# Patient Record
Sex: MALE | Race: WHITE | Employment: OTHER | URBAN - METROPOLITAN AREA
[De-identification: names, ages, dates, MRNs, and addresses within clinical notes are randomized per-mention and may not be internally consistent; named-entity substitution may affect disease eponyms.]

---

## 2017-05-17 PROCEDURE — 82570 ASSAY OF URINE CREATININE: CPT | Performed by: INTERNAL MEDICINE

## 2017-05-17 PROCEDURE — 82043 UR ALBUMIN QUANTITATIVE: CPT | Performed by: INTERNAL MEDICINE

## 2017-05-22 PROBLEM — N40.0 BENIGN NON-NODULAR PROSTATIC HYPERPLASIA WITHOUT LOWER URINARY TRACT SYMPTOMS: Status: ACTIVE | Noted: 2017-05-22

## 2017-08-24 ENCOUNTER — LAB ENCOUNTER (OUTPATIENT)
Dept: LAB | Age: 77
End: 2017-08-24
Attending: PODIATRIST
Payer: MEDICARE

## 2017-08-24 DIAGNOSIS — M10.9 GOUT: Primary | ICD-10-CM

## 2017-08-24 LAB — URIC ACID: 6.5 MG/DL (ref 2.4–8.7)

## 2017-08-24 PROCEDURE — 84550 ASSAY OF BLOOD/URIC ACID: CPT

## 2017-09-21 ENCOUNTER — OFFICE VISIT (OUTPATIENT)
Dept: OTOLARYNGOLOGY | Facility: CLINIC | Age: 77
End: 2017-09-21

## 2017-09-21 VITALS
SYSTOLIC BLOOD PRESSURE: 140 MMHG | WEIGHT: 250 LBS | TEMPERATURE: 97 F | HEIGHT: 70 IN | BODY MASS INDEX: 35.79 KG/M2 | DIASTOLIC BLOOD PRESSURE: 80 MMHG

## 2017-09-21 DIAGNOSIS — H61.23 BILATERAL IMPACTED CERUMEN: Primary | ICD-10-CM

## 2017-09-21 PROCEDURE — 69210 REMOVE IMPACTED EAR WAX UNI: CPT | Performed by: OTOLARYNGOLOGY

## 2017-09-21 PROCEDURE — 99212 OFFICE O/P EST SF 10 MIN: CPT | Performed by: OTOLARYNGOLOGY

## 2017-09-21 PROCEDURE — 99203 OFFICE O/P NEW LOW 30 MIN: CPT | Performed by: OTOLARYNGOLOGY

## 2017-09-21 RX ORDER — INDOMETHACIN 50 MG/1
CAPSULE ORAL
Refills: 1 | COMMUNITY
Start: 2017-08-24 | End: 2017-11-13 | Stop reason: ALTCHOICE

## 2017-09-22 NOTE — PROGRESS NOTES
Katherine Hammond. is a 68year old male. Patient presents with:  Ear Wax: both ears    HPI:   He is trying to get his hearing checked and needs wax removed to be able to do it.      Current Outpatient Prescriptions:  indomethacin 50 MG Oral Cap TAKE ONE CA osteoarthritis knees   • SLEEP APNEA     [facility] fax [phone]      Social History:  Smoking status: Former Smoker                                                              Packs/day: 1.00      Years: 40.00        Quit date: 6/1/2009  Smokeless tob suction. Tympanic membranes were noted to be normal. Patient tolerated the procedure well. All questions were answered. ASSESSMENT AND PLAN:   1. Bilateral impacted cerumen  Cerumen cleared bilaterally.  RTC prn  - REMOVAL IMPACTED CERUMEN REQUIRING INSTR

## 2017-09-22 NOTE — PATIENT INSTRUCTIONS
Earwax Removal    The ear canal makes earwax from the canal’s lining. The ears make wax to lubricate and protect the ear canal. The ear canal is the tube that connects the middle ear to the outside of the ear.  The wax protects the ear from bacteria, infe · Don’t use cotton swabs in your ears. Cotton swabs may push wax deeper into the ear canal or damage the eardrum.  Use cotton gauze or a wet washcloth  to gently remove wax on the outside of the ear and around the opening to the ear canal.  · Don't use any © 9531-4951 [address], [address] [place]. All rights reserved. This information is not intended as a substitute for professional medical care. Always follow your healthcare professional's instructions.

## 2017-11-13 PROBLEM — M1A.00X0 IDIOPATHIC CHRONIC GOUT WITHOUT TOPHUS, UNSPECIFIED SITE: Status: ACTIVE | Noted: 2017-11-13

## 2018-03-18 ENCOUNTER — DIAGNOSTIC TRANS (OUTPATIENT)
Dept: OTHER | Age: 78
End: 2018-03-18

## 2018-03-18 ENCOUNTER — HOSPITAL (OUTPATIENT)
Dept: OTHER | Age: 78
End: 2018-03-18
Attending: HOSPITALIST

## 2018-03-18 LAB
ANALYZER ANC (IANC): ABNORMAL
ANION GAP SERPL CALC-SCNC: 13 MMOL/L (ref 10–20)
BASOPHILS # BLD: 0 THOUSAND/MCL (ref 0–0.3)
BASOPHILS NFR BLD: 0 %
BNP SERPL-MCNC: 42 PG/ML
BUN SERPL-MCNC: 23 MG/DL (ref 6–20)
BUN/CREAT SERPL: 23 (ref 7–25)
CALCIUM SERPL-MCNC: 8.8 MG/DL (ref 8.4–10.2)
CHLORIDE: 105 MMOL/L (ref 98–107)
CO2 SERPL-SCNC: 26 MMOL/L (ref 21–32)
CREAT SERPL-MCNC: 1 MG/DL (ref 0.67–1.17)
DIFFERENTIAL METHOD BLD: ABNORMAL
EOSINOPHIL # BLD: 0.3 THOUSAND/MCL (ref 0.1–0.5)
EOSINOPHIL NFR BLD: 3 %
ERYTHROCYTE [DISTWIDTH] IN BLOOD: 12.8 % (ref 11–15)
GLUCOSE SERPL-MCNC: 174 MG/DL (ref 65–99)
HEMATOCRIT: 38.8 % (ref 39–51)
HGB BLD-MCNC: 13.6 GM/DL (ref 13–17)
LYMPHOCYTES # BLD: 2.4 THOUSAND/MCL (ref 1–4)
LYMPHOCYTES NFR BLD: 25 %
MAGNESIUM SERPL-MCNC: 1.9 MG/DL (ref 1.7–2.4)
MCH RBC QN AUTO: 32.7 PG (ref 26–34)
MCHC RBC AUTO-ENTMCNC: 35.1 GM/DL (ref 32–36.5)
MCV RBC AUTO: 93.3 FL (ref 78–100)
MONOCYTES # BLD: 0.6 THOUSAND/MCL (ref 0.3–0.9)
MONOCYTES NFR BLD: 7 %
NEUTROPHILS # BLD: 6.2 THOUSAND/MCL (ref 1.8–7.7)
NEUTROPHILS NFR BLD: 65 %
NEUTS SEG NFR BLD: ABNORMAL %
PERCENT NRBC: ABNORMAL
PLATELET # BLD: 240 THOUSAND/MCL (ref 140–450)
POTASSIUM SERPL-SCNC: 4.3 MMOL/L (ref 3.4–5.1)
PROCALCITONIN SERPL IA-MCNC: <0.05 NG/ML
RBC # BLD: 4.16 MILLION/MCL (ref 4.5–5.9)
SODIUM SERPL-SCNC: 140 MMOL/L (ref 135–145)
TROPONIN I SERPL HS-MCNC: <0.02 NG/ML
TROPONIN I SERPL HS-MCNC: <0.02 NG/ML
WBC # BLD: 9.5 THOUSAND/MCL (ref 4.2–11)

## 2018-03-19 ENCOUNTER — CHARTING TRANS (OUTPATIENT)
Dept: OTHER | Age: 78
End: 2018-03-19

## 2018-03-19 PROBLEM — J43.2 CENTRILOBULAR EMPHYSEMA (HCC): Status: ACTIVE | Noted: 2018-03-19

## 2018-03-19 PROBLEM — I70.0 THORACIC AORTIC ATHEROSCLEROSIS (HCC): Status: ACTIVE | Noted: 2018-03-19

## 2018-03-19 PROBLEM — I70.0 THORACIC AORTIC ATHEROSCLEROSIS: Status: ACTIVE | Noted: 2018-03-19

## 2018-03-19 PROBLEM — J84.9 INTERSTITIAL LUNG DISEASE (HCC): Status: ACTIVE | Noted: 2018-03-19

## 2018-03-19 LAB
CHOLEST SERPL-MCNC: 140 MG/DL
CHOLEST/HDLC SERPL: 3.6 {RATIO}
HDLC SERPL-MCNC: 39 MG/DL
LDLC SERPL CALC-MCNC: 91 MG/DL
NONHDLC SERPL-MCNC: 101 MG/DL
TRIGLYCERIDE (TRIGP): 52 MG/DL
TROPONIN I SERPL HS-MCNC: <0.02 NG/ML
TROPONIN I SERPL HS-MCNC: <0.02 NG/ML

## 2018-03-20 PROBLEM — R94.30 CARDIAC LV EJECTION FRACTION >40%: Status: ACTIVE | Noted: 2018-03-20

## 2018-03-20 PROBLEM — IMO0002 CARDIAC LV EJECTION FRACTION >40%: Status: ACTIVE | Noted: 2018-03-20

## 2018-03-20 PROBLEM — I25.10 CORONARY ARTERY DISEASE INVOLVING NATIVE CORONARY ARTERY OF NATIVE HEART WITHOUT ANGINA PECTORIS: Status: ACTIVE | Noted: 2018-03-20

## 2018-03-20 PROBLEM — Z95.5 PRESENCE OF DRUG COATED STENT IN LAD CORONARY ARTERY: Status: ACTIVE | Noted: 2018-03-20

## 2018-03-20 LAB
BUN SERPL-MCNC: 23 MG/DL (ref 6–20)
BUN/CREAT SERPL: 28 (ref 7–25)
CREAT SERPL-MCNC: 0.83 MG/DL (ref 0.67–1.17)

## 2018-03-28 PROBLEM — E78.2 MIXED HYPERLIPIDEMIA: Status: ACTIVE | Noted: 2018-03-28

## 2018-03-28 PROBLEM — Z72.0 TOBACCO ABUSE: Status: RESOLVED | Noted: 2018-03-28 | Resolved: 2018-03-28

## 2018-03-28 PROBLEM — Z72.0 TOBACCO ABUSE: Status: ACTIVE | Noted: 2018-03-28

## 2018-04-12 ENCOUNTER — HOSPITAL (OUTPATIENT)
Dept: OTHER | Age: 78
End: 2018-04-12
Attending: INTERNAL MEDICINE

## 2018-05-01 ENCOUNTER — HOSPITAL (OUTPATIENT)
Dept: OTHER | Age: 78
End: 2018-05-01
Attending: INTERNAL MEDICINE

## 2018-05-15 PROBLEM — E78.2 MIXED HYPERLIPIDEMIA: Status: RESOLVED | Noted: 2018-03-28 | Resolved: 2018-05-15

## 2018-05-15 PROBLEM — M47.812 FACET ARTHROPATHY, CERVICAL: Status: ACTIVE | Noted: 2018-05-15

## 2018-06-01 ENCOUNTER — HOSPITAL (OUTPATIENT)
Dept: OTHER | Age: 78
End: 2018-06-01
Attending: INTERNAL MEDICINE

## 2018-06-11 PROBLEM — M47.812 SPONDYLOSIS OF CERVICAL REGION WITHOUT MYELOPATHY OR RADICULOPATHY: Status: ACTIVE | Noted: 2018-06-11

## 2018-07-01 ENCOUNTER — HOSPITAL (OUTPATIENT)
Dept: OTHER | Age: 78
End: 2018-07-01
Attending: INTERNAL MEDICINE

## 2018-08-01 ENCOUNTER — HOSPITAL (OUTPATIENT)
Dept: OTHER | Age: 78
End: 2018-08-01
Attending: INTERNAL MEDICINE

## 2019-04-08 PROCEDURE — 81003 URINALYSIS AUTO W/O SCOPE: CPT | Performed by: INTERNAL MEDICINE

## 2019-07-09 ENCOUNTER — OFFICE VISIT (OUTPATIENT)
Dept: OTOLARYNGOLOGY | Facility: CLINIC | Age: 79
End: 2019-07-09
Payer: MEDICARE

## 2019-07-09 VITALS
WEIGHT: 230 LBS | DIASTOLIC BLOOD PRESSURE: 66 MMHG | HEIGHT: 70 IN | BODY MASS INDEX: 32.93 KG/M2 | SYSTOLIC BLOOD PRESSURE: 132 MMHG | TEMPERATURE: 98 F

## 2019-07-09 DIAGNOSIS — H61.23 BILATERAL IMPACTED CERUMEN: Primary | ICD-10-CM

## 2019-07-09 PROCEDURE — 69210 REMOVE IMPACTED EAR WAX UNI: CPT | Performed by: OTOLARYNGOLOGY

## 2019-07-10 NOTE — PROGRESS NOTES
Dario Bai is a 78year old male. Patient presents with:  Cerumen Impaction: Bilateral ear cleaning     HPI:   Is ears feel as though they are blocked with wax once again.     Current Outpatient Medications:  Metoprolol Succinate  MG Oral Tabl Rfl:    AMLODIPINE BESYLATE 10 MG Oral Tab TAKE 1 TABLET DAILY Disp: 90 tablet Rfl: 2      Past Medical History:   Diagnosis Date   • CAD involving native coronary artery of native heart without angina pectoris 3/20/2018   • Cardiac LV ejection fraction 5 Turbinates - Normal   Neurological Normal Memory - Normal. Cranial nerves - Cranial nerves II through XII grossly intact.    Neck Exam     Psychiatric     Lymph Detail     Eyes     Ears Normal Inspection - Right: Normal, Left: Normal. Canal - Left: Normal.

## 2019-11-15 ENCOUNTER — OFFICE VISIT (OUTPATIENT)
Dept: OTOLARYNGOLOGY | Facility: CLINIC | Age: 79
End: 2019-11-15
Payer: MEDICARE

## 2019-11-15 VITALS
WEIGHT: 230 LBS | SYSTOLIC BLOOD PRESSURE: 120 MMHG | TEMPERATURE: 97 F | BODY MASS INDEX: 32.93 KG/M2 | HEIGHT: 70 IN | DIASTOLIC BLOOD PRESSURE: 75 MMHG

## 2019-11-15 DIAGNOSIS — H61.23 BILATERAL IMPACTED CERUMEN: Primary | ICD-10-CM

## 2019-11-15 PROCEDURE — 69210 REMOVE IMPACTED EAR WAX UNI: CPT | Performed by: OTOLARYNGOLOGY

## 2019-11-15 NOTE — PROGRESS NOTES
Milan Noe. is a 78year old male.  Patient presents with:  Ear Problem: Bilateral ear wax removal     HPI:   Ears are blocked with wax again  Current Outpatient Medications   Medication Sig Dispense Refill   • PANTOPRAZOLE SODIUM 40 MG Oral Tab EC T Medical History:   Diagnosis Date   • CAD involving native coronary artery of native heart without angina pectoris 3/20/2018   • Cardiac LV ejection fraction 50-55% per 2018 angio  3/20/2018   • Centrilobular emphysema ([address] 3/19/2018   • Diabetes mellitus Normal Inspection - Right: Normal, Left: Normal. Canal - Left: Normal. TM - Right: Normal, Left: Normal.   Cerumen impaction bilaterally     Procedure:  After informed consent was obtained, the patients ears were examined under the operating microscope.  Ce

## 2020-05-25 PROBLEM — R94.30 CARDIAC LV EJECTION FRACTION >40%: Status: RESOLVED | Noted: 2018-03-20 | Resolved: 2020-05-25

## 2020-05-25 PROBLEM — I25.10 CORONARY ARTERY DISEASE INVOLVING NATIVE CORONARY ARTERY OF NATIVE HEART WITHOUT ANGINA PECTORIS: Status: RESOLVED | Noted: 2018-03-20 | Resolved: 2020-05-25

## 2020-05-25 PROBLEM — M47.812 FACET ARTHRITIS OF CERVICAL REGION: Status: ACTIVE | Noted: 2017-11-13

## 2020-05-25 PROBLEM — IMO0002 CARDIAC LV EJECTION FRACTION >40%: Status: RESOLVED | Noted: 2018-03-20 | Resolved: 2020-05-25

## 2020-07-21 PROBLEM — Z95.5 HISTORY OF CORONARY ARTERY STENT PLACEMENT: Status: ACTIVE | Noted: 2018-03-20

## 2020-07-21 PROBLEM — I25.10 CAD IN NATIVE ARTERY: Status: ACTIVE | Noted: 2018-03-20

## 2020-08-11 ENCOUNTER — OFFICE VISIT (OUTPATIENT)
Dept: OTOLARYNGOLOGY | Facility: CLINIC | Age: 80
End: 2020-08-11
Payer: MEDICARE

## 2020-08-11 VITALS
BODY MASS INDEX: 33 KG/M2 | WEIGHT: 230 LBS | HEART RATE: 70 BPM | DIASTOLIC BLOOD PRESSURE: 61 MMHG | SYSTOLIC BLOOD PRESSURE: 113 MMHG

## 2020-08-11 DIAGNOSIS — H61.23 BILATERAL IMPACTED CERUMEN: Primary | ICD-10-CM

## 2020-08-11 PROCEDURE — 69210 REMOVE IMPACTED EAR WAX UNI: CPT | Performed by: OTOLARYNGOLOGY

## 2020-08-11 NOTE — PROGRESS NOTES
Martina Nesbitt is a [de-identified]year old male. Patient presents with:  Ear Wax    HPI:   Ears are feeling like they are blocked with wax. He got heairing aids within the last six months.    Current Outpatient Medications   Medication Sig Dispense Refill   • Pant OSTEOARTHRITIS     osteoarthritis knees   • Presence of drug coated stent in prox & Mid LAD coronary artery 03/19/2018 3/20/2018    2.75 x 15 mm Xcience drug-eluting stent into the mid LAD 3.5 x 12 mm Xcience drug-eluting stent into the proximal LAD   • SL patient indicates understanding of these issues and agrees to the plan. Nehemias Abreu MD  8/11/2020  10:07 AM

## 2020-12-11 PROBLEM — R73.03 BORDERLINE DIABETES MELLITUS: Status: ACTIVE | Noted: 2020-12-11

## 2020-12-11 PROBLEM — E78.6 ELEVATED RATIO OF CHOLESTEROL TO HIGH DENSITY LIPOPROTEIN (HDL): Status: ACTIVE | Noted: 2020-12-11

## 2020-12-11 PROBLEM — K44.9 HIATAL HERNIA: Status: ACTIVE | Noted: 2020-12-11

## 2020-12-11 PROBLEM — I10 HYPERTENSION: Status: ACTIVE | Noted: 2020-12-11

## 2020-12-25 ENCOUNTER — APPOINTMENT (OUTPATIENT)
Dept: CT IMAGING | Age: 80
End: 2020-12-25
Attending: EMERGENCY MEDICINE

## 2020-12-25 ENCOUNTER — HOSPITAL ENCOUNTER (EMERGENCY)
Age: 80
Discharge: HOME | End: 2020-12-25
Attending: EMERGENCY MEDICINE

## 2020-12-25 ENCOUNTER — APPOINTMENT (OUTPATIENT)
Dept: GENERAL RADIOLOGY | Age: 80
End: 2020-12-25
Attending: EMERGENCY MEDICINE

## 2020-12-25 VITALS
HEIGHT: 70 IN | SYSTOLIC BLOOD PRESSURE: 130 MMHG | WEIGHT: 230.82 LBS | OXYGEN SATURATION: 93 % | HEART RATE: 71 BPM | DIASTOLIC BLOOD PRESSURE: 68 MMHG | RESPIRATION RATE: 20 BRPM | BODY MASS INDEX: 33.05 KG/M2 | TEMPERATURE: 97.9 F

## 2020-12-25 DIAGNOSIS — U07.1 COVID-19 VIRUS INFECTION: Primary | ICD-10-CM

## 2020-12-25 LAB
ALBUMIN SERPL-MCNC: 3.2 G/DL (ref 3.6–5.1)
ALBUMIN/GLOB SERPL: 0.7 {RATIO} (ref 1–2.4)
ALP SERPL-CCNC: 99 UNITS/L (ref 45–117)
ALT SERPL-CCNC: 15 UNITS/L
ANION GAP SERPL CALC-SCNC: 8 MMOL/L (ref 10–20)
AST SERPL-CCNC: 23 UNITS/L
BASOPHILS # BLD: 0 K/MCL (ref 0–0.3)
BASOPHILS NFR BLD: 0 %
BILIRUB SERPL-MCNC: 0.4 MG/DL (ref 0.2–1)
BUN SERPL-MCNC: 18 MG/DL (ref 6–20)
BUN/CREAT SERPL: 20 (ref 7–25)
CALCIUM SERPL-MCNC: 8.3 MG/DL (ref 8.4–10.2)
CHLORIDE SERPL-SCNC: 109 MMOL/L (ref 98–107)
CO2 SERPL-SCNC: 26 MMOL/L (ref 21–32)
CREAT SERPL-MCNC: 0.9 MG/DL (ref 0.67–1.17)
D DIMER PPP FEU-MCNC: 1.05 MG/L (FEU)
DEPRECATED RDW RBC: 42.8 FL (ref 39–50)
EOSINOPHIL # BLD: 0.1 K/MCL (ref 0–0.5)
EOSINOPHIL NFR BLD: 2 %
ERYTHROCYTE [DISTWIDTH] IN BLOOD: 12.4 % (ref 11–15)
FASTING DURATION TIME PATIENT: ABNORMAL H
GFR SERPLBLD BASED ON 1.73 SQ M-ARVRAT: 80 ML/MIN/1.73M2
GLOBULIN SER-MCNC: 4.6 G/DL (ref 2–4)
GLUCOSE SERPL-MCNC: 114 MG/DL (ref 65–99)
HCT VFR BLD CALC: 37 % (ref 39–51)
HGB BLD-MCNC: 12.4 G/DL (ref 13–17)
IMM GRANULOCYTES # BLD AUTO: 0.1 K/MCL (ref 0–0.2)
IMM GRANULOCYTES # BLD: 1 %
LYMPHOCYTES # BLD: 1.2 K/MCL (ref 1–4)
LYMPHOCYTES NFR BLD: 19 %
MCH RBC QN AUTO: 31.5 PG (ref 26–34)
MCHC RBC AUTO-ENTMCNC: 33.5 G/DL (ref 32–36.5)
MCV RBC AUTO: 93.9 FL (ref 78–100)
MONOCYTES # BLD: 0.9 K/MCL (ref 0.3–0.9)
MONOCYTES NFR BLD: 13 %
NEUTROPHILS # BLD: 4.4 K/MCL (ref 1.8–7.7)
NEUTROPHILS NFR BLD: 65 %
NRBC BLD MANUAL-RTO: 0 /100 WBC
PLATELET # BLD AUTO: 187 K/MCL (ref 140–450)
POTASSIUM SERPL-SCNC: 3.8 MMOL/L (ref 3.4–5.1)
PROT SERPL-MCNC: 7.8 G/DL (ref 6.4–8.2)
RAINBOW EXTRA TUBES HOLD SPECIMEN: NORMAL
RBC # BLD: 3.94 MIL/MCL (ref 4.5–5.9)
SODIUM SERPL-SCNC: 139 MMOL/L (ref 135–145)
TROPONIN I SERPL HS-MCNC: <0.02 NG/ML
WBC # BLD: 6.7 K/MCL (ref 4.2–11)

## 2020-12-25 PROCEDURE — 93005 ELECTROCARDIOGRAM TRACING: CPT | Performed by: EMERGENCY MEDICINE

## 2020-12-25 PROCEDURE — 36415 COLL VENOUS BLD VENIPUNCTURE: CPT

## 2020-12-25 PROCEDURE — 85379 FIBRIN DEGRADATION QUANT: CPT | Performed by: EMERGENCY MEDICINE

## 2020-12-25 PROCEDURE — 71275 CT ANGIOGRAPHY CHEST: CPT

## 2020-12-25 PROCEDURE — 84484 ASSAY OF TROPONIN QUANT: CPT | Performed by: EMERGENCY MEDICINE

## 2020-12-25 PROCEDURE — 71045 X-RAY EXAM CHEST 1 VIEW: CPT

## 2020-12-25 PROCEDURE — 80053 COMPREHEN METABOLIC PANEL: CPT | Performed by: EMERGENCY MEDICINE

## 2020-12-25 PROCEDURE — 85025 COMPLETE CBC W/AUTO DIFF WBC: CPT | Performed by: EMERGENCY MEDICINE

## 2020-12-25 PROCEDURE — 99285 EMERGENCY DEPT VISIT HI MDM: CPT

## 2020-12-25 PROCEDURE — 10002805 HB CONTRAST AGENT: Performed by: EMERGENCY MEDICINE

## 2020-12-25 RX ADMIN — IOHEXOL 100 ML: 350 INJECTION, SOLUTION INTRAVENOUS at 14:30

## 2020-12-25 SDOH — HEALTH STABILITY: MENTAL HEALTH: HOW OFTEN DO YOU HAVE A DRINK CONTAINING ALCOHOL?: NEVER

## 2020-12-25 ASSESSMENT — PAIN SCALES - GENERAL: PAINLEVEL_OUTOF10: 0

## 2020-12-27 LAB
ATRIAL RATE (BPM): 65
P AXIS (DEGREES): 46
PR-INTERVAL (MSEC): 142
QRS-INTERVAL (MSEC): 98
QT-INTERVAL (MSEC): 418
QTC: 435
R AXIS (DEGREES): 18
REPORT TEXT: NORMAL
T AXIS (DEGREES): 54
VENTRICULAR RATE EKG/MIN (BPM): 65

## 2020-12-28 ENCOUNTER — TELEPHONE (OUTPATIENT)
Dept: SCHEDULING | Age: 80
End: 2020-12-28

## 2020-12-30 ENCOUNTER — TELEPHONE (OUTPATIENT)
Dept: SCHEDULING | Age: 80
End: 2020-12-30

## 2021-01-25 ENCOUNTER — OFFICE VISIT (OUTPATIENT)
Dept: OTOLARYNGOLOGY | Facility: CLINIC | Age: 81
End: 2021-01-25
Payer: MEDICARE

## 2021-01-25 VITALS
TEMPERATURE: 98 F | HEIGHT: 70 IN | DIASTOLIC BLOOD PRESSURE: 70 MMHG | BODY MASS INDEX: 33.55 KG/M2 | SYSTOLIC BLOOD PRESSURE: 122 MMHG | WEIGHT: 234.38 LBS

## 2021-01-25 DIAGNOSIS — H61.23 BILATERAL IMPACTED CERUMEN: Primary | ICD-10-CM

## 2021-01-25 PROCEDURE — 69210 REMOVE IMPACTED EAR WAX UNI: CPT | Performed by: OTOLARYNGOLOGY

## 2021-01-25 NOTE — PROGRESS NOTES
Xenia Fuentes. is a [de-identified]year old male.  Patient presents with:  Ear Wax: both ears    HPI:   Is ears are blocked with wax and his hearing aids are not working as well  Current Outpatient Medications   Medication Sig Dispense Refill   • Metoprolol [facility] • OSTEOARTHRITIS     osteoarthritis knees   • Presence of drug coated stent in prox & Mid LAD coronary artery 03/19/2018 3/20/2018    2.75 x 15 mm Xcience drug-eluting stent into the mid LAD 3.5 x 12 mm Xcience drug-eluting stent into the proximal LAD ears.  Return as needed      The patient indicates understanding of these issues and agrees to the plan. Nehemias Ramos MD  1/25/2021  3:59 PM

## 2021-04-14 ENCOUNTER — IMMUNIZATION (OUTPATIENT)
Dept: LAB | Age: 81
End: 2021-04-14

## 2021-04-14 DIAGNOSIS — Z23 NEED FOR VACCINATION: Primary | ICD-10-CM

## 2021-04-14 PROCEDURE — 91301 COVID-19 MODERNA VACCINE: CPT

## 2021-04-14 PROCEDURE — 0011A COVID-19 MODERNA VACCINE: CPT

## 2021-05-18 ENCOUNTER — IMMUNIZATION (OUTPATIENT)
Dept: LAB | Age: 81
End: 2021-05-18
Attending: HOSPITALIST

## 2021-05-18 DIAGNOSIS — Z23 NEED FOR VACCINATION: Primary | ICD-10-CM

## 2021-05-18 PROCEDURE — 0012A COVID-19 MODERNA VACCINE: CPT

## 2021-05-18 PROCEDURE — 91301 COVID-19 MODERNA VACCINE: CPT

## 2021-07-07 PROBLEM — Z95.5 HISTORY OF CORONARY ARTERY STENT PLACEMENT: Status: RESOLVED | Noted: 2018-03-20 | Resolved: 2021-07-07

## 2021-07-07 PROBLEM — I10 HYPERTENSION: Status: RESOLVED | Noted: 2020-12-11 | Resolved: 2021-07-07

## 2021-07-07 PROBLEM — K44.9 HIATAL HERNIA: Status: RESOLVED | Noted: 2020-12-11 | Resolved: 2021-07-07

## 2021-07-07 PROBLEM — R73.03 BORDERLINE DIABETES MELLITUS: Status: RESOLVED | Noted: 2020-12-11 | Resolved: 2021-07-07

## 2021-07-27 ENCOUNTER — OFFICE VISIT (OUTPATIENT)
Dept: OTOLARYNGOLOGY | Facility: CLINIC | Age: 81
End: 2021-07-27
Payer: MEDICARE

## 2021-07-27 VITALS — DIASTOLIC BLOOD PRESSURE: 67 MMHG | TEMPERATURE: 97 F | SYSTOLIC BLOOD PRESSURE: 131 MMHG

## 2021-07-27 DIAGNOSIS — H61.23 BILATERAL IMPACTED CERUMEN: Primary | ICD-10-CM

## 2021-07-27 PROCEDURE — 69210 REMOVE IMPACTED EAR WAX UNI: CPT | Performed by: OTOLARYNGOLOGY

## 2021-07-27 NOTE — PROGRESS NOTES
Sophia Lopez. is a 80year old male.  Patient presents with:  Cerumen Impaction: patient is here for ear cleaning    HPI:   Is ears are blocked with wax and he cannot hear very well  Current Outpatient Medications   Medication Sig Dispense Refill   • o drug-eluting stent into the proximal LAD   • SLEEP APNEA     [facility] fax [phone]      Social History:  Social History    Tobacco Use      Smoking status: Former Smoker        Packs/day: 1.00        Years: 40.00        Pack years: 36        Quit date these issues and agrees to the plan. Nehemias Abreu MD  7/27/2021  9:08 AM

## 2021-12-10 PROBLEM — M46.92 UNSPECIFIED INFLAMMATORY SPONDYLOPATHY, CERVICAL REGION: Status: ACTIVE | Noted: 2021-12-10

## 2021-12-10 PROBLEM — M46.92 UNSPECIFIED INFLAMMATORY SPONDYLOPATHY, CERVICAL REGION (HCC): Status: ACTIVE | Noted: 2021-12-10

## 2022-01-28 ENCOUNTER — OFFICE VISIT (OUTPATIENT)
Dept: OTOLARYNGOLOGY | Facility: CLINIC | Age: 82
End: 2022-01-28
Payer: MEDICARE

## 2022-01-28 VITALS — WEIGHT: 225 LBS | BODY MASS INDEX: 32.21 KG/M2 | HEIGHT: 70 IN

## 2022-01-28 DIAGNOSIS — H61.23 BILATERAL IMPACTED CERUMEN: Primary | ICD-10-CM

## 2022-01-28 PROCEDURE — 99212 OFFICE O/P EST SF 10 MIN: CPT | Performed by: OTOLARYNGOLOGY

## 2022-01-28 NOTE — PROGRESS NOTES
Anshul Hough. is a 80year old male. Patient presents with:  Cerumen Impaction: Patient Presents with: Bilateral ear wax Impaction       HISTORY OF PRESENT ILLNESS    He presents with decreased hearing from his ears.   History of seeing SVT in the pas anesthesia/anoscopy. Incision and drainage of perirectal abscess. Placement of seton . Excision of anal tags. Destruction of internal hemorrhoids. Surgery done at [facility] on 3/12/10.    • OTHER SURGICAL HISTORY      left knee arthroscopy   • PATIENT DOCUMENTED Head/Face Normal Facial features - Normal. Eyebrows - Normal. Skull - Normal.             Ears Normal Inspection - Right: Normal, Left: Normal. Canal - Right: Normal, Left: Normal. TM - Right: Normal, Left: Normal.   Skin Normal Inspection - Normal. (two) times daily before meals. , Disp: 1 kit, Rfl: 0  •  aspirin 81 MG Oral Chew Tab, Chew 81 mg by mouth daily. , Disp: , Rfl:   •  Omega-3 Fatty Acids (FISH OIL) 1000 MG Oral Cap, ONE DAILY, Disp: , Rfl:   •  Multiple Vitamin (MULTI VITAMIN MENS OR), ONE

## 2022-02-15 PROBLEM — Z95.5 HISTORY OF CORONARY ARTERY STENT PLACEMENT: Status: RESOLVED | Noted: 2018-03-20 | Resolved: 2022-02-15

## 2022-05-24 ENCOUNTER — OFFICE VISIT (OUTPATIENT)
Dept: OTOLARYNGOLOGY | Facility: CLINIC | Age: 82
End: 2022-05-24
Payer: MEDICARE

## 2022-05-24 VITALS — TEMPERATURE: 97 F | BODY MASS INDEX: 32.93 KG/M2 | HEIGHT: 70 IN | WEIGHT: 230 LBS

## 2022-05-24 DIAGNOSIS — H61.23 BILATERAL IMPACTED CERUMEN: Primary | ICD-10-CM

## 2022-05-24 PROCEDURE — 69210 REMOVE IMPACTED EAR WAX UNI: CPT | Performed by: OTOLARYNGOLOGY

## 2022-10-11 ENCOUNTER — OFFICE VISIT (OUTPATIENT)
Dept: OTOLARYNGOLOGY | Facility: CLINIC | Age: 82
End: 2022-10-11
Payer: MEDICARE

## 2022-10-11 DIAGNOSIS — H61.23 BILATERAL IMPACTED CERUMEN: Primary | ICD-10-CM

## 2022-10-11 PROCEDURE — 69210 REMOVE IMPACTED EAR WAX UNI: CPT | Performed by: OTOLARYNGOLOGY

## 2023-02-14 ENCOUNTER — OFFICE VISIT (OUTPATIENT)
Dept: OTOLARYNGOLOGY | Facility: CLINIC | Age: 83
End: 2023-02-14

## 2023-02-14 DIAGNOSIS — H61.23 BILATERAL IMPACTED CERUMEN: Primary | ICD-10-CM

## 2023-02-14 PROCEDURE — 69210 REMOVE IMPACTED EAR WAX UNI: CPT | Performed by: OTOLARYNGOLOGY

## 2023-02-14 RX ORDER — NIFEDIPINE 30 MG/1
30 TABLET, FILM COATED, EXTENDED RELEASE ORAL DAILY
COMMUNITY
Start: 2023-02-01

## 2023-02-14 RX ORDER — OMEPRAZOLE 20 MG/1
20 CAPSULE, DELAYED RELEASE ORAL DAILY
COMMUNITY
Start: 2023-01-27

## 2023-02-14 RX ORDER — LOSARTAN POTASSIUM AND HYDROCHLOROTHIAZIDE 12.5; 1 MG/1; MG/1
1 TABLET ORAL DAILY
COMMUNITY
Start: 2023-01-27

## 2023-05-30 ENCOUNTER — OFFICE VISIT (OUTPATIENT)
Dept: OTOLARYNGOLOGY | Facility: CLINIC | Age: 83
End: 2023-05-30

## 2023-05-30 VITALS — BODY MASS INDEX: 33 KG/M2 | WEIGHT: 230 LBS | TEMPERATURE: 98 F

## 2023-05-30 DIAGNOSIS — H61.23 BILATERAL IMPACTED CERUMEN: Primary | ICD-10-CM

## 2023-05-30 PROCEDURE — 69210 REMOVE IMPACTED EAR WAX UNI: CPT | Performed by: OTOLARYNGOLOGY

## 2023-05-30 RX ORDER — METHYLPREDNISOLONE 4 MG/1
1 TABLET ORAL AS DIRECTED
COMMUNITY
Start: 2023-04-26

## 2023-05-30 RX ORDER — BENZONATATE 200 MG/1
200 CAPSULE ORAL 3 TIMES DAILY PRN
COMMUNITY
Start: 2023-04-26

## 2023-06-30 NOTE — LETTER
[facility]     PICC INSERTION CONSENT     I agree to have a Peripherally Inserted Central Catheter (PICC) placed in my arm.   1. The PICC insertion procedure, care, maintenance, risks, benefits, and complications have been explained to me by my physician, ________________________, and I understand them.   2. I understand that this may not be the only way I can receive my medication. I understand that my physician has determined that the PICC would be the safest and most effective means of administering my medication at this time. If there are other options of giving medication into my veins those options have been explained to me by my physician and I have chosen this one.   3. I realize a nurse who has been specially trained and certified by the hospital and ’s representative to insert a PICC will perform this procedure. My catheter will be inserted by _____________________________.   4. I have been informed by my doctor of the nature and purpose of this procedure and the risks involved and the possibility of complications. I realize that this is an invasive procedure and has certain risks such as air embolism (air entering the catheter or my vein), arterial puncture (a tearing of one of my arteries), infection, irregular heartbeat and venous thrombosis (a blood clot in a vein) nerve injury and fracture of the catheter with or without migration.   5. In order to numb the area where the line will be placed, a small amount of anesthetic medication will be injected as ordered by my physician.   6. I understand that while the catheter will be placed in my upper arm the end of the catheter will come to rest in an area near my heart.     7. The person performing this procedure has discussed the potential benefits, risks, and side effects of the PICC; the likelihood of achieving goals; and potential problems that might occur during recuperation. They also discussed reasonable alternatives to  the PICC, including risks, benefits, and side effects related to the alternatives and risks related to not receiving this procedure.    8.  I have expressed any questions about this procedure to my physician or the PICC Proceduralist and he/she has answered them.  I certify that I have read and understand this consent to the insertion of a PICC.      _________________________________________________________   Date     Time     Patient/Guardian Signature       ____________________________________   Printed name of Patient/Guardian          ________________________________________________________________    Date        Time                   Witnessing RN Signature      Patient Name: Raul GALVNA Abdiaziz Hadadd     : 1940                 Printed: 2024     Medical Record #: [id number]   [Negative] : Genitourinary

## 2023-10-24 ENCOUNTER — OFFICE VISIT (OUTPATIENT)
Dept: OTOLARYNGOLOGY | Facility: CLINIC | Age: 83
End: 2023-10-24

## 2023-10-24 DIAGNOSIS — H61.23 BILATERAL IMPACTED CERUMEN: Primary | ICD-10-CM

## 2023-10-24 PROCEDURE — 69210 REMOVE IMPACTED EAR WAX UNI: CPT | Performed by: OTOLARYNGOLOGY

## 2023-10-24 RX ORDER — SILDENAFIL 50 MG/1
50 TABLET, FILM COATED ORAL
COMMUNITY
Start: 2023-09-05

## 2023-10-24 NOTE — PROGRESS NOTES
Alison Hernandez is a 80year old male.   Patient presents with:  Ear Wax: Patient here for routine ear cleaning        HISTORY OF PRESENT ILLNESS    Patient presents for cerumen removal. No other complaints or concerns at this time    Social History    Socioeconomic History      Marital status:     Tobacco Use      Smoking status: Former        Packs/day: 1.00        Years: 40.00        Additional pack years: 0.00        Total pack years: 40.00        Types: Cigarettes        Quit date: 2009        Years since quittin.4      Smokeless tobacco: Never      Tobacco comments: has been a 3 ppd    Vaping Use      Vaping Use: Never used    Substance and Sexual Activity      Alcohol use: Yes        Comment: occasional intake      Drug use: No      Family History   Problem Relation Age of Onset    Cancer Mother         lung    Obesity Son     Obesity Sister     Lipids Sister     Obesity Son        Past Medical History:   Diagnosis Date    Abdominal aortic aneurysm (AAA) 3.0 cm to 5.0 cm in diameter in female [facility])     CAD involving native coronary artery of native heart without angina pectoris 3/20/2018    Cardiac LV ejection fraction 50-55% per 2018 angio  3/20/2018    Centrilobular emphysema ([address] 3/19/2018    Coronary atherosclerosis     COVID 2020    Diabetes mellitus ([address]     Gastric ulcer 2008    Healed     GOUT     High blood pressure     High cholesterol     Hypercholesterolemia     HYPERLIPIDEMIA     HYPERTENSION     Hypertension     OBESITY     OSTEOARTHRITIS     osteoarthritis knees    Prediabetes     Presence of drug coated stent in prox & Mid LAD coronary artery 2018 3/20/2018    2.75 x 15 mm Xcience drug-eluting stent into the mid LAD 3.5 x 12 mm Xcience drug-eluting stent into the proximal LAD    Pulmonary emphysema ([address]     SLEEP APNEA     [facility] fax [phone]    Sleep apnea        Past Surgical History:   Procedure Laterality Date    CATH BARE METAL STENT (BMS) COLONOSCOPY  2009= Declines repeat    1285, complicated by anal fissure    COLONOSCOPY      COLONOSCOPY  03/2022    one small TA, int hem, can consider d/cing surveillance    COLONOSCOPY N/A 2/28/2022    Procedure: COLONOSCOPY,  with polypectomy;  Surgeon: Abelardo Lyn MD;  Location: [facility]  Dr Melina Bird 3/12/10 cdh     Rectal exam under anesthesia/anoscopy. Incision and drainage of perirectal abscess. Placement of seton . Excision of anal tags. Destruction of internal hemorrhoids. Surgery done at [facility] on 3/12/10. OTHER SURGICAL HISTORY      left knee arthroscopy    PATIENT DOCUMENTED NOT TO HAVE EXPERIENCED ANY OF THE FOLLOWING EVENTS N/A 2/18/2015    Procedure: ESOPHAGOGASTRODUODENOSCOPY, POSSIBLE BIOPSY, POSSIBLE POLYPECTOMY 41607;  Surgeon: Smooth Wray MD;  Location: [address] [place] [address] IV ANTIBIOTIC SURGICAL SITE INFECTION PROPHYLAXIS. N/A 2/18/2015    Procedure: ESOPHAGOGASTRODUODENOSCOPY, POSSIBLE BIOPSY, POSSIBLE POLYPECTOMY 90147;  Surgeon: Smooth Wray MD;  Location: [place] [address] GI ENDOSCOPY PERFORMED  2008, 2/27/2009    UPPER GI ENDOSCOPY,BIOPSY  2/18/15=Gastritis. H pylori negative, normal SB Bx    UPPER GI ENDOSCOPY,BIOPSY N/A 2/18/2015    Procedure: ESOPHAGOGASTRODUODENOSCOPY, POSSIBLE BIOPSY, POSSIBLE POLYPECTOMY 54150;  Surgeon: Smooth Wray MD;  Location: [address] [place] [address]    UPPER GI ENDOSCOPY,EXAM         REVIEW OF SYSTEMS    System Neg/Pos Details   Constitutional Negative Fatigue, fever and weight loss. ENMT Negative Drooling. Eyes Negative Blurred vision and vision changes. Respiratory Negative Dyspnea and wheezing. Cardio Negative Chest pain, irregular heartbeat/palpitations and syncope. GI Negative Abdominal pain and diarrhea. Endocrine Negative Cold intolerance and heat intolerance. Neuro Negative Tremors.    Psych Negative Anxiety and depression. Integumentary Negative Frequent skin infections, pigment change and rash. Hema/Lymph Negative Easy bleeding and easy bruising. PHYSICAL EXAM    There were no vitals taken for this visit. Constitutional Normal Overall appearance - Normal.        Neck Exam Normal Inspection - Normal. Palpation - Normal. Parotid gland - Normal. Thyroid gland - Normal.             Head/Face Normal Facial features - Normal. Eyebrows - Normal. Skull - Normal.             Ears Normal Inspection - Right: Normal, Left: Normal. Canal - Right: Normal, Left: Normal. TM - Right: Normal, Left: Normal.   Skin Normal Inspection - Normal.                              Canals:  Right: Canal reveals cerumen impaction,   Left: Canal reveals cerumen impaction,     Tympanic Membranes:  Right: Normal tympanic membrane. Left: Normal tympanic membrane. TM Visualized Method:   Right TM examined via otomicroscopy. Left TM examined via otomicroscopy. PROCEDURE:    Removal of cerumen impaction   The cerumen impaction was completely removed using microscopy. Removal was completed by using acurette and/or suction. Comments: Return to clinic as needed. Avoid q-tips, water precautions and use over the counter wax remedies as needed. Current Outpatient Medications:     Sildenafil Citrate 50 MG Oral Tab, Take 1 tablet (50 mg total) by mouth daily as needed. , Disp: , Rfl:     benzonatate 200 MG Oral Cap, Take 1 capsule (200 mg total) by mouth 3 (three) times daily as needed. , Disp: , Rfl:     methylPREDNISolone 4 MG Oral Tab, Take 1 tablet (4 mg total) by mouth As Directed., Disp: , Rfl:     Losartan Potassium-HCTZ 100-12.5 MG Oral Tab, Take 1 tablet by mouth daily. , Disp: , Rfl:     NIFEdipine ER 30 MG Oral Tablet 24 Hr, Take 1 tablet (30 mg total) by mouth daily. , Disp: , Rfl:     omeprazole 20 MG Oral Capsule Delayed Release, Take 1 capsule (20 mg total) by mouth daily. , Disp: , Rfl:     losartan 100 MG Oral Tab, Take 1 tablet (100 mg total) by mouth daily. , Disp: 30 tablet, Rfl: 1    losartan 100 MG Oral Tab, Take 1 tablet (100 mg total) by mouth daily. , Disp: 90 tablet, Rfl: 3    METOPROLOL SUCCINATE 100 MG Oral Tablet 24 Hr, TAKE 1 TABLET BY MOUTH EVERY DAY, Disp: 90 tablet, Rfl: 1    amLODIPine 5 MG Oral Tab, Take 1 tablet (5 mg total) by mouth daily. , Disp: 90 tablet, Rfl: 3    atorvastatin 20 MG Oral Tab, Take 1 tablet (20 mg total) by mouth daily. , Disp: 90 tablet, Rfl: 3    allopurinol 100 MG Oral Tab, Take 1 tablet (100 mg total) by mouth daily. , Disp: , Rfl:     Glucose Blood (CONTOUR NEXT TEST) In Vitro Strip, TEST TWICE DAILY BEFORE MEALS AS DIRECTED, Disp: 200 strip, Rfl: 3    Microlet Lancets Does not apply Misc, USE AS DIRECTED TWICE DAILY, Disp: 200 each, Rfl: 3    Blood Glucose Monitoring Suppl ([employer] Player CONTOUR NEXT MONITOR) w/Device Does not apply Kit, 1 Units by Does not apply route 2 (two) times daily before meals. , Disp: 1 kit, Rfl: 0    aspirin 81 MG Oral Chew Tab, Chew 1 tablet (81 mg total) by mouth daily. , Disp: , Rfl:     Omega-3 Fatty Acids (FISH OIL) 1000 MG Oral Cap, ONE DAILY, Disp: , Rfl:     Multiple Vitamin (MULTI VITAMIN MENS OR), ONE DAILY, Disp: , Rfl:     cholecalciferol (VITAMIN D3) 5000 units Oral Cap, Take 1 capsule (5,000 Units total) by mouth daily. , Disp: 30 capsule, Rfl: 11  ASSESSMENT AND PLAN    1. Bilateral impacted cerumen    - REMOVAL IMPACTED CERUMEN REQUIRING INSTRUMENTATION, UNILATERAL      All cerumen was removed using microscopy. I have asked the patient to return to see me as needed for repeat cerumen removal in the future. Lynnette Ross.  Darlene Halsted, MD    10/24/2023    8:50 AM

## 2024-01-27 ENCOUNTER — APPOINTMENT (OUTPATIENT)
Dept: CT IMAGING | Facility: HOSPITAL | Age: 84
End: 2024-01-27
Attending: EMERGENCY MEDICINE
Payer: MEDICARE

## 2024-01-27 ENCOUNTER — APPOINTMENT (OUTPATIENT)
Dept: GENERAL RADIOLOGY | Facility: HOSPITAL | Age: 84
End: 2024-01-27
Attending: EMERGENCY MEDICINE
Payer: MEDICARE

## 2024-01-27 ENCOUNTER — HOSPITAL ENCOUNTER (INPATIENT)
Facility: HOSPITAL | Age: 84
LOS: 2 days | Discharge: HOME HEALTH | End: 2024-01-30
Attending: EMERGENCY MEDICINE | Admitting: INTERNAL MEDICINE
Payer: MEDICARE

## 2024-01-27 ENCOUNTER — HOSPITAL ENCOUNTER (INPATIENT)
Facility: HOSPITAL | Age: 84
LOS: 2 days | Discharge: HOME | End: 2024-01-30
Attending: EMERGENCY MEDICINE | Admitting: INTERNAL MEDICINE
Payer: MEDICARE

## 2024-01-27 DIAGNOSIS — K61.0 PERIANAL ABSCESS: ICD-10-CM

## 2024-01-27 DIAGNOSIS — K52.9 PROCTOCOLITIS: ICD-10-CM

## 2024-01-27 DIAGNOSIS — N17.9 ACUTE KIDNEY INJURY (HCC): Primary | ICD-10-CM

## 2024-01-27 LAB
ALBUMIN SERPL-MCNC: 4.6 G/DL (ref 3.2–4.8)
ALBUMIN/GLOB SERPL: 1.2 {RATIO} (ref 1–2)
ALP LIVER SERPL-CCNC: 90 U/L
ALT SERPL-CCNC: 17 U/L
ANION GAP SERPL CALC-SCNC: 11 MMOL/L (ref 0–18)
AST SERPL-CCNC: 38 U/L (ref ?–34)
BILIRUB SERPL-MCNC: 0.6 MG/DL (ref 0.2–1.1)
BILIRUB UR QL: NEGATIVE
BUN BLD-MCNC: 49 MG/DL (ref 9–23)
BUN/CREAT SERPL: 25 (ref 10–20)
CALCIUM BLD-MCNC: 8.5 MG/DL (ref 8.7–10.4)
CHLORIDE SERPL-SCNC: 107 MMOL/L (ref 98–112)
CO2 SERPL-SCNC: 18 MMOL/L (ref 21–32)
COLOR UR: YELLOW
CREAT BLD-MCNC: 1.96 MG/DL
EGFRCR SERPLBLD CKD-EPI 2021: 33 ML/MIN/1.73M2 (ref 60–?)
FLUAV + FLUBV RNA SPEC NAA+PROBE: NEGATIVE
FLUAV + FLUBV RNA SPEC NAA+PROBE: NEGATIVE
GLOBULIN PLAS-MCNC: 4 G/DL (ref 2.8–4.4)
GLUCOSE BLD-MCNC: 137 MG/DL (ref 70–99)
GLUCOSE BLDC GLUCOMTR-MCNC: 160 MG/DL (ref 70–99)
GLUCOSE UR-MCNC: NORMAL MG/DL
HGB UR QL STRIP.AUTO: NEGATIVE
HYALINE CASTS #/AREA URNS AUTO: PRESENT /LPF
KETONES UR-MCNC: NEGATIVE MG/DL
LACTATE SERPL-SCNC: 1.7 MMOL/L (ref 0.5–2)
LEUKOCYTE ESTERASE UR QL STRIP.AUTO: NEGATIVE
NITRITE UR QL STRIP.AUTO: NEGATIVE
OSMOLALITY SERPL CALC.SUM OF ELEC: 297 MOSM/KG (ref 275–295)
PH UR: 5.5 [PH] (ref 5–8)
POTASSIUM SERPL-SCNC: 3.8 MMOL/L (ref 3.5–5.1)
PROT SERPL-MCNC: 8.6 G/DL (ref 5.7–8.2)
PROT UR-MCNC: 50 MG/DL
RSV RNA SPEC NAA+PROBE: NEGATIVE
SARS-COV-2 RNA RESP QL NAA+PROBE: NOT DETECTED
SODIUM SERPL-SCNC: 136 MMOL/L (ref 136–145)
SP GR UR STRIP: 1.02 (ref 1–1.03)
UROBILINOGEN UR STRIP-ACNC: NORMAL

## 2024-01-27 PROCEDURE — 85060 BLOOD SMEAR INTERPRETATION: CPT | Performed by: EMERGENCY MEDICINE

## 2024-01-27 PROCEDURE — 93010 ELECTROCARDIOGRAM REPORT: CPT

## 2024-01-27 PROCEDURE — 83605 ASSAY OF LACTIC ACID: CPT | Performed by: EMERGENCY MEDICINE

## 2024-01-27 PROCEDURE — 93005 ELECTROCARDIOGRAM TRACING: CPT

## 2024-01-27 PROCEDURE — 99285 EMERGENCY DEPT VISIT HI MDM: CPT

## 2024-01-27 PROCEDURE — 74177 CT ABD & PELVIS W/CONTRAST: CPT | Performed by: EMERGENCY MEDICINE

## 2024-01-27 PROCEDURE — 71045 X-RAY EXAM CHEST 1 VIEW: CPT | Performed by: EMERGENCY MEDICINE

## 2024-01-27 PROCEDURE — 36415 COLL VENOUS BLD VENIPUNCTURE: CPT

## 2024-01-27 PROCEDURE — 0241U SARS-COV-2/FLU A AND B/RSV BY PCR (GENEXPERT): CPT | Performed by: EMERGENCY MEDICINE

## 2024-01-27 PROCEDURE — 80053 COMPREHEN METABOLIC PANEL: CPT | Performed by: EMERGENCY MEDICINE

## 2024-01-27 PROCEDURE — 82962 GLUCOSE BLOOD TEST: CPT

## 2024-01-27 PROCEDURE — 87040 BLOOD CULTURE FOR BACTERIA: CPT | Performed by: EMERGENCY MEDICINE

## 2024-01-27 PROCEDURE — 96361 HYDRATE IV INFUSION ADD-ON: CPT

## 2024-01-27 PROCEDURE — 85025 COMPLETE CBC W/AUTO DIFF WBC: CPT | Performed by: EMERGENCY MEDICINE

## 2024-01-27 PROCEDURE — 81001 URINALYSIS AUTO W/SCOPE: CPT | Performed by: EMERGENCY MEDICINE

## 2024-01-28 ENCOUNTER — APPOINTMENT (OUTPATIENT)
Dept: ULTRASOUND IMAGING | Facility: HOSPITAL | Age: 84
End: 2024-01-28
Attending: HOSPITALIST
Payer: MEDICARE

## 2024-01-28 PROBLEM — N17.9 ACUTE KIDNEY INJURY: Status: ACTIVE | Noted: 2024-01-28

## 2024-01-28 PROBLEM — N17.9 ACUTE KIDNEY INJURY (HCC): Status: ACTIVE | Noted: 2024-01-28

## 2024-01-28 PROBLEM — K61.0 PERIANAL ABSCESS: Status: ACTIVE | Noted: 2024-01-28

## 2024-01-28 PROBLEM — K52.9 PROCTOCOLITIS: Status: ACTIVE | Noted: 2024-01-28

## 2024-01-28 LAB
ANION GAP SERPL CALC-SCNC: 11 MMOL/L (ref 0–18)
ATRIAL RATE: 89 BPM
BUN BLD-MCNC: 36 MG/DL (ref 9–23)
BUN/CREAT SERPL: 27.1 (ref 10–20)
CALCIUM BLD-MCNC: 7.9 MG/DL (ref 8.7–10.4)
CHLORIDE SERPL-SCNC: 115 MMOL/L (ref 98–112)
CO2 SERPL-SCNC: 15 MMOL/L (ref 21–32)
CREAT BLD-MCNC: 1.33 MG/DL
EGFRCR SERPLBLD CKD-EPI 2021: 53 ML/MIN/1.73M2 (ref 60–?)
GLUCOSE BLD-MCNC: 139 MG/DL (ref 70–99)
GLUCOSE BLDC GLUCOMTR-MCNC: 135 MG/DL (ref 70–99)
OSMOLALITY SERPL CALC.SUM OF ELEC: 303 MOSM/KG (ref 275–295)
P AXIS: 40 DEGREES
P-R INTERVAL: 174 MS
POTASSIUM SERPL-SCNC: 3.4 MMOL/L (ref 3.5–5.1)
Q-T INTERVAL: 364 MS
QRS DURATION: 110 MS
QTC CALCULATION (BEZET): 442 MS
R AXIS: 22 DEGREES
SODIUM SERPL-SCNC: 141 MMOL/L (ref 136–145)
T AXIS: 65 DEGREES
VENTRICULAR RATE: 89 BPM

## 2024-01-28 PROCEDURE — 96365 THER/PROPH/DIAG IV INF INIT: CPT

## 2024-01-28 PROCEDURE — 96375 TX/PRO/DX INJ NEW DRUG ADDON: CPT

## 2024-01-28 PROCEDURE — 94799 UNLISTED PULMONARY SVC/PX: CPT

## 2024-01-28 PROCEDURE — 82962 GLUCOSE BLOOD TEST: CPT

## 2024-01-28 PROCEDURE — 80048 BASIC METABOLIC PNL TOTAL CA: CPT | Performed by: HOSPITALIST

## 2024-01-28 PROCEDURE — 76770 US EXAM ABDO BACK WALL COMP: CPT | Performed by: HOSPITALIST

## 2024-01-28 PROCEDURE — 94660 CPAP INITIATION&MGMT: CPT

## 2024-01-28 RX ORDER — ACETAMINOPHEN 500 MG
500 TABLET ORAL EVERY 4 HOURS PRN
Status: DISCONTINUED | OUTPATIENT
Start: 2024-01-28 | End: 2024-01-30

## 2024-01-28 RX ORDER — SODIUM CHLORIDE, SODIUM LACTATE, POTASSIUM CHLORIDE, CALCIUM CHLORIDE 600; 310; 30; 20 MG/100ML; MG/100ML; MG/100ML; MG/100ML
INJECTION, SOLUTION INTRAVENOUS CONTINUOUS
Status: DISCONTINUED | OUTPATIENT
Start: 2024-01-28 | End: 2024-01-28

## 2024-01-28 RX ORDER — POLYETHYLENE GLYCOL 3350 17 G/17G
17 POWDER, FOR SOLUTION ORAL DAILY PRN
Status: DISCONTINUED | OUTPATIENT
Start: 2024-01-28 | End: 2024-01-29

## 2024-01-28 RX ORDER — ONDANSETRON 2 MG/ML
4 INJECTION INTRAMUSCULAR; INTRAVENOUS EVERY 6 HOURS PRN
Status: DISCONTINUED | OUTPATIENT
Start: 2024-01-28 | End: 2024-01-30

## 2024-01-28 RX ORDER — SODIUM CHLORIDE 9 MG/ML
125 INJECTION, SOLUTION INTRAVENOUS CONTINUOUS
Status: DISCONTINUED | OUTPATIENT
Start: 2024-01-28 | End: 2024-01-28

## 2024-01-28 RX ORDER — MELATONIN
3 NIGHTLY PRN
Status: DISCONTINUED | OUTPATIENT
Start: 2024-01-28 | End: 2024-01-30

## 2024-01-28 RX ORDER — POTASSIUM CHLORIDE 14.9 MG/ML
20 INJECTION INTRAVENOUS ONCE
Status: COMPLETED | OUTPATIENT
Start: 2024-01-28 | End: 2024-01-28

## 2024-01-28 RX ORDER — ATORVASTATIN CALCIUM 20 MG/1
20 TABLET, FILM COATED ORAL DAILY
Status: DISCONTINUED | OUTPATIENT
Start: 2024-01-28 | End: 2024-01-30

## 2024-01-28 RX ORDER — BISACODYL 10 MG
10 SUPPOSITORY, RECTAL RECTAL
Status: DISCONTINUED | OUTPATIENT
Start: 2024-01-28 | End: 2024-01-29

## 2024-01-28 RX ORDER — HEPARIN SODIUM 5000 [USP'U]/ML
5000 INJECTION, SOLUTION INTRAVENOUS; SUBCUTANEOUS EVERY 8 HOURS SCHEDULED
Status: DISCONTINUED | OUTPATIENT
Start: 2024-01-28 | End: 2024-01-30

## 2024-01-28 RX ORDER — ALLOPURINOL 100 MG/1
100 TABLET ORAL DAILY
Status: DISCONTINUED | OUTPATIENT
Start: 2024-01-28 | End: 2024-01-30

## 2024-01-28 RX ORDER — METOCLOPRAMIDE HYDROCHLORIDE 5 MG/ML
5 INJECTION INTRAMUSCULAR; INTRAVENOUS EVERY 8 HOURS PRN
Status: DISCONTINUED | OUTPATIENT
Start: 2024-01-28 | End: 2024-01-30

## 2024-01-28 RX ORDER — METRONIDAZOLE 500 MG/100ML
500 INJECTION, SOLUTION INTRAVENOUS ONCE
Status: COMPLETED | OUTPATIENT
Start: 2024-01-28 | End: 2024-01-28

## 2024-01-28 RX ORDER — METRONIDAZOLE 500 MG/100ML
500 INJECTION, SOLUTION INTRAVENOUS EVERY 8 HOURS
Qty: 2100 ML | Refills: 0 | Status: DISCONTINUED | OUTPATIENT
Start: 2024-01-28 | End: 2024-01-30

## 2024-01-28 RX ORDER — SODIUM CHLORIDE 9 MG/ML
INJECTION, SOLUTION INTRAVENOUS CONTINUOUS
Status: DISCONTINUED | OUTPATIENT
Start: 2024-01-28 | End: 2024-01-29

## 2024-01-28 RX ORDER — SENNOSIDES 8.6 MG
17.2 TABLET ORAL NIGHTLY PRN
Status: DISCONTINUED | OUTPATIENT
Start: 2024-01-28 | End: 2024-01-30

## 2024-01-28 RX ORDER — ENOXAPARIN SODIUM 100 MG/ML
30 INJECTION SUBCUTANEOUS DAILY
Status: DISCONTINUED | OUTPATIENT
Start: 2024-01-28 | End: 2024-01-28

## 2024-01-28 RX ORDER — METOPROLOL SUCCINATE 100 MG/1
100 TABLET, EXTENDED RELEASE ORAL DAILY
Status: DISCONTINUED | OUTPATIENT
Start: 2024-01-28 | End: 2024-01-29

## 2024-01-28 NOTE — PROGRESS NOTES
[facility] Note:  Renal Dose Adjustment for enoxaparin (LOVENOX)    Raul Freed Jr. has been prescribed enoxaparin 40 mg subcutaneously every 24 hours.    Estimated Creatinine Clearance: 27.6 mL/min (A) (based on SCr of 1.96 mg/dL (H)).    Calculated CrCl 20 to 30 mL/min so the dose of Enoxaparin (LOVENOX) has been changed to enoxaparin 30 mg every 24 hours per P&T approved protocol.  Pharmacy will continue to follow, and make additional adjustments if needed.      Thank you,  Dino Moncada, PharmD  1/28/2024 2:04 AM

## 2024-01-28 NOTE — PROGRESS NOTES
[facility] Pharmacy Note: Antimicrobial Weight Based Dose Adjustment for: ceftriaxone (ROCEPHIN)    Raul Freed Jr. is a 83 year old patient who has been prescribed ceftriaxone (ROCEPHIN) 1gm ivpb x 1 dose.    Estimated Creatinine Clearance: 27.6 mL/min (A) (based on SCr of 1.96 mg/dL (H)).  Wt Readings from Last 6 Encounters:   01/27/24 104.3 kg (230 lb)   05/30/23 104.3 kg (230 lb)   05/24/22 104.3 kg (230 lb)   02/28/22 103.4 kg (228 lb)   02/15/22 106.6 kg (235 lb)   01/28/22 102.1 kg (225 lb)     Body mass index is 34.97 kg/m².    Based on this criteria and renal function, dose will be adjusted to ceftriaxone (ROCEPHIN) 2gm ivpb x 1 dose.    Thank you,    Dino Moncada, PharmD  1/28/2024  12:26 AM

## 2024-01-28 NOTE — PLAN OF CARE
Patient is alert and oriented with some confusion at times requiring redirection.  Denies pain. On room air, CPAP when sleeping. Per pt's family he had not urinated in a couple days. Bladder scan showed 418 ml. Patient able to void freely during shift. Ambulating with standby assist. High fall risk s/p fall at home 1/25. IV antibiotics and fluid continued.  US kidney/bladder showing no hydronephrosis. Blood cultures pending. No bowel movement. Tolerating low fiber soft diet. No plans for surgical intervention. Family updated on POC at bedside. Safety precautions in place.     Problem: Patient Centered Care  Goal: Patient preferences are identified and integrated in the patient's plan of care  Description: Interventions:  - What would you like us to know as we care for you?   - Provide timely, complete, and accurate information to patient/family  - Incorporate patient and family knowledge, values, beliefs, and cultural backgrounds into the planning and delivery of care  - Encourage patient/family to participate in care and decision-making at the level they choose  - Honor patient and family perspectives and choices  1/28/2024 1600 by Imelda Montano RN  Outcome: Progressing  1/28/2024 1559 by Imelda Montano RN  Outcome: Progressing     Problem: Patient/Family Goals  Goal: Patient/Family Long Term Goal  Description: Patient's Long Term Goal:     Interventions:  -   - See additional Care Plan goals for specific interventions  1/28/2024 1600 by Imelda Montano RN  Outcome: Progressing  1/28/2024 1559 by Imelda Montano RN  Outcome: Progressing  Goal: Patient/Family Short Term Goal  Description: Patient's Short Term Goal:     Interventions:   -   - See additional Care Plan goals for specific interventions  1/28/2024 1600 by Imelda Montano RN  Outcome: Progressing  1/28/2024 1559 by Imelda Montano RN  Outcome: Progressing     Problem: CARDIOVASCULAR - ADULT  Goal: Maintains optimal cardiac output and hemodynamic  stability  Description: INTERVENTIONS:  - Monitor vital signs, rhythm, and trends  - Monitor for bleeding, hypotension and signs of decreased cardiac output  - Evaluate effectiveness of vasoactive medications to optimize hemodynamic stability  - Monitor arterial and/or venous puncture sites for bleeding and/or hematoma  - Assess quality of pulses, skin color and temperature  - Assess for signs of decreased coronary artery perfusion - ex. Angina  - Evaluate fluid balance, assess for edema, trend weights  1/28/2024 1600 by Imelda Montano RN  Outcome: Progressing  1/28/2024 1559 by Imelda Montano RN  Outcome: Progressing  Goal: Absence of cardiac arrhythmias or at baseline  Description: INTERVENTIONS:  - Continuous cardiac monitoring, monitor vital signs, obtain 12 lead EKG if indicated  - Evaluate effectiveness of antiarrhythmic and heart rate control medications as ordered  - Initiate emergency measures for life threatening arrhythmias  - Monitor electrolytes and administer replacement therapy as ordered  1/28/2024 1600 by Imelda Montano RN  Outcome: Progressing  1/28/2024 1559 by Imelda Montano RN  Outcome: Progressing     Problem: SKIN/TISSUE INTEGRITY - ADULT  Goal: Skin integrity remains intact  Description: INTERVENTIONS  - Assess and document risk factors for pressure ulcer development  - Assess and document skin integrity  - Monitor for areas of redness and/or skin breakdown  - Initiate interventions, skin care algorithm/standards of care as needed  1/28/2024 1600 by Imelda Montano RN  Outcome: Progressing  1/28/2024 1559 by Imelda Montano RN  Outcome: Progressing  Goal: Incision(s), wounds(s) or drain site(s) healing without S/S of infection  Description: INTERVENTIONS:  - Assess and document risk factors for pressure ulcer development  - Assess and document skin integrity  - Assess and document dressing/incision, wound bed, drain sites and surrounding tissue  - Implement wound care per orders  - Initiate  isolation precautions as appropriate  - Initiate Pressure Ulcer prevention bundle as indicated  1/28/2024 1600 by Imelda Montano, RN  Outcome: Progressing  1/28/2024 1559 by Imelda Montano RN  Outcome: Progressing  Goal: Oral mucous membranes remain intact  Description: INTERVENTIONS  - Assess oral mucosa and hygiene practices  - Implement preventative oral hygiene regimen  - Implement oral medicated treatments as ordered  1/28/2024 1600 by Imelda Montano, RN  Outcome: Progressing  1/28/2024 1559 by Imelda Montano, RN  Outcome: Progressing     Problem: SAFETY ADULT - FALL  Goal: Free from fall injury  Description: INTERVENTIONS:  - Assess pt frequently for physical needs  - Identify cognitive and physical deficits and behaviors that affect risk of falls.  - [place] fall precautions as indicated by assessment.  - Educate pt/family on patient safety including physical limitations  - Instruct pt to call for assistance with activity based on assessment  - Modify environment to reduce risk of injury  - Provide assistive devices as appropriate  - Consider OT/PT consult to assist with strengthening/mobility  - Encourage toileting schedule  Outcome: Progressing

## 2024-01-28 NOTE — CONSULTS
[facility]    Report of Consultation    Raul Freed Jr. Patient Status:  Inpatient    1940 MRN [id number]   Location [facility] 4W/SW/SE Attending Grecia Mc MD   Hosp Day # 0 PCP Rao Beyer MD     Date of Admission:  2024  Date of Consult:  2024  Reason for Consultation:   Colitis    History of Present Illness:   Patient is a 83 year old male who was admitted to the hospital for Acute kidney injury (HCC):    He is a poor historian, history obtained from patient and chart.  He reports having chronic diarrhea  His current symptoms started about 2 days ago, with progressive weakness.  He is said to have had a lot of diarrhea by family members, although he denies this as being different from his baseline.  He also denies N/V, loss of appetite, abdominal pain/distention, or blood in stool.  He has not noted urinary symptoms or F/C/S    He has a PMH significant for CAD, DM2, HTN.         Past Medical History  Past Medical History:   Diagnosis Date    Abdominal aortic aneurysm (AAA) 3.0 cm to 5.0 cm in diameter in female (HCC)     CAD involving native coronary artery of native heart without angina pectoris 3/20/2018    Cardiac LV ejection fraction 50-55% per 2018 angio  3/20/2018    Centrilobular emphysema (HCC) 3/19/2018    Coronary atherosclerosis     COVID 2020    Diabetes mellitus (HCC)     Gastric ulcer 2008    Healed     GOUT     High blood pressure     High cholesterol     Hypercholesterolemia     HYPERLIPIDEMIA     HYPERTENSION     Hypertension     OBESITY     OSTEOARTHRITIS     osteoarthritis knees    Prediabetes     Presence of drug coated stent in prox & Mid LAD coronary artery 2018 3/20/2018    2.75 x 15 mm Xcience drug-eluting stent into the mid LAD 3.5 x 12 mm Xcience drug-eluting stent into the proximal LAD    Pulmonary emphysema (HCC)     SLEEP APNEA     [facility] fax [phone]    Sleep apnea        Past Surgical History  Past  Surgical History:   Procedure Laterality Date    CATH BARE METAL STENT (BMS)      COLONOSCOPY  2009= Declines repeat    2009, complicated by anal fissure    COLONOSCOPY      COLONOSCOPY  03/2022    one small TA, int hem, can consider d/cing surveillance    COLONOSCOPY N/A 2/28/2022    Procedure: COLONOSCOPY,  with polypectomy;  Surgeon: Anthony Patel MD;  Location: [facility]    HEMORRHOIDECTOMY  Dr Kamara 3/12/10 [facility]     Rectal exam under anesthesia/anoscopy. Incision and drainage of perirectal abscess. Placement of seton . Excision of anal tags. Destruction of internal hemorrhoids.  Surgery done at [facility] on 3/12/10.    OTHER SURGICAL HISTORY      left knee arthroscopy    PATIENT DOCUMENTED NOT TO HAVE EXPERIENCED ANY OF THE FOLLOWING EVENTS N/A 2/18/2015    Procedure: ESOPHAGOGASTRODUODENOSCOPY, POSSIBLE BIOPSY, POSSIBLE POLYPECTOMY 89911;  Surgeon: Musa Cook MD;  Location: [facility]    PATIENT WITHOUGH PREOPERATIVE ORDER FOR IV ANTIBIOTIC SURGICAL SITE INFECTION PROPHYLAXIS. N/A 2/18/2015    Procedure: ESOPHAGOGASTRODUODENOSCOPY, POSSIBLE BIOPSY, POSSIBLE POLYPECTOMY 26845;  Surgeon: Musa Cook MD;  Location: [facility]    UPPER GI ENDOSCOPY PERFORMED  2008, 2/27/2009    UPPER GI ENDOSCOPY,BIOPSY  2/18/15=Gastritis.  H pylori negative, normal SB Bx    UPPER GI ENDOSCOPY,BIOPSY N/A 2/18/2015    Procedure: ESOPHAGOGASTRODUODENOSCOPY, POSSIBLE BIOPSY, POSSIBLE POLYPECTOMY 67046;  Surgeon: Musa Cook MD;  Location: [facility]    UPPER GI ENDOSCOPY,EXAM         Family History  Family History   Problem Relation Age of Onset    Cancer Mother         lung    Obesity Son     Obesity Sister     Lipids Sister     Obesity Son        Social History  Social History     Socioeconomic History    Marital status:    Tobacco Use    Smoking status: Former     Packs/day: 1.00     Years: 40.00     Additional pack years: 0.00     Total pack years:  40.00     Types: Cigarettes     Quit date: 2009     Years since quittin.6    Smokeless tobacco: Never    Tobacco comments:     has been a 3 ppd   Vaping Use    Vaping Use: Never used   Substance and Sexual Activity    Alcohol use: Yes     Comment: occasional intake    Drug use: No     Social Determinants of Health     Food Insecurity: No Food Insecurity (2024)    Food Insecurity     Food Insecurity: Never true   Transportation Needs: No Transportation Needs (2024)    Transportation Needs     Lack of Transportation: No   Housing Stability: Low Risk  (2024)    Housing Stability     Housing Instability: No           Current Medications:  Current Facility-Administered Medications   Medication Dose Route Frequency    allopurinol (Zyloprim) tab 100 mg  100 mg Oral Daily    atorvastatin (Lipitor) tab 20 mg  20 mg Oral Daily    [Held by provider] metoprolol succinate ER (Toprol XL) 24 hr tab 100 mg  100 mg Oral Daily    acetaminophen (Tylenol Extra Strength) tab 500 mg  500 mg Oral Q4H PRN    melatonin tab 3 mg  3 mg Oral Nightly PRN    polyethylene glycol (PEG 3350) (Miralax) 17 g oral packet 17 g  17 g Oral Daily PRN    sennosides (Senokot) tab 17.2 mg  17.2 mg Oral Nightly PRN    bisacodyl (Dulcolax) 10 MG rectal suppository 10 mg  10 mg Rectal Daily PRN    ondansetron (Zofran) 4 MG/2ML injection 4 mg  4 mg Intravenous Q6H PRN    metoclopramide (Reglan) 5 mg/mL injection 5 mg  5 mg Intravenous Q8H PRN    cefTRIAXone (Rocephin) 2 g in D5W 100 mL IVPB-ADD  2 g Intravenous Q24H    metRONIDAZOLE in sodium chloride 0.79% (Flagyl) 5 mg/mL IVPB premix 500 mg  500 mg Intravenous Q8H    sodium chloride 0.9% infusion   Intravenous Continuous    heparin (Porcine) 5000 UNIT/ML injection 5,000 Units  5,000 Units Subcutaneous Q8H MATT     Medications Prior to Admission   Medication Sig    omeprazole 20 MG Oral Capsule Delayed Release Take 1 capsule (20 mg total) by mouth daily.    losartan 100 MG Oral Tab Take 1  tablet (100 mg total) by mouth daily.    losartan 100 MG Oral Tab Take 1 tablet (100 mg total) by mouth daily.    Omega-3 Fatty Acids (FISH OIL) 1000 MG Oral Cap ONE DAILY    Multiple Vitamin (MULTI VITAMIN MENS OR) ONE DAILY    Sildenafil Citrate 50 MG Oral Tab Take 1 tablet (50 mg total) by mouth daily as needed.    benzonatate 200 MG Oral Cap Take 1 capsule (200 mg total) by mouth 3 (three) times daily as needed.    methylPREDNISolone 4 MG Oral Tab Take 1 tablet (4 mg total) by mouth As Directed.    Losartan Potassium-HCTZ 100-12.5 MG Oral Tab Take 1 tablet by mouth daily.    NIFEdipine ER 30 MG Oral Tablet 24 Hr Take 1 tablet (30 mg total) by mouth daily.    METOPROLOL SUCCINATE 100 MG Oral Tablet 24 Hr TAKE 1 TABLET BY MOUTH EVERY DAY    amLODIPine 5 MG Oral Tab Take 1 tablet (5 mg total) by mouth daily.    atorvastatin 20 MG Oral Tab Take 1 tablet (20 mg total) by mouth daily.    allopurinol 100 MG Oral Tab Take 1 tablet (100 mg total) by mouth daily.    Glucose Blood (CONTOUR NEXT TEST) In Vitro Strip TEST TWICE DAILY BEFORE MEALS AS DIRECTED    Microlet Lancets Does not apply Misc USE AS DIRECTED TWICE DAILY    cholecalciferol (VITAMIN D3) 5000 units Oral Cap Take 1 capsule (5,000 Units total) by mouth daily.    Blood Glucose Monitoring Suppl ([employer] Records CONTOUR NEXT MONITOR) w/Device Does not apply Kit 1 Units by Does not apply route 2 (two) times daily before meals.    aspirin 81 MG Oral Chew Tab Chew 1 tablet (81 mg total) by mouth daily.       Allergies  No Known Allergies    Review of Systems:   Pertinent items are noted in HPI.    Physical Exam:      /83 (BP Location: Left arm)   Pulse 86   Temp (!) 101.1 °F (38.4 °C) (Rectal)   Resp 18   Ht 5' 8\" (1.727 m)   Wt 230 lb (104.3 kg)   SpO2 96%   BMI 34.97 kg/m²    Body mass index is 34.97 kg/m².    I/O last 3 completed shifts:  In: 1000 [I.V.:1000]  Out: -     CONSTITUTIONAL:  awake, alert, cooperative, no apparent distress, and appears stated  age  EYES:  Lids and lashes normal, sclera clear, conjunctiva normal  ENT:  Normocephalic, without obvious abnormality, atraumatic  NECK:  Supple, symmetrical, trachea midline, no adenopathy, thyroid symmetric, not enlarged and no tenderness  HEMATOLOGIC/LYMPHATICS:  No cervical lymphadenopathy, no supraclavicular lymphadenopathy, no inguinal lymphadenopathy  LUNGS:  No increased work of breathing, good air exchange, clear to auscultation bilaterally, no crackles or wheezing  CARDIOVASCULAR:  Normal apical impulse, regular rate and rhythm, and no murmur noted, no pedal edema  ABDOMEN:  Obese, normal bowel sounds, soft, non-distended, non-tender, no masses palpated, no hepatosplenomegaly        MUSCULOSKELETAL: Full range of motion noted.  Motor strength is 5 out of 5 all extremities bilaterally.   SKIN:  no rashes and no jaundice  PSYCHIATRIC:       Orientation:  normal     Appearance:  normal     Behavior:  normal     Attitude toward examiner:  normal     Affect: normal     Judgment:  Normal         Results:     Laboratory Data:  Lab Results   Component Value Date    WBC 13.0 (H) 01/27/2024    HGB 12.3 (L) 01/27/2024    HCT 37.4 (L) 01/27/2024    .0 01/27/2024    CREATSERUM 1.96 (H) 01/27/2024    BUN 49 (H) 01/27/2024     01/27/2024    K 3.8 01/27/2024     01/27/2024    CO2 18.0 (L) 01/27/2024     (H) 01/27/2024    CA 8.5 (L) 01/27/2024    ALB 4.6 01/27/2024    ALKPHO 90 01/27/2024    TP 8.6 (H) 01/27/2024    AST 38 (H) 01/27/2024    ALT 17 01/27/2024    TSH 3.044 05/18/2015    PSA 1.02 11/25/2015    CRP 3.1 11/24/2009         Imaging:  CT ABDOMEN PELVIS IV CONTRAST, NO ORAL (ER)    Result Date: 1/28/2024  CONCLUSION:  1. Imaging findings suggesting severe proctocolitis with complex-appearing left lateral fluid and gas collection concerning for associated abscess formation.  2. Fluid-filled loops of small and large bowel, which could reflect additional underlying enterocolitis or  malabsorption in the appropriate clinical setting.  3. Reactive ileus or partial large bowel obstruction.  4. Infrarenal abdominal aortic aneurysm measuring up to 5.0 cm maximally.  5. Mild prostatomegaly.  6. Large gas and debris-filled duodenal diverticulum.  7. Suspected mild pulmonary interstitial fibrosis.  8. Possible hepatic steatosis.   9. Lesser incidental findings as above.    A preliminary report was issued by the "[employer] Radiology teleradiology service. There are no major discrepancies.   elm-remote.   Dictated by (CST): Mike Baum MD on 1/28/2024 at 9:15 AM     Finalized by (CST): Mike Baum MD on 1/28/2024 at 9:31 AM          XR CHEST AP PORTABLE  (CPT=71045)    Result Date: 1/28/2024  CONCLUSION:  1. A minimal nodular opacity is seen slightly inferior to the left anterior 4th rib, and may be summation artifact, but is indeterminate. Follow-up chest CT imaging should be considered.  2. Otherwise negative for radiographically evident acute intrathoracic process.    A preliminary report was issued by the "[employer] Radiology teleradiology service. There are no major discrepancies.  elm-remote.   Dictated by (CST): Mike Baum MD on 1/28/2024 at 6:08 AM     Finalized by (CST): Mike Baum MD on 1/28/2024 at 6:10 AM              Impression:       Proctocolitis    Perianal abscess    Fever    Leukocytosis  He appears to have a small intramural abscess in the left posterior rectal wall, about the level of the inferior border of the prostate.  This is likely to respond to IV antibiotics and perhaps rupture spontaneously into the rectal lumen.  Defer drainage procedure at this time, if needed this would best be done via transanal route (in OR or via endoscopy) and not by IR as that could potentially create a supralevator fistula      Diarrhea  Patient reports chronic history of diarrhea and no change  C.diff and GI stool panel pending      PAULA on CKD  Cr 1.9 on admit, prior labs from 2023 with Cr  1.0      OK to allow diet for now      Thank you for allowing me to participate in the care of your patient.    Musa Shelby MD    1/28/2024

## 2024-01-28 NOTE — H&P
[facility]       CC:   Chief Complaint   Patient presents with    Fatigue    Urinary Symptoms        PCP: Rao Beyer MD    Date of Admission: 1/27/2024  8:55 PM    ASSESSMENT / PLAN:     Mr. Freed is an 84 yo M with PMH of CAD, DM2, HTN, HL who presented with fatigue, diarrhea, and inability to urinate.     Proctocolitis with perianal abscess  Fever  - CT A/P with proctocolitis with perianal abscess  - Bcx pending, LA normal  - started on zosyn, continue  - surgery consulted, no surgical indication at this time    Diarrhea  - check C.diff and GI stool panel    PAULA on CKD  - Cr 1.9 on admit, prior labs from 2023 with Cr 1.0  - continue IVF  - no obstruction on CT A/P or renal US  - bladder scan PRN    CAD/HL  - statin, hold ASA for now    DM2  - home regimen: not on home meds  - accuchecks QID, hypoglycemic protocol, SSI    HTN  - BP stable, low in ER  - hold home metoprolol    ACP  - CODE- FULL- \"try once\"  - POA- spouse  - lives at home with spouse, grandson is also staying with them but has had a TBI    FN:  - IVF: NS  - Diet: low fiber    DVT Prophy: SCD, HSQ  Lines: PIV    Dispo: pending clinical course    Outpatient records or previous hospital records reviewed.     Further recommendations pending patient's clinical course.  [facility] hospitalist to continue to follow patient while in house    Patient and/or patient's family given opportunity to ask questions and note understanding and agreeing with therapeutic plan as outlined    Grecia Mc MD  [facility]  Answering Service number: [phone]    HPI     History of Present Illness:    Mr. Freed is an 84 yo M with PMH of CAD, DM2, HTN, HL who presented with fatigue, diarrhea, and inability to urinate. Patient is not the best historian, does not have his hearing aids in per family. States symptoms started a few days ago, family noticed he has been weaker. Denied fevers at home. Denied abdominal pain. Has been having a lot of diarrhea.  Has had issues with urinating, no burning with urination. He was taken to immediate care and discharged. Became weaker so family called 911    In the ED, febrile to 101, BP stable, LA normal. Labs with WBC 13, Cr 1.96. CT A/P with proctocolitis with perianal abscess. He was started on zosyn. Surgery consulted.       PMH  Past Medical History:   Diagnosis Date    Abdominal aortic aneurysm (AAA) 3.0 cm to 5.0 cm in diameter in female (HCC)     CAD involving native coronary artery of native heart without angina pectoris 3/20/2018    Cardiac LV ejection fraction 50-55% per 2018 angio  3/20/2018    Centrilobular emphysema (HCC) 3/19/2018    Coronary atherosclerosis     COVID 12/2020    Diabetes mellitus (HCC)     Gastric ulcer 2008    Healed 2009    GOUT     High blood pressure     High cholesterol     Hypercholesterolemia     HYPERLIPIDEMIA     HYPERTENSION     Hypertension     OBESITY     OSTEOARTHRITIS     osteoarthritis knees    Prediabetes     Presence of drug coated stent in prox & Mid LAD coronary artery 03/19/2018 3/20/2018    2.75 x 15 mm Xcience drug-eluting stent into the mid LAD 3.5 x 12 mm Xcience drug-eluting stent into the proximal LAD    Pulmonary emphysema (HCC)     SLEEP APNEA     [facility] fax [phone]    Sleep apnea         PSH  Past Surgical History:   Procedure Laterality Date    CATH BARE METAL STENT (BMS)      COLONOSCOPY  2009= Declines repeat    2009, complicated by anal fissure    COLONOSCOPY      COLONOSCOPY  03/2022    one small TA, int hem, can consider d/cing surveillance    COLONOSCOPY N/A 2/28/2022    Procedure: COLONOSCOPY,  with polypectomy;  Surgeon: Anthony Patel MD;  Location: [facility]    HEMORRHOIDECTOMY  Dr Kamara 3/12/10 [facility]     Rectal exam under anesthesia/anoscopy. Incision and drainage of perirectal abscess. Placement of seton . Excision of anal tags. Destruction of internal hemorrhoids.  Surgery done at [facility] on 3/12/10.    OTHER SURGICAL HISTORY       left knee arthroscopy    PATIENT DOCUMENTED NOT TO HAVE EXPERIENCED ANY OF THE FOLLOWING EVENTS N/A 2015    Procedure: ESOPHAGOGASTRODUODENOSCOPY, POSSIBLE BIOPSY, POSSIBLE POLYPECTOMY 36583;  Surgeon: Musa Cook MD;  Location: [facility]    PATIENT WITHOUGH PREOPERATIVE ORDER FOR IV ANTIBIOTIC SURGICAL SITE INFECTION PROPHYLAXIS. N/A 2015    Procedure: ESOPHAGOGASTRODUODENOSCOPY, POSSIBLE BIOPSY, POSSIBLE POLYPECTOMY 36245;  Surgeon: Musa Cook MD;  Location: [facility]    UPPER GI ENDOSCOPY PERFORMED  , 2009    UPPER GI ENDOSCOPY,BIOPSY  2/18/15=Gastritis.  H pylori negative, normal SB Bx    UPPER GI ENDOSCOPY,BIOPSY N/A 2015    Procedure: ESOPHAGOGASTRODUODENOSCOPY, POSSIBLE BIOPSY, POSSIBLE POLYPECTOMY 79708;  Surgeon: Musa Cook MD;  Location: [facility]    UPPER GI ENDOSCOPY,EXAM          ALL:  No Known Allergies     Home Medications:  Outpatient Medications Marked as Taking for the 24 encounter (Hospital Encounter)   Medication Sig Dispense Refill    omeprazole 20 MG Oral Capsule Delayed Release Take 1 capsule (20 mg total) by mouth daily.      losartan 100 MG Oral Tab Take 1 tablet (100 mg total) by mouth daily. 30 tablet 1    losartan 100 MG Oral Tab Take 1 tablet (100 mg total) by mouth daily. 90 tablet 3    Omega-3 Fatty Acids (FISH OIL) 1000 MG Oral Cap ONE DAILY      Multiple Vitamin (MULTI VITAMIN MENS OR) ONE DAILY           Soc Hx  Social History     Tobacco Use    Smoking status: Former     Packs/day: 1.00     Years: 40.00     Additional pack years: 0.00     Total pack years: 40.00     Types: Cigarettes     Quit date: 2009     Years since quittin.6    Smokeless tobacco: Never    Tobacco comments:     has been a 3 ppd   Substance Use Topics    Alcohol use: Yes     Comment: occasional intake        Fam Hx  Family History   Problem Relation Age of Onset    Cancer Mother         lung    Obesity Son     Obesity  Sister     Lipids Sister     Obesity Son        Review of Systems  Comprehensive ROS reviewed and negative except for what's stated above.     OBJECTIVE:  /63 (BP Location: Left arm)   Pulse 86   Temp (!) 101.1 °F (38.4 °C) (Rectal)   Resp 18   Ht 5' 8\" (1.727 m)   Wt 230 lb (104.3 kg)   SpO2 98%   BMI 34.97 kg/m²     GEN: elderly male in NAD  HEENT: EOMI, hard of hearing  Pulm: CTAB, no crackles or wheezes  CV: RRR, no murmurs  ABD: Soft, non-tender, non-distended, +BS  SKIN: warm, dry  EXT: no edema    Diagnostic Data:    CBC/Chem    Recent Labs   Lab 01/27/24 2105   RBC 3.87   HGB 12.3*   HCT 37.4*   MCV 96.6   MCH 31.8   MCHC 32.9   RDW 13.1   NEPRELIM 8.68*   WBC 13.0*   .0         Recent Labs   Lab 01/27/24 2106   *   BUN 49*   CREATSERUM 1.96*   EGFRCR 33*   CA 8.5*      K 3.8      CO2 18.0*     Lab Results   Component Value Date    WBC 13.0 01/27/2024    HGB 12.3 01/27/2024    HCT 37.4 01/27/2024    .0 01/27/2024    CREATSERUM 1.96 01/27/2024    BUN 49 01/27/2024     01/27/2024    K 3.8 01/27/2024     01/27/2024    CO2 18.0 01/27/2024     01/27/2024    CA 8.5 01/27/2024    ALB 4.6 01/27/2024    ALKPHO 90 01/27/2024    BILT 0.6 01/27/2024    TP 8.6 01/27/2024    AST 38 01/27/2024    ALT 17 01/27/2024       No results for input(s): \"TROP\" in the last 168 hours.    Additional Diagnostics:     Radiology: XR CHEST AP PORTABLE  (CPT=71045)    Result Date: 1/28/2024  CONCLUSION:  1. A minimal nodular opacity is seen slightly inferior to the left anterior 4th rib, and may be summation artifact, but is indeterminate. Follow-up chest CT imaging should be considered.  2. Otherwise negative for radiographically evident acute intrathoracic process.    A preliminary report was issued by the [facility] teleradiology service. There are no major discrepancies.  elm-remote.   Dictated by (CST): Mike Baum MD on 1/28/2024 at 6:08 AM     Finalized by  (CST): Mike Baum MD on 1/28/2024 at 6:10 AM

## 2024-01-28 NOTE — ED PROVIDER NOTES
Patient Seen in: [facility] Emergency Department      History     Chief Complaint   Patient presents with    Fatigue    Urinary Symptoms     Stated Complaint: Weakness    Subjective:   HPI    83-year-old male with history of hypertension, diabetes, hyperlipidemia, abdominal aortic aneurysm, coronary artery disease post angioplasty and stent placement, interstitial lung disease, and sleep apnea presents with complaints of nausea, vomiting, and diarrhea.  The patient reports 3-4 episodes of nonbloody diarrhea daily over the past 4 days.  He reports nausea and vomiting beginning yesterday and continuing today.  He reports intermittent vomiting.  He states he has been able to keep down some fluids.  He also reports cough productive of clear sputum.  He denies dyspnea.  No known fevers although the patient has a 101.1 temperature in the ED.  The patient was seen in immediate care yesterday and had laboratory studies done.  He was diagnosed with acute kidney injury and dehydration.  He was discharged with Florastor and Zofran.  He went to the emergency department today because of worsening symptoms.    Objective:   Past Medical History:   Diagnosis Date    Abdominal aortic aneurysm (AAA) 3.0 cm to 5.0 cm in diameter in female (HCC)     CAD involving native coronary artery of native heart without angina pectoris 3/20/2018    Cardiac LV ejection fraction 50-55% per 2018 angio  3/20/2018    Centrilobular emphysema (HCC) 3/19/2018    Coronary atherosclerosis     COVID 12/2020    Diabetes mellitus (HCC)     Gastric ulcer 2008    Healed 2009    GOUT     High blood pressure     High cholesterol     Hypercholesterolemia     HYPERLIPIDEMIA     HYPERTENSION     Hypertension     OBESITY     OSTEOARTHRITIS     osteoarthritis knees    Prediabetes     Presence of drug coated stent in prox & Mid LAD coronary artery 03/19/2018 3/20/2018    2.75 x 15 mm Xcience drug-eluting stent into the mid LAD 3.5 x 12 mm Xcience drug-eluting  stent into the proximal LAD    Pulmonary emphysema (HCC)     SLEEP APNEA     [facility] fax [phone]    Sleep apnea               Past Surgical History:   Procedure Laterality Date    CATH BARE METAL STENT (BMS)      COLONOSCOPY  2009= Declines repeat    2009, complicated by anal fissure    COLONOSCOPY      COLONOSCOPY  03/2022    one small TA, int hem, can consider d/cing surveillance    COLONOSCOPY N/A 2/28/2022    Procedure: COLONOSCOPY,  with polypectomy;  Surgeon: Anthony Patel MD;  Location: [facility]    HEMORRHOIDECTOMY  Dr Kamara 3/12/10 [facility]     Rectal exam under anesthesia/anoscopy. Incision and drainage of perirectal abscess. Placement of seton . Excision of anal tags. Destruction of internal hemorrhoids.  Surgery done at [facility] on 3/12/10.    OTHER SURGICAL HISTORY      left knee arthroscopy    PATIENT DOCUMENTED NOT TO HAVE EXPERIENCED ANY OF THE FOLLOWING EVENTS N/A 2/18/2015    Procedure: ESOPHAGOGASTRODUODENOSCOPY, POSSIBLE BIOPSY, POSSIBLE POLYPECTOMY 03310;  Surgeon: Musa Cook MD;  Location: [facility]    PATIENT WITHOUGH PREOPERATIVE ORDER FOR IV ANTIBIOTIC SURGICAL SITE INFECTION PROPHYLAXIS. N/A 2/18/2015    Procedure: ESOPHAGOGASTRODUODENOSCOPY, POSSIBLE BIOPSY, POSSIBLE POLYPECTOMY 63987;  Surgeon: Musa Cook MD;  Location: [facility]    UPPER GI ENDOSCOPY PERFORMED  2008, 2/27/2009    UPPER GI ENDOSCOPY,BIOPSY  2/18/15=Gastritis.  H pylori negative, normal SB Bx    UPPER GI ENDOSCOPY,BIOPSY N/A 2/18/2015    Procedure: ESOPHAGOGASTRODUODENOSCOPY, POSSIBLE BIOPSY, POSSIBLE POLYPECTOMY 16356;  Surgeon: Musa Cook MD;  Location: [facility]    UPPER GI ENDOSCOPY,EXAM                  Social History     Socioeconomic History    Marital status:    Tobacco Use    Smoking status: Former     Packs/day: 1.00     Years: 40.00     Additional pack years: 0.00     Total pack years: 40.00     Types: Cigarettes     Quit  date: 2009     Years since quittin.6    Smokeless tobacco: Never    Tobacco comments:     has been a 3 ppd   Vaping Use    Vaping Use: Never used   Substance and Sexual Activity    Alcohol use: Yes     Comment: occasional intake    Drug use: No              Review of Systems    Positive for stated complaint: Weakness  Other systems are as noted in HPI.  Constitutional and vital signs reviewed.      All other systems reviewed and negative except as noted above.    Physical Exam     ED Triage Vitals [24 2100]   /35   Pulse 94   Resp 22   Temp 98.7 °F (37.1 °C)   Temp src Oral   SpO2 95 %   O2 Device None (Room air)       Current:/52   Pulse 89   Temp (!) 101.1 °F (38.4 °C) (Rectal)   Resp 23   Ht 172.7 cm (5' 8\")   Wt 104.3 kg   SpO2 96%   BMI 34.97 kg/m²         Physical Exam      General Appearance: alert, no distress  Eyes: pupils equal and round no pallor or injection  ENT, Mouth: mucous membranes moist  Respiratory: there are no retractions, lungs are clear to auscultation  Cardiovascular: regular rate and rhythm  Gastrointestinal:  abdomen is soft and non tender, no masses, bowel sounds normal  Neurological: Speech normal.  Moving extremities x 4.  Skin: warm and dry, no rashes.  Musculoskeletal: neck is supple non tender        Extremities are symmetrical, full range of motion  Psychiatric: patient is oriented X 3, there is no agitation      ED Course     Labs Reviewed   URINALYSIS WITH CULTURE REFLEX - Abnormal; Notable for the following components:       Result Value    Clarity Urine Turbid (*)     Protein Urine 50 (*)     Squamous Epi. Cells Few (*)     Hyaline Casts Present (*)     All other components within normal limits   COMP METABOLIC PANEL (14) - Abnormal; Notable for the following components:    Glucose 137 (*)     CO2 18.0 (*)     BUN 49 (*)     Creatinine 1.96 (*)     BUN/CREA Ratio 25.0 (*)     Calcium, Total 8.5 (*)     Calculated Osmolality 297 (*)     eGFR-Cr  33 (*)     AST 38 (*)     Total Protein 8.6 (*)     All other components within normal limits   POCT GLUCOSE - Abnormal; Notable for the following components:    POC Glucose  160 (*)     All other components within normal limits   CBC W/ DIFFERENTIAL - Abnormal; Notable for the following components:    WBC 13.0 (*)     HGB 12.3 (*)     HCT 37.4 (*)     RDW-SD 46.4 (*)     Neutrophil Absolute Prelim 8.68 (*)     All other components within normal limits   LACTIC ACID, PLASMA - Normal   SARS-COV-2/FLU A AND B/RSV BY PCR (GENEXPERT) - Normal    Narrative:     This test is intended for the qualitative detection and differentiation of SARS-CoV-2, influenza A, influenza B, and respiratory syncytial virus (RSV) viral RNA in nasopharyngeal or nares swabs from individuals suspected of respiratory viral infection consistent with COVID-19 by their healthcare provider. Signs and symptoms of respiratory viral infection due to SARS-CoV-2, influenza, and RSV can be similar.    Test performed using the Xpert Xpress SARS-CoV-2/FLU/RSV (real time RT-PCR)  assay on the [employer] instrument, [employer], [place] [address].   This test is being used under the Food and Drug Administration's Emergency Use Authorization.    The authorized Fact Sheet for Healthcare Providers for this assay is available upon request from the laboratory.   CBC WITH DIFFERENTIAL WITH PLATELET    Narrative:     The following orders were created for panel order CBC With Differential With Platelet.  Procedure                               Abnormality         Status                     ---------                               -----------         ------                     CBC W/ DIFFERENTIAL[[id number]]          Abnormal            Preliminary result           Please view results for these tests on the individual orders.   SCAN SLIDE   MD BLOOD SMEAR CONSULT   RAINBOW DRAW LAVENDER   RAINBOW DRAW LIGHT GREEN   RAINBOW DRAW BLUE   BLOOD CULTURE   BLOOD CULTURE   GI  STOOL PANEL BY PCR   C. DIFFICILE(TOXIGENIC)PCR             EKG    Rate, intervals and axes as noted on EKG Report.  Rate: 89  Rhythm: Sinus Rhythm  Axis: Normal  Reading: Normal EKG                   MDM      Lab and CT results noted.  Patient with acute kidney injury likely secondary to dehydration.  Also has proctocolitis and a small perianal abscess.  Will begin empiric antibiotics, IV fluids, and admit for further evaluation and treatment.  Communicated with Dr. Reynolds, hospitalist.  Also communicated with Dr. Shelby, general surgery.  Admission disposition: 1/28/2024 12:41 AM                                        Medical Decision Making      Disposition and Plan     Clinical Impression:  1. Acute kidney injury (HCC)    2. Proctocolitis    3. Perianal abscess         Disposition:  Admit  1/28/2024 12:41 am    Follow-up:  No follow-up provider specified.  We recommend that you schedule follow up care with a primary care provider within the next three months to obtain basic health screening including reassessment of your blood pressure.      Medications Prescribed:  Current Discharge Medication List                            Hospital Problems       Present on Admission  Date Reviewed: 10/24/2023            ICD-10-CM Noted POA    * (Principal) Acute kidney injury (HCC) N17.9 1/28/2024 Unknown

## 2024-01-28 NOTE — PROGRESS NOTES
[facility]acy Note: Antimicrobial Weight Based Dose Adjustment for: ceftriaxone (ROCEPHIN)    Raul Freed Jr. is a 83 year old patient who has been prescribed ceftriaxone (ROCEPHIN) 1gm every 24 hours.    Estimated Creatinine Clearance: 27.6 mL/min (A) (based on SCr of 1.96 mg/dL (H)).  Wt Readings from Last 6 Encounters:   01/27/24 104.3 kg (230 lb)   05/30/23 104.3 kg (230 lb)   05/24/22 104.3 kg (230 lb)   02/28/22 103.4 kg (228 lb)   02/15/22 106.6 kg (235 lb)   01/28/22 102.1 kg (225 lb)     Body mass index is 34.97 kg/m².    Based on this criteria and renal function, dose will be adjusted to ceftriaxone (ROCEPHIN) 2gm every 24 hours.    Thank you,    Dino Moncada, PharmD  1/28/2024  2:05 AM

## 2024-01-28 NOTE — ED INITIAL ASSESSMENT (HPI)
Pt arrived via EMS from home with weakness, unable to urinate, and diarrhea. Pt reports he feels very weak. Pt is alertx4.

## 2024-01-28 NOTE — PLAN OF CARE
Patient was admitted from the ER with weakness, urinary retention and diarrhea. He is on Tele with no calls. NPO. IVF & abx infusing.ScD's for DVT prophylaxis. Up with one and walker. Bed lowest position. Call light with in reach. All safety measures in place.  Problem: Patient Centered Care  Goal: Patient preferences are identified and integrated in the patient's plan of care  Description: Interventions:  - Provide timely, complete, and accurate information to patient/family  - Incorporate patient and family knowledge, values, beliefs, and cultural backgrounds into the planning and delivery of care  - Encourage patient/family to participate in care and decision-making at the level they choose  - Honor patient and family perspectives and choices  1/28/2024 0422 by Rocio Epperson RN  Outcome: Progressing  1/28/2024 0421 by Rocio Epperson RN  Outcome: Progressing     specific interventions  1/28/2024 0422 by Rocio Epperson RN  Outcome: Progressing  1/28/2024 0421 by Rocio Epperson RN  Outcome: Progressing     Problem: CARDIOVASCULAR - ADULT  Goal: Maintains optimal cardiac output and hemodynamic stability  Description: INTERVENTIONS:  - Monitor vital signs, rhythm, and trends  - Monitor for bleeding, hypotension and signs of decreased cardiac output  - Evaluate effectiveness of vasoactive medications to optimize hemodynamic stability  - Monitor arterial and/or venous puncture sites for bleeding and/or hematoma  - Assess quality of pulses, skin color and temperature  - Assess for signs of decreased coronary artery perfusion - ex. Angina  - Evaluate fluid balance, assess for edema, trend weights  Outcome: Progressing  Goal: Absence of cardiac arrhythmias or at baseline  Description: INTERVENTIONS:  - Continuous cardiac monitoring, monitor vital signs, obtain 12 lead EKG if indicated  - Evaluate effectiveness of antiarrhythmic and heart rate control medications as ordered  - Initiate emergency  measures for life threatening arrhythmias  - Monitor electrolytes and administer replacement therapy as ordered  Outcome: Progressing     - See additional Care Plan goals for   - Monitor for areas of redness and/or skin breakdown  - Initiate interventions, skin care algorithm/standards of care as needed  Outcome: Progressing  Goal: Incision(s), wounds(s) or drain site(s) healing without S/S of infection  Description: INTERVENTIONS:  - Assess and document risk factors for pressure ulcer development  - Assess and document skin integrity  - Assess and document dressing/incision, wound bed, drain sites and surrounding tissue  - Implement wound care per orders  - Initiate isolation precautions as appropriate  - Initiate Pressure Ulcer prevention bundle as indicated  Outcome: Progressing  Goal: Oral mucous membranes remain intact  Description: INTERVENTIONS  - Assess oral mucosa and hygiene practices  - Implement preventative oral hygiene regimen  - Implement oral medicated treatments as ordered  Outcome: Progressing     Problem: Patient Centered Care  Goal: Patient preferences are identified and integrated in the patient's plan of care  Description: Interventions:  - Provide timely, complete, and accurate information to patient/family  - Incorporate patient and family knowledge, values, beliefs, and cultural backgrounds into the planning and delivery of care  - Encourage patient/family to participate in care and decision-making at the level they choose  - Honor patient and family perspectives and choices  Outcome: Progressing     itional Care Plan goals for specific interventions  Outcome: Progressing

## 2024-01-28 NOTE — ED QUICK NOTES
Orders for admission, patient is aware of plan and ready to go upstairs. Any questions, please call ED RN Nataliia at extension [phone].     Patient Covid vaccination status: Fully vaccinated     COVID Test Ordered in ED: SARS-CoV-2/Flu A and B/RSV by PCR (GeneXpert)    COVID Suspicion at Admission: N/A    Running Infusions:    sodium chloride          Mental Status/LOC at time of transport: a/o x 3    Other pertinent information:   CIWA score: N/A   NIH score:  N/A

## 2024-01-29 ENCOUNTER — APPOINTMENT (OUTPATIENT)
Dept: PICC SERVICES | Facility: HOSPITAL | Age: 84
End: 2024-01-29
Attending: INTERNAL MEDICINE
Payer: MEDICARE

## 2024-01-29 LAB
ANION GAP SERPL CALC-SCNC: 9 MMOL/L (ref 0–18)
BASOPHILS # BLD AUTO: 0.01 X10(3) UL (ref 0–0.2)
BASOPHILS # BLD AUTO: 0.02 X10(3) UL (ref 0–0.2)
BASOPHILS NFR BLD AUTO: 0.1 %
BASOPHILS NFR BLD AUTO: 0.2 %
BUN BLD-MCNC: 23 MG/DL (ref 9–23)
BUN/CREAT SERPL: 23.2 (ref 10–20)
CALCIUM BLD-MCNC: 7.9 MG/DL (ref 8.7–10.4)
CHLORIDE SERPL-SCNC: 114 MMOL/L (ref 98–112)
CO2 SERPL-SCNC: 18 MMOL/L (ref 21–32)
CREAT BLD-MCNC: 0.99 MG/DL
DEPRECATED RDW RBC AUTO: 44.4 FL (ref 35.1–46.3)
DEPRECATED RDW RBC AUTO: 46.4 FL (ref 35.1–46.3)
EGFRCR SERPLBLD CKD-EPI 2021: 76 ML/MIN/1.73M2 (ref 60–?)
EOSINOPHIL # BLD AUTO: 0.01 X10(3) UL (ref 0–0.7)
EOSINOPHIL # BLD AUTO: 0.04 X10(3) UL (ref 0–0.7)
EOSINOPHIL NFR BLD AUTO: 0.1 %
EOSINOPHIL NFR BLD AUTO: 0.5 %
ERYTHROCYTE [DISTWIDTH] IN BLOOD BY AUTOMATED COUNT: 12.8 % (ref 11–15)
ERYTHROCYTE [DISTWIDTH] IN BLOOD BY AUTOMATED COUNT: 13.1 % (ref 11–15)
GLUCOSE BLD-MCNC: 111 MG/DL (ref 70–99)
HCT VFR BLD AUTO: 29.7 %
HCT VFR BLD AUTO: 37.4 %
HGB BLD-MCNC: 10.4 G/DL
HGB BLD-MCNC: 12.3 G/DL
IMM GRANULOCYTES # BLD AUTO: 0.05 X10(3) UL (ref 0–1)
IMM GRANULOCYTES # BLD AUTO: 0.09 X10(3) UL (ref 0–1)
IMM GRANULOCYTES NFR BLD: 0.6 %
IMM GRANULOCYTES NFR BLD: 0.7 %
LYMPHOCYTES # BLD AUTO: 0.99 X10(3) UL (ref 1–4)
LYMPHOCYTES # BLD AUTO: 1.15 X10(3) UL (ref 1–4)
LYMPHOCYTES NFR BLD AUTO: 13.2 %
LYMPHOCYTES NFR BLD AUTO: 7.6 %
MAGNESIUM SERPL-MCNC: 2.2 MG/DL (ref 1.6–2.6)
MCH RBC QN AUTO: 31.8 PG (ref 26–34)
MCH RBC QN AUTO: 32.8 PG (ref 26–34)
MCHC RBC AUTO-ENTMCNC: 32.9 G/DL (ref 31–37)
MCHC RBC AUTO-ENTMCNC: 35 G/DL (ref 31–37)
MCV RBC AUTO: 93.7 FL
MCV RBC AUTO: 96.6 FL
MONOCYTES # BLD AUTO: 2.56 X10(3) UL (ref 0.1–1)
MONOCYTES # BLD AUTO: 3.2 X10(3) UL (ref 0.1–1)
MONOCYTES NFR BLD AUTO: 24.7 %
MONOCYTES NFR BLD AUTO: 29.5 %
NEUTROPHILS # BLD AUTO: 4.86 X10 (3) UL (ref 1.5–7.7)
NEUTROPHILS # BLD AUTO: 4.86 X10(3) UL (ref 1.5–7.7)
NEUTROPHILS # BLD AUTO: 8.68 X10 (3) UL (ref 1.5–7.7)
NEUTROPHILS # BLD AUTO: 8.68 X10(3) UL (ref 1.5–7.7)
NEUTROPHILS NFR BLD AUTO: 56 %
NEUTROPHILS NFR BLD AUTO: 66.8 %
OSMOLALITY SERPL CALC.SUM OF ELEC: 296 MOSM/KG (ref 275–295)
PLATELET # BLD AUTO: 180 10(3)UL (ref 150–450)
PLATELET # BLD AUTO: 246 10(3)UL (ref 150–450)
POTASSIUM SERPL-SCNC: 3.4 MMOL/L (ref 3.5–5.1)
POTASSIUM SERPL-SCNC: 3.4 MMOL/L (ref 3.5–5.1)
POTASSIUM SERPL-SCNC: 4.1 MMOL/L (ref 3.5–5.1)
RBC # BLD AUTO: 3.17 X10(6)UL
RBC # BLD AUTO: 3.87 X10(6)UL
SODIUM SERPL-SCNC: 141 MMOL/L (ref 136–145)
WBC # BLD AUTO: 13 X10(3) UL (ref 4–11)
WBC # BLD AUTO: 8.7 X10(3) UL (ref 4–11)

## 2024-01-29 PROCEDURE — 97535 SELF CARE MNGMENT TRAINING: CPT

## 2024-01-29 PROCEDURE — 36569 INSJ PICC 5 YR+ W/O IMAGING: CPT

## 2024-01-29 PROCEDURE — 94799 UNLISTED PULMONARY SVC/PX: CPT

## 2024-01-29 PROCEDURE — B5181ZA FLUOROSCOPY OF SUPERIOR VENA CAVA USING LOW OSMOLAR CONTRAST, GUIDANCE: ICD-10-PCS | Performed by: STUDENT IN AN ORGANIZED HEALTH CARE EDUCATION/TRAINING PROGRAM

## 2024-01-29 PROCEDURE — 80048 BASIC METABOLIC PNL TOTAL CA: CPT | Performed by: INTERNAL MEDICINE

## 2024-01-29 PROCEDURE — 85025 COMPLETE CBC W/AUTO DIFF WBC: CPT | Performed by: INTERNAL MEDICINE

## 2024-01-29 PROCEDURE — 97161 PT EVAL LOW COMPLEX 20 MIN: CPT

## 2024-01-29 PROCEDURE — 97116 GAIT TRAINING THERAPY: CPT

## 2024-01-29 PROCEDURE — 85060 BLOOD SMEAR INTERPRETATION: CPT | Performed by: INTERNAL MEDICINE

## 2024-01-29 PROCEDURE — 02HV33Z INSERTION OF INFUSION DEVICE INTO SUPERIOR VENA CAVA, PERCUTANEOUS APPROACH: ICD-10-PCS | Performed by: STUDENT IN AN ORGANIZED HEALTH CARE EDUCATION/TRAINING PROGRAM

## 2024-01-29 PROCEDURE — 97530 THERAPEUTIC ACTIVITIES: CPT

## 2024-01-29 PROCEDURE — 97165 OT EVAL LOW COMPLEX 30 MIN: CPT

## 2024-01-29 PROCEDURE — 84132 ASSAY OF SERUM POTASSIUM: CPT | Performed by: HOSPITALIST

## 2024-01-29 PROCEDURE — 83735 ASSAY OF MAGNESIUM: CPT | Performed by: INTERNAL MEDICINE

## 2024-01-29 PROCEDURE — 84132 ASSAY OF SERUM POTASSIUM: CPT | Performed by: STUDENT IN AN ORGANIZED HEALTH CARE EDUCATION/TRAINING PROGRAM

## 2024-01-29 RX ORDER — CETIRIZINE HYDROCHLORIDE 10 MG/1
10 TABLET ORAL DAILY
Status: DISCONTINUED | OUTPATIENT
Start: 2024-01-29 | End: 2024-01-30

## 2024-01-29 RX ORDER — LIDOCAINE HYDROCHLORIDE 10 MG/ML
5 INJECTION, SOLUTION EPIDURAL; INFILTRATION; INTRACAUDAL; PERINEURAL
Status: COMPLETED | OUTPATIENT
Start: 2024-01-29 | End: 2024-01-29

## 2024-01-29 RX ORDER — FLUTICASONE PROPIONATE 50 MCG
1 SPRAY, SUSPENSION (ML) NASAL DAILY
Status: DISCONTINUED | OUTPATIENT
Start: 2024-01-29 | End: 2024-01-30

## 2024-01-29 RX ORDER — POTASSIUM CHLORIDE 20 MEQ/1
40 TABLET, EXTENDED RELEASE ORAL EVERY 4 HOURS
Status: COMPLETED | OUTPATIENT
Start: 2024-01-29 | End: 2024-01-29

## 2024-01-29 RX ORDER — METOPROLOL SUCCINATE 50 MG/1
50 TABLET, EXTENDED RELEASE ORAL
Status: DISCONTINUED | OUTPATIENT
Start: 2024-01-29 | End: 2024-01-30

## 2024-01-29 RX ORDER — ERTAPENEM 1 G/1
1 INJECTION, POWDER, LYOPHILIZED, FOR SOLUTION INTRAMUSCULAR; INTRAVENOUS DAILY
Qty: 21 EACH | Refills: 0 | Status: SHIPPED | OUTPATIENT
Start: 2024-01-29 | End: 2024-02-08

## 2024-01-29 NOTE — PLAN OF CARE
Problem: Patient Centered Care  Goal: Patient preferences are identified and integrated in the patient's plan of care  Description: Interventions:  - What would you like us to know as we care for you?   - Provide timely, complete, and accurate information to patient/family  - Incorporate patient and family knowledge, values, beliefs, and cultural backgrounds into the planning and delivery of care  - Encourage patient/family to participate in care and decision-making at the level they choose  - Honor patient and family perspectives and choices  Outcome: Progressing     Problem: Patient/Family Goals  Goal: Patient/Family Long Term Goal  Description: Patient's Long Term Goal:     Interventions:  - See additional Care Plan goals for specific interventions  Outcome: Progressing  Goal: Patient/Family Short Term Goal  Description: Patient's Short Term Goal:     Interventions:   - See additional Care Plan goals for specific interventions  Outcome: Progressing     Problem: CARDIOVASCULAR - ADULT  Goal: Maintains optimal cardiac output and hemodynamic stability  Description: INTERVENTIONS:  - Monitor vital signs, rhythm, and trends  - Monitor for bleeding, hypotension and signs of decreased cardiac output  - Evaluate effectiveness of vasoactive medications to optimize hemodynamic stability  - Monitor arterial and/or venous puncture sites for bleeding and/or hematoma  - Assess quality of pulses, skin color and temperature  - Assess for signs of decreased coronary artery perfusion - ex. Angina  - Evaluate fluid balance, assess for edema, trend weights  Outcome: Progressing  Goal: Absence of cardiac arrhythmias or at baseline  Description: INTERVENTIONS:  - Continuous cardiac monitoring, monitor vital signs, obtain 12 lead EKG if indicated  - Evaluate effectiveness of antiarrhythmic and heart rate control medications as ordered  - Initiate emergency measures for life threatening arrhythmias  - Monitor electrolytes and  administer replacement therapy as ordered  Outcome: Progressing     Problem: SKIN/TISSUE INTEGRITY - ADULT  Goal: Skin integrity remains intact  Description: INTERVENTIONS  - Assess and document risk factors for pressure ulcer development  - Assess and document skin integrity  - Monitor for areas of redness and/or skin breakdown  - Initiate interventions, skin care algorithm/standards of care as needed  Outcome: Progressing  Goal: Incision(s), wounds(s) or drain site(s) healing without S/S of infection  Description: INTERVENTIONS:  - Assess and document risk factors for pressure ulcer development  - Assess and document skin integrity  - Assess and document dressing/incision, wound bed, drain sites and surrounding tissue  - Implement wound care per orders  - Initiate isolation precautions as appropriate  - Initiate Pressure Ulcer prevention bundle as indicated  Outcome: Progressing  Goal: Oral mucous membranes remain intact  Description: INTERVENTIONS  - Assess oral mucosa and hygiene practices  - Implement preventative oral hygiene regimen  - Implement oral medicated treatments as ordered  Outcome: Progressing     Problem: SAFETY ADULT - FALL  Goal: Free from fall injury  Description: INTERVENTIONS:  - Assess pt frequently for physical needs  - Identify cognitive and physical deficits and behaviors that affect risk of falls.  - [place] fall precautions as indicated by assessment.  - Educate pt/family on patient safety including physical limitations  - Instruct pt to call for assistance with activity based on assessment  - Modify environment to reduce risk of injury  - Provide assistive devices as appropriate  - Consider OT/PT consult to assist with strengthening/mobility  - Encourage toileting schedule  Outcome: Progressing

## 2024-01-29 NOTE — HOME CARE LIAISON
[facility] Spoke w/ patient's spouse/Chel who is agreeable with [facility]. Contact information placed on AVS.

## 2024-01-29 NOTE — CM/SW NOTE
01/29/24 1200   CM/ Referral Data   Referral Source    Reason for Referral Discharge planning   Informant Patient;Spouse/Significant Other   Medical Hx   Does patient have an established PCP? Yes  (Dr Rao Beyer)   Patient Info   Patient's Current Mental Status at Time of Assessment Alert;Oriented   Patient's Home Environment House   Number of Levels in Home 1   Number of Stair in Home 0   Patient lives with Spouse/Significant other   Patient Status Prior to Admission   Independent with ADLs and Mobility No   Pt. requires assistance with Housework;Ambulating   Services in place prior to admission DME/Supplies at home   Type of DME/Supplies Standard Walker   Discharge Needs   Anticipated D/C needs Home health care       MDO received for discharge planning. PT recommending home with HH PT. CM met with patient and spouse at bedside, above information provided by patient/spouse.     Patient lives home with spouse in single level home. Usually \"limps on one leg due to knee pain\", no devices used. Patient has walker if needed. Patient able to walk around home at baseline and drives. Son lives close if needed.     PT recommendations discussed, patient agreeable to home health. Patient has no current services in the home, no history of LINDA.     HH referrals sent via [employer]. F2F/order entered and uploaded to referral.     Update 1400: CM met with patient and spouse at bedside, provided quality data list for home health. Choice is [facility]. Reserved in [employer] and notified anil Jack.     MDO received for outpatient antibiotics. Discussed with patient and spouse, they are requesting infusion center as they do not feel comfortable doing IV antibiotics at home. Patient stated his spouse can drive him to and from daily. Per Polly with infusion center, they are currently accepting new patients.    Final ID plan pending. Discussed with Dr Bradford, she will enter final script into chart. Likely plan  is daily IV Invanz.    Plan: Home with spouse and [facility]. Final antibiotic plan pending- MD to enter final script for infusion center    Ely Talbert, RN, BSN       normal...

## 2024-01-29 NOTE — PROGRESS NOTES
[facility] Hospitalist Progress Note     CC: Hospital Follow up    PCP: Rao Beyer MD       Assessment/Plan:     Principal Problem:    Acute kidney injury (HCC)  Active Problems:    Proctocolitis    Perianal abscess    Mr. Freed is an 82 yo M with PMH of CAD, DM2, HTN, HL who presented with fatigue, diarrhea, and inability to urinate.      Proctocolitis with perianal abscess  Fever  - CT A/P with proctocolitis with perianal abscess  - Bcx pending, LA normal  - started on zosyn, continue  - surgery consulted, no surgical indication at this time  - ID consulted, appreciate recs     Diarrhea  - check C.diff and GI stool panel  - per patient is a chronic problem x few years,      PAULA on CKD- resolved  - Cr 1.9 on admit, prior labs from 2023 with Cr 1.0  - stop IVF  - no obstruction on CT A/P or renal US  - bladder scan PRN     NAGMA  - suspect from chronic diarrhea/PAULA  - per outpatient labs is chronic    CAD/HL  - statin, hold ASA for now     DM2  - home regimen: not on home meds  - accuchecks QID, hypoglycemic protocol, SSI     HTN  - BP stable, low in ER  - hold home metoprolol     ACP  - CODE- FULL- \"try once\"  - POA- spouse  - lives at home with spouse, grandson is also staying with them but has had a TBI     FN:  - IVF: NS stopped  - Diet: low fiber     DVT Prophy: SCD, HSQ  Lines: PIV     Dispo: pending clinical course    Thank You,  Kerrie Guzman,     Hospitalist with [facility]     Subjective:     Patient states that he feels a lot better. No diarrhea since hospitalization    OBJECTIVE:    Blood pressure 115/54, pulse 83, temperature 98.5 °F (36.9 °C), temperature source Oral, resp. rate 20, height 5' 8\" (1.727 m), weight 230 lb (104.3 kg), SpO2 97%.    Temp:  [98.3 °F (36.8 °C)-98.5 °F (36.9 °C)] 98.5 °F (36.9 °C)  Pulse:  [76-93] 83  Resp:  [20-23] 20  BP: (115-141)/(54-79) 115/54  SpO2:  [92 %-98 %] 97 %      Intake/Output:    Intake/Output Summary (Last 24 hours) at  1/29/2024 1050  Last data filed at 1/28/2024 2030  Gross per 24 hour   Intake 850 ml   Output 845 ml   Net 5 ml       Last 3 Weights   01/28/24 0323 230 lb (104.3 kg)   01/27/24 2100 230 lb (104.3 kg)   05/30/23 0805 230 lb (104.3 kg)   05/24/22 0812 230 lb (104.3 kg)       Exam   Gen: No acute distres  Heent: NC AT, neck supple  Pulm: Lungs clear, normal respiratory effort  CV: Heart with regular rate and rhythm  Abd: Abdomen soft, nontender  MSK: no clubbing, no cyanosis  Skin: no rashes or lesions        Data Review:       Labs:     Recent Labs   Lab 01/27/24 2105 01/29/24  0603   RBC 3.87 3.17*   HGB 12.3* 10.4*   HCT 37.4* 29.7*   MCV 96.6 93.7   MCH 31.8 32.8   MCHC 32.9 35.0   RDW 13.1 12.8   NEPRELIM 8.68* 4.86   WBC 13.0* 8.7   .0 180.0         Recent Labs   Lab 01/27/24 2106 01/28/24  1526 01/29/24  0603   * 139* 111*   BUN 49* 36* 23   CREATSERUM 1.96* 1.33* 0.99   EGFRCR 33* 53* 76   CA 8.5* 7.9* 7.9*    141 141   K 3.8 3.4* 3.4*  3.4*    115* 114*   CO2 18.0* 15.0* 18.0*       Recent Labs   Lab 01/27/24 2106   ALT 17   AST 38*   ALB 4.6         Imaging:  CT ABDOMEN PELVIS IV CONTRAST, NO ORAL (ER)    Result Date: 1/28/2024  CONCLUSION:  1. Imaging findings suggesting severe proctocolitis with complex-appearing left lateral fluid and gas collection concerning for associated abscess formation.  2. Fluid-filled loops of small and large bowel, which could reflect additional underlying enterocolitis or malabsorption in the appropriate clinical setting.  3. Reactive ileus or partial large bowel obstruction.  4. Infrarenal abdominal aortic aneurysm measuring up to 5.0 cm maximally.  5. Mild prostatomegaly.  6. Large gas and debris-filled duodenal diverticulum.  7. Suspected mild pulmonary interstitial fibrosis.  8. Possible hepatic steatosis.   9. Lesser incidental findings as above.    A preliminary report was issued by the [facility] teleradiology service. There are no  major discrepancies.   elm-remote.   Dictated by (CST): Mike Baum MD on 1/28/2024 at 9:15 AM     Finalized by (CST): Mike Baum MD on 1/28/2024 at 9:31 AM          XR CHEST AP PORTABLE  (CPT=71045)    Result Date: 1/28/2024  CONCLUSION:  1. A minimal nodular opacity is seen slightly inferior to the left anterior 4th rib, and may be summation artifact, but is indeterminate. Follow-up chest CT imaging should be considered.  2. Otherwise negative for radiographically evident acute intrathoracic process.    A preliminary report was issued by the [facility] teleradiology service. There are no major discrepancies.  elm-remote.   Dictated by (CST): Mike Baum MD on 1/28/2024 at 6:08 AM     Finalized by (CST): Mike Baum MD on 1/28/2024 at 6:10 AM             Meds:      metoprolol succinate ER  50 mg Oral Daily Beta Blocker    potassium chloride  40 mEq Oral Q4H    allopurinol  100 mg Oral Daily    atorvastatin  20 mg Oral Daily    cefTRIAXone  2 g Intravenous Q24H    metRONIDAZOLE  500 mg Intravenous Q8H    heparin  5,000 Units Subcutaneous Q8H MATT       acetaminophen, melatonin, polyethylene glycol (PEG 3350), sennosides, bisacodyl, ondansetron, metoclopramide

## 2024-01-29 NOTE — PHYSICAL THERAPY NOTE
PHYSICAL THERAPY EVALUATION - INPATIENT     Room Number: 446/446-A  Evaluation Date: 1/29/2024  Type of Evaluation: Initial   Physician Order: PT Eval and Treat    Presenting Problem: N/V, diarrhea, urinary retention, PAULA, dehydration, perianal abscess  Co-Morbidities : AAA, CAD, coronary atherosclerosis, DM2, GOUT, HTN, HLD, OA  Reason for Therapy: Mobility Dysfunction and Discharge Planning    PHYSICAL THERAPY ASSESSMENT     Patient is a 83 year old male admitted 1/27/2024 for N/V, diarrhea, urinary retention, PAULA, dehydration, perianal abscess.  Prior to admission, patient was independent with no AD. During session, patient demonstrated CGA-SBA with a rolling walker during transfers, gait, stair negotiation, ambulating household distances, and ambulating community distances. Pt demonstrates ability to navigate home environment safely, was experiencing dyspnea on exertion requiring seated rest break after 60' prior to stair trial. Anticipate patient to return home with home-health PT to return [facility]. Patient is cleared from a physical therapy standpoint for discharge as they have adequately achieved therapy goals to return home safely, however patient would benefit from continued inpatient therapy services if patient remains hospitalized to further progress towards prior level of function.     Patient history and/or personal factors that may impact the plan of care include limited social support. Based on the physical therapy examination of the noted systems and mobility skills, the patient presentation is stable given the patient is functioning near baseline level.    The patient's Approx Degree of Impairment: 41.77% has been calculated based on documentation in the [facility] '6 clicks' Inpatient Basic Mobility Short Form.  Research supports that patients with this level of impairment may benefit from home with home-health PT.    Patient will benefit from continued IP PT services to address these deficits in  preparation for discharge.    DISCHARGE RECOMMENDATIONS  PT Discharge Recommendations: Home with home health PT    PLAN  PT Treatment Plan: Bed mobility;Endurance;Energy conservation;Patient education;Gait training;Transfer training;Stair training  Rehab Potential : Good  Frequency (Obs): 3-5x/week       PHYSICAL THERAPY MEDICAL/SOCIAL HISTORY       Problem List  Principal Problem:    Acute kidney injury (HCC)  Active Problems:    Proctocolitis    Perianal abscess      HOME SITUATION  Home Situation  Type of Home: House  Home Layout: One level  Stairs to Enter : 3  Railing: Yes  Lives With: Spouse  Drives: Yes  Patient Owned Equipment: Rolling walker;Cane  Patient Regularly Uses: Hearing aides;Glasses     Prior Level of Rodney: independent with no device, lives with spouse, 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 I've been  for 60 years!\"     PHYSICAL THERAPY EXAMINATION     OBJECTIVE  Precautions: Other (Comment) (contact+)  Fall Risk: Standard fall risk    WEIGHT BEARING RESTRICTION  Weight Bearing Restriction: None                PAIN ASSESSMENT  Ratin  Location: denies       COGNITION  Overall Cognitive Status:  WFL - within functional limits    RANGE OF MOTION AND STRENGTH ASSESSMENT  Upper extremity ROM and strength are within functional limits  BUE  Lower extremity ROM is within functional limits  BLE  Lower extremity strength is within functional limits  BLE    BALANCE  Static Sitting: Good  Dynamic Sitting: Fair +  Static Standing: Good  Dynamic Standing: Fair +      NEUROLOGICAL FINDINGS           Sensation: WFL bilat to light touch          ACTIVITY TOLERANCE  Pulse: 83 (pre, 83 post)  Heart Rate Source: Monitor     BP: 115/54 (pre, 143/60 post)  BP Location: Left arm  BP Method: Automatic  Patient Position: Sitting    O2 WALK  Oxygen Therapy  SPO2% on Room Air at Rest: 97  SPO2% Ambulation on Room Air: 96    AM-PAC '6-Clicks' INPATIENT SHORT FORM -  BASIC MOBILITY  How much difficulty does the patient currently have...  Patient Difficulty: Turning over in bed (including adjusting bedclothes, sheets and blankets)?: None   Patient Difficulty: Sitting down on and standing up from a chair with arms (e.g., wheelchair, bedside commode, etc.): A Little   Patient Difficulty: Moving from lying on back to sitting on the side of the bed?: A Little   How much help from another person does the patient currently need...   Help from Another: Moving to and from a bed to a chair (including a wheelchair)?: A Little   Help from Another: Need to walk in hospital room?: A Little   Help from Another: Climbing 3-5 steps with a railing?: A Little     AM-PAC Score:  Raw Score: 19   Approx Degree of Impairment: 41.77%   Standardized Score (AM-PAC Scale): 45.44   CMS Modifier (G-Code): CK    FUNCTIONAL ABILITY STATUS  Functional Mobility/Gait Assessment  Gait Assistance: Contact guard assist (progressed to SBA)  Distance (ft): 60+60  Assistive Device: Rolling walker;None (ambulated 5' to chair without RW, prior to that pt denied ambulation without RW)  Pattern: R Decreased stance time;Comment (dec meg, slightly kyphotic, moderate UE reliance on RW, no LOB, step through pattern with dec step length)  Stairs: Stairs  How Many Stairs: 4  Device: 2 Rails  Assist: Contact guard assist  Pattern: Ascend and Descend  Ascend and Descend : Reciprocal      SIT TO STAND: contact guard assist with rolling walker from chair, mat table, progressed to SBA  STAND TO SIT: standby assist     SESSION DETAIL  Patient received seated in bedside chair, agreeable to physical therapy evaluation. Pt experiencing fatigue during ambulation, required seated rest break after ambulating 60' with rolling walker prior to stairs. Pt preferring to use rolling walker during ambulation despite therapist encouragement to try ambulation without device. Vital signs monitored as noted above, no adverse symptoms and patient  stable during session. Education with patient provided on Physical therapy plan of care and physiological benefits of out of bed mobility. Next session anticipate to progress gait and ambulating community distances.      Exercise/Education Provided:  Bed mobility  Energy conservation  Functional activity tolerated  Gait training  Posture  Transfer training    Patient End of Session: Up in chair;Needs met;Call light within reach;RN aware of session/findings;All patient questions and concerns addressed    CURRENT GOALS    Goals to be met by: 24  Patient Goal Patient's self-stated goal is: to return home   Goal #1 Patient is able to demonstrate supine - sit EOB @ level: modified independent     Goal #1   Current Status    Goal #2 Patient is able to demonstrate transfers Sit to/from Stand at assistance level: modified independent with walker - rolling     Goal #2  Current Status    Goal #3 Patient is able to ambulate 200 feet with assist device: walker - rolling at assistance level: modified independent   Goal #3   Current Status    Goal #4 Patient to demonstrate independence with home activity/exercise instructions provided to patient in preparation for discharge.   Goal #4  Current Status    Goal #5    Goal #5  Current Status      Patient Evaluation Complexity Level:  History High - 3 or more personal factors and/or co-morbidities   Examination of body systems Moderate - addressing a total of 3 or more elements   Clinical Presentation Low - Stable   Clinical Decision Making Low Complexity       Gait Trainin minutes    BATOOL Cristina  [facility]  DPT Candidate     I provided clinical instruction and supervision for the duration of this session and agree with the above documentation.    Jaqui Olivo, PT, DPT  [facility]

## 2024-01-29 NOTE — OCCUPATIONAL THERAPY NOTE
OCCUPATIONAL THERAPY EVALUATION - INPATIENT     Room Number: 446/446-A  Evaluation Date: 1/29/2024  Type of Evaluation: Initial  Presenting Problem: N/V, diarrhea, urinary retention, PAULA, dehydration, perianal abscess    Physician Order: IP Consult to Occupational Therapy  Reason for Therapy: ADL/IADL Dysfunction and Discharge Planning    OCCUPATIONAL THERAPY ASSESSMENT   Patient is a 83 year old male admitted 1/27/2024 for general deconditioning due to dehydration, PAULA and N/V.  In this OT evaluation patient presents with the following impairments: impulsive behavior (premorbid to this admission), dec dynamic balance and self reported intermittent working memory deficits thought to be caused by lack of sleep and nutrition by spouse.  These deficits manifest functionally while performing household mobility.   The patient is below baseline and would benefit from skilled inpatient OT to address the above deficits, maximizing patient's ability to return to prior level of function.    The patient's Approx Degree of Impairment: 25.8% has been calculated based on documentation in the [facility] '6 clicks' Inpatient Daily Activity Short Form.  Research supports that patients with this level of impairment may benefit from home w/ family.    DISCHARGE RECOMMENDATIONS  OT Discharge Recommendations: Home  OT Device Recommendations: None    PLAN  Patient has been evaluated and presents with no skilled Occupational Therapy needs  at this time.  Patient will be discharged from Occupational Therapy services. Please re-order if a new functional limitation presents during this admission.    OCCUPATIONAL THERAPY MEDICAL/SOCIAL HISTORY   Problem List  Principal Problem:    Acute kidney injury (HCC)  Active Problems:    Proctocolitis    Perianal abscess    HOME SITUATION  Type of Home: House  Home Layout: One level  Lives With: Spouse  Toilet and Equipment: Comfort height toilet  Shower/Tub and Equipment: Walk-in shower; Tub-shower combo;  Shower chair  Drives: Yes  Patient Regularly Uses: Glasses; Hearing aides    Stairs in Home: 3  Use of Assistive Device(s): none    Prior Level of Andrew: independent    SUBJECTIVE  \"I want to get out of here soon, please\"    OCCUPATIONAL THERAPY EXAMINATION    OBJECTIVE  Precautions: -- (contact)  Fall Risk: Standard fall risk       PAIN ASSESSMENT  Ratin       COGNITION  Impulsive  Impaired short term memory    VISION  Wears glasses    Communication: able to communicate basic wants and needs clearly    Behavioral/Emotional/Social: cooperative     RANGE OF MOTION   Upper extremity ROM is within functional limits     STRENGTH ASSESSMENT  Upper extremity strength is within functional limits     COORDINATION  Gross Motor: WFL   Fine Motor: WFL     ACTIVITIES OF DAILY LIVING ASSESSMENT  AM-PAC ‘6-Clicks’ Inpatient Daily Activity Short Form  How much help from another person does the patient currently need…  -   Putting on and taking off regular lower body clothing?: A Little  -   Bathing (including washing, rinsing, drying)?: A Little  -   Toileting, which includes using toilet, bedpan or urinal? : None  -   Putting on and taking off regular upper body clothing?: None  -   Taking care of personal grooming such as brushing teeth?: None  -   Eating meals?: None    AM-PAC Score:  Score: 22  Approx Degree of Impairment: 25.8%  Standardized Score (AM-PAC Scale): 47.1  CMS Modifier (G-Code): CJ    FUNCTIONAL TRANSFER ASSESSMENT  Sit to Stand: Edge of Bed  Edge of Bed: Supervision     Skilled Therapy Provided: fall prevention, energy conservation     EDUCATION PROVIDED  Patient : Role of Occupational Therapy; Discharge Recommendations; Plan of Care; Functional Transfer Techniques; Fall Prevention  Patient's Response to Education: Demonstrates Disinterest  Family/Caregiver: Role of Occupational Therapy; Plan of Care; Discharge Recommendations; Adaptive Equipment Recommendations; Functional Transfer  Techniques  Family/Caregiver's Response to Education: Verbalized Understanding    Patient End of Session: Up in chair;Needs met;Call light within reach;RN aware of session/findings;Family present    Patient was able to achieve the following ...   Patient able to toilet transfer  safely and independently    Patient able to dress lower extremities  at previous functional level    Patient/Caregiver able to demonstrate safety with ADLS  safely and independently     Patient Evaluation Complexity Level:   Occupational Profile/Medical History LOW - Brief history including review of medical or therapy records    Specific performance deficits impacting engagement in ADL/IADL LOW  1 - 3 performance deficits    Client Assessment/Performance Deficits LOW - No comorbidities nor modifications of tasks    Clinical Decision Making LOW - Analysis of occupational profile, problem-focused assessments, limited treatment options    Overall Complexity LOW     OT Session Time: 23 minutes  Self-Care Home Management: 10 minutes  Therapeutic Activity: 13 minutes

## 2024-01-29 NOTE — CONSULTS
[facility] Infectious Disease  Report of Consultation    Raul Freed Jr. Patient Status:  Inpatient    1940 MRN [id number]   Location [facility] 4W/SW/SE Attending Kerrie Guzman, DO   Hosp Day # 1 PCP Rao Beyer MD     Date of Admission:  2024  Date of Consult:  2024    Reason for Consultation:  Colitis, perianal abscess    History of Present Illness:  Raul Freed Jr. is a a(n) 83 year old male being seen at your request regarding proctocolitis with a perianal abscess.  Roldan presented to the ED with a c/o multiple episodes of diarrhea over the last 4 days as well as lucina N/V.  He denied fevers but had a temp to 101.1F on admission.      CT done in the ED demonstrated severe proctocolitis with L lateral fluid collection and gas collection.  Also some fluid filled loops of small and large bowel.  Blood cultures are negative.  He has been started on IV ceftriaxone and metronidazole and we are asked to see and assist.    History:  Past Medical History:   Diagnosis Date    Abdominal aortic aneurysm (AAA) 3.0 cm to 5.0 cm in diameter in female (HCC)     CAD involving native coronary artery of native heart without angina pectoris 3/20/2018    Cardiac LV ejection fraction 50-55% per 2018 angio  3/20/2018    Centrilobular emphysema (HCC) 3/19/2018    Coronary atherosclerosis     COVID 2020    Diabetes mellitus (HCC)     Gastric ulcer 2008    Healed 2009    GOUT     High blood pressure     High cholesterol     Hypercholesterolemia     HYPERLIPIDEMIA     HYPERTENSION     Hypertension     OBESITY     OSTEOARTHRITIS     osteoarthritis knees    Prediabetes     Presence of drug coated stent in prox & Mid LAD coronary artery 2018 3/20/2018    2.75 x 15 mm Xcience drug-eluting stent into the mid LAD 3.5 x 12 mm Xcience drug-eluting stent into the proximal LAD    Pulmonary emphysema (HCC)     SLEEP APNEA     [facility] fax [phone]     Sleep apnea      Past Surgical History:   Procedure Laterality Date    CATH BARE METAL STENT (BMS)      COLONOSCOPY  2009= Declines repeat    2009, complicated by anal fissure    COLONOSCOPY      COLONOSCOPY  03/2022    one small TA, int hem, can consider d/cing surveillance    COLONOSCOPY N/A 2/28/2022    Procedure: COLONOSCOPY,  with polypectomy;  Surgeon: Anthony Patel MD;  Location: [facility]    HEMORRHOIDECTOMY  Dr Kamara 3/12/10 [facility]     Rectal exam under anesthesia/anoscopy. Incision and drainage of perirectal abscess. Placement of seton . Excision of anal tags. Destruction of internal hemorrhoids.  Surgery done at [facility] on 3/12/10.    OTHER SURGICAL HISTORY      left knee arthroscopy    PATIENT DOCUMENTED NOT TO HAVE EXPERIENCED ANY OF THE FOLLOWING EVENTS N/A 2/18/2015    Procedure: ESOPHAGOGASTRODUODENOSCOPY, POSSIBLE BIOPSY, POSSIBLE POLYPECTOMY 37317;  Surgeon: Musa Cook MD;  Location: [facility]    PATIENT WITHOUGH PREOPERATIVE ORDER FOR IV ANTIBIOTIC SURGICAL SITE INFECTION PROPHYLAXIS. N/A 2/18/2015    Procedure: ESOPHAGOGASTRODUODENOSCOPY, POSSIBLE BIOPSY, POSSIBLE POLYPECTOMY 84304;  Surgeon: Musa Cook MD;  Location: [facility]    UPPER GI ENDOSCOPY PERFORMED  2008, 2/27/2009    UPPER GI ENDOSCOPY,BIOPSY  2/18/15=Gastritis.  H pylori negative, normal SB Bx    UPPER GI ENDOSCOPY,BIOPSY N/A 2/18/2015    Procedure: ESOPHAGOGASTRODUODENOSCOPY, POSSIBLE BIOPSY, POSSIBLE POLYPECTOMY 22640;  Surgeon: Musa Cook MD;  Location: [facility]    UPPER GI ENDOSCOPY,EXAM       Family History   Problem Relation Age of Onset    Cancer Mother         lung    Obesity Son     Obesity Sister     Lipids Sister     Obesity Son       reports that he quit smoking about 14 years ago. He has a 40 pack-year smoking history. He has never used smokeless tobacco. He reports current alcohol use. He reports that he does not use  drugs.    Allergies:  No Known Allergies    Medications:    Current Facility-Administered Medications:     metoprolol succinate ER (Toprol XL) 24 hr tab 50 mg, 50 mg, Oral, Daily Beta Blocker    potassium chloride (K-Dur) tab 40 mEq, 40 mEq, Oral, Q4H    allopurinol (Zyloprim) tab 100 mg, 100 mg, Oral, Daily    atorvastatin (Lipitor) tab 20 mg, 20 mg, Oral, Daily    acetaminophen (Tylenol Extra Strength) tab 500 mg, 500 mg, Oral, Q4H PRN    melatonin tab 3 mg, 3 mg, Oral, Nightly PRN    sennosides (Senokot) tab 17.2 mg, 17.2 mg, Oral, Nightly PRN    ondansetron (Zofran) 4 MG/2ML injection 4 mg, 4 mg, Intravenous, Q6H PRN    metoclopramide (Reglan) 5 mg/mL injection 5 mg, 5 mg, Intravenous, Q8H PRN    cefTRIAXone (Rocephin) 2 g in D5W 100 mL IVPB-ADD, 2 g, Intravenous, Q24H    metRONIDAZOLE in sodium chloride 0.79% (Flagyl) 5 mg/mL IVPB premix 500 mg, 500 mg, Intravenous, Q8H    heparin (Porcine) 5000 UNIT/ML injection 5,000 Units, 5,000 Units, Subcutaneous, Q8H MATT    Review of Systems:    Constitutional:  Fevers on admission.   HEENT:  No visual changes, oral ulcers, sore throat, difficulty swallowing.   Respiratory: Negative for cough, sputum, hemoptysis, chest pain, wheezing, dyspnea on exertion, or stridor.   Cardiovascular: Negative for chest pain, palpitations, irregular heart beats.   Gastrointestinal:  N/V/D.   Genitourinary:  No dysuria, hematuria, urine urgency or frequency.   Integument/breast: Negative for rash, skin lesions, and pruritus.   Hematologic/lymphatic: Negative for easy bruising, bleeding, and lymphadenopathy.   Musculoskeletal: Negative for myalgias, arthralgias, muscle weakness.   Neurological: No focal neurologic deficits, seizures, tremors.   Psych:  No h/o anxiety, depression, other psych d/o.   Endocrine: No history of of diabetes, thyroid disorder.    Remainder of 12 point review of systems otherwise negative.    Vital signs in last 24 hours:  Patient Vitals for the past 24 hrs:   BP  Temp Temp src Pulse Resp SpO2   01/29/24 0919 115/54 -- -- 83 -- --   01/29/24 0345 -- -- -- 76 -- 97 %   01/29/24 0239 119/79 98.5 °F (36.9 °C) Oral 93 20 92 %   01/29/24 0103 -- -- -- 87 -- 98 %   01/28/24 2300 -- -- -- 85 -- 97 %   01/28/24 2030 141/68 98.3 °F (36.8 °C) Oral 90 23 97 %       Intake/Output:  No intake/output data recorded.    Physical Exam:   General: Awake, alert, non-tox and in NAD.   Head: Normocephalic, without obvious abnormality, atraumatic.   Eyes: Conjunctivae/corneas clear.  No scleral icterus.  No conjunctival     hemorrhage.   Nose: Nares normal.   Throat:  Oropharynx clear, MMs moist.   Neck: Trachea ML, no masses.   Lungs: CTA b/l no rhonchi, rales, wheezes.   Chest wall: No tenderness or deformity.   Heart: Regular rate and rhythm, normal S1S2, no murmurs.   Abdomen: Soft, NT/ND.  Bowel sounds present.  No organomegaly.   Extremity: No edema.   Skin: No rashes or lesions.   Neurological: No focal neurologic deficits.    Lab Data Review:  Lab Results   Component Value Date    WBC 8.7 01/29/2024    HGB 10.4 01/29/2024    HCT 29.7 01/29/2024    .0 01/29/2024    CREATSERUM 0.99 01/29/2024    BUN 23 01/29/2024     01/29/2024    K 3.4 01/29/2024    K 3.4 01/29/2024     01/29/2024    CO2 18.0 01/29/2024     01/29/2024    CA 7.9 01/29/2024    MG 2.2 01/29/2024      Cultures:   Blood cultures negative x 2    Radiology:  CONCLUSION:   1. Imaging findings suggesting severe proctocolitis with complex-appearing left lateral fluid and gas collection concerning for associated abscess formation.      2. Fluid-filled loops of small and large bowel, which could reflect additional underlying enterocolitis or malabsorption in the appropriate clinical setting.      3. Reactive ileus or partial large bowel obstruction.      4. Infrarenal abdominal aortic aneurysm measuring up to 5.0 cm maximally.      5. Mild prostatomegaly.      6. Large gas and debris-filled duodenal  diverticulum.      7. Suspected mild pulmonary interstitial fibrosis.      8. Possible hepatic steatosis.       9. Lesser incidental findings as above.       Assessment and Plan:    Sepsis in this gentleman presenting with fevers, leukocytosis, N/V/D  - CT with evidence of severe proctocolitis with complex-appearing L lateral fluid/gas  - Seen by Dr. Shelby - consistent with a small intramural abscess in the L posterior rectal wall  - IV antibiotics ongoing, no plans for drainage at this time    2.  Diarrhea due to the above  - Patient does have a h/o chronic loose stools so unclear if this is much worse than baseline  - Stool PCR and c.diff studies have been ordered for completeness    3.  Low-grade leukocytosis due to the above  - At 8K and resolved currently    4.  Disposition - inpatient.  Supportive care ongoing.  If no definitive procedure planned, best approach will be for continued IV Rx.  Suggest PICC and ~3 weeks of IV invanz with repeat CT anticipated at EOT, sooner if clinical worsening.  Trending temps and WBCs.  Orders placed for PICC.  Anticipating invanz 1gm IV daily through 2/19/24 at discharge.  Orders entered for social work to assist.  Will follow.    Nupur Bradford DO, [facility] Infectious Disease  [phone]    1/29/2024  12:14 PM

## 2024-01-29 NOTE — DISCHARGE INSTRUCTIONS
Sometimes managing your health at home requires assistance.  The [facility] team has recognized your preference to use [facility].  They can be reached by phone at [phone].  The fax number for your reference is [phone].  A representative from the home health agency will contact you or your family to schedule your first visit.        You have been scheduled to receive outpatient antibiotics at the infusion center located at the [facility].  The Cancer Center is located on the east side of the hospital next to the [facility].  Please park in the [place] parking lot, enter through the front door of the Cancer Center, and check in at the . *For weekend appointments, please enter through door 5A (just left of the Cancer Center main door, marked “WEEKEND INFUSION ENTRANCE”).      These infusions are by appointment only so it is very important that you are on time for each visit.  Immediately after discharge, you should make a follow-up appointment with your ordering provider (Dr. Bradford) prior to the completion of you antibiotics to ensure a safe and seamless transition.      If you are unable to make your appointment or have questions/concerns, please call the center directly at [phone].    [facility]  [address], [place] [address]  First appointment: Tuesday 1/31/2024 at 3pm  If you are unable to make your appointment or have questions/concerns, please call the center directly at [phone].

## 2024-01-30 VITALS
SYSTOLIC BLOOD PRESSURE: 143 MMHG | DIASTOLIC BLOOD PRESSURE: 75 MMHG | TEMPERATURE: 98 F | RESPIRATION RATE: 18 BRPM | HEIGHT: 68 IN | BODY MASS INDEX: 34.86 KG/M2 | HEART RATE: 71 BPM | WEIGHT: 230 LBS | OXYGEN SATURATION: 96 %

## 2024-01-30 LAB
ANION GAP SERPL CALC-SCNC: 6 MMOL/L (ref 0–18)
BASOPHILS # BLD AUTO: 0.01 X10(3) UL (ref 0–0.2)
BASOPHILS NFR BLD AUTO: 0.1 %
BUN BLD-MCNC: 15 MG/DL (ref 9–23)
BUN/CREAT SERPL: 18.3 (ref 10–20)
CALCIUM BLD-MCNC: 8.4 MG/DL (ref 8.7–10.4)
CHLORIDE SERPL-SCNC: 115 MMOL/L (ref 98–112)
CO2 SERPL-SCNC: 21 MMOL/L (ref 21–32)
CREAT BLD-MCNC: 0.82 MG/DL
DEPRECATED RDW RBC AUTO: 43.5 FL (ref 35.1–46.3)
EGFRCR SERPLBLD CKD-EPI 2021: 87 ML/MIN/1.73M2 (ref 60–?)
EOSINOPHIL # BLD AUTO: 0.16 X10(3) UL (ref 0–0.7)
EOSINOPHIL NFR BLD AUTO: 2.1 %
ERYTHROCYTE [DISTWIDTH] IN BLOOD BY AUTOMATED COUNT: 12.9 % (ref 11–15)
GLUCOSE BLD-MCNC: 101 MG/DL (ref 70–99)
HCT VFR BLD AUTO: 28.2 %
HGB BLD-MCNC: 9.9 G/DL
IMM GRANULOCYTES # BLD AUTO: 0.06 X10(3) UL (ref 0–1)
IMM GRANULOCYTES NFR BLD: 0.8 %
LYMPHOCYTES # BLD AUTO: 1.41 X10(3) UL (ref 1–4)
LYMPHOCYTES NFR BLD AUTO: 18.6 %
MCH RBC QN AUTO: 32.6 PG (ref 26–34)
MCHC RBC AUTO-ENTMCNC: 35.1 G/DL (ref 31–37)
MCV RBC AUTO: 92.8 FL
MONOCYTES # BLD AUTO: 1.8 X10(3) UL (ref 0.1–1)
MONOCYTES NFR BLD AUTO: 23.7 %
NEUTROPHILS # BLD AUTO: 4.16 X10 (3) UL (ref 1.5–7.7)
NEUTROPHILS # BLD AUTO: 4.16 X10(3) UL (ref 1.5–7.7)
NEUTROPHILS NFR BLD AUTO: 54.7 %
OSMOLALITY SERPL CALC.SUM OF ELEC: 295 MOSM/KG (ref 275–295)
PLATELET # BLD AUTO: 185 10(3)UL (ref 150–450)
POTASSIUM SERPL-SCNC: 4.1 MMOL/L (ref 3.5–5.1)
RBC # BLD AUTO: 3.04 X10(6)UL
SODIUM SERPL-SCNC: 142 MMOL/L (ref 136–145)
WBC # BLD AUTO: 7.6 X10(3) UL (ref 4–11)

## 2024-01-30 PROCEDURE — 94799 UNLISTED PULMONARY SVC/PX: CPT

## 2024-01-30 PROCEDURE — 80048 BASIC METABOLIC PNL TOTAL CA: CPT | Performed by: NURSE PRACTITIONER

## 2024-01-30 PROCEDURE — 85025 COMPLETE CBC W/AUTO DIFF WBC: CPT | Performed by: NURSE PRACTITIONER

## 2024-01-30 NOTE — PLAN OF CARE
Alert and oriented x4. RA. Remote tele in place. CPAP at night. R picc line in place. R arm precautions maintained. Tolerating low fiber soft. Continues IV rocephin and flagyl. Voiding freely. Ambulates independently. Call light within reach . Safety measures in place.     Problem: CARDIOVASCULAR - ADULT  Goal: Maintains optimal cardiac output and hemodynamic stability  Description: INTERVENTIONS:  - Monitor vital signs, rhythm, and trends  - Monitor for bleeding, hypotension and signs of decreased cardiac output  - Evaluate effectiveness of vasoactive medications to optimize hemodynamic stability  - Monitor arterial and/or venous puncture sites for bleeding and/or hematoma  - Assess quality of pulses, skin color and temperature  - Assess for signs of decreased coronary artery perfusion - ex. Angina  - Evaluate fluid balance, assess for edema, trend weights  Outcome: Progressing  Goal: Absence of cardiac arrhythmias or at baseline  Description: INTERVENTIONS:  - Continuous cardiac monitoring, monitor vital signs, obtain 12 lead EKG if indicated  - Evaluate effectiveness of antiarrhythmic and heart rate control medications as ordered  - Initiate emergency measures for life threatening arrhythmias  - Monitor electrolytes and administer replacement therapy as ordered  Outcome: Progressing     Problem: SKIN/TISSUE INTEGRITY - ADULT  Goal: Skin integrity remains intact  Description: INTERVENTIONS  - Assess and document risk factors for pressure ulcer development  - Assess and document skin integrity  - Monitor for areas of redness and/or skin breakdown  - Initiate interventions, skin care algorithm/standards of care as needed  Outcome: Progressing  Goal: Incision(s), wounds(s) or drain site(s) healing without S/S of infection  Description: INTERVENTIONS:  - Assess and document risk factors for pressure ulcer development  - Assess and document skin integrity  - Assess and document dressing/incision, wound bed, drain sites  and surrounding tissue  - Implement wound care per orders  - Initiate isolation precautions as appropriate  - Initiate Pressure Ulcer prevention bundle as indicated  Outcome: Progressing  Goal: Oral mucous membranes remain intact  Description: INTERVENTIONS  - Assess oral mucosa and hygiene practices  - Implement preventative oral hygiene regimen  - Implement oral medicated treatments as ordered  Outcome: Progressing     Problem: SAFETY ADULT - FALL  Goal: Free from fall injury  Description: INTERVENTIONS:  - Assess pt frequently for physical needs  - Identify cognitive and physical deficits and behaviors that affect risk of falls.  - [place] fall precautions as indicated by assessment.  - Educate pt/family on patient safety including physical limitations  - Instruct pt to call for assistance with activity based on assessment  - Modify environment to reduce risk of injury  - Provide assistive devices as appropriate  - Consider OT/PT consult to assist with strengthening/mobility  - Encourage toileting schedule  Outcome: Progressing

## 2024-01-30 NOTE — PLAN OF CARE
Pt a/o x 4, ambulating in room per self. Wife @ BS, discussed POC, discharge instructions and all follow up care. Pt and wife verbalize understanding that first appt for infusion center is 1/31 at 1500. All questions addressed and pt discharged home after 1st dose of Invanz, pt tolerated well.   Problem: Patient Centered Care  Goal: Patient preferences are identified and integrated in the patient's plan of care  Description: Interventions:  - What would you like us to know as we care for you?   - Provide timely, complete, and accurate information to patient/family  - Incorporate patient and family knowledge, values, beliefs, and cultural backgrounds into the planning and delivery of care  - Encourage patient/family to participate in care and decision-making at the level they choose  - Honor patient and family perspectives and choices  Outcome: Adequate for Discharge     Problem: Patient/Family Goals  Goal: Patient/Family Long Term Goal  Description: Patient's Long Term Goal:     Interventions:  -   - See additional Care Plan goals for specific interventions  Outcome: Adequate for Discharge  Goal: Patient/Family Short Term Goal  Description: Patient's Short Term Goal:     Interventions:   -   - See additional Care Plan goals for specific interventions  Outcome: Adequate for Discharge     Problem: CARDIOVASCULAR - ADULT  Goal: Maintains optimal cardiac output and hemodynamic stability  Description: INTERVENTIONS:  - Monitor vital signs, rhythm, and trends  - Monitor for bleeding, hypotension and signs of decreased cardiac output  - Evaluate effectiveness of vasoactive medications to optimize hemodynamic stability  - Monitor arterial and/or venous puncture sites for bleeding and/or hematoma  - Assess quality of pulses, skin color and temperature  - Assess for signs of decreased coronary artery perfusion - ex. Angina  - Evaluate fluid balance, assess for edema, trend weights  Outcome: Adequate for Discharge  Goal:  Absence of cardiac arrhythmias or at baseline  Description: INTERVENTIONS:  - Continuous cardiac monitoring, monitor vital signs, obtain 12 lead EKG if indicated  - Evaluate effectiveness of antiarrhythmic and heart rate control medications as ordered  - Initiate emergency measures for life threatening arrhythmias  - Monitor electrolytes and administer replacement therapy as ordered  Outcome: Adequate for Discharge     Problem: SKIN/TISSUE INTEGRITY - ADULT  Goal: Skin integrity remains intact  Description: INTERVENTIONS  - Assess and document risk factors for pressure ulcer development  - Assess and document skin integrity  - Monitor for areas of redness and/or skin breakdown  - Initiate interventions, skin care algorithm/standards of care as needed  Outcome: Adequate for Discharge  Goal: Incision(s), wounds(s) or drain site(s) healing without S/S of infection  Description: INTERVENTIONS:  - Assess and document risk factors for pressure ulcer development  - Assess and document skin integrity  - Assess and document dressing/incision, wound bed, drain sites and surrounding tissue  - Implement wound care per orders  - Initiate isolation precautions as appropriate  - Initiate Pressure Ulcer prevention bundle as indicated  Outcome: Adequate for Discharge  Goal: Oral mucous membranes remain intact  Description: INTERVENTIONS  - Assess oral mucosa and hygiene practices  - Implement preventative oral hygiene regimen  - Implement oral medicated treatments as ordered  Outcome: Adequate for Discharge     Problem: SAFETY ADULT - FALL  Goal: Free from fall injury  Description: INTERVENTIONS:  - Assess pt frequently for physical needs  - Identify cognitive and physical deficits and behaviors that affect risk of falls.  - [place] fall precautions as indicated by assessment.  - Educate pt/family on patient safety including physical limitations  - Instruct pt to call for assistance with activity based on assessment  - Modify  environment to reduce risk of injury  - Provide assistive devices as appropriate  - Consider OT/PT consult to assist with strengthening/mobility  - Encourage toileting schedule  Outcome: Adequate for Discharge

## 2024-01-30 NOTE — CM/SW NOTE
Final rx entered in [employer] for [facility] infusion center.   Will need to arrange appointment time for day after dc prior to dc.     Jennifer Chan RN, BSN  Nurse   [phone]

## 2024-01-30 NOTE — DISCHARGE SUMMARY
General Medicine Discharge Summary     Patient ID:  Raul Freed Jr.  83 year old  2/19/1940    Admit date: 1/27/2024    Discharge date and time: 01/30/24    Attending Physician: Kerrie Guzman DO     Consults: IP CONSULT TO GENERAL SURGERY  IP CONSULT TO RESPIRATORY CARE  IP CONSULT TO SOCIAL WORK  IP CONSULT TO INFECTIOUS DISEASE  IP CONSULT TO SOCIAL WORK  IP CONSULT TO SOCIAL WORK  IP CONSULT TO VASCULAR ACCESS TEAM    Primary Care Physician: Rao Beyer MD     Reason for admission: proctocolitis, perianal abscess    Risk of Readmission Lace+ Score: 67  59-90 High Risk  29-58 Medium Risk  0-28   Low Risk    Discharge Diagnoses: Perianal abscess [K61.0]  Proctocolitis [K52.9]  Acute kidney injury (HCC) [N17.9]  See Additional Discharge Diagnoses in Hospital Course    Discharged Condition: good    Follow-up with labs/images appointments:   F/u with PCP  F/u with ID    Exam  Gen: No acute distress  Pulm: Lungs clear, normal respiratory effort  CV: Heart with regular rate and rhythm  Abd: Abdomen soft,   EXT: no edema     HPI: Mr. Freed is an 84 yo M with PMH of CAD, DM2, HTN, HL who presented with fatigue, diarrhea, and inability to urinate. Patient is not the best historian, does not have his hearing aids in per family. States symptoms started a few days ago, family noticed he has been weaker. Denied fevers at home. Denied abdominal pain. Has been having a lot of diarrhea. Has had issues with urinating, no burning with urination. He was taken to immediate care and discharged. Became weaker so family called 911     In the ED, febrile to 101, BP stable, LA normal. Labs with WBC 13, Cr 1.96. CT A/P with proctocolitis with perianal abscess. He was started on zosyn. Surgery consulted.        Hospital Course:   Mr. Freed is an 84 yo M with PMH of CAD, DM2, HTN, HL who presented with fatigue, diarrhea, and inability to urinate. Found to have proctocolitis with perianal abscess. Seen by general surgery  and decision made to manage conservatively. Seen by ID and had PICC placed with plans for invanz x 3 weeks. He will f/u as outpatient for repeat scan./ Dc in stable condition     Proctocolitis with perianal abscess  Fever  - CT A/P with proctocolitis with perianal abscess  - Bcx pending, LA normal  - started on zosyn, continue  - surgery consulted, no surgical indication at this time  - ID consulted, appreciate recs     Diarrhea  - check C.diff and GI stool panel  - per patient is a chronic problem x few years,      PAULA on CKD- resolved  - Cr 1.9 on admit, prior labs from 2023 with Cr 1.0  - stop IVF  - no obstruction on CT A/P or renal US  - bladder scan PRN     NAGMA  - suspect from chronic diarrhea/PAULA  - per outpatient labs is chronic     CAD/HL  - statin, hold ASA for now     DM2  - home regimen: not on home meds  - accuchecks QID, hypoglycemic protocol, SSI     HTN  - BP stable, low in ER  - hold home metoprolol    Operative Procedures:      Imaging: CT ABDOMEN PELVIS IV CONTRAST, NO ORAL (ER)    Result Date: 1/28/2024  CONCLUSION:  1. Imaging findings suggesting severe proctocolitis with complex-appearing left lateral fluid and gas collection concerning for associated abscess formation.  2. Fluid-filled loops of small and large bowel, which could reflect additional underlying enterocolitis or malabsorption in the appropriate clinical setting.  3. Reactive ileus or partial large bowel obstruction.  4. Infrarenal abdominal aortic aneurysm measuring up to 5.0 cm maximally.  5. Mild prostatomegaly.  6. Large gas and debris-filled duodenal diverticulum.  7. Suspected mild pulmonary interstitial fibrosis.  8. Possible hepatic steatosis.   9. Lesser incidental findings as above.    A preliminary report was issued by the [facility] teleradiology service. There are no major discrepancies.   elm-remote.   Dictated by (CST): Mike Baum MD on 1/28/2024 at 9:15 AM     Finalized by (CST): Mike Baum MD on  1/28/2024 at 9:31 AM          XR CHEST AP PORTABLE  (CPT=71045)    Result Date: 1/28/2024  CONCLUSION:  1. A minimal nodular opacity is seen slightly inferior to the left anterior 4th rib, and may be summation artifact, but is indeterminate. Follow-up chest CT imaging should be considered.  2. Otherwise negative for radiographically evident acute intrathoracic process.    A preliminary report was issued by the [facility] teleradiology service. There are no major discrepancies.  elm-remote.   Dictated by (CST): Mike Baum MD on 1/28/2024 at 6:08 AM     Finalized by (CST): Mike Baum MD on 1/28/2024 at 6:10 AM             Disposition: home    Activity: activity as tolerated  Diet: regular diet  Wound Care: as directed  Code Status: Full Code  O2:     Home Medication Changes:     Med list     Medication List        START taking these medications      ertapenem 1 g Solr  Commonly known as: Invanz  Inject 1 g into the vein daily for 21 days. Check once weekly CBC, CMP, CRP.  PICC care q week.  Flushes per [facility] protocol.            CONTINUE taking these medications      allopurinol 100 MG Tabs  Commonly known as: Zyloprim     aspirin 81 MG Chew     atorvastatin 20 MG Tabs  Commonly known as: Lipitor  Take 1 tablet (20 mg total) by mouth daily.     Kyra Contour Next Monitor w/Device Kit  1 Units by Does not apply route 2 (two) times daily before meals.     benzonatate 200 MG Caps  Commonly known as: Tessalon     cholecalciferol 125 MCG (5000 UT) Caps  Commonly known as: Vitamin D3  Take 1 capsule (5,000 Units total) by mouth daily.     Contour Next Test Strp  Generic drug: Glucose Blood  TEST TWICE DAILY BEFORE MEALS AS DIRECTED     metoprolol succinate  MG Tb24  Commonly known as: Toprol XL  TAKE 1 TABLET BY MOUTH EVERY DAY     Microlet Lancets Misc  USE AS DIRECTED TWICE DAILY     MULTI VITAMIN MENS OR     omega-3 fatty acids 1000 MG Caps  Commonly known as: Fish Oil     omeprazole 20 MG  Cpdr  Commonly known as: PriLOSEC     Sildenafil Citrate 50 MG Tabs  Commonly known as: VIAGRA            STOP taking these medications      amLODIPine 5 MG Tabs  Commonly known as: Norvasc     losartan 100 MG Tabs  Commonly known as: Cozaar     Losartan Potassium-HCTZ 100-12.5 MG Tabs  Commonly known as: HYZAAR     methylPREDNISolone 4 MG Tabs  Commonly known as: Medrol     NIFEdipine ER 30 MG Tb24  Commonly known as: Adalat CC               Where to Get Your Medications        You can get these medications from any pharmacy    Bring a paper prescription for each of these medications  ertapenem 1 g Solr         FU   Follow-up Information       Tiffani Hensley APRN. Call in 1 week(s).    Specialty: Nurse Practitioner  Contact information:  [address]  [facility] [address]  [place] [address] [phone]               Rao Beyer MD. Schedule an appointment as soon as possible for a visit.    Specialty: Internal Medicine  Contact information:  [address]  [address]  [facility] [address] [phone]                             DC instructions:      Other Discharge Instructions:         Sometimes managing your health at home requires assistance.  The [facility] team has recognized your preference to use [facility].  They can be reached by phone at [phone].  The fax number for your reference is [phone].  A representative from the home health agency will contact you or your family to schedule your first visit.            I reconciled current and discharge medications on day of discharge, discussed changes with patient and noted changes above.       Total Time Coordinating Care: Greater than 30 minutes    Patient had opportunity to ask questions and state understand and agree with therapeutic plan as outlined    Thank You,    Kerrie Guzman DO   Hospitalist with Replaced by [facility] and Care

## 2024-01-30 NOTE — PROGRESS NOTES
[facility] Infectious Disease  Progress Note    Raul Freed Jr. Patient Status:  Inpatient    1940 MRN [id number]   Location [facility] 4W/SW/SE Attending Kerrie Guzman, DO   Hosp Day # 2 PCP Rao Beyer MD     Subjective:    Patient seen and examined resting in bed, tolerating the IV abx. States ~1 episode of diarrhea a day. No other complaints or concerns. He's hoping to go home today.    Review of Systems:  Review of systems reviewed and negative except as mentioned    Objective:  Blood pressure 142/46, pulse 75, temperature 97.5 °F (36.4 °C), temperature source Oral, resp. rate 18, height 5' 8\" (1.727 m), weight 230 lb (104.3 kg), SpO2 96%.        Physical Exam:  General: Awake, alert, non-tox, NAD.  HEENT:  Oropharynx clear, trachea ML.  Heart: RRR S1S2 no murmurs.  Lungs: Essentially CTA b/l, no rhonchi, rales, wheezes.  Abdomen: Soft, NT/ND.  BS present.    Extremity: No edema.  Neurological: No focal deficits.  Derm:  Warm, dry, free from rashes.  Right arm PICC    Lab Data Review:  Lab Results   Component Value Date    WBC 7.6 2024    HGB 9.9 2024    HCT 28.2 2024    .0 2024    CREATSERUM 0.82 2024    BUN 15 2024     2024    K 4.1 2024     2024    CO2 21.0 2024     2024    CA 8.4 2024        Cultures:  Hospital Encounter on 24   1. Blood Culture     Status: None (Preliminary result)    Collection Time: 24 10:04 PM    Specimen: Blood,peripheral   Result Value Ref Range    Blood Culture Result No Growth 2 Days N/A        Radiology:    CT ABDOMEN PELVIS IV CONTRAST, NO ORAL (ER)    Result Date: 2024  CONCLUSION:  1. Imaging findings suggesting severe proctocolitis with complex-appearing left lateral fluid and gas collection concerning for associated abscess formation.  2. Fluid-filled loops of small and large bowel, which  could reflect additional underlying enterocolitis or malabsorption in the appropriate clinical setting.  3. Reactive ileus or partial large bowel obstruction.  4. Infrarenal abdominal aortic aneurysm measuring up to 5.0 cm maximally.  5. Mild prostatomegaly.  6. Large gas and debris-filled duodenal diverticulum.  7. Suspected mild pulmonary interstitial fibrosis.  8. Possible hepatic steatosis.   9. Lesser incidental findings as above.    A preliminary report was issued by the [facility] teleradiology service. There are no major discrepancies.   elm-remote.   Dictated by (CST): Mike Baum MD on 1/28/2024 at 9:15 AM     Finalized by (CST): Mike Baum MD on 1/28/2024 at 9:31 AM          XR CHEST AP PORTABLE  (CPT=71045)    Result Date: 1/28/2024  CONCLUSION:  1. A minimal nodular opacity is seen slightly inferior to the left anterior 4th rib, and may be summation artifact, but is indeterminate. Follow-up chest CT imaging should be considered.  2. Otherwise negative for radiographically evident acute intrathoracic process.    A preliminary report was issued by the [facility] teleradiology service. There are no major discrepancies.  elm-remote.   Dictated by (CST): Mike Baum MD on 1/28/2024 at 6:08 AM     Finalized by (CST): Mike Baum MD on 1/28/2024 at 6:10 AM               Assessment and Plan:    Sepsis in this gentleman presenting with fevers, leukocytosis, N/V/D  - CT with evidence of severe proctocolitis with complex-appearing L lateral fluid/gas  - Seen by Dr. Shelby - consistent with a small intramural abscess in the L posterior rectal wall, no plans for drainage at this time  - Blood cultures NGTD  - IV ceftriaxone and flagyl ongoing      Diarrhea due to the above  - Patient does have a h/o chronic loose stools so unclear if this is much worse than baseline  - Stool PCR and c.diff ordered, not yet sent. RN to send if able     Low-grade leukocytosis due to the above  -  resoled      Recommendations  - Continue IV ceftriaxone and flagyl while inpatient with plans to switch to IV Invanz at discharge to complete ~3 weeks of IV Rx through 2/19/24 with plans for repeat CT prior to EOT  - Follow pending cultures   - PICC in place, weekly labs while on IV abx  - Follow up with ID within one week of discharge   - Stable to discharge from ID standpoint when opt IV abx are set up and when okay with others.        If you have any questions or concerns please call [facility] Infectious Disease at [phone].     BABITA Barone    1/30/2024  8:47 AM

## 2024-01-30 NOTE — CM/SW NOTE
01/30/24 1100   Discharge disposition   Expected discharge disposition Home-Health   Post Acute Care Provider [facility]   DME/Infusion Providers   ([facility])   Discharge transportation Private car     Pt discussed with Dr. Guzman and RN Corie. Pt is medically cleared for discharge.    Spoke with Barby in outpt scheduling.   Pt is on the schedule tomorrow 1/31 at 3pm for his first infusion.   AVS updated with directions and time.    [facility] liaison notified of dc today via secure chat.    [facility]  P:[phone]  F:[phone]    Jennifer Chan RN, BSN  Nurse   [phone]

## 2024-01-31 ENCOUNTER — NURSE ONLY (OUTPATIENT)
Dept: HEMATOLOGY/ONCOLOGY | Facility: HOSPITAL | Age: 84
End: 2024-01-31
Attending: INTERNAL MEDICINE
Payer: MEDICARE

## 2024-01-31 VITALS
TEMPERATURE: 98 F | RESPIRATION RATE: 18 BRPM | OXYGEN SATURATION: 94 % | DIASTOLIC BLOOD PRESSURE: 64 MMHG | HEART RATE: 75 BPM | SYSTOLIC BLOOD PRESSURE: 156 MMHG

## 2024-01-31 DIAGNOSIS — K61.0 PERIANAL ABSCESS: Primary | ICD-10-CM

## 2024-01-31 DIAGNOSIS — K52.9 PROCTOCOLITIS: ICD-10-CM

## 2024-01-31 PROCEDURE — 96365 THER/PROPH/DIAG IV INF INIT: CPT

## 2024-01-31 RX ORDER — 0.9 % SODIUM CHLORIDE 0.9 %
INTRAVENOUS SOLUTION INTRAVENOUS
Status: DISCONTINUED
Start: 2024-01-31 | End: 2024-01-31

## 2024-01-31 RX ORDER — ERTAPENEM 1 G/1
INJECTION, POWDER, LYOPHILIZED, FOR SOLUTION INTRAMUSCULAR; INTRAVENOUS
Status: DISCONTINUED
Start: 2024-01-31 | End: 2024-01-31

## 2024-01-31 NOTE — PROGRESS NOTES
Pt here for Invanz . Pt denies any issues or concerns.   Patient reports taking shower and getting PICC line wet during it, did not cover PICC line up during shower. Patient states he was informed not to shower while line is in place. Education provided to patient and reinforced not getting the line or dressing wet. Risks explained to patient. Voices understanding.      Ordering MD: Suzette  Order Exp: 2/19/2024     Pt tolerated infusion without difficulty or complaint. Reviewed next apt date/time: 2/1 at 1600  Copy of AVS given to patient.      Education Record  Learner:  Patient  Disease / Diagnosis: Perianal abscess, Proctocolitis.  Barriers / Limitations:  None  Method:  Reinforcement  General Topics:  Precautions, Plan of care reviewed, and Other:  PICC line care and maintenance.   Outcome:  Shows understanding

## 2024-02-01 ENCOUNTER — NURSE ONLY (OUTPATIENT)
Dept: HEMATOLOGY/ONCOLOGY | Facility: HOSPITAL | Age: 84
End: 2024-02-01
Attending: INTERNAL MEDICINE
Payer: MEDICARE

## 2024-02-01 VITALS
HEART RATE: 77 BPM | TEMPERATURE: 97 F | WEIGHT: 235.63 LBS | OXYGEN SATURATION: 97 % | SYSTOLIC BLOOD PRESSURE: 164 MMHG | DIASTOLIC BLOOD PRESSURE: 58 MMHG | BODY MASS INDEX: 36 KG/M2 | RESPIRATION RATE: 18 BRPM

## 2024-02-01 DIAGNOSIS — K61.0 PERIANAL ABSCESS: Primary | ICD-10-CM

## 2024-02-01 DIAGNOSIS — K52.9 PROCTOCOLITIS: ICD-10-CM

## 2024-02-01 PROCEDURE — 96365 THER/PROPH/DIAG IV INF INIT: CPT

## 2024-02-01 RX ORDER — 0.9 % SODIUM CHLORIDE 0.9 %
INTRAVENOUS SOLUTION INTRAVENOUS
Status: DISCONTINUED
Start: 2024-02-01 | End: 2024-02-01

## 2024-02-01 RX ORDER — ERTAPENEM 1 G/1
INJECTION, POWDER, LYOPHILIZED, FOR SOLUTION INTRAMUSCULAR; INTRAVENOUS
Status: DISCONTINUED
Start: 2024-02-01 | End: 2024-02-01

## 2024-02-01 NOTE — PROGRESS NOTES
Pt here for IV antibiotic r/t perianal abscess. Patient arrived ambulatory to unit. R upper arm single lumen PICC C/D/I, good blood return noted. IV Invanz infused. Line flushed and alcohol cap applied. Pt denies any issues or concerns.      Ordering MD: Suzette HOPPER 2/19     Pt tolerated infusion without difficulty or complaint. Reviewed next apt date/time: 2/2      Education Record  Learner:  Patient  Disease / Diagnosis: Perianal abscess  Barriers / Limitations:  None  Method:  Discussion  General Topics:  Medication, Side effects and symptom management, and Plan of care reviewed  Outcome:  Shows understanding

## 2024-02-02 ENCOUNTER — NURSE ONLY (OUTPATIENT)
Dept: HEMATOLOGY/ONCOLOGY | Facility: HOSPITAL | Age: 84
End: 2024-02-02
Attending: INTERNAL MEDICINE
Payer: MEDICARE

## 2024-02-02 VITALS
WEIGHT: 234.31 LBS | HEIGHT: 67.99 IN | HEART RATE: 82 BPM | OXYGEN SATURATION: 92 % | SYSTOLIC BLOOD PRESSURE: 159 MMHG | TEMPERATURE: 97 F | RESPIRATION RATE: 16 BRPM | DIASTOLIC BLOOD PRESSURE: 70 MMHG | BODY MASS INDEX: 35.51 KG/M2

## 2024-02-02 DIAGNOSIS — K61.0 PERIANAL ABSCESS: Primary | ICD-10-CM

## 2024-02-02 DIAGNOSIS — K52.9 PROCTOCOLITIS: ICD-10-CM

## 2024-02-02 PROCEDURE — 96365 THER/PROPH/DIAG IV INF INIT: CPT

## 2024-02-02 RX ORDER — 0.9 % SODIUM CHLORIDE 0.9 %
INTRAVENOUS SOLUTION INTRAVENOUS
Status: DISCONTINUED
Start: 2024-02-02 | End: 2024-02-02

## 2024-02-02 RX ORDER — ERTAPENEM 1 G/1
INJECTION, POWDER, LYOPHILIZED, FOR SOLUTION INTRAMUSCULAR; INTRAVENOUS
Status: DISCONTINUED
Start: 2024-02-02 | End: 2024-02-02

## 2024-02-02 NOTE — PROGRESS NOTES
Pt here for INVANZ . Pt denies any issues or concerns.      Ordering MD: Nupur Bradford  Order Exp: 2/19/2024     Pt tolerated infusion without difficulty or complaint. Reviewed next apt date/time: YES      Education Record  Learner:  Patient  Disease / Diagnosis: Perianal abcess  Barriers / Limitations:  None  Method:  Discussion  General Topics:  Plan of care reviewed  Outcome:  Shows understanding

## 2024-02-03 ENCOUNTER — NURSE ONLY (OUTPATIENT)
Dept: HEMATOLOGY/ONCOLOGY | Facility: HOSPITAL | Age: 84
End: 2024-02-03
Attending: INTERNAL MEDICINE
Payer: MEDICARE

## 2024-02-03 VITALS
TEMPERATURE: 98 F | RESPIRATION RATE: 16 BRPM | HEART RATE: 77 BPM | OXYGEN SATURATION: 96 % | DIASTOLIC BLOOD PRESSURE: 72 MMHG | SYSTOLIC BLOOD PRESSURE: 145 MMHG

## 2024-02-03 DIAGNOSIS — K61.0 PERIANAL ABSCESS: Primary | ICD-10-CM

## 2024-02-03 DIAGNOSIS — K52.9 PROCTOCOLITIS: ICD-10-CM

## 2024-02-03 PROCEDURE — 96365 THER/PROPH/DIAG IV INF INIT: CPT

## 2024-02-03 RX ORDER — ERTAPENEM 1 G/1
INJECTION, POWDER, LYOPHILIZED, FOR SOLUTION INTRAMUSCULAR; INTRAVENOUS
Status: DISCONTINUED
Start: 2024-02-03 | End: 2024-02-03

## 2024-02-03 RX ORDER — 0.9 % SODIUM CHLORIDE 0.9 %
INTRAVENOUS SOLUTION INTRAVENOUS
Status: DISCONTINUED
Start: 2024-02-03 | End: 2024-02-03

## 2024-02-03 NOTE — PROGRESS NOTES
Pt here for daily Invanz . Pt denies any issues or concerns.      Ordering MD: Suzette  Order Exp: 2/19     Pt tolerated infusion without difficulty or complaint. Reviewed next apt date/time: 2/4 @ 0800      Education Record  Learner:  Patient and Spouse  Disease / Diagnosis: Perianal abscess  Barriers / Limitations:  None  Method:  Reinforcement  General Topics:  Plan of care reviewed  Outcome:  Shows understanding

## 2024-02-04 ENCOUNTER — NURSE ONLY (OUTPATIENT)
Dept: HEMATOLOGY/ONCOLOGY | Facility: HOSPITAL | Age: 84
End: 2024-02-04
Attending: INTERNAL MEDICINE
Payer: MEDICARE

## 2024-02-04 VITALS
OXYGEN SATURATION: 96 % | SYSTOLIC BLOOD PRESSURE: 144 MMHG | HEART RATE: 79 BPM | DIASTOLIC BLOOD PRESSURE: 67 MMHG | TEMPERATURE: 98 F | RESPIRATION RATE: 18 BRPM

## 2024-02-04 DIAGNOSIS — K52.9 PROCTOCOLITIS: ICD-10-CM

## 2024-02-04 DIAGNOSIS — K61.0 PERIANAL ABSCESS: Primary | ICD-10-CM

## 2024-02-04 PROCEDURE — 96365 THER/PROPH/DIAG IV INF INIT: CPT

## 2024-02-04 RX ORDER — ERTAPENEM 1 G/1
INJECTION, POWDER, LYOPHILIZED, FOR SOLUTION INTRAMUSCULAR; INTRAVENOUS
Status: DISCONTINUED
Start: 2024-02-04 | End: 2024-02-04

## 2024-02-04 RX ORDER — 0.9 % SODIUM CHLORIDE 0.9 %
INTRAVENOUS SOLUTION INTRAVENOUS
Status: DISCONTINUED
Start: 2024-02-04 | End: 2024-02-04

## 2024-02-04 NOTE — PROGRESS NOTES
Pt here for daily Invanz infusion. Pt denies any issues or concerns.      Ordering MD: Suzette  Order Exp: 2/19/24     Pt tolerated infusion without difficulty or complaint. Reviewed next apt date/time: 2/5 @ 1600      Education Record  Learner:  Patient  Disease / Diagnosis: Perianal abscess  Barriers / Limitations:  None  Method:  Reinforcement  General Topics:  Medication and Plan of care reviewed  Outcome:  Shows understanding

## 2024-02-05 ENCOUNTER — NURSE ONLY (OUTPATIENT)
Dept: HEMATOLOGY/ONCOLOGY | Facility: HOSPITAL | Age: 84
End: 2024-02-05
Attending: INTERNAL MEDICINE
Payer: MEDICARE

## 2024-02-05 VITALS
DIASTOLIC BLOOD PRESSURE: 63 MMHG | TEMPERATURE: 98 F | SYSTOLIC BLOOD PRESSURE: 152 MMHG | OXYGEN SATURATION: 95 % | HEART RATE: 62 BPM | RESPIRATION RATE: 18 BRPM

## 2024-02-05 DIAGNOSIS — K52.9 PROCTOCOLITIS: ICD-10-CM

## 2024-02-05 DIAGNOSIS — K61.0 PERIANAL ABSCESS: Primary | ICD-10-CM

## 2024-02-05 LAB
ALBUMIN SERPL-MCNC: 4 G/DL (ref 3.2–4.8)
ALBUMIN/GLOB SERPL: 1.1 {RATIO} (ref 1–2)
ALP LIVER SERPL-CCNC: 96 U/L
ALT SERPL-CCNC: 48 U/L
ANION GAP SERPL CALC-SCNC: 7 MMOL/L (ref 0–18)
AST SERPL-CCNC: 35 U/L (ref ?–34)
BASOPHILS # BLD AUTO: 0.03 X10(3) UL (ref 0–0.2)
BASOPHILS NFR BLD AUTO: 0.3 %
BILIRUB SERPL-MCNC: 0.2 MG/DL (ref 0.2–1.1)
BUN BLD-MCNC: 17 MG/DL (ref 9–23)
BUN/CREAT SERPL: 15.9 (ref 10–20)
CALCIUM BLD-MCNC: 9.1 MG/DL (ref 8.7–10.4)
CHLORIDE SERPL-SCNC: 106 MMOL/L (ref 98–112)
CO2 SERPL-SCNC: 28 MMOL/L (ref 21–32)
CREAT BLD-MCNC: 1.07 MG/DL
CRP SERPL-MCNC: <0.4 MG/DL (ref ?–1)
DEPRECATED RDW RBC AUTO: 44.8 FL (ref 35.1–46.3)
EGFRCR SERPLBLD CKD-EPI 2021: 69 ML/MIN/1.73M2 (ref 60–?)
EOSINOPHIL # BLD AUTO: 0.21 X10(3) UL (ref 0–0.7)
EOSINOPHIL NFR BLD AUTO: 2.4 %
ERYTHROCYTE [DISTWIDTH] IN BLOOD BY AUTOMATED COUNT: 12.9 % (ref 11–15)
GLOBULIN PLAS-MCNC: 3.5 G/DL (ref 2.8–4.4)
GLUCOSE BLD-MCNC: 135 MG/DL (ref 70–99)
HCT VFR BLD AUTO: 31.5 %
HGB BLD-MCNC: 10.9 G/DL
IMM GRANULOCYTES # BLD AUTO: 0.33 X10(3) UL (ref 0–1)
IMM GRANULOCYTES NFR BLD: 3.7 %
LYMPHOCYTES # BLD AUTO: 2.46 X10(3) UL (ref 1–4)
LYMPHOCYTES NFR BLD AUTO: 27.8 %
MCH RBC QN AUTO: 33.3 PG (ref 26–34)
MCHC RBC AUTO-ENTMCNC: 34.6 G/DL (ref 31–37)
MCV RBC AUTO: 96.3 FL
MONOCYTES # BLD AUTO: 1.22 X10(3) UL (ref 0.1–1)
MONOCYTES NFR BLD AUTO: 13.8 %
NEUTROPHILS # BLD AUTO: 4.6 X10 (3) UL (ref 1.5–7.7)
NEUTROPHILS # BLD AUTO: 4.6 X10(3) UL (ref 1.5–7.7)
NEUTROPHILS NFR BLD AUTO: 52 %
OSMOLALITY SERPL CALC.SUM OF ELEC: 296 MOSM/KG (ref 275–295)
PLATELET # BLD AUTO: 282 10(3)UL (ref 150–450)
POTASSIUM SERPL-SCNC: 4.2 MMOL/L (ref 3.5–5.1)
PROT SERPL-MCNC: 7.5 G/DL (ref 5.7–8.2)
RBC # BLD AUTO: 3.27 X10(6)UL
SODIUM SERPL-SCNC: 141 MMOL/L (ref 136–145)
WBC # BLD AUTO: 8.9 X10(3) UL (ref 4–11)

## 2024-02-05 PROCEDURE — 96365 THER/PROPH/DIAG IV INF INIT: CPT

## 2024-02-05 PROCEDURE — 80053 COMPREHEN METABOLIC PANEL: CPT

## 2024-02-05 PROCEDURE — 85025 COMPLETE CBC W/AUTO DIFF WBC: CPT

## 2024-02-05 PROCEDURE — 86140 C-REACTIVE PROTEIN: CPT

## 2024-02-05 RX ORDER — ERTAPENEM 1 G/1
INJECTION, POWDER, LYOPHILIZED, FOR SOLUTION INTRAMUSCULAR; INTRAVENOUS
Status: DISCONTINUED
Start: 2024-02-05 | End: 2024-02-05

## 2024-02-05 RX ORDER — 0.9 % SODIUM CHLORIDE 0.9 %
INTRAVENOUS SOLUTION INTRAVENOUS
Status: DISCONTINUED
Start: 2024-02-05 | End: 2024-02-05

## 2024-02-05 NOTE — CDS QUERY
.CLINICAL DOCUMENTATION CLARIFICATION FORM  Dear Doctor: Megan.  Infectious disease notes: Sepsis in this gentleman presenting with fevers, leukocytosis, N/V/D. Based on your judgment and below clinical indicators, please clarify whether or not you agree with documented diagnosis of sepsis:    PLEASE (X) ATT THAT APPLY TO  ( x) Yes, I agree with Sepsis  ( ) No, sepsis was ruled out  ( ) other- please specify:____________________________________    Also, if agree with sepsis, please specify associated organ dysfunction  ,( x) PAULA   ( )  Other- please specify:________________________________      Documentation includes   83yr M with HTN, DMM presented to ER with fatigue, nausea, vomitting, diarrhea, and inability to urinate, and admitted for Proctocolitis and Perianal abscess  Clinical indicators:  in ED febrile to 101.1, fever Leukocytosis, PAULA, Proctocolitis with perianal abscess    Lab results 1/27- 1/30:  WBC  13 8.7 7.6,  lactic acide 1.7 ,creatinine Cr 1.96 1.33 0.99 0.82, GFR 33   Vitals:  Pulse 94, 90, 94 ,91 89, Respiration 25 22 20 17  - Blood culture negative    Infectious disease: Sepsis in this gentleman presenting with fevers, leukocytosis, N/V/D  - consistent with a small intramural abscess in the L posterior rectal wall, no plans for drainage at this time-     Treatment:  IV ceftriaxone and flagyy, PICC line ,IV Invanz at discharge to complete ~3 weeks of IV Rx through 2/19/24, repeat CT in 2-3weeks  If you have any questions, please contact Clinical :  SANDRA Gonzalez at [phone]     Thank You!    THIS FORM IS A PERMANENT PART OF THE MEDICAL RECORD

## 2024-02-06 ENCOUNTER — NURSE ONLY (OUTPATIENT)
Dept: HEMATOLOGY/ONCOLOGY | Facility: HOSPITAL | Age: 84
End: 2024-02-06
Attending: INTERNAL MEDICINE
Payer: MEDICARE

## 2024-02-06 VITALS
HEART RATE: 74 BPM | RESPIRATION RATE: 18 BRPM | DIASTOLIC BLOOD PRESSURE: 51 MMHG | SYSTOLIC BLOOD PRESSURE: 144 MMHG | TEMPERATURE: 97 F | OXYGEN SATURATION: 96 %

## 2024-02-06 DIAGNOSIS — K61.0 PERIANAL ABSCESS: Primary | ICD-10-CM

## 2024-02-06 DIAGNOSIS — K52.9 PROCTOCOLITIS: ICD-10-CM

## 2024-02-06 PROCEDURE — 96365 THER/PROPH/DIAG IV INF INIT: CPT

## 2024-02-06 RX ORDER — 0.9 % SODIUM CHLORIDE 0.9 %
INTRAVENOUS SOLUTION INTRAVENOUS
Status: DISCONTINUED
Start: 2024-02-06 | End: 2024-02-06

## 2024-02-06 RX ORDER — ERTAPENEM 1 G/1
INJECTION, POWDER, LYOPHILIZED, FOR SOLUTION INTRAMUSCULAR; INTRAVENOUS
Status: DISCONTINUED
Start: 2024-02-06 | End: 2024-02-06

## 2024-02-06 NOTE — PROGRESS NOTES
Pt here for daily abx. Pt denies any issues or concerns. Tolerated infusion without difficulty or complaint. Reviewed next appt date/time. Discharge home Ambulating independently     Ordering MD: Nupur Bradford MD F/U: Pt instructed to call  EOT: 2/19/24    Education Record  Learner:  Patient  Barriers / Limitations:  None  Method:  Discussion  General Topics:  Plan of care reviewed  Outcome:  Shows understanding

## 2024-02-07 ENCOUNTER — NURSE ONLY (OUTPATIENT)
Dept: HEMATOLOGY/ONCOLOGY | Facility: HOSPITAL | Age: 84
End: 2024-02-07
Attending: INTERNAL MEDICINE
Payer: MEDICARE

## 2024-02-07 VITALS
DIASTOLIC BLOOD PRESSURE: 54 MMHG | TEMPERATURE: 98 F | HEART RATE: 70 BPM | SYSTOLIC BLOOD PRESSURE: 153 MMHG | RESPIRATION RATE: 18 BRPM | OXYGEN SATURATION: 94 %

## 2024-02-07 DIAGNOSIS — K61.0 PERIANAL ABSCESS: Primary | ICD-10-CM

## 2024-02-07 DIAGNOSIS — K52.9 PROCTOCOLITIS: ICD-10-CM

## 2024-02-07 PROCEDURE — 96365 THER/PROPH/DIAG IV INF INIT: CPT

## 2024-02-07 RX ORDER — ERTAPENEM 1 G/1
INJECTION, POWDER, LYOPHILIZED, FOR SOLUTION INTRAMUSCULAR; INTRAVENOUS
Status: DISCONTINUED
Start: 2024-02-07 | End: 2024-02-07

## 2024-02-07 RX ORDER — 0.9 % SODIUM CHLORIDE 0.9 %
INTRAVENOUS SOLUTION INTRAVENOUS
Status: DISCONTINUED
Start: 2024-02-07 | End: 2024-02-07

## 2024-02-07 NOTE — PROGRESS NOTES
Pt here for daily Invanz. Pt denies any issues or concerns.      Ordering MD: Suzette  Order Exp: 2/19/24     Pt tolerated infusion without difficulty or complaint. Reviewed next apt date/time: 2/8 @ 1600    Education Record  Learner:  Patient  Disease / Diagnosis: perianal abscess   Barriers / Limitations:  None  Method:  Reinforcement  General Topics:  Medication and Plan of care reviewed  Outcome:  Shows understanding

## 2024-02-08 ENCOUNTER — APPOINTMENT (OUTPATIENT)
Dept: MRI IMAGING | Facility: HOSPITAL | Age: 84
End: 2024-02-08
Attending: EMERGENCY MEDICINE
Payer: MEDICARE

## 2024-02-08 ENCOUNTER — APPOINTMENT (OUTPATIENT)
Dept: GENERAL RADIOLOGY | Facility: HOSPITAL | Age: 84
End: 2024-02-08
Attending: EMERGENCY MEDICINE
Payer: MEDICARE

## 2024-02-08 ENCOUNTER — APPOINTMENT (OUTPATIENT)
Dept: CT IMAGING | Facility: HOSPITAL | Age: 84
End: 2024-02-08
Attending: EMERGENCY MEDICINE
Payer: MEDICARE

## 2024-02-08 ENCOUNTER — HOSPITAL ENCOUNTER (INPATIENT)
Facility: HOSPITAL | Age: 84
LOS: 8 days | Discharge: NURSING, REHAB OR LONG-TERM CARE | End: 2024-02-16
Attending: EMERGENCY MEDICINE | Admitting: STUDENT IN AN ORGANIZED HEALTH CARE EDUCATION/TRAINING PROGRAM
Payer: MEDICARE

## 2024-02-08 ENCOUNTER — APPOINTMENT (OUTPATIENT)
Dept: HEMATOLOGY/ONCOLOGY | Facility: HOSPITAL | Age: 84
End: 2024-02-08
Attending: INTERNAL MEDICINE
Payer: MEDICARE

## 2024-02-08 DIAGNOSIS — R53.1 WEAKNESS: ICD-10-CM

## 2024-02-08 DIAGNOSIS — R19.7 DIARRHEA, UNSPECIFIED TYPE: ICD-10-CM

## 2024-02-08 DIAGNOSIS — K61.2 ABSCESS OF ANAL AND RECTAL REGIONS: Primary | ICD-10-CM

## 2024-02-08 PROBLEM — D72.829 LEUKOCYTOSIS: Status: ACTIVE | Noted: 2024-02-08

## 2024-02-08 PROBLEM — D64.9 ANEMIA: Status: ACTIVE | Noted: 2024-02-08

## 2024-02-08 PROBLEM — R73.9 HYPERGLYCEMIA: Status: ACTIVE | Noted: 2024-02-08

## 2024-02-08 LAB
ALBUMIN SERPL-MCNC: 3.8 G/DL (ref 3.2–4.8)
ALBUMIN/GLOB SERPL: 1 {RATIO} (ref 1–2)
ALP LIVER SERPL-CCNC: 85 U/L
ALT SERPL-CCNC: 27 U/L
ANION GAP SERPL CALC-SCNC: 6 MMOL/L (ref 0–18)
AST SERPL-CCNC: 20 U/L (ref ?–34)
ATRIAL RATE: 78 BPM
BASOPHILS # BLD AUTO: 0.03 X10(3) UL (ref 0–0.2)
BASOPHILS NFR BLD AUTO: 0.2 %
BILIRUB SERPL-MCNC: 0.6 MG/DL (ref 0.2–1.1)
BILIRUB UR QL: NEGATIVE
BUN BLD-MCNC: 17 MG/DL (ref 9–23)
BUN/CREAT SERPL: 15.6 (ref 10–20)
C DIFF GDH + TOXINS A+B STL QL IA.RAPID: DETECTED
C DIFF TOX B STL QL: POSITIVE
CALCIUM BLD-MCNC: 8.9 MG/DL (ref 8.7–10.4)
CHLORIDE SERPL-SCNC: 105 MMOL/L (ref 98–112)
CLARITY UR: CLEAR
CO2 SERPL-SCNC: 27 MMOL/L (ref 21–32)
CREAT BLD-MCNC: 1.09 MG/DL
DEPRECATED RDW RBC AUTO: 45.7 FL (ref 35.1–46.3)
EGFRCR SERPLBLD CKD-EPI 2021: 67 ML/MIN/1.73M2 (ref 60–?)
EOSINOPHIL # BLD AUTO: 0.08 X10(3) UL (ref 0–0.7)
EOSINOPHIL NFR BLD AUTO: 0.4 %
ERYTHROCYTE [DISTWIDTH] IN BLOOD BY AUTOMATED COUNT: 13.3 % (ref 11–15)
EST. AVERAGE GLUCOSE BLD GHB EST-MCNC: 140 MG/DL (ref 68–126)
FLUAV + FLUBV RNA SPEC NAA+PROBE: NEGATIVE
FLUAV + FLUBV RNA SPEC NAA+PROBE: NEGATIVE
GLOBULIN PLAS-MCNC: 3.8 G/DL (ref 2.8–4.4)
GLUCOSE BLD-MCNC: 117 MG/DL (ref 70–99)
GLUCOSE BLDC GLUCOMTR-MCNC: 101 MG/DL (ref 70–99)
GLUCOSE BLDC GLUCOMTR-MCNC: 122 MG/DL (ref 70–99)
GLUCOSE UR-MCNC: NORMAL MG/DL
HBA1C MFR BLD: 6.5 % (ref ?–5.7)
HCT VFR BLD AUTO: 32.5 %
HGB BLD-MCNC: 11.2 G/DL
HGB UR QL STRIP.AUTO: NEGATIVE
IMM GRANULOCYTES # BLD AUTO: 0.16 X10(3) UL (ref 0–1)
IMM GRANULOCYTES NFR BLD: 0.8 %
KETONES UR-MCNC: NEGATIVE MG/DL
LACTATE SERPL-SCNC: 1.4 MMOL/L (ref 0.5–2)
LEUKOCYTE ESTERASE UR QL STRIP.AUTO: NEGATIVE
LYMPHOCYTES # BLD AUTO: 1.93 X10(3) UL (ref 1–4)
LYMPHOCYTES NFR BLD AUTO: 10 %
MCH RBC QN AUTO: 33.1 PG (ref 26–34)
MCHC RBC AUTO-ENTMCNC: 34.5 G/DL (ref 31–37)
MCV RBC AUTO: 96.2 FL
MONOCYTES # BLD AUTO: 3.36 X10(3) UL (ref 0.1–1)
MONOCYTES NFR BLD AUTO: 17.4 %
NEUTROPHILS # BLD AUTO: 13.7 X10 (3) UL (ref 1.5–7.7)
NEUTROPHILS # BLD AUTO: 13.7 X10(3) UL (ref 1.5–7.7)
NEUTROPHILS NFR BLD AUTO: 71.2 %
NITRITE UR QL STRIP.AUTO: NEGATIVE
OSMOLALITY SERPL CALC.SUM OF ELEC: 289 MOSM/KG (ref 275–295)
P AXIS: 49 DEGREES
P-R INTERVAL: 180 MS
PH UR: 6.5 [PH] (ref 5–8)
PLATELET # BLD AUTO: 258 10(3)UL (ref 150–450)
POTASSIUM SERPL-SCNC: 4.2 MMOL/L (ref 3.5–5.1)
PROT SERPL-MCNC: 7.6 G/DL (ref 5.7–8.2)
PROT UR-MCNC: 20 MG/DL
Q-T INTERVAL: 392 MS
QRS DURATION: 94 MS
QTC CALCULATION (BEZET): 446 MS
R AXIS: 22 DEGREES
RBC # BLD AUTO: 3.38 X10(6)UL
RSV RNA SPEC NAA+PROBE: NEGATIVE
SARS-COV-2 RNA RESP QL NAA+PROBE: NOT DETECTED
SODIUM SERPL-SCNC: 138 MMOL/L (ref 136–145)
SP GR UR STRIP: 1.02 (ref 1–1.03)
T AXIS: 43 DEGREES
TROPONIN I SERPL HS-MCNC: 6 NG/L
UROBILINOGEN UR STRIP-ACNC: NORMAL
VENTRICULAR RATE: 78 BPM
WBC # BLD AUTO: 19.3 X10(3) UL (ref 4–11)

## 2024-02-08 PROCEDURE — 93010 ELECTROCARDIOGRAM REPORT: CPT

## 2024-02-08 PROCEDURE — 0241U SARS-COV-2/FLU A AND B/RSV BY PCR (GENEXPERT): CPT | Performed by: EMERGENCY MEDICINE

## 2024-02-08 PROCEDURE — 85025 COMPLETE CBC W/AUTO DIFF WBC: CPT | Performed by: EMERGENCY MEDICINE

## 2024-02-08 PROCEDURE — 84484 ASSAY OF TROPONIN QUANT: CPT | Performed by: EMERGENCY MEDICINE

## 2024-02-08 PROCEDURE — 81001 URINALYSIS AUTO W/SCOPE: CPT | Performed by: EMERGENCY MEDICINE

## 2024-02-08 PROCEDURE — 80053 COMPREHEN METABOLIC PANEL: CPT | Performed by: EMERGENCY MEDICINE

## 2024-02-08 PROCEDURE — 74177 CT ABD & PELVIS W/CONTRAST: CPT | Performed by: EMERGENCY MEDICINE

## 2024-02-08 PROCEDURE — 94660 CPAP INITIATION&MGMT: CPT

## 2024-02-08 PROCEDURE — 36415 COLL VENOUS BLD VENIPUNCTURE: CPT

## 2024-02-08 PROCEDURE — 71045 X-RAY EXAM CHEST 1 VIEW: CPT | Performed by: EMERGENCY MEDICINE

## 2024-02-08 PROCEDURE — 70498 CT ANGIOGRAPHY NECK: CPT | Performed by: EMERGENCY MEDICINE

## 2024-02-08 PROCEDURE — 87040 BLOOD CULTURE FOR BACTERIA: CPT | Performed by: EMERGENCY MEDICINE

## 2024-02-08 PROCEDURE — 83605 ASSAY OF LACTIC ACID: CPT | Performed by: EMERGENCY MEDICINE

## 2024-02-08 PROCEDURE — 99285 EMERGENCY DEPT VISIT HI MDM: CPT

## 2024-02-08 PROCEDURE — 96374 THER/PROPH/DIAG INJ IV PUSH: CPT

## 2024-02-08 PROCEDURE — 82962 GLUCOSE BLOOD TEST: CPT

## 2024-02-08 PROCEDURE — 87493 C DIFF AMPLIFIED PROBE: CPT | Performed by: STUDENT IN AN ORGANIZED HEALTH CARE EDUCATION/TRAINING PROGRAM

## 2024-02-08 PROCEDURE — 70496 CT ANGIOGRAPHY HEAD: CPT | Performed by: EMERGENCY MEDICINE

## 2024-02-08 PROCEDURE — 70551 MRI BRAIN STEM W/O DYE: CPT | Performed by: EMERGENCY MEDICINE

## 2024-02-08 PROCEDURE — 96361 HYDRATE IV INFUSION ADD-ON: CPT

## 2024-02-08 PROCEDURE — 93005 ELECTROCARDIOGRAM TRACING: CPT

## 2024-02-08 PROCEDURE — 83036 HEMOGLOBIN GLYCOSYLATED A1C: CPT | Performed by: STUDENT IN AN ORGANIZED HEALTH CARE EDUCATION/TRAINING PROGRAM

## 2024-02-08 RX ORDER — NICOTINE POLACRILEX 4 MG
15 LOZENGE BUCCAL
Status: DISCONTINUED | OUTPATIENT
Start: 2024-02-08 | End: 2024-02-16

## 2024-02-08 RX ORDER — NICOTINE POLACRILEX 4 MG
30 LOZENGE BUCCAL
Status: DISCONTINUED | OUTPATIENT
Start: 2024-02-08 | End: 2024-02-16

## 2024-02-08 RX ORDER — ATORVASTATIN CALCIUM 20 MG/1
20 TABLET, FILM COATED ORAL DAILY
Status: DISCONTINUED | OUTPATIENT
Start: 2024-02-08 | End: 2024-02-16

## 2024-02-08 RX ORDER — VIT C/B6/B5/MAGNESIUM/HERB 173 50-5-6-5MG
500 CAPSULE ORAL DAILY
COMMUNITY
End: 2024-02-16

## 2024-02-08 RX ORDER — DEXTROSE MONOHYDRATE 25 G/50ML
50 INJECTION, SOLUTION INTRAVENOUS
Status: DISCONTINUED | OUTPATIENT
Start: 2024-02-08 | End: 2024-02-16

## 2024-02-08 RX ORDER — ENEMA 19; 7 G/133ML; G/133ML
1 ENEMA RECTAL ONCE AS NEEDED
Status: DISCONTINUED | OUTPATIENT
Start: 2024-02-08 | End: 2024-02-16

## 2024-02-08 RX ORDER — VANCOMYCIN HYDROCHLORIDE 50 MG/ML
125 KIT ORAL 4 TIMES DAILY
Status: DISCONTINUED | OUTPATIENT
Start: 2024-02-08 | End: 2024-02-16

## 2024-02-08 RX ORDER — BISACODYL 10 MG
10 SUPPOSITORY, RECTAL RECTAL
Status: DISCONTINUED | OUTPATIENT
Start: 2024-02-08 | End: 2024-02-16

## 2024-02-08 RX ORDER — THIAMINE HCL 100 MG
500 TABLET ORAL DAILY
COMMUNITY

## 2024-02-08 RX ORDER — METOPROLOL SUCCINATE 100 MG/1
100 TABLET, EXTENDED RELEASE ORAL DAILY
Status: DISCONTINUED | OUTPATIENT
Start: 2024-02-08 | End: 2024-02-16

## 2024-02-08 RX ORDER — ONDANSETRON 2 MG/ML
4 INJECTION INTRAMUSCULAR; INTRAVENOUS EVERY 6 HOURS PRN
Status: DISCONTINUED | OUTPATIENT
Start: 2024-02-08 | End: 2024-02-16

## 2024-02-08 RX ORDER — PANTOPRAZOLE SODIUM 20 MG/1
20 TABLET, DELAYED RELEASE ORAL
Status: DISCONTINUED | OUTPATIENT
Start: 2024-02-09 | End: 2024-02-16

## 2024-02-08 RX ORDER — SODIUM CHLORIDE 9 MG/ML
INJECTION, SOLUTION INTRAVENOUS CONTINUOUS
Status: DISCONTINUED | OUTPATIENT
Start: 2024-02-08 | End: 2024-02-12

## 2024-02-08 RX ORDER — METOCLOPRAMIDE HYDROCHLORIDE 5 MG/ML
10 INJECTION INTRAMUSCULAR; INTRAVENOUS EVERY 8 HOURS PRN
Status: DISCONTINUED | OUTPATIENT
Start: 2024-02-08 | End: 2024-02-13

## 2024-02-08 RX ORDER — ALLOPURINOL 100 MG/1
100 TABLET ORAL DAILY
Status: DISCONTINUED | OUTPATIENT
Start: 2024-02-08 | End: 2024-02-16

## 2024-02-08 RX ORDER — SENNOSIDES 8.6 MG
17.2 TABLET ORAL NIGHTLY PRN
Status: DISCONTINUED | OUTPATIENT
Start: 2024-02-08 | End: 2024-02-16

## 2024-02-08 RX ORDER — ACETAMINOPHEN 500 MG
500 TABLET ORAL EVERY 4 HOURS PRN
Status: DISCONTINUED | OUTPATIENT
Start: 2024-02-08 | End: 2024-02-16

## 2024-02-08 RX ORDER — POLYETHYLENE GLYCOL 3350 17 G/17G
17 POWDER, FOR SOLUTION ORAL DAILY PRN
Status: DISCONTINUED | OUTPATIENT
Start: 2024-02-08 | End: 2024-02-16

## 2024-02-08 NOTE — ED QUICK NOTES
Orders for admission, patient is aware of plan and ready to go upstairs. Any questions, please call ED BILL esparza at extension [phone].     Patient Covid vaccination status: Fully vaccinated     COVID Test Ordered in ED: SARS-CoV-2/Flu A and B/RSV by PCR (GeneXpert)    COVID Suspicion at Admission: N/A    Running Infusions:      Mental Status/LOC at time of transport: AAOx4 but forgetfull (confirmed by wife)    Other pertinent information: hard of hearing, does not have hearing aides in  CIWA score: N/A   NIH score:  N/A

## 2024-02-08 NOTE — HOME CARE LIAISON
This pt is current with [facility] for RN/PT/OT services. Pt will need a KENYETTA entered and Quality data delivered by BUBBA TAVERAS. Notified BUBBA Ramirez.

## 2024-02-08 NOTE — ED INITIAL ASSESSMENT (HPI)
Pt presents to ED via EMS for weakness and diarrhea starting last night. Pt is currently on day 8 of daily antibiotic infusions for a perianal abscess and proctocolitis. Pt denies abdominal pain or vomiting

## 2024-02-08 NOTE — H&P
[facility] Hospitalist H&P       CC:   Chief Complaint   Patient presents with    Fatigue        PCP: Rao Beyer MD    History of Present Illness: Patient is a 83 year old male with PMH sig for CAD, DM2, HTN, HL, and recent admission for proctocolitis with perianal abscess discharged on invanz presenting with diarrhea and worsening weakness. Upon presentation he was afebrile. Labwork showed a worsening leukocytosis of 19.3    CT A/P showed:  1. Severe proctocolitis with substantial interval worsening, with interval development of a large intramural rectal abscess.      2. Additional left posterolateral and posterior peripherally enhancing fluid collections are seen, suggesting potential communicating or independent abscesses.      3. A heavy stool burden is seen throughout the more proximal colon, and there may be some degree of partial large bowel obstruction.       Patient was started on merrem and admitted for further medical management.       PMH  Past Medical History:   Diagnosis Date    Abdominal aortic aneurysm (AAA) 3.0 cm to 5.0 cm in diameter in female (HCC)     CAD involving native coronary artery of native heart without angina pectoris 03/20/2018    Cardiac LV ejection fraction 50-55% per 2018 angio  03/20/2018    Centrilobular emphysema (HCC) 03/19/2018    Coronary atherosclerosis     COVID 12/2020    Diabetes mellitus (HCC)     Gastric ulcer 2008    Healed 2009    GOUT     High blood pressure     High cholesterol     Hypercholesterolemia     HYPERLIPIDEMIA     HYPERTENSION     Hypertension     OBESITY     OSTEOARTHRITIS     osteoarthritis knees    Prediabetes     Presence of drug coated stent in prox & Mid LAD coronary artery 03/19/2018 03/20/2018    2.75 x 15 mm Xcience drug-eluting stent into the mid LAD 3.5 x 12 mm Xcience drug-eluting stent into the proximal LAD    Pulmonary emphysema (HCC)     SLEEP APNEA     [facility] fax [phone]    Sleep apnea         PSH  Past Surgical  History:   Procedure Laterality Date    CATH BARE METAL STENT (BMS)      COLONOSCOPY  2009= Declines repeat    2009, complicated by anal fissure    COLONOSCOPY      COLONOSCOPY  03/2022    one small TA, int hem, can consider d/cing surveillance    COLONOSCOPY N/A 02/28/2022    Procedure: COLONOSCOPY,  with polypectomy;  Surgeon: Anthony Patel MD;  Location: [facility]    HEMORRHOIDECTOMY  Dr Kamara 3/12/10 [facility]     Rectal exam under anesthesia/anoscopy. Incision and drainage of perirectal abscess. Placement of seton . Excision of anal tags. Destruction of internal hemorrhoids.  Surgery done at [facility] on 3/12/10.    OTHER SURGICAL HISTORY      left knee arthroscopy    PATIENT DOCUMENTED NOT TO HAVE EXPERIENCED ANY OF THE FOLLOWING EVENTS N/A 02/18/2015    Procedure: ESOPHAGOGASTRODUODENOSCOPY, POSSIBLE BIOPSY, POSSIBLE POLYPECTOMY 97681;  Surgeon: Musa Cook MD;  Location: [facility]    PATIENT WITHOUGH PREOPERATIVE ORDER FOR IV ANTIBIOTIC SURGICAL SITE INFECTION PROPHYLAXIS. N/A 02/18/2015    Procedure: ESOPHAGOGASTRODUODENOSCOPY, POSSIBLE BIOPSY, POSSIBLE POLYPECTOMY 92166;  Surgeon: Musa Cook MD;  Location: [facility]    UPPER GI ENDOSCOPY PERFORMED  2008, 2/27/2009    UPPER GI ENDOSCOPY,BIOPSY  2/18/15=Gastritis.  H pylori negative, normal SB Bx    UPPER GI ENDOSCOPY,BIOPSY N/A 02/18/2015    Procedure: ESOPHAGOGASTRODUODENOSCOPY, POSSIBLE BIOPSY, POSSIBLE POLYPECTOMY 48132;  Surgeon: Musa Cook MD;  Location: [facility]    UPPER GI ENDOSCOPY,EXAM          ALL:  No Known Allergies     Home Medications:  No outpatient medications have been marked as taking for the 2/8/24 encounter (Hospital Encounter).         Soc Hx  Social History     Tobacco Use    Smoking status: Former     Packs/day: 1.00     Years: 40.00     Additional pack years: 0.00     Total pack years: 40.00     Types: Cigarettes     Quit date: 6/1/2009     Years since quitting:  14.6    Smokeless tobacco: Never    Tobacco comments:     has been a 3 ppd   Substance Use Topics    Alcohol use: Yes     Comment: occasional intake        Fam Hx  Family History   Problem Relation Age of Onset    Cancer Mother         lung    Obesity Son     Obesity Sister     Lipids Sister     Obesity Son        Review of Systems  Comprehensive ROS reviewed and negative except for what's stated above.     OBJECTIVE:  BP (!) 164/75   Pulse 79   Temp 98.5 °F (36.9 °C)   Resp 23   Ht 5' 10\" (1.778 m)   Wt 226 lb (102.5 kg)   SpO2 95%   BMI 32.43 kg/m²   General:  Alert, no distress   Head:  Normocephalic, without obvious abnormality               Neck: Supple   Lungs:   Clear to auscultation bilaterally. Normal effort       Heart:  Regular rate and rhythm, S1, S2 normal,    Abdomen:   Soft, non-tender. Bowel sounds normal.   Extremities: Extremities cherelle             Diagnostic Data:    CBC/Chem  Recent Labs   Lab 02/05/24  1550 02/08/24  0854   WBC 8.9 19.3*   HGB 10.9* 11.2*   MCV 96.3 96.2   .0 258.0       Recent Labs   Lab 02/05/24  1550 02/08/24  0854    138   K 4.2 4.2    105   CO2 28.0 27.0   BUN 17 17   CREATSERUM 1.07 1.09   * 117*   CA 9.1 8.9       Recent Labs   Lab 02/05/24  1550 02/08/24  0854   ALT 48 27   AST 35* 20   ALB 4.0 3.8       No results for input(s): \"TROP\" in the last 168 hours.    Radiology: CT ABDOMEN+PELVIS(CONTRAST ONLY)(CPT=74177)    Result Date: 2/8/2024  CONCLUSION:  1. Severe proctocolitis with substantial interval worsening, with interval development of a large intramural rectal abscess.  2. Additional left posterolateral and posterior peripherally enhancing fluid collections are seen, suggesting potential communicating or independent abscesses.  3. A heavy stool burden is seen throughout the more proximal colon, and there may be some degree of partial large bowel obstruction.   4. Infrarenal abdominal aortic aneurysm measuring up to 5.0 cm.  5. A  large complex debris-filled duodenal diverticulum is seen without CT evidence acute complication.  6. Prostatomegaly.  7. Pulmonary interstitial fibrosis is suggested.  8. Possible hepatic steatosis.  9. Lesser incidental findings as above.    elm-remote.   Dictated by (CST): Mike Baum MD on 2/08/2024 at 12:08 PM     Finalized by (CST): Mike Baum MD on 2/08/2024 at 12:20 PM          CTA BRAIN + CTA CAROTIDS (CPT=70496/78465)    Result Date: 2/8/2024  CONCLUSION:  1. No proximal intracranial flow limiting stenosis/large vessel occlusion.  No intracranial aneurysm identified.  2. No hemodynamically significant stenosis or dissection involving the cervical carotid or vertebral arteries.  Non flow-limiting moderate left and mild right carotid bifurcation atherosclerosis.  There is also mild to moderate right vertebral ostial stenosis related to atherosclerosis. 3. Noncontrast head CT demonstrates no acute intracranial abnormality. 4. Nonspecific white matter changes involving both cerebral hemispheres that most likely reflect sequelae of chronic microangiopathy. 5. Emphysema.  Noncalcified right upper lobe lung nodules, which measure up to 6 mm.  Nonemergent follow-up chest CT is suggested for complete evaluation (unless outside institution comparison imaging is available and demonstrates stability).  elm-remote  Dictated by (CST): Toño Chavarria MD on 2/08/2024 at 11:55 AM     Finalized by (CST): Toño Chavarria MD on 2/08/2024 at 12:03 PM          XR CHEST AP/PA (1 VIEW) (CPT=71045)    Result Date: 2/8/2024  CONCLUSION: No acute cardiopulmonary abnormality.  Interval placement of a right upper extremity PICC with tip in the cavoatrial junction.   Dictated by (CST): Sean Singer MD on 2/08/2024 at 10:41 AM     Finalized by (CST): Sean Singer MD on 2/08/2024 at 10:43 AM          MRI BRAIN WO ACUTE (3) SEQUENCE (CPT=70551)    Result Date: 2/8/2024  CONCLUSION: No acute intracranial process.  No evidence of  acute or subacute infarct. There are moderate microvascular white matter ischemic changes, likely related to long-standing hypertension and/or diabetes.   Dictated by (CST): Sean Singer MD on 2/08/2024 at 10:00 AM     Finalized by (CST): Sean Singer MD on 2/08/2024 at 10:01 AM             ASSESSMENT / PLAN:   Patient is a 83 year old male with PMH sig for CAD, DM2, HTN, HL, and recent admission for proctocolitis with perianal abscess discharged on invanz presenting with diarrhea and worsening weakness.    Severe proctocolitis  Large intramural rectal abscess  - recent admission for same dc on invanz  - CT reviewed, shows interval worsening  [facility]  - [facility] colleagues consulted, appreciate recommendations  - reached out to IR to see if anything can be drained    Overflow diarrhea  - CT reviewed, large stool burden  - stop all anti diarrheal medications  - needs as good bowel regimen  - currently NPO until seen by general surgery, then will start on scheduled medications    CKD  - chronic, stable    CAD  HLD  - statin, hold ASA for now\    DM2  - home regimen: not on home meds  - accuchecks QID, hypoglycemic protocol, SSI     HTN  - resume home metoprolol    FN:  - IVF: NS  - Diet: NPO    DVT Prophy: SCD  Lines: PIV    Dispo: pending clinical course    Outpatient records or previous hospital records reviewed.     Further recommendations pending patient's clinical course.  [facility] hospitalist to continue to follow patient while in house    Patient and/or patient's family given opportunity to ask questions and note understanding and agreeing with therapeutic plan as outlined    Thank You,  Kerrie Guzman DO    Hospitalist with [facility]  Answering Service number: [phone]

## 2024-02-08 NOTE — CONSULTS
[facility] Infectious Disease  Report of Consultation    Raul Freed Jr. Patient Status:  Emergency    1940 MRN [id number]   Location [facility] EMERGENCY DEPARTMENT Attending Trace Tyson MD   Hosp Day # 0 PCP Rao Beyer MD     Date of Admission:  2024  Date of Consult:  2024    Reason for Consultation:  Rectal intramural abscess, diarrhea    History of Present Illness:  Raul Freed Jr. is a a(n) 83 year old male being seen at your request regarding a rectal intramural abscess with diarrhea.  Patient is well known to myself from a h/o recent admission for the same.    Patient recently presented to the ED on 24 with a c/o HN/V/D.  He had a CT at that time demonstrate a L lateral rectal fluid collection and he had some fevers.  He was ultimately discharged on a course of IV invanz and had normal WBCs.  Plan was for a ~3 week course through 24.      Patient returned to the ED today with weakness, dizziness, and feeling off balanced.  No N/V.  Some liquid stools.    Repeat CT in the ED demonstrated    History:  Past Medical History:   Diagnosis Date    Abdominal aortic aneurysm (AAA) 3.0 cm to 5.0 cm in diameter in female (HCC)     CAD involving native coronary artery of native heart without angina pectoris 2018    Cardiac LV ejection fraction 50-55% per 2018 angio  2018    Centrilobular emphysema (HCC) 2018    Coronary atherosclerosis     COVID 2020    Diabetes mellitus (HCC)     Gastric ulcer 2008    Healed     GOUT     High blood pressure     High cholesterol     Hypercholesterolemia     HYPERLIPIDEMIA     HYPERTENSION     Hypertension     OBESITY     OSTEOARTHRITIS     osteoarthritis knees    Prediabetes     Presence of drug coated stent in prox & Mid LAD coronary artery 2018    2.75 x 15 mm [employer]ience drug-eluting stent into the mid LAD 3.5 x 12 mm [employer]ience drug-eluting stent into  the proximal LAD    Pulmonary emphysema (HCC)     SLEEP APNEA     [facility] fax [phone]    Sleep apnea      Past Surgical History:   Procedure Laterality Date    CATH BARE METAL STENT (BMS)      COLONOSCOPY  2009= Declines repeat    2009, complicated by anal fissure    COLONOSCOPY      COLONOSCOPY  03/2022    one small TA, int hem, can consider d/cing surveillance    COLONOSCOPY N/A 02/28/2022    Procedure: COLONOSCOPY,  with polypectomy;  Surgeon: Anthony Patel MD;  Location: [facility]    HEMORRHOIDECTOMY  Dr Kamara 3/12/10 [facility]     Rectal exam under anesthesia/anoscopy. Incision and drainage of perirectal abscess. Placement of seton . Excision of anal tags. Destruction of internal hemorrhoids.  Surgery done at [facility] on 3/12/10.    OTHER SURGICAL HISTORY      left knee arthroscopy    PATIENT DOCUMENTED NOT TO HAVE EXPERIENCED ANY OF THE FOLLOWING EVENTS N/A 02/18/2015    Procedure: ESOPHAGOGASTRODUODENOSCOPY, POSSIBLE BIOPSY, POSSIBLE POLYPECTOMY 46838;  Surgeon: Musa Cook MD;  Location: [facility]    PATIENT WITHOUGH PREOPERATIVE ORDER FOR IV ANTIBIOTIC SURGICAL SITE INFECTION PROPHYLAXIS. N/A 02/18/2015    Procedure: ESOPHAGOGASTRODUODENOSCOPY, POSSIBLE BIOPSY, POSSIBLE POLYPECTOMY 68488;  Surgeon: Musa Cook MD;  Location: [facility]    UPPER GI ENDOSCOPY PERFORMED  2008, 2/27/2009    UPPER GI ENDOSCOPY,BIOPSY  2/18/15=Gastritis.  H pylori negative, normal SB Bx    UPPER GI ENDOSCOPY,BIOPSY N/A 02/18/2015    Procedure: ESOPHAGOGASTRODUODENOSCOPY, POSSIBLE BIOPSY, POSSIBLE POLYPECTOMY 27956;  Surgeon: Musa Cook MD;  Location: [facility]    UPPER GI ENDOSCOPY,EXAM       Family History   Problem Relation Age of Onset    Cancer Mother         lung    Obesity Son     Obesity Sister     Lipids Sister     Obesity Son       reports that he quit smoking about 14 years ago. He has a 40 pack-year smoking history. He has never used smokeless  tobacco. He reports current alcohol use. He reports that he does not use drugs.    Allergies:  No Known Allergies    Medications:    Current Facility-Administered Medications:     meropenem (Merrem) 500 mg in sodium chloride 0.9% 100 mL IVPB-MBP, 500 mg, Intravenous, Once    Review of Systems:    Constitutional:  No fevers, chills, diaphoresis, weight changes.   HEENT:  No visual changes, oral ulcers, sore throat, difficulty swallowing.   Respiratory: Negative for cough, sputum, hemoptysis, chest pain, wheezing, dyspnea on exertion, or stridor.   Cardiovascular: Negative for chest pain, palpitations, irregular heart beats.   Gastrointestinal:  Diarrhea.   Genitourinary:  No dysuria, hematuria, urine urgency or frequency.   Integument/breast: Negative for rash, skin lesions, and pruritus.   Hematologic/lymphatic: Negative for easy bruising, bleeding, and lymphadenopathy.   Musculoskeletal: Negative for myalgias, arthralgias, muscle weakness.   Neurological: Weakness, feeling off balance.   Psych:  No h/o anxiety, depression, other psych d/o.   Endocrine: No history of of diabetes, thyroid disorder.    Remainder of 12 point review of systems otherwise negative.    Vital signs in last 24 hours:  Patient Vitals for the past 24 hrs:   BP Temp Pulse Resp SpO2 Height Weight   02/08/24 1315 (!) 164/75 -- 79 23 95 % -- --   02/08/24 1230 (!) 164/66 -- 69 17 95 % -- --   02/08/24 1100 124/54 -- 73 20 93 % -- --   02/08/24 1041 122/71 -- 80 16 95 % -- --   02/08/24 0848 152/71 -- -- -- -- -- --   02/08/24 0846 -- 98.5 °F (36.9 °C) 79 16 95 % 5' 10\" (1.778 m) 226 lb (102.5 kg)       Intake/Output:  No intake/output data recorded.    Physical Exam:   General: Awake, alert, non-tox and in NAD.   Head: Normocephalic, without obvious abnormality, atraumatic.   Eyes: Conjunctivae/corneas clear.  No scleral icterus.  No conjunctival     hemorrhage.   Nose: Nares normal.   Throat:  Oropharynx clear, MMs moist.   Neck: Trachea ML, no  masses.   Lungs: CTA b/l no rhonchi, rales, wheezes.   Chest wall: No tenderness or deformity.   Heart: Regular rate and rhythm, normal S1S2, no murmurs.   Abdomen: Soft, NT/ND.  Bowel sounds present.  No organomegaly.   Extremity: No edema.   Skin: No rashes or lesions.   Neurological: No focal neurologic deficits.    Lab Data Review:  Lab Results   Component Value Date    WBC 19.3 02/08/2024    HGB 11.2 02/08/2024    HCT 32.5 02/08/2024    .0 02/08/2024    CREATSERUM 1.09 02/08/2024    BUN 17 02/08/2024     02/08/2024    K 4.2 02/08/2024     02/08/2024    CO2 27.0 02/08/2024     02/08/2024    CA 8.9 02/08/2024    ALB 3.8 02/08/2024    ALKPHO 85 02/08/2024    BILT 0.6 02/08/2024    TP 7.6 02/08/2024    AST 20 02/08/2024    ALT 27 02/08/2024      Cultures:   Pending    Radiology:  CONCLUSION:   1. Severe proctocolitis with substantial interval worsening, with interval development of a large intramural rectal abscess.      2. Additional left posterolateral and posterior peripherally enhancing fluid collections are seen, suggesting potential communicating or independent abscesses.      3. A heavy stool burden is seen throughout the more proximal colon, and there may be some degree of partial large bowel obstruction.       4. Infrarenal abdominal aortic aneurysm measuring up to 5.0 cm.      5. A large complex debris-filled duodenal diverticulum is seen without CT evidence acute complication.      6. Prostatomegaly.      7. Pulmonary interstitial fibrosis is suggested.      8. Possible hepatic steatosis.      9. Lesser incidental findings as above.     Assessment and Plan:    Syndrome of weakness, diarrhea, and known proctocolitis with new leukocytosis and concern for interval worsening  - CT with possibly several communicating abscesses and initial intramural abscess worsening  - IV invanz had been ongoing - meropenem given in ED    2.  Recent admission in January 2024 for sepsis in this  gentleman presenting with fevers, leukocytosis, N/V/D  - CT at that time with evidence of severe proctocolitis with complex-appearing L lateral fluid/gas  - Seen by Dr. Shelby - consistent with a small intramural abscess in the L posterior rectal wall not amenable to drainage previously     3.  Diarrhea due to the above  - Patient does have a h/o chronic loose stools so unclear if this is much worse than baseline  - Stool studies normal last admission, will repeat c.diff    4.  Leukocytosis due to the above  - Potentially due to undrained focus of infection vs. C.diff vs. Other  - At 19K, will trend    5.  Weakness and some feeling off balance  - This could be because of evolving sepsis or could possibly be drug effect due to carbapenem class  - After meropenem dosing will give zosyn for now     4.  Disposition - inpatient.  Given interval worsening suggest re-eval from surgery and IR to determine if anything can be drained.  Will check stools for c.diff and treat if needed.  Will give IV zosyn while here in case some of his weakness and feeling off balance were due to carbapenems.  Trending temp sand WBCs.  New stop date for IV antibiotics to be determined.  D/w patient at length.  Will follow.    Nupur Bradford DO, [facility] Infectious Disease  [phone]    2/8/2024  1:47 PM

## 2024-02-08 NOTE — ED PROVIDER NOTES
Patient Seen in: [facility] Emergency Department      History     Chief Complaint   Patient presents with    Fatigue     Stated Complaint:     Subjective:   83 male with extensive history including diabetes, CAD, AAA, hyperlipidemia, obesity, and recent rectal abscess here with complaints of weakness and dizziness described as feeling off balance.  He is having a hard time walking.  Does not feel one-sided weakness or numbness.  No slurred speech or confusion.  No difficulty swallowing or visual changes.  No falls at home.  He does get ertapenem daily for 21 days.  He has approximately 10 more days left.  Otherwise denies new medications.  No vomiting or diarrhea.            Objective:   Past Medical History:   Diagnosis Date    Abdominal aortic aneurysm (AAA) 3.0 cm to 5.0 cm in diameter in female (HCC)     CAD involving native coronary artery of native heart without angina pectoris 03/20/2018    Cardiac LV ejection fraction 50-55% per 2018 angio  03/20/2018    Centrilobular emphysema (HCC) 03/19/2018    Coronary atherosclerosis     COVID 12/2020    Diabetes mellitus (HCC)     Gastric ulcer 2008    Healed 2009    GOUT     High blood pressure     High cholesterol     Hypercholesterolemia     HYPERLIPIDEMIA     HYPERTENSION     Hypertension     OBESITY     OSTEOARTHRITIS     osteoarthritis knees    Prediabetes     Presence of drug coated stent in prox & Mid LAD coronary artery 03/19/2018 03/20/2018    2.75 x 15 mm Xcience drug-eluting stent into the mid LAD 3.5 x 12 mm Xcience drug-eluting stent into the proximal LAD    Pulmonary emphysema (HCC)     SLEEP APNEA     [facility] fax [phone]    Sleep apnea               Past Surgical History:   Procedure Laterality Date    CATH BARE METAL STENT (BMS)      COLONOSCOPY  2009= Declines repeat    2009, complicated by anal fissure    COLONOSCOPY      COLONOSCOPY  03/2022    one small TA, int hem, can consider d/cing surveillance    COLONOSCOPY N/A 02/28/2022     Procedure: COLONOSCOPY,  with polypectomy;  Surgeon: Anthony Patel MD;  Location: [facility]    HEMORRHOIDECTOMY  Dr Kamara 3/12/10 [facility]     Rectal exam under anesthesia/anoscopy. Incision and drainage of perirectal abscess. Placement of seton . Excision of anal tags. Destruction of internal hemorrhoids.  Surgery done at [facility] on 3/12/10.    OTHER SURGICAL HISTORY      left knee arthroscopy    PATIENT DOCUMENTED NOT TO HAVE EXPERIENCED ANY OF THE FOLLOWING EVENTS N/A 2015    Procedure: ESOPHAGOGASTRODUODENOSCOPY, POSSIBLE BIOPSY, POSSIBLE POLYPECTOMY 18612;  Surgeon: Musa Cook MD;  Location: [facility]    PATIENT WITHOUGH PREOPERATIVE ORDER FOR IV ANTIBIOTIC SURGICAL SITE INFECTION PROPHYLAXIS. N/A 2015    Procedure: ESOPHAGOGASTRODUODENOSCOPY, POSSIBLE BIOPSY, POSSIBLE POLYPECTOMY 72871;  Surgeon: Musa Cook MD;  Location: [facility]    UPPER GI ENDOSCOPY PERFORMED  , 2009    UPPER GI ENDOSCOPY,BIOPSY  2/18/15=Gastritis.  H pylori negative, normal SB Bx    UPPER GI ENDOSCOPY,BIOPSY N/A 2015    Procedure: ESOPHAGOGASTRODUODENOSCOPY, POSSIBLE BIOPSY, POSSIBLE POLYPECTOMY 75978;  Surgeon: Musa Cook MD;  Location: [facility]    UPPER GI ENDOSCOPY,EXAM                  Social History     Socioeconomic History    Marital status:    Tobacco Use    Smoking status: Former     Packs/day: 1.00     Years: 40.00     Additional pack years: 0.00     Total pack years: 40.00     Types: Cigarettes     Quit date: 2009     Years since quittin.6    Smokeless tobacco: Never    Tobacco comments:     has been a 3 ppd   Vaping Use    Vaping Use: Never used   Substance and Sexual Activity    Alcohol use: Yes     Comment: occasional intake    Drug use: No     Social Determinants of Health     Food Insecurity: No Food Insecurity (2024)    Food Insecurity     Food Insecurity: Never true   Transportation Needs: No  Transportation Needs (1/28/2024)    Transportation Needs     Lack of Transportation: No   Housing Stability: Low Risk  (1/28/2024)    Housing Stability     Housing Instability: No              Review of Systems    Positive for stated complaint:   Other systems are as noted in HPI.  Constitutional and vital signs reviewed.      All other systems reviewed and negative except as noted above.    Physical Exam     ED Triage Vitals   BP 02/08/24 0848 152/71   Pulse 02/08/24 0846 79   Resp 02/08/24 0846 16   Temp 02/08/24 0846 98.5 °F (36.9 °C)   Temp src --    SpO2 02/08/24 0846 95 %   O2 Device 02/08/24 0846 None (Room air)       Current:BP (!) 164/66   Pulse 69   Temp 98.5 °F (36.9 °C)   Resp 17   Ht 177.8 cm (5' 10\")   Wt 102.5 kg   SpO2 95%   BMI 32.43 kg/m²         Physical Exam  HENT:      Head: Normocephalic and atraumatic.      Right Ear: External ear normal.      Nose: Nose normal.      Mouth/Throat:      Mouth: Mucous membranes are moist.   Eyes:      Extraocular Movements: Extraocular movements intact.      Pupils: Pupils are equal, round, and reactive to light.   Cardiovascular:      Rate and Rhythm: Normal rate.      Pulses: Normal pulses.   Pulmonary:      Effort: Pulmonary effort is normal.      Breath sounds: Normal breath sounds.   Abdominal:      Palpations: Abdomen is soft.      Tenderness: There is no abdominal tenderness.   Musculoskeletal:         General: Normal range of motion.      Cervical back: Normal range of motion.   Skin:     General: Skin is warm.      Capillary Refill: Capillary refill takes less than 2 seconds.   Neurological:      General: No focal deficit present.      Mental Status: He is alert.      Sensory: No sensory deficit.      Motor: No weakness.      Comments: No facial asymmetry.  Finger-to-nose normal               ED Course     Labs Reviewed   COMP METABOLIC PANEL (14) - Abnormal; Notable for the following components:       Result Value    Glucose 117 (*)     All  other components within normal limits   URINALYSIS, ROUTINE - Abnormal; Notable for the following components:    Protein Urine 20 (*)     All other components within normal limits   POCT GLUCOSE - Abnormal; Notable for the following components:    POC Glucose  122 (*)     All other components within normal limits   CBC W/ DIFFERENTIAL - Abnormal; Notable for the following components:    WBC 19.3 (*)     RBC 3.38 (*)     HGB 11.2 (*)     HCT 32.5 (*)     Neutrophil Absolute Prelim 13.70 (*)     Neutrophil Absolute 13.70 (*)     Monocyte Absolute 3.36 (*)     All other components within normal limits   TROPONIN I HIGH SENSITIVITY - Normal   SARS-COV-2/FLU A AND B/RSV BY PCR (GENEXPERT) - Normal    Narrative:     This test is intended for the qualitative detection and differentiation of SARS-CoV-2, influenza A, influenza B, and respiratory syncytial virus (RSV) viral RNA in nasopharyngeal or nares swabs from individuals suspected of respiratory viral infection consistent with COVID-19 by their healthcare provider. Signs and symptoms of respiratory viral infection due to SARS-CoV-2, influenza, and RSV can be similar.    Test performed using the Xpert Xpress SARS-CoV-2/FLU/RSV (real time RT-PCR)  assay on the GeneXpert instrument, [employer], [place] [address].   This test is being used under the Food and Drug Administration's Emergency Use Authorization.    The authorized Fact Sheet for Healthcare Providers for this assay is available upon request from the laboratory.   CBC WITH DIFFERENTIAL WITH PLATELET    Narrative:     The following orders were created for panel order CBC With Differential With Platelet.  Procedure                               Abnormality         Status                     ---------                               -----------         ------                     CBC W/ DIFFERENTIAL[[id number]]          Abnormal            Final result                 Please view results for these tests on the individual  orders.   SCAN SLIDE   LACTIC ACID, PLASMA   RAINBOW DRAW BLUE   RAINBOW DRAW GOLD   BLOOD CULTURE   BLOOD CULTURE     EKG    Rate, intervals and axes as noted on EKG Report.  Rate: 78  Rhythm: Sinus Rhythm  Reading: no st elevation normal axis. Normal EKG read by me              ED Course as of 02/08/24 1326  ------------------------------------------------------------  Time: 02/08 0954  Comment: Labs independently interpreted by me.  Patient has a new leukocytosis.  Hemoglobin is 11.  Troponin normal, CMP normal, awaiting a urine specimen, chest x-ray.  ------------------------------------------------------------  Time: 02/08 1023  Comment: Patient now having large amounts of diarrhea in the bed.  We were unable to get a sample to test for C. difficile.  Will start some IV fluids.  Certainly this is in the differential and could explain his leukocytosis as well.  ------------------------------------------------------------  Time: 02/08 1024  Comment: MRI independently interpreted by me as no acute stroke process.  ------------------------------------------------------------  Time: 02/08 1029  Comment: Stress test from a few years ago check.  Patient had good ejection fraction.  No recent echo in the system.  ------------------------------------------------------------  Time: 02/08 1238  Comment: Discussed with hospitalist for admission.  CTs independently interpreted by me.  He has increased intramural rectal abscess and proctocolitis.  He has no LVO on his CTA brain, chest x-ray clear.  Fluids being given.  Will add blood cultures now although is only SIRS criteria is leukocytosis.  Patient to be admitted with infectious disease consult.  General surgery notified.  ------------------------------------------------------------  Time: 02/08 1326  Comment: Dr Hopper now here and melvi MUELLER      CT ABDOMEN+PELVIS(CONTRAST ONLY)(CPT=74177)    Result Date: 2/8/2024  CONCLUSION:  1. Severe  proctocolitis with substantial interval worsening, with interval development of a large intramural rectal abscess.  2. Additional left posterolateral and posterior peripherally enhancing fluid collections are seen, suggesting potential communicating or independent abscesses.  3. A heavy stool burden is seen throughout the more proximal colon, and there may be some degree of partial large bowel obstruction.   4. Infrarenal abdominal aortic aneurysm measuring up to 5.0 cm.  5. A large complex debris-filled duodenal diverticulum is seen without CT evidence acute complication.  6. Prostatomegaly.  7. Pulmonary interstitial fibrosis is suggested.  8. Possible hepatic steatosis.  9. Lesser incidental findings as above.    elm-remote.   Dictated by (CST): Mike Baum MD on 2/08/2024 at 12:08 PM     Finalized by (CST): Mike Baum MD on 2/08/2024 at 12:20 PM          CTA BRAIN + CTA CAROTIDS (CPT=70496/50818)    Result Date: 2/8/2024  CONCLUSION:  1. No proximal intracranial flow limiting stenosis/large vessel occlusion.  No intracranial aneurysm identified.  2. No hemodynamically significant stenosis or dissection involving the cervical carotid or vertebral arteries.  Non flow-limiting moderate left and mild right carotid bifurcation atherosclerosis.  There is also mild to moderate right vertebral ostial stenosis related to atherosclerosis. 3. Noncontrast head CT demonstrates no acute intracranial abnormality. 4. Nonspecific white matter changes involving both cerebral hemispheres that most likely reflect sequelae of chronic microangiopathy. 5. Emphysema.  Noncalcified right upper lobe lung nodules, which measure up to 6 mm.  Nonemergent follow-up chest CT is suggested for complete evaluation (unless outside institution comparison imaging is available and demonstrates stability).  elm-remote  Dictated by (CST): Toño Chavarria MD on 2/08/2024 at 11:55 AM     Finalized by (CST): Toño Chavarria MD on 2/08/2024 at  12:03 PM          XR CHEST AP/PA (1 VIEW) (CPT=71045)    Result Date: 2/8/2024  CONCLUSION: No acute cardiopulmonary abnormality.  Interval placement of a right upper extremity PICC with tip in the cavoatrial junction.   Dictated by (CST): Sean Singer MD on 2/08/2024 at 10:41 AM     Finalized by (CST): Sean Singer MD on 2/08/2024 at 10:43 AM          MRI BRAIN WO ACUTE (3) SEQUENCE (CPT=70551)    Result Date: 2/8/2024  CONCLUSION: No acute intracranial process.  No evidence of acute or subacute infarct. There are moderate microvascular white matter ischemic changes, likely related to long-standing hypertension and/or diabetes.   Dictated by (CST): Sean Sinegr MD on 2/08/2024 at 10:00 AM     Finalized by (CST): Sean Singer MD on 2/08/2024 at 10:01 AM           Admission disposition: 2/8/2024 12:40 PM                                        Medical Decision Making  Patient on IV antibiotics for rectal abscess here with ataxia and feeling off balance since last night.  It has been over 12 hours and he would not be a thrombolytic candidate.  Will discuss with neurology and get imaging along with lab work.  Differential could include stroke, worsening metabolic condition, worsening abscess or infectious process or C. difficile.    Amount and/or Complexity of Data Reviewed  External Data Reviewed: notes.     Details: Infectious disease office note from yesterday:Patient presents today for hospital f/u for rectal abscess. He was recently admitted to [facility] with c/o multiple episodes of diarrhea and some n/v as well as temp of 101.1F on admission. CT done in the ED showing severe proctocolitis with L lateral fluid collection and gas collection. There were also some fluid filled loops of small and large bowel. He was evaluated by Dr. Shelby with general surgery, and findings c/w small intramural abscess in the L posterior rectal wall; however, drainage deferred at that time.. Blood cultures were negative. He was  started on IV Rocephin + Flagyl and transitioned to IV Invanz through 2/19/24 on discharge.  Labs: ordered. Decision-making details documented in ED Course.  Radiology: ordered and independent interpretation performed. Decision-making details documented in ED Course.  ECG/medicine tests: ordered and independent interpretation performed. Decision-making details documented in ED Course.  Discussion of management or test interpretation with external provider(s): See ED course    Risk  Decision regarding hospitalization.  Risk Details: Recent abscess.  Diabetes        Disposition and Plan     Clinical Impression:  1. Abscess of anal and rectal regions    2. Diarrhea, unspecified type    3. Weakness         Disposition:  Admit  2/8/2024 12:40 pm    Follow-up:  No follow-up provider specified.  We recommend that you schedule follow up care with a primary care provider within the next three months to obtain basic health screening including reassessment of your blood pressure.      Medications Prescribed:  Current Discharge Medication List                            Hospital Problems       Present on Admission  Date Reviewed: 10/24/2023            ICD-10-CM Noted POA    * (Principal) Abscess of anal and rectal regions K61.2 2/8/2024 Unknown    Anemia D64.9 2/8/2024 Yes    Hyperglycemia R73.9 2/8/2024 Yes    Leukocytosis D72.829 2/8/2024 Yes

## 2024-02-09 ENCOUNTER — APPOINTMENT (OUTPATIENT)
Dept: HEMATOLOGY/ONCOLOGY | Facility: HOSPITAL | Age: 84
End: 2024-02-09
Attending: INTERNAL MEDICINE
Payer: MEDICARE

## 2024-02-09 LAB
ANION GAP SERPL CALC-SCNC: 8 MMOL/L (ref 0–18)
BUN BLD-MCNC: 11 MG/DL (ref 9–23)
BUN/CREAT SERPL: 12.4 (ref 10–20)
CALCIUM BLD-MCNC: 8.4 MG/DL (ref 8.7–10.4)
CHLORIDE SERPL-SCNC: 106 MMOL/L (ref 98–112)
CO2 SERPL-SCNC: 26 MMOL/L (ref 21–32)
CREAT BLD-MCNC: 0.89 MG/DL
DEPRECATED RDW RBC AUTO: 46.9 FL (ref 35.1–46.3)
EGFRCR SERPLBLD CKD-EPI 2021: 85 ML/MIN/1.73M2 (ref 60–?)
ERYTHROCYTE [DISTWIDTH] IN BLOOD BY AUTOMATED COUNT: 13.4 % (ref 11–15)
GLUCOSE BLD-MCNC: 106 MG/DL (ref 70–99)
GLUCOSE BLDC GLUCOMTR-MCNC: 119 MG/DL (ref 70–99)
GLUCOSE BLDC GLUCOMTR-MCNC: 125 MG/DL (ref 70–99)
GLUCOSE BLDC GLUCOMTR-MCNC: 154 MG/DL (ref 70–99)
GLUCOSE BLDC GLUCOMTR-MCNC: 161 MG/DL (ref 70–99)
GLUCOSE BLDC GLUCOMTR-MCNC: 96 MG/DL (ref 70–99)
GLUCOSE BLDC GLUCOMTR-MCNC: 97 MG/DL (ref 70–99)
GLUCOSE BLDC GLUCOMTR-MCNC: 99 MG/DL (ref 70–99)
HCT VFR BLD AUTO: 31.1 %
HGB BLD-MCNC: 10.4 G/DL
MCH RBC QN AUTO: 32 PG (ref 26–34)
MCHC RBC AUTO-ENTMCNC: 33.4 G/DL (ref 31–37)
MCV RBC AUTO: 95.7 FL
OSMOLALITY SERPL CALC.SUM OF ELEC: 290 MOSM/KG (ref 275–295)
PLATELET # BLD AUTO: 232 10(3)UL (ref 150–450)
POTASSIUM SERPL-SCNC: 3.6 MMOL/L (ref 3.5–5.1)
RBC # BLD AUTO: 3.25 X10(6)UL
SODIUM SERPL-SCNC: 140 MMOL/L (ref 136–145)
WBC # BLD AUTO: 19.1 X10(3) UL (ref 4–11)

## 2024-02-09 PROCEDURE — 97110 THERAPEUTIC EXERCISES: CPT

## 2024-02-09 PROCEDURE — 80048 BASIC METABOLIC PNL TOTAL CA: CPT | Performed by: STUDENT IN AN ORGANIZED HEALTH CARE EDUCATION/TRAINING PROGRAM

## 2024-02-09 PROCEDURE — 82962 GLUCOSE BLOOD TEST: CPT

## 2024-02-09 PROCEDURE — 97112 NEUROMUSCULAR REEDUCATION: CPT

## 2024-02-09 PROCEDURE — 97530 THERAPEUTIC ACTIVITIES: CPT

## 2024-02-09 PROCEDURE — 85027 COMPLETE CBC AUTOMATED: CPT | Performed by: STUDENT IN AN ORGANIZED HEALTH CARE EDUCATION/TRAINING PROGRAM

## 2024-02-09 PROCEDURE — 97162 PT EVAL MOD COMPLEX 30 MIN: CPT

## 2024-02-09 PROCEDURE — 97167 OT EVAL HIGH COMPLEX 60 MIN: CPT

## 2024-02-09 PROCEDURE — 97166 OT EVAL MOD COMPLEX 45 MIN: CPT

## 2024-02-09 RX ORDER — HYDRALAZINE HYDROCHLORIDE 20 MG/ML
10 INJECTION INTRAMUSCULAR; INTRAVENOUS EVERY 6 HOURS PRN
Status: DISCONTINUED | OUTPATIENT
Start: 2024-02-09 | End: 2024-02-14

## 2024-02-09 NOTE — PLAN OF CARE
Patient is A&O x4, forgetful at times. Stool sample resulted positive for C. Diff, MD paged and ordered antibiotics - see MAR. IV fluids infusing. BM continued. Ambulating 1x with walker. NPO. Accuchecks. Fever overnight, prn tylenol given. Safety precautions in place. Call light and belongings at bedside and within reach. Frequent monitoring.    Problem: Patient Centered Care  Goal: Patient preferences are identified and integrated in the patient's plan of care  Description: Interventions:  - What would you like us to know as we care for you?   - Provide timely, complete, and accurate information to patient/family  - Incorporate patient and family knowledge, values, beliefs, and cultural backgrounds into the planning and delivery of care  - Encourage patient/family to participate in care and decision-making at the level they choose  - Honor patient and family perspectives and choices  Outcome: Progressing     Problem: RISK FOR INFECTION - ADULT  Goal: Absence of fever/infection during anticipated neutropenic period  Description: INTERVENTIONS  - Monitor WBC  - Administer growth factors as ordered  - Implement neutropenic guidelines  Outcome: Progressing     Problem: SAFETY ADULT - FALL  Goal: Free from fall injury  Description: INTERVENTIONS:  - Assess pt frequently for physical needs  - Identify cognitive and physical deficits and behaviors that affect risk of falls.  - [place] fall precautions as indicated by assessment.  - Educate pt/family on patient safety including physical limitations  - Instruct pt to call for assistance with activity based on assessment  - Modify environment to reduce risk of injury  - Provide assistive devices as appropriate  - Consider OT/PT consult to assist with strengthening/mobility  - Encourage toileting schedule  Outcome: Progressing     Problem: RESPIRATORY - ADULT  Goal: Achieves optimal ventilation and oxygenation  Description: INTERVENTIONS:  - Assess for changes in  respiratory status  - Assess for changes in mentation and behavior  - Position to facilitate oxygenation and minimize respiratory effort  - Oxygen supplementation based on oxygen saturation or ABGs  - Provide Smoking Cessation handout, if applicable  - Encourage broncho-pulmonary hygiene including cough, deep breathe, Incentive Spirometry  - Assess the need for suctioning and perform as needed  - Assess and instruct to report SOB or any respiratory difficulty  - Respiratory Therapy support as indicated  - Manage/alleviate anxiety  - Monitor for signs/symptoms of CO2 retention  Outcome: Progressing     Problem: METABOLIC/FLUID AND ELECTROLYTES - ADULT  Goal: Glucose maintained within prescribed range  Description: INTERVENTIONS:  - Monitor Blood Glucose as ordered  - Assess for signs and symptoms of hyperglycemia and hypoglycemia  - Administer ordered medications to maintain glucose within target range  - Assess barriers to adequate nutritional intake and initiate nutrition consult as needed  - Instruct patient on self management of diabetes  Outcome: Progressing

## 2024-02-09 NOTE — CONSULTS
[facility]    Report of Consultation    Raul Freed Jr. Patient Status:  Inpatient    1940 MRN [id number]   Location [facility] 4W/SW/SE Attending Megan LAIRD Anisha    Day # 0 PCP Rao Beyer MD     Date of Admission:  2024  Date of Consult:  2024  Reason for Consultation:   Colitis, rectal abscess    History of Present Illness:   Patient is a 83 year old male who was admitted to the hospital for Abscess of anal and rectal regions:  He was hospitalized about 10 days ago for similar concerns and discharged on IV antibiotics which he has been receiving.  He now returns with increased diarrhea and lethargy.      He is a poor historian, history obtained from patient and chart.  He reports having chronic diarrhea for years  His current symptoms started about 2 days ago, with progressive weakness.  He is said to have had a lot of diarrhea by family members, although he denies this as being different from his baseline.  He denies change in anal pain other than chronic intermittent pain related to passing stool and cleaning after.  He also denies N/V, loss of appetite, abdominal pain/distention, or blood in stool.  He has not noted urinary symptoms or F/C/S    He has a PMH significant for CAD, DM2, HTN.         Past Medical History  Past Medical History:   Diagnosis Date    Abdominal aortic aneurysm (AAA) 3.0 cm to 5.0 cm in diameter in female (HCC)     CAD involving native coronary artery of native heart without angina pectoris 2018    Cardiac LV ejection fraction 50-55% per 2018 angio  2018    Centrilobular emphysema (HCC) 2018    Coronary atherosclerosis     COVID 2020    Diabetes mellitus (HCC)     Gastric ulcer 2008    Healed 2009    GOUT     High blood pressure     High cholesterol     Hypercholesterolemia     HYPERLIPIDEMIA     HYPERTENSION     Hypertension     OBESITY     OSTEOARTHRITIS     osteoarthritis knees    Prediabetes      Presence of drug coated stent in prox & Mid LAD coronary artery 03/19/2018 03/20/2018    2.75 x 15 mm Xcience drug-eluting stent into the mid LAD 3.5 x 12 mm Xcience drug-eluting stent into the proximal LAD    Pulmonary emphysema (HCC)     SLEEP APNEA     [facility] fax [phone]    Sleep apnea        Past Surgical History  Past Surgical History:   Procedure Laterality Date    CATH BARE METAL STENT (BMS)      COLONOSCOPY  2009= Declines repeat    2009, complicated by anal fissure    COLONOSCOPY      COLONOSCOPY  03/2022    one small TA, int hem, can consider d/cing surveillance    COLONOSCOPY N/A 02/28/2022    Procedure: COLONOSCOPY,  with polypectomy;  Surgeon: Anthony Patel MD;  Location: [facility]    HEMORRHOIDECTOMY  Dr Kamara 3/12/10 [facility]     Rectal exam under anesthesia/anoscopy. Incision and drainage of perirectal abscess. Placement of seton . Excision of anal tags. Destruction of internal hemorrhoids.  Surgery done at [facility] on 3/12/10.    OTHER SURGICAL HISTORY      left knee arthroscopy    PATIENT DOCUMENTED NOT TO HAVE EXPERIENCED ANY OF THE FOLLOWING EVENTS N/A 02/18/2015    Procedure: ESOPHAGOGASTRODUODENOSCOPY, POSSIBLE BIOPSY, POSSIBLE POLYPECTOMY 83223;  Surgeon: Musa Cook MD;  Location: [facility]    PATIENT WITHOUGH PREOPERATIVE ORDER FOR IV ANTIBIOTIC SURGICAL SITE INFECTION PROPHYLAXIS. N/A 02/18/2015    Procedure: ESOPHAGOGASTRODUODENOSCOPY, POSSIBLE BIOPSY, POSSIBLE POLYPECTOMY 55530;  Surgeon: Musa Cook MD;  Location: [facility]    UPPER GI ENDOSCOPY PERFORMED  2008, 2/27/2009    UPPER GI ENDOSCOPY,BIOPSY  2/18/15=Gastritis.  H pylori negative, normal SB Bx    UPPER GI ENDOSCOPY,BIOPSY N/A 02/18/2015    Procedure: ESOPHAGOGASTRODUODENOSCOPY, POSSIBLE BIOPSY, POSSIBLE POLYPECTOMY 22182;  Surgeon: Musa Cook MD;  Location: [facility]    UPPER GI ENDOSCOPY,EXAM         Family History  Family History   Problem Relation  Age of Onset    Cancer Mother         lung    Obesity Son     Obesity Sister     Lipids Sister     Obesity Son        Social History  Social History     Socioeconomic History    Marital status:    Tobacco Use    Smoking status: Former     Packs/day: 1.00     Years: 40.00     Additional pack years: 0.00     Total pack years: 40.00     Types: Cigarettes     Quit date: 2009     Years since quittin.6    Smokeless tobacco: Never    Tobacco comments:     has been a 3 ppd   Vaping Use    Vaping Use: Never used   Substance and Sexual Activity    Alcohol use: Yes     Comment: occasional intake    Drug use: No     Social Determinants of Health     Food Insecurity: No Food Insecurity (2024)    Food Insecurity     Food Insecurity: Never true   Transportation Needs: No Transportation Needs (2024)    Transportation Needs     Lack of Transportation: No   Housing Stability: Low Risk  (2024)    Housing Stability     Housing Instability: No           Current Medications:      Medications Prior to Admission   Medication Sig    Magnesium 500 MG Oral Tab Take 1 tablet (500 mg total) by mouth daily.    Turmeric (QC TUMERIC COMPLEX) 500 MG Oral Cap Take 500 mg by mouth daily.    omeprazole 20 MG Oral Capsule Delayed Release Take 1 capsule (20 mg total) by mouth daily.    METOPROLOL SUCCINATE 100 MG Oral Tablet 24 Hr TAKE 1 TABLET BY MOUTH EVERY DAY    atorvastatin 20 MG Oral Tab Take 1 tablet (20 mg total) by mouth daily.    allopurinol 100 MG Oral Tab Take 1 tablet (100 mg total) by mouth daily.    cholecalciferol (VITAMIN D3) 5000 units Oral Cap Take 1 capsule (5,000 Units total) by mouth daily.    aspirin 81 MG Oral Chew Tab Chew 1 tablet (81 mg total) by mouth daily.    Omega-3 Fatty Acids (FISH OIL) 1000 MG Oral Cap ONE DAILY    Multiple Vitamin (MULTI VITAMIN MENS OR) ONE DAILY    Sildenafil Citrate 50 MG Oral Tab Take 1 tablet (50 mg total) by mouth daily as needed. (Patient not taking: Reported on  2/8/2024)    benzonatate 200 MG Oral Cap Take 1 capsule (200 mg total) by mouth 3 (three) times daily as needed.    Glucose Blood (CONTOUR NEXT TEST) In Vitro Strip TEST TWICE DAILY BEFORE MEALS AS DIRECTED    Microlet Lancets Does not apply Misc USE AS DIRECTED TWICE DAILY    Blood Glucose Monitoring Suppl (Zivity CONTOUR NEXT MONITOR) w/Device Does not apply Kit 1 Units by Does not apply route 2 (two) times daily before meals.       Allergies  No Known Allergies    Review of Systems:   Pertinent items are noted in HPI.    Physical Exam:      BP (!) 151/91 (BP Location: Left arm)   Pulse 84   Temp (!) 100.5 °F (38.1 °C) (Oral)   Resp 18   Ht 5' 10\" (1.778 m)   Wt 226 lb (102.5 kg)   SpO2 93%   BMI 32.43 kg/m²    Body mass index is 32.43 kg/m².    I/O last 3 completed shifts:  In: 367 [I.V.:267; IV PIGGYBACK:100]  Out: -     CONSTITUTIONAL:  awake, alert, cooperative, no apparent distress, and appears stated age  EYES:  Lids and lashes normal, sclera clear, conjunctiva normal  ENT:  Normocephalic, without obvious abnormality, atraumatic  NECK:  Supple, symmetrical, trachea midline, no adenopathy, thyroid symmetric, not enlarged and no tenderness  HEMATOLOGIC/LYMPHATICS:  No cervical lymphadenopathy, no supraclavicular lymphadenopathy, no inguinal lymphadenopathy  LUNGS:  No increased work of breathing, good air exchange, clear to auscultation bilaterally, no crackles or wheezing  CARDIOVASCULAR:  Normal apical impulse, regular rate and rhythm, and no murmur noted, no pedal edema  ABDOMEN:  Obese, normal bowel sounds, soft, non-distended, non-tender, no masses palpated, no hepatosplenomegaly        MUSCULOSKELETAL: Full range of motion noted.  Motor strength is 5 out of 5 all extremities bilaterally.   SKIN:  no rashes and no jaundice  PSYCHIATRIC:       Orientation:  normal     Appearance:  normal     Behavior:  normal     Attitude toward examiner:  normal     Affect: normal     Judgment:  Normal          Results:     Laboratory Data:  Lab Results   Component Value Date    WBC 19.3 (H) 02/08/2024    HGB 11.2 (L) 02/08/2024    HCT 32.5 (L) 02/08/2024    .0 02/08/2024    CREATSERUM 1.09 02/08/2024    BUN 17 02/08/2024     02/08/2024    K 4.2 02/08/2024     02/08/2024    CO2 27.0 02/08/2024     (H) 02/08/2024    CA 8.9 02/08/2024    ALB 3.8 02/08/2024    ALKPHO 85 02/08/2024    TP 7.6 02/08/2024    AST 20 02/08/2024    ALT 27 02/08/2024    TSH 3.044 05/18/2015    PSA 1.02 11/25/2015    CRP <0.40 02/05/2024    MG 2.2 01/29/2024         Imaging:  CT ABDOMEN+PELVIS(CONTRAST ONLY)(CPT=74177)    Result Date: 2/8/2024  CONCLUSION:  1. Severe proctocolitis with substantial interval worsening, with interval development of a large intramural rectal abscess.  2. Additional left posterolateral and posterior peripherally enhancing fluid collections are seen, suggesting potential communicating or independent abscesses.  3. A heavy stool burden is seen throughout the more proximal colon, and there may be some degree of partial large bowel obstruction.   4. Infrarenal abdominal aortic aneurysm measuring up to 5.0 cm.  5. A large complex debris-filled duodenal diverticulum is seen without CT evidence acute complication.  6. Prostatomegaly.  7. Pulmonary interstitial fibrosis is suggested.  8. Possible hepatic steatosis.  9. Lesser incidental findings as above.    elm-remote.   Dictated by (CST): Mike Baum MD on 2/08/2024 at 12:08 PM     Finalized by (CST): Mike Baum MD on 2/08/2024 at 12:20 PM          CTA BRAIN + CTA CAROTIDS (CPT=70496/08735)    Result Date: 2/8/2024  CONCLUSION:  1. No proximal intracranial flow limiting stenosis/large vessel occlusion.  No intracranial aneurysm identified.  2. No hemodynamically significant stenosis or dissection involving the cervical carotid or vertebral arteries.  Non flow-limiting moderate left and mild right carotid bifurcation atherosclerosis.   There is also mild to moderate right vertebral ostial stenosis related to atherosclerosis. 3. Noncontrast head CT demonstrates no acute intracranial abnormality. 4. Nonspecific white matter changes involving both cerebral hemispheres that most likely reflect sequelae of chronic microangiopathy. 5. Emphysema.  Noncalcified right upper lobe lung nodules, which measure up to 6 mm.  Nonemergent follow-up chest CT is suggested for complete evaluation (unless outside institution comparison imaging is available and demonstrates stability).  elm-remote  Dictated by (CST): Toño Chavarria MD on 2/08/2024 at 11:55 AM     Finalized by (CST): Toño Chavarria MD on 2/08/2024 at 12:03 PM          XR CHEST AP/PA (1 VIEW) (CPT=71045)    Result Date: 2/8/2024  CONCLUSION: No acute cardiopulmonary abnormality.  Interval placement of a right upper extremity PICC with tip in the cavoatrial junction.   Dictated by (CST): Sean Singer MD on 2/08/2024 at 10:41 AM     Finalized by (CST): Sean Singer MD on 2/08/2024 at 10:43 AM          MRI BRAIN WO ACUTE (3) SEQUENCE (CPT=70551)    Result Date: 2/8/2024  CONCLUSION: No acute intracranial process.  No evidence of acute or subacute infarct. There are moderate microvascular white matter ischemic changes, likely related to long-standing hypertension and/or diabetes.   Dictated by (CST): Sean Singer MD on 2/08/2024 at 10:00 AM     Finalized by (CST): Sean Signer MD on 2/08/2024 at 10:01 AM              Impression:       Proctocolitis    Perianal abscess    Fever    Leukocytosis  He appears to have intramural abscess in the left posterior rectal wall, initially about the level of the inferior border of the prostate.  It appears enlarged despite IV antibiotics and is more diffuse and intramural making drainage difficult to achieve at this point.    Defer drainage procedure at this time,      -Continue IV antibiotics and anticipate MRI rectal fistula protocol in a few days to assess with better  detail and determine if it has matured into a more drainable collection or if it has ruptured spontaneously into the rectal lumen.     -If drainage needed this would best be done via transanal route (in OR or via endoscopy) and not by IR as that could potentially create a supralevator fistula      Diarrhea  Patient reports chronic history of diarrhea and apparently increased  C.diff and GI stool panel pending      PAULA on CKD  Cr 1.09, had been Cr 1.9 on prior admit 10 days ago, prior labs from 2023 with Cr 1.0      OK to allow diet for now      Thank you for allowing me to participate in the care of your patient.    Musa Shelby MD    02/08/2024

## 2024-02-09 NOTE — PROGRESS NOTES
[facility]    Progress Note    Raul Freed Jr. Patient Status:  Inpatient    1940 MRN [id number]   Location [facility] 5SW/SE Attending Kerrie Guzman, DO   Hosp Day # 1 PCP Rao Beyer MD       Subjective:   Raul Freed Jr. is a(n) 83 year old   male with complaints of diarrhea, wanting to go home.  Accompanied by wife    + BM, + Flatus  - Nausea, - Vomiting    Assessment and Plan:   Raul Freed Jr. is a(n) 83 year old male    HD 1 with Perianal abscess and proctocolitis.    Patient with recent hospitalization for same problem, was discharged with Invanz.  Patient returned with continued weakness and diarrhea. CT showed increase in abscess and worsening proctocolitis.    C. Diff (+) - started on PO Vancomycin today    Will defer to Dr. Shelby for definitive surgical intervention, however, with worsening abscess, persistent leukocytosis patient may require surgical or endoscopic drainage. Patient with C. Diff colitis would recommend completing treatment prior to any surgical drainage.    Plan  Diet: Regular   Continue abx per ID recommendations  Appreciate GI input  Ambulation  Pulmonary Hygiene  Trend labs    Objective:   Blood pressure 155/85, pulse 80, temperature 98.9 °F (37.2 °C), temperature source Oral, resp. rate 20, height 5' 10\" (1.778 m), weight 226 lb (102.5 kg), SpO2 94%. I/O last 3 completed shifts:  In: 567 [I.V.:267; IV PIGGYBACK:300]  Out: 200 [Urine:200]     General appearance: alert, appears stated age, cooperative, and moderately obese  Neck: no JVD and supple, symmetrical, trachea midline  Pulmonary: No labored breathing, equal respiratory excursion  Cardiovascular: regular rate and rhythm  Abdominal: soft, non-tender; bowel sounds normal; no masses,  no organomegaly.  Extremities: extremities normal, atraumatic, no cyanosis or edema  Anal: Tender to palpation, brown stool noticed in crack. No JEFRY able to be performed due to patient  intolerance.        Results:       Recent Labs   Lab 02/05/24  1550 02/08/24  0854 02/09/24  0503   RBC 3.27* 3.38* 3.25*   HGB 10.9* 11.2* 10.4*   HCT 31.5* 32.5* 31.1*   MCV 96.3 96.2 95.7   MCH 33.3 33.1 32.0   MCHC 34.6 34.5 33.4   RDW 12.9 13.3 13.4   NEPRELIM 4.60 13.70*  --    WBC 8.9 19.3* 19.1*   .0 258.0 232.0     No results found for: \"PT\", \"INR\"    Recent Labs   Lab 02/05/24  1550 02/08/24  0854 02/09/24  0503   * 117* 106*   BUN 17 17 11   CREATSERUM 1.07 1.09 0.89   CA 9.1 8.9 8.4*   ALB 4.0 3.8  --     138 140   K 4.2 4.2 3.6    105 106   CO2 28.0 27.0 26.0   ALKPHO 96 85  --    AST 35* 20  --    ALT 48 27  --    BILT 0.2 0.6  --    TP 7.5 7.6  --              CT ABDOMEN+PELVIS(CONTRAST ONLY)(CPT=74177)    Result Date: 2/8/2024  CONCLUSION:  1. Severe proctocolitis with substantial interval worsening, with interval development of a large intramural rectal abscess.  2. Additional left posterolateral and posterior peripherally enhancing fluid collections are seen, suggesting potential communicating or independent abscesses.  3. A heavy stool burden is seen throughout the more proximal colon, and there may be some degree of partial large bowel obstruction.   4. Infrarenal abdominal aortic aneurysm measuring up to 5.0 cm.  5. A large complex debris-filled duodenal diverticulum is seen without CT evidence acute complication.  6. Prostatomegaly.  7. Pulmonary interstitial fibrosis is suggested.  8. Possible hepatic steatosis.  9. Lesser incidental findings as above.    elm-remote.   Dictated by (CST): Mike Baum MD on 2/08/2024 at 12:08 PM     Finalized by (CST): Mike Baum MD on 2/08/2024 at 12:20 PM          CTA BRAIN + CTA CAROTIDS (CPT=70496/15767)    Result Date: 2/8/2024  CONCLUSION:  1. No proximal intracranial flow limiting stenosis/large vessel occlusion.  No intracranial aneurysm identified.  2. No hemodynamically significant stenosis or dissection involving the  cervical carotid or vertebral arteries.  Non flow-limiting moderate left and mild right carotid bifurcation atherosclerosis.  There is also mild to moderate right vertebral ostial stenosis related to atherosclerosis. 3. Noncontrast head CT demonstrates no acute intracranial abnormality. 4. Nonspecific white matter changes involving both cerebral hemispheres that most likely reflect sequelae of chronic microangiopathy. 5. Emphysema.  Noncalcified right upper lobe lung nodules, which measure up to 6 mm.  Nonemergent follow-up chest CT is suggested for complete evaluation (unless outside institution comparison imaging is available and demonstrates stability).  elm-remote  Dictated by (CST): Toño Chavarria MD on 2/08/2024 at 11:55 AM     Finalized by (CST): Toño Chavarria MD on 2/08/2024 at 12:03 PM          XR CHEST AP/PA (1 VIEW) (CPT=71045)    Result Date: 2/8/2024  CONCLUSION: No acute cardiopulmonary abnormality.  Interval placement of a right upper extremity PICC with tip in the cavoatrial junction.   Dictated by (CST): Sean Singer MD on 2/08/2024 at 10:41 AM     Finalized by (CST): Sean Singer MD on 2/08/2024 at 10:43 AM          MRI BRAIN WO ACUTE (3) SEQUENCE (CPT=70551)    Result Date: 2/8/2024  CONCLUSION: No acute intracranial process.  No evidence of acute or subacute infarct. There are moderate microvascular white matter ischemic changes, likely related to long-standing hypertension and/or diabetes.   Dictated by (CST): Sean Singer MD on 2/08/2024 at 10:00 AM     Finalized by (CST): Sean Singer MD on 2/08/2024 at 10:01 AM         EKG 12 Lead    Result Date: 2/8/2024  Normal sinus rhythm Normal ECG When compared with ECG of 27-JAN-2024 21:22, No significant change was found Confirmed by SUZANNA ELLER ELMER ([id number]) on 2/8/2024 1:20:36 PM       Time spent in direct patient contact and decision making as well as counseling/coordination of care:    71459- 35 min    At all points during my encounter with  the patient my collaborating physician Dr. Musa Shelby  was available to answer questions and update the plan as necessary. The plan as stated previously is in agreement with the team.       Ashkan Chaidez PA-C  [facility]  02/09/24  12:18 PM

## 2024-02-09 NOTE — PROGRESS NOTES
[facility]    [facility] Progress Note    Raul Freed  Patient Status:  Inpatient    1940 MRN [id number]   Location [facility] 5SW/SE Attending Kerrie Guzman,    Hosp Day # 1 PCP Rao Beyer MD     Subjective:  Pt sleeping comfortably.    Objective:    Allergies:  No Known Allergies    Medications:    Current Facility-Administered Medications:     piperacillin-tazobactam (Zosyn) 4.5 g in dextrose 5% 100 mL IVPB-ADDV, 4.5 g, Intravenous, Q8H    sodium chloride 0.9% infusion, , Intravenous, Continuous    acetaminophen (Tylenol Extra Strength) tab 500 mg, 500 mg, Oral, Q4H PRN    ondansetron (Zofran) 4 MG/2ML injection 4 mg, 4 mg, Intravenous, Q6H PRN    metoclopramide (Reglan) 5 mg/mL injection 10 mg, 10 mg, Intravenous, Q8H PRN    polyethylene glycol (PEG 3350) (Miralax) 17 g oral packet 17 g, 17 g, Oral, Daily PRN    sennosides (Senokot) tab 17.2 mg, 17.2 mg, Oral, Nightly PRN    bisacodyl (Dulcolax) 10 MG rectal suppository 10 mg, 10 mg, Rectal, Daily PRN    fleet enema (Fleet) 7-19 GM/118ML rectal enema 133 mL, 1 enema, Rectal, Once PRN    allopurinol (Zyloprim) tab 100 mg, 100 mg, Oral, Daily    atorvastatin (Lipitor) tab 20 mg, 20 mg, Oral, Daily    metoprolol succinate ER (Toprol XL) 24 hr tab 100 mg, 100 mg, Oral, Daily    pantoprazole (Protonix) DR tab 20 mg, 20 mg, Oral, QAM AC    glucose (Dex4) 15 GM/59ML oral liquid 15 g, 15 g, Oral, Q15 Min PRN **OR** glucose (Glutose) 40% oral gel 15 g, 15 g, Oral, Q15 Min PRN **OR** glucose-vitamin C (Dex-4) chewable tab 4 tablet, 4 tablet, Oral, Q15 Min PRN **OR** dextrose 50% injection 50 mL, 50 mL, Intravenous, Q15 Min PRN **OR** glucose (Dex4) 15 GM/59ML oral liquid 30 g, 30 g, Oral, Q15 Min PRN **OR** glucose (Glutose) 40% oral gel 30 g, 30 g, Oral, Q15 Min PRN **OR** glucose-vitamin C (Dex-4) chewable tab 8 tablet, 8 tablet, Oral, Q15 Min PRN    insulin aspart (NovoLOG) 100 Units/mL FlexPen 1-5  Units, 1-5 Units, Subcutaneous, TID CC    vancomycin (Firvanq) 50 mg/mL oral solution 125 mg, 125 mg, Oral, QID    Physical Exam:  General: Sleeping.  No apparent distress.  Vital Signs:  Blood pressure 158/66, pulse 82, temperature 98.2 °F (36.8 °C), temperature source Oral, resp. rate 20, height 5' 10\" (1.778 m), weight 226 lb (102.5 kg), SpO2 93%.   Temp (24hrs), Av.8 °F (37.1 °C), Min:98.2 °F (36.8 °C), Max:100.5 °F (38.1 °C)      HEENT: Exam is unremarkable.    Lungs: Clear to auscultation bilaterally.  Cardiac: Regular rate and rhythm. No murmur.  Abdomen:  Soft, non-distended, non-tender, with no rebound or guarding.   Extremities:  No lower extremity edema noted.  Without clubbing or cyanosis.    Skin: Normal texture and turgor.  Neurologic: Cranial nerves are grossly intact.      Labs:  Lab Results   Component Value Date    WBC 19.1 2024    HGB 10.4 2024    HCT 31.1 2024    .0 2024    CREATSERUM 0.89 2024    BUN 11 2024     2024    K 3.6 2024     2024    CO2 26.0 2024     2024    CA 8.4 2024         Assessment/Plan:    1.  Proctocolitis with abscess  -seen on CT at recent admission  -dc on IV ertapenem x 3 weeks through 24  -readmitted for weakness, diarrhea and leukocytosis  -repeat CT with worsening intermural rectal abscess and additional enhancing fluid collections concerning for potential communicating or independent abscesses  -seen by IR, no safe approach for IR drainage  -on IV zosyn  2.  Leukocytosis  -19k, continue to trend  3.  Diarrhea  -c diff positive  -on PO vancomycin d#2  4.  Dispo  -continue IV zosyn and PO vancomycin  -likely need repeat CT prior to EOT     If you have any questions or concerns please call [facility] at [phone].     Johnnie Higgins, APRN

## 2024-02-09 NOTE — OCCUPATIONAL THERAPY NOTE
OCCUPATIONAL THERAPY EVALUATION - INPATIENT     Room Number: 533/533-A  Evaluation Date: 2/9/2024  Type of Evaluation: Initial    Presenting Problem: Rectal abscess, Fever, C-Diff, Fatigue    Co-Morbidities: DM, CAD, DM, HTN, CKD       Physician Order: IP Consult to Occupational Therapy  Reason for Therapy: ADL/IADL Dysfunction and Discharge Planning    OCCUPATIONAL THERAPY ASSESSMENT   Patient is a 83 year old male admitted 2/8/2024 for Rectal abscess, Fever, C-Diff, Fatigue  .  Prior to admission, patient's baseline is independent with ADL's, IADL's, functional mobility, functional transfers, negotiating stairs and driving..  Patient is currently functioning below baseline with  ADL and mobility .  Patient is requiring maximum assist as a result of the following impairments: decreased functional strength, decreased functional reach, decreased endurance, impaired sitting/standing balance, decreased muscular endurance, decreased insight to deficits, and decreased safety awareness. Occupational Therapy will continue to follow for duration of hospitalization.    Patient will benefit from continued skilled OT Services to promote return to prior level of function and safety with continuous assistance and gradual rehabilitative therapy     PLAN  OT Treatment Plan: Balance activities;Energy conservation/work simplification techniques;ADL training;Functional transfer training;UE strengthening/ROM;Endurance training;Patient/Family education;Patient/Family training;Neuromuscluar reeducation;Compensatory technique education  OT Device Recommendations: TBD    OCCUPATIONAL THERAPY MEDICAL/SOCIAL HISTORY   Problem List  Principal Problem:    Abscess of anal and rectal regions  Active Problems:    Anemia    Hyperglycemia    Leukocytosis    Diarrhea, unspecified type    Weakness    HOME SITUATION  Type of Home: House  Home Layout: One level  Lives With: Spouse  Shower/Tub and Equipment: Walk-in shower  Drives: Yes  Patient  Regularly Uses: Glasses; Hearing aides    Stairs in Home: N/A  Use of Assistive Device(s): Recently started using a R/W    Prior Level of [place]: Pt was independent with ADL's, IADL's, functional mobility, functional transfers, negotiating stairs and driving.     SUBJECTIVE  \"I don't know what's going on\" Pt stated when pt presented with retropulsive sitting/standing balance    OCCUPATIONAL THERAPY EXAMINATION    OBJECTIVE  Precautions: Limb alert - right; Bed/chair alarm; Hard of hearing  Fall Risk: High fall risk    PAIN ASSESSMENT  Ratin    ACTIVITY TOLERANCE  Pulse: 81  Heart Rate Source: Monitor     BP: 146/76 (MAP 95)  BP Location: Left arm  BP Method: Automatic  Patient Position: Semi-[place]    O2 SATURATIONS  Oxygen Therapy  SPO2% on Room Air at Rest: 93  SPO2% Ambulation on Room Air: 90 (91 BPM)    COGNITION  Overall Cognitive Status:  WFL - within functional limits      RANGE OF MOTION   Upper extremity ROM is within functional limits     STRENGTH ASSESSMENT  Upper extremity strength is within functional limits     COORDINATION  Gross Motor: WFL   Fine Motor: WFL     ACTIVITIES OF DAILY LIVING ASSESSMENT  AM-PAC ‘6-Clicks’ Inpatient Daily Activity Short Form  How much help from another person does the patient currently need…  -   Putting on and taking off regular lower body clothing?: A Lot  -   Bathing (including washing, rinsing, drying)?: A Lot  -   Toileting, which includes using toilet, bedpan or urinal? : Total  -   Putting on and taking off regular upper body clothing?: A Lot  -   Taking care of personal grooming such as brushing teeth?: A Lot  -   Eating meals?: A Little    AM-PAC Score:  Score: 12  Approx Degree of Impairment: 66.57%  Standardized Score (AM-PAC Scale): 30.6  CMS Modifier (G-Code): CL    FUNCTIONAL TRANSFER ASSESSMENT  Sit to Stand: Edge of Bed  Edge of Bed: Maximum Assist (x2)    BED MOBILITY  Rolling: Maximum Assist  Supine to Sit : Maximum Assist  Sit to Supine (OT):  Maximum Assist (x2)  Scooting: Max A    BALANCE ASSESSMENT  Static Sitting: Maximum Assist  Static Standing: Maximum Assist (x2)         Skilled Therapy Provided: Sitting balance, sitting tolerance, standing balance, standing tolerance, bed mobility, positioning, pt education.    EDUCATION PROVIDED  Patient: Role of Occupational Therapy; Plan of Care; Discharge Recommendations; Functional Transfer Techniques; Fall Prevention; Energy Conservation; Compensatory ADL Techniques  Patient's Response to Education: Verbalized Understanding; Requires Further Education    The patient's Approx Degree of Impairment: 66.57% has been calculated based on documentation in the [facility] '6 clicks' Inpatient Daily Activity Short Form.  Research supports that patients with this level of impairment may benefit from inpatient rehabilitative services. Pt is significantly below baseline, unsteady on feet, retropulsive in sitting/standing and not safe to discharge home.  Final disposition will be made by interdisciplinary medical team.     Patient End of Session: In bed;Needs met;Call light within reach;RN aware of session/findings;All patient questions and concerns addressed;SCDs in place;Alarm set    OT Goals  Patients self stated goal is: Return to PLOF     Patient will complete functional transfer with Min A  Comment:     Patient will complete toileting with Min A  Comment:     Patient will tolerate standing for 5 minutes in prep for adls with Min A   Comment:    Patient will complete UE dressing with CGA  Comment:          Goals  on: 3/5/24  Frequency: 3-5x/week    Patient Evaluation Complexity Level:   Occupational Profile/Medical History MODERATE - Expanded review of history including review of medical or therapy record   Specific performance deficits impacting engagement in ADL/IADL MODERATE  3 - 5 performance deficits   Client Assessment/Performance Deficits MODERATE - Comorbidities and min to mod modifications of tasks     Clinical Decision Making MODERATE - Analysis of occupational profile, detailed assessments, several treatment options    Overall Complexity MODERATE     OT Session Time: 25 minutes    Neuromuscular Re-education: 14 minutes      Erica Jenkins OTR/L  Occupational Therapy  [facility]

## 2024-02-09 NOTE — PHYSICAL THERAPY NOTE
PHYSICAL THERAPY EVALUATION - INPATIENT     Room Number: 533/533-A  Evaluation Date: 2/9/2024  Type of Evaluation: Initial   Physician Order: PT Eval and Treat    Presenting Problem: abscess of anal and rectal region c diff     Reason for Therapy: Mobility Dysfunction and Discharge Planning    PHYSICAL THERAPY ASSESSMENT   Patient is a 83 year old male admitted 2/8/2024 for abscess of anal and rectal regions c diff.  Prior to admission, patient's baseline is independent in ADL's and ambulation with no assistive device, although was using rolling walker just prior to admit.  Patient is currently functioning below baseline with bed mobility, transfers, and gait.  Patient is requiring moderate assist as a result of the following impairments: decreased functional strength and medical status.  Physical Therapy will continue to follow for duration of hospitalization.    Patient will benefit from continued skilled PT Services to promote return to prior level of function and safety with continuous assistance and gradual rehabilitative therapy .    PLAN  PT Treatment Plan: Bed mobility;Body mechanics;Patient education;Gait training;Transfer training;Balance training  Rehab Potential : Fair  Frequency (Obs): 3-5x/week    PHYSICAL THERAPY MEDICAL/SOCIAL HISTORY   Problem List  Principal Problem:    Abscess of anal and rectal regions  Active Problems:    Anemia    Hyperglycemia    Leukocytosis    Diarrhea, unspecified type    Weakness    HOME SITUATION  Home Situation  Type of Home: House  Home Layout: One level  Stairs to Enter : 1  Railing: Yes  Railing: No  Lives With: Spouse  Drives: Yes  Patient Owned Equipment: Rolling walker (was using only briefly prior to admit)  Patient Regularly Uses: Glasses;Hearing aides     SUBJECTIVE  \"I don't know, I don't normally sit like this\"    PHYSICAL THERAPY EXAMINATION   OBJECTIVE  Precautions: Limb alert - right;Bed/chair alarm;Hard of hearing  Fall Risk: High fall risk    WEIGHT  BEARING RESTRICTION  Weight Bearing Restriction: None                PAIN ASSESSMENT  Ratin          COGNITION  Overall Cognitive Status:  WFL - within functional limits    RANGE OF MOTION AND STRENGTH ASSESSMENT  Lower extremity ROM is within functional limits  BLE WNL  Lower extremity strength is within functional limits  BLE WNL    BALANCE  Static Sitting: Fair  Dynamic Sitting: Fair -  Static Standing: Not tested  Dynamic Standing: Not tested    AM-PAC '6-Clicks' INPATIENT SHORT FORM - BASIC MOBILITY  How much difficulty does the patient currently have...  Patient Difficulty: Turning over in bed (including adjusting bedclothes, sheets and blankets)?: A Lot   Patient Difficulty: Sitting down on and standing up from a chair with arms (e.g., wheelchair, bedside commode, etc.): Unable   Patient Difficulty: Moving from lying on back to sitting on the side of the bed?: A Lot   How much help from another person does the patient currently need...   Help from Another: Moving to and from a bed to a chair (including a wheelchair)?: Total   Help from Another: Need to walk in hospital room?: Total   Help from Another: Climbing 3-5 steps with a railing?: Total     AM-PAC Score:  Raw Score: 8   Approx Degree of Impairment: 86.62%   Standardized Score (AM-PAC Scale): 28.58   CMS Modifier (G-Code): CM    FUNCTIONAL ABILITY STATUS  Functional Mobility/Gait Assessment  Gait Assistance: Not tested  Rolling:  not tested   Supine to Sit: moderate assist  Sit to Supine: minimal assist  Sit to Stand: moderate assist and assist of 2    Exercise/Education Provided:  Bed mobility  Body mechanics    The patient's Approx Degree of Impairment: 86.62% has been calculated based on documentation in the [facility] '6 clicks' Inpatient Basic Mobility Short Form.  Research supports that patients with this level of impairment may benefit from long term care, however patient would benefit from rehab facility. Final disposition will be made by  interdisciplinary medical team. Patient on room air, oxygen sat dropped to 86% at end of session. Patient agreeable to standing at bedside, mod A x 2 posterior lean when standing, not able to stand upright. Patient able to take side steps to head of bed with mod A x 2 and rolling walker. Patient still with posterior lean, cues to lean forward, unsteady, Session coordinated with OT, gait belt donned.     Patient End of Session: In bed;Needs met;Call light within reach;RN aware of session/findings;All patient questions and concerns addressed;Alarm set    CURRENT GOALS  Goals to be met by: 2/24/24  Patient Goal Patient's self-stated goal is: to go home   Goal #1 Patient is able to demonstrate supine - sit EOB @ level: minimum assistance     Goal #1   Current Status    Goal #2 Patient is able to demonstrate transfers Sit to/from Stand at assistance level: minimum assistance with walker - rolling     Goal #2  Current Status    Goal #3 Patient is able to ambulate 10 feet with assist device: walker - rolling at assistance level: minimum assistance   Goal #3   Current Status    Goal #4    Goal #4   Current Status    Goal #5 Patient to demonstrate independence with home activity/exercise instructions provided to patient in preparation for discharge.   Goal #5   Current Status    Goal #6    Goal #6  Current Status      Patient Evaluation Complexity Level:  History Moderate - 1 or 2 personal factors and/or co-morbidities   Examination of body systems Moderate - addressing a total of 3 or more elements   Clinical Presentation  Moderate - Evolving   Clinical Decision Making  Moderate Complexity     Therapeutic Activity:  13 minutes  Therapeutic Exercise: 10 minutes

## 2024-02-09 NOTE — PROGRESS NOTES
[facility]     CC: Hospital Follow up    PCP: Rao Beyer MD       Assessment/Plan:     Principal Problem:    Abscess of anal and rectal regions  Active Problems:    Anemia    Hyperglycemia    Leukocytosis    Diarrhea, unspecified type    Weakness    Patient is a 83 year old male with PMH sig for CAD, DM2, HTN, HL, and recent admission for proctocolitis with perianal abscess discharged on invanz presenting with diarrhea and worsening weakness.     Severe proctocolitis  Large intramural rectal abscess  - recent admission for same dc on invanz  - CT reviewed, shows interval worsening  - [facility]  - general surgery colleagues consulted, appreciate recommendations  - reached out to IR to see if anything can be drained, unfortunately not by IR  - consulted GI colleagues as well to see if abscess can be drained endoscopically    C dif colitis  - PO vancomycin started  - soft Bms now charted  - suspect had some overflow diarrhea prior to coming in as stool burden on CT on admission was extensive     CKD  - chronic, stable     CAD  HLD  - statin, hold ASA for now\     DM2  - home regimen: not on home meds  - accuchecks QID, hypoglycemic protocol, SSI     HTN  - resume home metoprolol     FN:  - IVF: NS  - Diet: regular     DVT Prophy: SCD  Lines: PIV       Thank You,  Kerrie Guzman, DO    Hospitalist with [facility]     Subjective:     Patient states that his weakness is a lot better. No new complaints. No pain    OBJECTIVE:    Blood pressure 146/76, pulse 81, temperature 98.9 °F (37.2 °C), temperature source Oral, resp. rate 20, height 5' 10\" (1.778 m), weight 226 lb (102.5 kg), SpO2 94%.    Temp:  [98.2 °F (36.8 °C)-100.5 °F (38.1 °C)] 98.9 °F (37.2 °C)  Pulse:  [69-88] 81  Resp:  [16-23] 20  BP: (145-164)/(57-91) 146/76  SpO2:  [93 %-95 %] 94 %      Intake/Output:    Intake/Output Summary (Last 24 hours) at 2/9/2024 1114  Last data filed  at 2/9/2024 0457  Gross per 24 hour   Intake 566.98 ml   Output 200 ml   Net 366.98 ml       Last 3 Weights   02/08/24 1431 226 lb (102.5 kg)   02/08/24 0846 226 lb (102.5 kg)   02/02/24 1630 234 lb 4.8 oz (106.3 kg)   02/01/24 1608 235 lb 9.6 oz (106.9 kg)       Exam   Gen: No acute distress  Heent: NC AT, neck supple  Pulm: Lungs clear, normal respiratory effort  CV: Heart with regular rate and rhythm,   Abd: Abdomen soft, nontender, nondistended  MSK: no clubbing, no cyanosis  Skin: no rashes or lesions        Data Review:       Labs:     Recent Labs   Lab 02/05/24  1550 02/08/24  0854 02/09/24  0503   RBC 3.27* 3.38* 3.25*   HGB 10.9* 11.2* 10.4*   HCT 31.5* 32.5* 31.1*   MCV 96.3 96.2 95.7   MCH 33.3 33.1 32.0   MCHC 34.6 34.5 33.4   RDW 12.9 13.3 13.4   NEPRELIM 4.60 13.70*  --    WBC 8.9 19.3* 19.1*   .0 258.0 232.0         Recent Labs   Lab 02/05/24  1550 02/08/24  0854 02/09/24  0503   * 117* 106*   BUN 17 17 11   CREATSERUM 1.07 1.09 0.89   EGFRCR 69 67 85   CA 9.1 8.9 8.4*    138 140   K 4.2 4.2 3.6    105 106   CO2 28.0 27.0 26.0       Recent Labs   Lab 02/05/24  1550 02/08/24  0854   ALT 48 27   AST 35* 20   ALB 4.0 3.8         Imaging:  CT ABDOMEN+PELVIS(CONTRAST ONLY)(CPT=74177)    Result Date: 2/8/2024  CONCLUSION:  1. Severe proctocolitis with substantial interval worsening, with interval development of a large intramural rectal abscess.  2. Additional left posterolateral and posterior peripherally enhancing fluid collections are seen, suggesting potential communicating or independent abscesses.  3. A heavy stool burden is seen throughout the more proximal colon, and there may be some degree of partial large bowel obstruction.   4. Infrarenal abdominal aortic aneurysm measuring up to 5.0 cm.  5. A large complex debris-filled duodenal diverticulum is seen without CT evidence acute complication.  6. Prostatomegaly.  7. Pulmonary interstitial fibrosis is suggested.  8.  Possible hepatic steatosis.  9. Lesser incidental findings as above.    elm-remote.   Dictated by (CST): Mike Baum MD on 2/08/2024 at 12:08 PM     Finalized by (CST): Mike Baum MD on 2/08/2024 at 12:20 PM          CTA BRAIN + CTA CAROTIDS (CPT=70496/29072)    Result Date: 2/8/2024  CONCLUSION:  1. No proximal intracranial flow limiting stenosis/large vessel occlusion.  No intracranial aneurysm identified.  2. No hemodynamically significant stenosis or dissection involving the cervical carotid or vertebral arteries.  Non flow-limiting moderate left and mild right carotid bifurcation atherosclerosis.  There is also mild to moderate right vertebral ostial stenosis related to atherosclerosis. 3. Noncontrast head CT demonstrates no acute intracranial abnormality. 4. Nonspecific white matter changes involving both cerebral hemispheres that most likely reflect sequelae of chronic microangiopathy. 5. Emphysema.  Noncalcified right upper lobe lung nodules, which measure up to 6 mm.  Nonemergent follow-up chest CT is suggested for complete evaluation (unless outside institution comparison imaging is available and demonstrates stability).  elm-remote  Dictated by (CST): Toño Chavarria MD on 2/08/2024 at 11:55 AM     Finalized by (CST): Toño Chavarria MD on 2/08/2024 at 12:03 PM          XR CHEST AP/PA (1 VIEW) (CPT=71045)    Result Date: 2/8/2024  CONCLUSION: No acute cardiopulmonary abnormality.  Interval placement of a right upper extremity PICC with tip in the cavoatrial junction.   Dictated by (CST): Sean Singer MD on 2/08/2024 at 10:41 AM     Finalized by (CST): Sean Singer MD on 2/08/2024 at 10:43 AM          MRI BRAIN WO ACUTE (3) SEQUENCE (CPT=70551)    Result Date: 2/8/2024  CONCLUSION: No acute intracranial process.  No evidence of acute or subacute infarct. There are moderate microvascular white matter ischemic changes, likely related to long-standing hypertension and/or diabetes.   Dictated by  (CST): Sean Singer MD on 2/08/2024 at 10:00 AM     Finalized by (CST): Sean Singer MD on 2/08/2024 at 10:01 AM             Meds:      piperacillin-tazobactam  4.5 g Intravenous Q8H    allopurinol  100 mg Oral Daily    atorvastatin  20 mg Oral Daily    metoprolol succinate ER  100 mg Oral Daily    pantoprazole  20 mg Oral QAM AC    insulin aspart  1-5 Units Subcutaneous TID CC    vancomycin  125 mg Oral QID      sodium chloride 83 mL/hr at 02/09/24 0358     acetaminophen, ondansetron, metoclopramide, polyethylene glycol (PEG 3350), sennosides, bisacodyl, fleet enema, glucose **OR** glucose **OR** glucose-vitamin C **OR** dextrose **OR** glucose **OR** glucose **OR** glucose-vitamin C

## 2024-02-10 ENCOUNTER — APPOINTMENT (OUTPATIENT)
Dept: HEMATOLOGY/ONCOLOGY | Facility: HOSPITAL | Age: 84
End: 2024-02-10
Attending: INTERNAL MEDICINE
Payer: MEDICARE

## 2024-02-10 LAB
ANION GAP SERPL CALC-SCNC: 5 MMOL/L (ref 0–18)
BUN BLD-MCNC: 9 MG/DL (ref 9–23)
BUN/CREAT SERPL: 9.9 (ref 10–20)
CALCIUM BLD-MCNC: 8.1 MG/DL (ref 8.7–10.4)
CHLORIDE SERPL-SCNC: 109 MMOL/L (ref 98–112)
CO2 SERPL-SCNC: 26 MMOL/L (ref 21–32)
CREAT BLD-MCNC: 0.91 MG/DL
DEPRECATED RDW RBC AUTO: 47 FL (ref 35.1–46.3)
EGFRCR SERPLBLD CKD-EPI 2021: 84 ML/MIN/1.73M2 (ref 60–?)
ERYTHROCYTE [DISTWIDTH] IN BLOOD BY AUTOMATED COUNT: 13.2 % (ref 11–15)
GLUCOSE BLD-MCNC: 115 MG/DL (ref 70–99)
GLUCOSE BLDC GLUCOMTR-MCNC: 119 MG/DL (ref 70–99)
GLUCOSE BLDC GLUCOMTR-MCNC: 128 MG/DL (ref 70–99)
GLUCOSE BLDC GLUCOMTR-MCNC: 151 MG/DL (ref 70–99)
GLUCOSE BLDC GLUCOMTR-MCNC: 153 MG/DL (ref 70–99)
HCT VFR BLD AUTO: 30.9 %
HGB BLD-MCNC: 10.4 G/DL
MCH RBC QN AUTO: 32.6 PG (ref 26–34)
MCHC RBC AUTO-ENTMCNC: 33.7 G/DL (ref 31–37)
MCV RBC AUTO: 96.9 FL
OSMOLALITY SERPL CALC.SUM OF ELEC: 290 MOSM/KG (ref 275–295)
PLATELET # BLD AUTO: 215 10(3)UL (ref 150–450)
POTASSIUM SERPL-SCNC: 3.2 MMOL/L (ref 3.5–5.1)
RBC # BLD AUTO: 3.19 X10(6)UL
SODIUM SERPL-SCNC: 140 MMOL/L (ref 136–145)
WBC # BLD AUTO: 17.9 X10(3) UL (ref 4–11)

## 2024-02-10 PROCEDURE — 82962 GLUCOSE BLOOD TEST: CPT

## 2024-02-10 PROCEDURE — 85027 COMPLETE CBC AUTOMATED: CPT | Performed by: STUDENT IN AN ORGANIZED HEALTH CARE EDUCATION/TRAINING PROGRAM

## 2024-02-10 PROCEDURE — 80048 BASIC METABOLIC PNL TOTAL CA: CPT | Performed by: STUDENT IN AN ORGANIZED HEALTH CARE EDUCATION/TRAINING PROGRAM

## 2024-02-10 RX ORDER — POTASSIUM CHLORIDE 20 MEQ/1
40 TABLET, EXTENDED RELEASE ORAL ONCE
Status: COMPLETED | OUTPATIENT
Start: 2024-02-10 | End: 2024-02-10

## 2024-02-10 RX ORDER — SIMETHICONE 80 MG
80 TABLET,CHEWABLE ORAL 4 TIMES DAILY PRN
Status: DISCONTINUED | OUTPATIENT
Start: 2024-02-10 | End: 2024-02-16

## 2024-02-10 NOTE — PLAN OF CARE
Patient alert and oriented x4, forgetful at times. General diet, tolerating well. Accuchecks ACHS. Max assist. Room air. Continuing IV fluids and antibiotics. Continuing to have frequent loose bowel movements. Call light in reach. Frequent rounding performed.     Problem: Patient Centered Care  Goal: Patient preferences are identified and integrated in the patient's plan of care  Description: Interventions:  - What would you like us to know as we care for you? I was here recently and discharged with IV antibiotics    - Provide timely, complete, and accurate information to patient/family  - Incorporate patient and family knowledge, values, beliefs, and cultural backgrounds into the planning and delivery of care  - Encourage patient/family to participate in care and decision-making at the level they choose  - Honor patient and family perspectives and choices  Outcome: Progressing     Problem: Patient/Family Goals  Goal: Patient/Family Long Term Goal  Description: Patient's Long Term Goal: to return home    Interventions:  - See additional Care Plan goals for specific interventions  Outcome: Progressing  Goal: Patient/Family Short Term Goal  Description: Patient's Short Term Goal: no more diarrhea    Interventions:     - See additional Care Plan goals for specific interventions  Outcome: Progressing     Problem: RISK FOR INFECTION - ADULT  Goal: Absence of fever/infection during anticipated neutropenic period  Description: INTERVENTIONS  - Monitor WBC  - Administer growth factors as ordered  - Implement neutropenic guidelines  Outcome: Progressing     Problem: SAFETY ADULT - FALL  Goal: Free from fall injury  Description: INTERVENTIONS:  - Assess pt frequently for physical needs  - Identify cognitive and physical deficits and behaviors that affect risk of falls.  - [place] fall precautions as indicated by assessment.  - Educate pt/family on patient safety including physical limitations  - Instruct pt to call for  assistance with activity based on assessment  - Modify environment to reduce risk of injury  - Provide assistive devices as appropriate  - Consider OT/PT consult to assist with strengthening/mobility  - Encourage toileting schedule  Outcome: Progressing     Problem: RESPIRATORY - ADULT  Goal: Achieves optimal ventilation and oxygenation  Description: INTERVENTIONS:  - Assess for changes in respiratory status  - Assess for changes in mentation and behavior  - Position to facilitate oxygenation and minimize respiratory effort  - Oxygen supplementation based on oxygen saturation or ABGs  - Provide Smoking Cessation handout, if applicable  - Encourage broncho-pulmonary hygiene including cough, deep breathe, Incentive Spirometry  - Assess the need for suctioning and perform as needed  - Assess and instruct to report SOB or any respiratory difficulty  - Respiratory Therapy support as indicated  - Manage/alleviate anxiety  - Monitor for signs/symptoms of CO2 retention  Outcome: Progressing     Problem: METABOLIC/FLUID AND ELECTROLYTES - ADULT  Goal: Glucose maintained within prescribed range  Description: INTERVENTIONS:  - Monitor Blood Glucose as ordered  - Assess for signs and symptoms of hyperglycemia and hypoglycemia  - Administer ordered medications to maintain glucose within target range  - Assess barriers to adequate nutritional intake and initiate nutrition consult as needed  - Instruct patient on self management of diabetes  Outcome: Progressing     Problem: MUSCULOSKELETAL - ADULT  Goal: Return mobility to safest level of function  Description: INTERVENTIONS:  - Assess patient stability and activity tolerance for standing, transferring and ambulating w/ or w/o assistive devices  - Assist with transfers and ambulation using safe patient handling equipment as needed  - Ensure adequate protection for wounds/incisions during mobilization  - Obtain PT/OT consults as needed  - Advance activity as appropriate  -  Communicate ordered activity level and limitations with patient/family  Outcome: Progressing     Problem: NEUROLOGICAL - ADULT  Goal: Achieves stable or improved neurological status  Description: INTERVENTIONS  - Assess for and report changes in neurological status  - Initiate measures to prevent increased intracranial pressure  - Maintain blood pressure and fluid volume within ordered parameters to optimize cerebral perfusion and minimize risk of hemorrhage  - Monitor temperature, glucose, and sodium. Initiate appropriate interventions as ordered  Outcome: Progressing     Problem: Impaired Functional Mobility  Goal: Achieve highest/safest level of mobility/gait  Description: Interventions:  - Assess patient's functional ability and stability  - Promote increasing activity/tolerance for mobility and gait  - Educate and engage patient/family in tolerated activity level and precautions  - Recommend use of total lift for transfers  Outcome: Progressing

## 2024-02-10 NOTE — PLAN OF CARE
Patient alert and oriented x3, can be forgetful at times. 1.5LNC for comfort, CPAP throughout the night. No acute events, ivf & iv abx running. Frequent nurse rounding done.    Problem: Patient Centered Care  Goal: Patient preferences are identified and integrated in the patient's plan of care  Description: Interventions:  - What would you like us to know as we care for you? From home with wife  - Provide timely, complete, and accurate information to patient/family  - Incorporate patient and family knowledge, values, beliefs, and cultural backgrounds into the planning and delivery of care  - Encourage patient/family to participate in care and decision-making at the level they choose  - Honor patient and family perspectives and choices  Outcome: Progressing     Problem: Patient/Family Goals  Goal: Patient/Family Long Term Goal  Description: Patient's Long Term Goal: return home    Interventions:  - cdiff treatment  GI to see  IVF & IV ABX  - See additional Care Plan goals for specific interventions  Outcome: Progressing  Goal: Patient/Family Short Term Goal  Description: Patient's Short Term Goal: become stronger    Interventions:   -Increase mobility  POT/OT   - See additional Care Plan goals for specific interventions  Outcome: Progressing     Problem: RISK FOR INFECTION - ADULT  Goal: Absence of fever/infection during anticipated neutropenic period  Description: INTERVENTIONS  - Monitor WBC  - Administer growth factors as ordered  - Implement neutropenic guidelines  Outcome: Progressing     Problem: SAFETY ADULT - FALL  Goal: Free from fall injury  Description: INTERVENTIONS:  - Assess pt frequently for physical needs  - Identify cognitive and physical deficits and behaviors that affect risk of falls.  - [place] fall precautions as indicated by assessment.  - Educate pt/family on patient safety including physical limitations  - Instruct pt to call for assistance with activity based on assessment  - Modify  environment to reduce risk of injury  - Provide assistive devices as appropriate  - Consider OT/PT consult to assist with strengthening/mobility  - Encourage toileting schedule  Outcome: Progressing     Problem: RESPIRATORY - ADULT  Goal: Achieves optimal ventilation and oxygenation  Description: INTERVENTIONS:  - Assess for changes in respiratory status  - Assess for changes in mentation and behavior  - Position to facilitate oxygenation and minimize respiratory effort  - Oxygen supplementation based on oxygen saturation or ABGs  - Provide Smoking Cessation handout, if applicable  - Encourage broncho-pulmonary hygiene including cough, deep breathe, Incentive Spirometry  - Assess the need for suctioning and perform as needed  - Assess and instruct to report SOB or any respiratory difficulty  - Respiratory Therapy support as indicated  - Manage/alleviate anxiety  - Monitor for signs/symptoms of CO2 retention  Outcome: Progressing     Problem: METABOLIC/FLUID AND ELECTROLYTES - ADULT  Goal: Glucose maintained within prescribed range  Description: INTERVENTIONS:  - Monitor Blood Glucose as ordered  - Assess for signs and symptoms of hyperglycemia and hypoglycemia  - Administer ordered medications to maintain glucose within target range  - Assess barriers to adequate nutritional intake and initiate nutrition consult as needed  - Instruct patient on self management of diabetes  Outcome: Progressing     Problem: MUSCULOSKELETAL - ADULT  Goal: Return mobility to safest level of function  Description: INTERVENTIONS:  - Assess patient stability and activity tolerance for standing, transferring and ambulating w/ or w/o assistive devices  - Assist with transfers and ambulation using safe patient handling equipment as needed  - Ensure adequate protection for wounds/incisions during mobilization  - Obtain PT/OT consults as needed  - Advance activity as appropriate  - Communicate ordered activity level and limitations with  patient/family  Outcome: Progressing     Problem: NEUROLOGICAL - ADULT  Goal: Achieves stable or improved neurological status  Description: INTERVENTIONS  - Assess for and report changes in neurological status  - Initiate measures to prevent increased intracranial pressure  - Maintain blood pressure and fluid volume within ordered parameters to optimize cerebral perfusion and minimize risk of hemorrhage  - Monitor temperature, glucose, and sodium. Initiate appropriate interventions as ordered  Outcome: Progressing     Problem: Impaired Functional Mobility  Goal: Achieve highest/safest level of mobility/gait  Description: Interventions:  - Assess patient's functional ability and stability  - Promote increasing activity/tolerance for mobility and gait  - Educate and engage patient/family in tolerated activity level and precautions  Outcome: Progressing

## 2024-02-10 NOTE — PROGRESS NOTES
[facility] Infectious Disease  Progress Note    Raul Freed Jr. Patient Status:  Inpatient    1940 MRN [id number]   Location [facility] 5SW/SE Attending Jose D Carrillo DO   Hosp Day # 2 PCP Rao Beyer MD     Subjective:  Patient seen and examined in bed. Nursing present at bedside. Reports feeling better today. Diarrhea continues with PO vancomycin ongoing for +c.diff status. Tolerating PO intake. Otherwise no new complaints.     Objective:  Blood pressure 155/61, pulse 83, temperature 98.6 °F (37 °C), temperature source Oral, resp. rate 24, height 5' 10\" (1.778 m), weight 226 lb (102.5 kg), SpO2 97%.    Intake/Output:    Intake/Output Summary (Last 24 hours) at 2/10/2024 0921  Last data filed at 2/10/2024 0549  Gross per 24 hour   Intake 1497.9 ml   Output 300 ml   Net 1197.9 ml       Physical Exam:  General: Awake, alert, non-tox, NAD.  HEENT:  Oropharynx clear, trachea ML.  Heart: RRR S1S2 no murmurs.  Lungs: Essentially CTA b/l, no rhonchi, rales, wheezes.  Abdomen: Soft, NT/ND.  BS present.  No organomegaly.  Extremity: No edema.  Neurological: No focal deficits.  Derm:  Warm and dry.    Lab Data Review:  Lab Results   Component Value Date    WBC 17.9 02/10/2024    HGB 10.4 02/10/2024    HCT 30.9 02/10/2024    .0 02/10/2024    CREATSERUM 0.91 02/10/2024    BUN 9 02/10/2024     02/10/2024    K 3.2 02/10/2024     02/10/2024    CO2 26.0 02/10/2024     02/10/2024    CA 8.1 02/10/2024        Cultures:  Hospital Encounter on 24   1. Blood Culture     Status: None (Preliminary result)    Collection Time: 24  1:14 PM    Specimen: Blood,peripheral   Result Value Ref Range    Blood Culture Result No Growth 1 Day N/A       Radiology:  CT ABDOMEN+PELVIS(CONTRAST ONLY)(CPT=74177)    Result Date: 2024  CONCLUSION:  1. Severe proctocolitis with substantial interval worsening, with interval development of a large  intramural rectal abscess.  2. Additional left posterolateral and posterior peripherally enhancing fluid collections are seen, suggesting potential communicating or independent abscesses.  3. A heavy stool burden is seen throughout the more proximal colon, and there may be some degree of partial large bowel obstruction.   4. Infrarenal abdominal aortic aneurysm measuring up to 5.0 cm.  5. A large complex debris-filled duodenal diverticulum is seen without CT evidence acute complication.  6. Prostatomegaly.  7. Pulmonary interstitial fibrosis is suggested.  8. Possible hepatic steatosis.  9. Lesser incidental findings as above.    elm-remote.   Dictated by (CST): Mike Baum MD on 2/08/2024 at 12:08 PM     Finalized by (CST): Mike Baum MD on 2/08/2024 at 12:20 PM          CTA BRAIN + CTA CAROTIDS (CPT=70496/43337)    Result Date: 2/8/2024  CONCLUSION:  1. No proximal intracranial flow limiting stenosis/large vessel occlusion.  No intracranial aneurysm identified.  2. No hemodynamically significant stenosis or dissection involving the cervical carotid or vertebral arteries.  Non flow-limiting moderate left and mild right carotid bifurcation atherosclerosis.  There is also mild to moderate right vertebral ostial stenosis related to atherosclerosis. 3. Noncontrast head CT demonstrates no acute intracranial abnormality. 4. Nonspecific white matter changes involving both cerebral hemispheres that most likely reflect sequelae of chronic microangiopathy. 5. Emphysema.  Noncalcified right upper lobe lung nodules, which measure up to 6 mm.  Nonemergent follow-up chest CT is suggested for complete evaluation (unless outside institution comparison imaging is available and demonstrates stability).  elm-remote  Dictated by (CST): Toño Chavarria MD on 2/08/2024 at 11:55 AM     Finalized by (CST): Toño Chavarria MD on 2/08/2024 at 12:03 PM          XR CHEST AP/PA (1 VIEW) (CPT=71045)    Result Date:  2/8/2024  CONCLUSION: No acute cardiopulmonary abnormality.  Interval placement of a right upper extremity PICC with tip in the cavoatrial junction.   Dictated by (CST): Sean Singer MD on 2/08/2024 at 10:41 AM     Finalized by (CST): Sean Singer MD on 2/08/2024 at 10:43 AM          MRI BRAIN WO ACUTE (3) SEQUENCE (CPT=70551)    Result Date: 2/8/2024  CONCLUSION: No acute intracranial process.  No evidence of acute or subacute infarct. There are moderate microvascular white matter ischemic changes, likely related to long-standing hypertension and/or diabetes.   Dictated by (CST): Sean Singer MD on 2/08/2024 at 10:00 AM     Finalized by (CST): Sean Singer MD on 2/08/2024 at 10:01 AM           Assessment and Plan:  1.  Proctocolitis with abscess with concerns for interval worsening  - CT with evidence of severe proctocolitis with complex-appearing L lateral fluid/gas on previous admission in January 2024.  - General Surgery consulted at that time and findings c/w small intramural abscess in L posterior rectal wall; however, not amenable to drainage.  - Discharged on IV Invanz through 2/19/24.  - Now readmitted for weakness, diarrhea and leukocytosis.  - Blood cultures are negative thus far.  - Repeat CT, 2/8, with severe proctocolitis and worsening intramural rectal abscess.  Additional L posterolateral and posterior peripherally enhancing fluid collections concerning for potential communicating or independent abscesses.  - Seen by IR, no safe approach for IR drainage.  - GI and General Surgery on consult.  - IV Invanz had been ongoing as outpatient -> IV meropenem given in the ED -> IV Zosyn.  - IV Zosyn, ongoing.    2.  Diarrhea secondary to the above  - Patient with history of chronic loose stools.  Unclear if this is worse than baseline.  - Stool studies negative on previous admission.  - Now with +c.diff status, 2/8/24.  - PO vancomycin, ongoing, day #3.     4.  Leukocytosis secondary to the above  - Slight  down-trend as of this AM -- WBC 17.9K <- 19.1K <- 19.3K  - Will trend.    5.  Weakness and feelings of imbalance  - This could be due to evolving sepsis or possibly drug effect of carbapenem class.  - IV meropenem -> IV Zosyn.    6.  Recs  - Continue IV Zosyn at this time.  - Continue PO vancomycin for c.diff.  - F/u cultures.  - F/u WBC and fever curve.  - General Surgery input noted and appreciated.  - F/u GI recs when available.  - Supportive care per primary team.   - Discussed plan of care with primary team attending physician, Dr. Fran Carrillo.  - Discussed plan of care with nursing.   - Discussed plan of care with patient. All questions addressed and understanding verbalized.  - Further recommendations pending clinical course.      Discussed case with ID attending/collaborating physician, Steven Dunn, who is in agreement with the above plan of care.      Please note that this report has been produced using speech recognition software and may contain errors related to that system including, but not limited to, errors in grammar, punctuation, and spelling, as well as words and phrases that possibly may have been recognized inappropriately.  If there are any questions or concerns, contact the dictating provider for clarification.     The 21st Century Cures Act makes medical notes like these available to patients in the interest of transparency. Please be advised this is a medical document. Medical documents are intended to carry relevant information, facts as evident, and the clinical opinion of the practitioner. The medical note is intended as peer to peer communication and may appear blunt or direct. It is written in medical language and may contain abbreviations or verbiage that are unfamiliar.     If you have any questions or concerns please call [facility] Infectious Disease at [phone].     Trace Olson, APRN    2/10/2024  9:21 AM

## 2024-02-10 NOTE — PROGRESS NOTES
[facility]    Progress Note    Raul Freed JrTamika Patient Status:  Inpatient    1940 MRN [id number]   Location [facility] 5SW/SE Attending Jose D Carrillo,    Hosp Day # 2 PCP Rao Beyer MD     Subjective:   Patient having improved rectal discomfort.  Fatigue improved.      Objective:   Blood pressure 119/57, pulse 83, temperature 97.5 °F (36.4 °C), temperature source Oral, resp. rate 18, height 5' 10\" (1.778 m), weight 226 lb (102.5 kg), SpO2 92%.    GENERAL: in no apparent distress  CHEST/CARDIO: RRR without murmur  LUNGS: clear to auscultation  GI: good BS's and no tenderness      Results:   Lab Results   Component Value Date    WBC 17.9 (H) 02/10/2024    HGB 10.4 (L) 02/10/2024    HCT 30.9 (L) 02/10/2024    .0 02/10/2024    CREATSERUM 0.91 02/10/2024    BUN 9 02/10/2024     02/10/2024    K 3.2 (L) 02/10/2024     02/10/2024    CO2 26.0 02/10/2024     (H) 02/10/2024    CA 8.1 (L) 02/10/2024    ALB 3.8 2024    ALKPHO 85 2024    BILT 0.6 2024    TP 7.6 2024    AST 20 2024    ALT 27 2024    TSH 3.044 2015    PSA 1.02 2015    CRP <0.40 2024    MG 2.2 2024    TROPHS 6 2024       No results found.        Assessment & Plan:   83 year old male with a history of HTN, HLD, DM2, CAD, Gout, DEANGELO and PUD who presents for rectal discomfort.     Rectal Intramural Abscess:  Proctocolitis:  Leukocytosis:  C.Diff:  - Patient presents back with fatigue and weakness after recent admission for new proctocolitis and perianal abscess.    - Imaging with severe proctocilitis with interval worsening with interval development of large rectal intramural abscess.  Also with heavy stool burden in proximal colon, infrarenal AAA. Colonoscopy 2022 with fair prep, small polyp and otherwise normal.   - Discussed with Dr. Patterson and imaging reviewed.  Unable to drain endoscopically with rectal EUS given appearance and  location of the abscess.  Unable to drain per IR.   - Dr. Shelby following, plan for MRI Pelvis tomorrow  - Diarrhea likely due to C.Diff as well as overflow given proximal large stool burden.   On oral vancomycin.  Will monitor.      Aniket Grimes MD  2/10/2024

## 2024-02-10 NOTE — PLAN OF CARE
Problem: METABOLIC/FLUID AND ELECTROLYTES - ADULT  Goal: Glucose maintained within prescribed range  Description: INTERVENTIONS:  - Monitor Blood Glucose as ordered  - Assess for signs and symptoms of hyperglycemia and hypoglycemia  - Administer ordered medications to maintain glucose within target range  - Assess barriers to adequate nutritional intake and initiate nutrition consult as needed  - Instruct patient on self management of diabetes  Outcome: Progressing     Problem: RISK FOR INFECTION - ADULT  Goal: Absence of fever/infection during anticipated neutropenic period  Description: INTERVENTIONS  - Monitor WBC  - Administer growth factors as ordered  - Implement neutropenic guidelines  Outcome: Not Progressing     Problem: MUSCULOSKELETAL - ADULT  Goal: Return mobility to safest level of function  Description: INTERVENTIONS:  - Assess patient stability and activity tolerance for standing, transferring and ambulating w/ or w/o assistive devices  - Assist with transfers and ambulation using safe patient handling equipment as needed  - Ensure adequate protection for wounds/incisions during mobilization  - Obtain PT/OT consults as needed  - Advance activity as appropriate  - Communicate ordered activity level and limitations with patient/family  Outcome: Not Progressing     Problem: NEUROLOGICAL - ADULT  Goal: Achieves stable or improved neurological status  Description: INTERVENTIONS  - Assess for and report changes in neurological status  - Initiate measures to prevent increased intracranial pressure  - Maintain blood pressure and fluid volume within ordered parameters to optimize cerebral perfusion and minimize risk of hemorrhage  - Monitor temperature, glucose, and sodium. Initiate appropriate interventions as ordered  Outcome: Not Progressing     Pt very confused. Low fever today, tylenol given. Gait is very unsteady. Safety precautions in place. Call light is within reach.

## 2024-02-10 NOTE — PROGRESS NOTES
[facility] [place]    Progress Note    Raul Freed Jr. Patient Status:  Inpatient    1940 MRN [id number]   Location [facility] 5SW/SE Attending Kerrie Guzman, DO   Hosp Day # 2 PCP Rao Beyer MD       Subjective:   Raul Freed Jr. is a(n) 83 year old   male with complaints of diarrhea, lethargy.  Accompanied by wife, she reports he is much more alert today.  He denies abdominal or anal pain except for perianal skin irritation which has been relieved somewhat with applied cream.  He is now C.diff positive, having frequent loose stools.  - Nausea, - Vomiting    Assessment and Plan:   Raul Freed Jr. is a(n) 83 year old male    HD 3 with Perianal abscess and proctocolitis.    Patient with recent hospitalization for same problem, was discharged with Invanz.  Patient returned with continued weakness and diarrhea. CT showed increase in abscess and worsening proctocolitis.    C. Diff (+) - started on PO Vancomycin 2024    Location of abscess is primarily intramural (extent of abscess vs \"proctocolitis\" unclear), within rectal wall and on left side of anorectal junction  Likelihood of being able to drain this surgically may be limited as this is fairly extensive    Plan  Rectal MRI, fistula protocol tomorrow,   Consider EUA on  based on MRI findings  Diet: Regular, NPO after MN  in anticipation of EUA on     Continue abx per ID recommendations  Appreciate GI input  Ambulation  Pulmonary Hygiene  Trend labs        Objective:   Blood pressure 155/61, pulse 83, temperature 98.6 °F (37 °C), temperature source Oral, resp. rate 24, height 5' 10\" (1.778 m), weight 226 lb (102.5 kg), SpO2 97%. I/O last 3 completed shifts:  In: 1697.9 [P.O.:270; I.V.:1027.9; IV PIGGYBACK:400]  Out: 500 [Urine:500]     General appearance: alert, appears stated age, cooperative, and moderately obese  Neck: no JVD and supple, symmetrical, trachea midline  Pulmonary: No labored  breathing, equal respiratory excursion  Cardiovascular: regular rate and rhythm  Abdominal: soft, non-tender; bowel sounds normal; no masses,  no organomegaly.  Extremities: extremities normal, atraumatic, no cyanosis or edema  Anal: Perianal skin irritated and tender to palpation, brown stool noticed in crack. No JEFRY able to be performed due to patient intolerance.        Results:       Recent Labs   Lab 02/05/24  1550 02/08/24  0854 02/09/24  0503 02/10/24  0510   RBC 3.27* 3.38* 3.25* 3.19*   HGB 10.9* 11.2* 10.4* 10.4*   HCT 31.5* 32.5* 31.1* 30.9*   MCV 96.3 96.2 95.7 96.9   MCH 33.3 33.1 32.0 32.6   MCHC 34.6 34.5 33.4 33.7   RDW 12.9 13.3 13.4 13.2   NEPRELIM 4.60 13.70*  --   --    WBC 8.9 19.3* 19.1* 17.9*   .0 258.0 232.0 215.0     No results found for: \"PT\", \"INR\"    Recent Labs   Lab 02/05/24  1550 02/08/24  0854 02/09/24  0503 02/10/24  0510   * 117* 106* 115*   BUN 17 17 11 9   CREATSERUM 1.07 1.09 0.89 0.91   CA 9.1 8.9 8.4* 8.1*   ALB 4.0 3.8  --   --     138 140 140   K 4.2 4.2 3.6 3.2*    105 106 109   CO2 28.0 27.0 26.0 26.0   ALKPHO 96 85  --   --    AST 35* 20  --   --    ALT 48 27  --   --    BILT 0.2 0.6  --   --    TP 7.5 7.6  --   --              CT ABDOMEN+PELVIS(CONTRAST ONLY)(CPT=74177)    Result Date: 2/8/2024  CONCLUSION:  1. Severe proctocolitis with substantial interval worsening, with interval development of a large intramural rectal abscess.  2. Additional left posterolateral and posterior peripherally enhancing fluid collections are seen, suggesting potential communicating or independent abscesses.  3. A heavy stool burden is seen throughout the more proximal colon, and there may be some degree of partial large bowel obstruction.   4. Infrarenal abdominal aortic aneurysm measuring up to 5.0 cm.  5. A large complex debris-filled duodenal diverticulum is seen without CT evidence acute complication.  6. Prostatomegaly.  7. Pulmonary interstitial fibrosis is  suggested.  8. Possible hepatic steatosis.  9. Lesser incidental findings as above.    elm-remote.   Dictated by (CST): Mike Baum MD on 2/08/2024 at 12:08 PM     Finalized by (CST): Mike Baum MD on 2/08/2024 at 12:20 PM          CTA BRAIN + CTA CAROTIDS (CPT=70496/94229)    Result Date: 2/8/2024  CONCLUSION:  1. No proximal intracranial flow limiting stenosis/large vessel occlusion.  No intracranial aneurysm identified.  2. No hemodynamically significant stenosis or dissection involving the cervical carotid or vertebral arteries.  Non flow-limiting moderate left and mild right carotid bifurcation atherosclerosis.  There is also mild to moderate right vertebral ostial stenosis related to atherosclerosis. 3. Noncontrast head CT demonstrates no acute intracranial abnormality. 4. Nonspecific white matter changes involving both cerebral hemispheres that most likely reflect sequelae of chronic microangiopathy. 5. Emphysema.  Noncalcified right upper lobe lung nodules, which measure up to 6 mm.  Nonemergent follow-up chest CT is suggested for complete evaluation (unless outside institution comparison imaging is available and demonstrates stability).  elm-remote  Dictated by (CST): Toño Chavarria MD on 2/08/2024 at 11:55 AM     Finalized by (CST): Toño Chavarria MD on 2/08/2024 at 12:03 PM                 Time spent in direct patient contact and decision making as well as counseling/coordination of care:    [id number]- 35 min      Musa Shelby MD

## 2024-02-10 NOTE — PROGRESS NOTES
[facility] Progress [facility]     CC: Hospital Follow up    PCP: Rao Beyer MD       Assessment/Plan:     Principal Problem:    Abscess of anal and rectal regions  Active Problems:    Anemia    Hyperglycemia    Leukocytosis    Diarrhea, unspecified type    Weakness    Patient is a 83 year old male with PMH sig for CAD, DM2, HTN, HL, and recent admission for proctocolitis with perianal abscess discharged on invanz presenting with diarrhea and worsening weakness.     Severe proctocolitis  Large intramural rectal abscess  - recent admission for same dc on invanz  - CT reviewed, shows interval worsening  -No clear best next steps, appreciate GI, IR and Gen surg input     C dif colitis  - PO vancomycin started  - soft Bms now charted  - suspect had some overflow diarrhea prior to coming in as stool burden on CT on admission was extensive     CKD  - chronic, stable     CAD  HLD  - statin, hold ASA for now\     DM2  - home regimen: not on home meds  - accuchecks QID, hypoglycemic protocol, SSI     HTN  - resume home metoprolol     FN:  - IVF: NS  - Diet: regular     DVT Prophy: SCD  Lines: PIV       Thank You,  Jose D Carrillo DO     Hospitalist with [facility]     Subjective:     Less weak, no fevers or chills, no chest pains, eating is just ok     OBJECTIVE:    Blood pressure 119/57, pulse 83, temperature 97.5 °F (36.4 °C), temperature source Oral, resp. rate 18, height 5' 10\" (1.778 m), weight 226 lb (102.5 kg), SpO2 92%.    Temp:  [97.5 °F (36.4 °C)-100 °F (37.8 °C)] 97.5 °F (36.4 °C)  Pulse:  [83-95] 83  Resp:  [18-24] 18  BP: (104-161)/(57-67) 119/57  SpO2:  [92 %-98 %] 92 %      Intake/Output:    Intake/Output Summary (Last 24 hours) at 2/10/2024 1243  Last data filed at 2/10/2024 1040  Gross per 24 hour   Intake 1527.9 ml   Output 300 ml   Net 1227.9 ml       Last 3 Weights   02/08/24 1431 226 lb (102.5 kg)   02/08/24 0846 226 lb (102.5 kg)   02/02/24 1630 234 lb 4.8 oz (106.3 kg)    02/01/24 1608 235 lb 9.6 oz (106.9 kg)       Exam   Gen: No acute distress  Pulm: Lungs clear, normal respiratory effort  CV: Heart with regular rate and rhythm,   Abd: Abdomen soft, nontender, nondistended  MSK: no clubbing, no cyanosis        Data Review:       Labs:     Recent Labs   Lab 02/05/24  1550 02/08/24  0854 02/09/24  0503 02/10/24  0510   RBC 3.27* 3.38* 3.25* 3.19*   HGB 10.9* 11.2* 10.4* 10.4*   HCT 31.5* 32.5* 31.1* 30.9*   MCV 96.3 96.2 95.7 96.9   MCH 33.3 33.1 32.0 32.6   MCHC 34.6 34.5 33.4 33.7   RDW 12.9 13.3 13.4 13.2   NEPRELIM 4.60 13.70*  --   --    WBC 8.9 19.3* 19.1* 17.9*   .0 258.0 232.0 215.0         Recent Labs   Lab 02/08/24  0854 02/09/24  0503 02/10/24  0510   * 106* 115*   BUN 17 11 9   CREATSERUM 1.09 0.89 0.91   EGFRCR 67 85 84   CA 8.9 8.4* 8.1*    140 140   K 4.2 3.6 3.2*    106 109   CO2 27.0 26.0 26.0       Recent Labs   Lab 02/05/24  1550 02/08/24  0854   ALT 48 27   AST 35* 20   ALB 4.0 3.8         Imaging:  CT ABDOMEN+PELVIS(CONTRAST ONLY)(CPT=74177)    Result Date: 2/8/2024  CONCLUSION:  1. Severe proctocolitis with substantial interval worsening, with interval development of a large intramural rectal abscess.  2. Additional left posterolateral and posterior peripherally enhancing fluid collections are seen, suggesting potential communicating or independent abscesses.  3. A heavy stool burden is seen throughout the more proximal colon, and there may be some degree of partial large bowel obstruction.   4. Infrarenal abdominal aortic aneurysm measuring up to 5.0 cm.  5. A large complex debris-filled duodenal diverticulum is seen without CT evidence acute complication.  6. Prostatomegaly.  7. Pulmonary interstitial fibrosis is suggested.  8. Possible hepatic steatosis.  9. Lesser incidental findings as above.    elm-remote.   Dictated by (CST): Mike Baum MD on 2/08/2024 at 12:08 PM     Finalized by (CST): Mike Baum MD on 2/08/2024 at  12:20 PM          CTA BRAIN + CTA CAROTIDS (CPT=70496/19537)    Result Date: 2/8/2024  CONCLUSION:  1. No proximal intracranial flow limiting stenosis/large vessel occlusion.  No intracranial aneurysm identified.  2. No hemodynamically significant stenosis or dissection involving the cervical carotid or vertebral arteries.  Non flow-limiting moderate left and mild right carotid bifurcation atherosclerosis.  There is also mild to moderate right vertebral ostial stenosis related to atherosclerosis. 3. Noncontrast head CT demonstrates no acute intracranial abnormality. 4. Nonspecific white matter changes involving both cerebral hemispheres that most likely reflect sequelae of chronic microangiopathy. 5. Emphysema.  Noncalcified right upper lobe lung nodules, which measure up to 6 mm.  Nonemergent follow-up chest CT is suggested for complete evaluation (unless outside institution comparison imaging is available and demonstrates stability).  elm-remote  Dictated by (CST): Toño Chavarria MD on 2/08/2024 at 11:55 AM     Finalized by (CST): Toño Chavarria MD on 2/08/2024 at 12:03 PM          XR CHEST AP/PA (1 VIEW) (CPT=71045)    Result Date: 2/8/2024  CONCLUSION: No acute cardiopulmonary abnormality.  Interval placement of a right upper extremity PICC with tip in the cavoatrial junction.   Dictated by (CST): Sean Singer MD on 2/08/2024 at 10:41 AM     Finalized by (CST): Sean Singer MD on 2/08/2024 at 10:43 AM          MRI BRAIN WO ACUTE (3) SEQUENCE (CPT=70551)    Result Date: 2/8/2024  CONCLUSION: No acute intracranial process.  No evidence of acute or subacute infarct. There are moderate microvascular white matter ischemic changes, likely related to long-standing hypertension and/or diabetes.   Dictated by (CST): Sean Singer MD on 2/08/2024 at 10:00 AM     Finalized by (CST): Sean Singer MD on 2/08/2024 at 10:01 AM             Meds:      piperacillin-tazobactam  4.5 g Intravenous Q8H    allopurinol  100 mg Oral  Daily    atorvastatin  20 mg Oral Daily    metoprolol succinate ER  100 mg Oral Daily    pantoprazole  20 mg Oral QAM AC    insulin aspart  1-5 Units Subcutaneous TID CC    vancomycin  125 mg Oral QID      sodium chloride 83 mL/hr at 02/10/24 0503     hydrALAzine, acetaminophen, ondansetron, metoclopramide, polyethylene glycol (PEG 3350), sennosides, bisacodyl, fleet enema, glucose **OR** glucose **OR** glucose-vitamin C **OR** dextrose **OR** glucose **OR** glucose **OR** glucose-vitamin C

## 2024-02-10 NOTE — CONSULTS
[facility]    Report of Consultation    Raul Freed Jr. Patient Status:  Inpatient    1940 MRN [id number]   Location [facility] 5SW/SE Attending Jose D Carrillo, DO   Hosp Day # 2 PCP Rao Beyer MD     Date of Admission:  2024  Date of Consult:  24  Reason for Consultation:   Rectal Abscess    History of Present Illness:   Patient is a 83 year old male with a history of HTN, HLD, DM2, CAD, Gout, DEANGELO and PUD who presents for rectal discomfort.    Patient presents back with general weakness dizziness, diarrhea and feeling off balance after recent admission for new proctocolitis and perianal abscess.  Previously managed conservatively with IV antibiotics and PICC line for 3 weeks.  Having some looser stools however no rectal bleeding.  Afebrile on admission.  Imaging with worsening of rectal intramural abscess.  Evaluated by infectious disease as well as surgery.  Repeat CT A/P with severe proctocilitis with interval worsening with interval development of large rectal intramural abscess.  Also with heavy stool burden in proximal colon, infrarenal AAA.  Plan for MRI Pelvis tomorrow.  WBC up from last admission discharge 9 to 19.3.  On Zosyn.  Stool cultures pending.  Colonoscopy 2022 with fair prep, small polyp and otherwise normal.       Past Medical History  Past Medical History:   Diagnosis Date    Abdominal aortic aneurysm (AAA) 3.0 cm to 5.0 cm in diameter in female (HCC)     CAD involving native coronary artery of native heart without angina pectoris 2018    Cardiac LV ejection fraction 50-55% per 2018 angio  2018    Centrilobular emphysema (HCC) 2018    Coronary atherosclerosis     COVID 2020    Diabetes mellitus (HCC)     Gastric ulcer 2008    Healed     GOUT     High blood pressure     High cholesterol     Hypercholesterolemia     HYPERLIPIDEMIA     HYPERTENSION     Hypertension     OBESITY     OSTEOARTHRITIS     osteoarthritis knees     Prediabetes     Presence of drug coated stent in prox & Mid LAD coronary artery 03/19/2018 03/20/2018    2.75 x 15 mm Xcience drug-eluting stent into the mid LAD 3.5 x 12 mm Xcience drug-eluting stent into the proximal LAD    Pulmonary emphysema (HCC)     SLEEP APNEA     [facility] fax [phone]    Sleep apnea        Past Surgical History  Past Surgical History:   Procedure Laterality Date    CATH BARE METAL STENT (BMS)      COLONOSCOPY  2009= Declines repeat    2009, complicated by anal fissure    COLONOSCOPY      COLONOSCOPY  03/2022    one small TA, int hem, can consider d/cing surveillance    COLONOSCOPY N/A 02/28/2022    Procedure: COLONOSCOPY,  with polypectomy;  Surgeon: Anthony Patel MD;  Location: [facility]    HEMORRHOIDECTOMY  Dr Kamara 3/12/10 [facility]     Rectal exam under anesthesia/anoscopy. Incision and drainage of perirectal abscess. Placement of seton . Excision of anal tags. Destruction of internal hemorrhoids.  Surgery done at [facility] [place] on 3/12/10.    OTHER SURGICAL HISTORY      left knee arthroscopy    PATIENT DOCUMENTED NOT TO HAVE EXPERIENCED ANY OF THE FOLLOWING EVENTS N/A 02/18/2015    Procedure: ESOPHAGOGASTRODUODENOSCOPY, POSSIBLE BIOPSY, POSSIBLE POLYPECTOMY 85445;  Surgeon: Musa Cook MD;  Location: [facility]    PATIENT WITHOUGH PREOPERATIVE ORDER FOR IV ANTIBIOTIC SURGICAL SITE INFECTION PROPHYLAXIS. N/A 02/18/2015    Procedure: ESOPHAGOGASTRODUODENOSCOPY, POSSIBLE BIOPSY, POSSIBLE POLYPECTOMY 63448;  Surgeon: Musa Cook MD;  Location: [facility]    UPPER GI ENDOSCOPY PERFORMED  2008, 2/27/2009    UPPER GI ENDOSCOPY,BIOPSY  2/18/15=Gastritis.  H pylori negative, normal SB Bx    UPPER GI ENDOSCOPY,BIOPSY N/A 02/18/2015    Procedure: ESOPHAGOGASTRODUODENOSCOPY, POSSIBLE BIOPSY, POSSIBLE POLYPECTOMY 64847;  Surgeon: Musa Cook MD;  Location: [facility]    UPPER GI ENDOSCOPY,EXAM         Family History  Family History    Problem Relation Age of Onset    Cancer Mother         lung    Obesity Son     Obesity Sister     Lipids Sister     Obesity Son        Social History  Social History     Socioeconomic History    Marital status:    Tobacco Use    Smoking status: Former     Packs/day: 1.00     Years: 40.00     Additional pack years: 0.00     Total pack years: 40.00     Types: Cigarettes     Quit date: 2009     Years since quittin.7    Smokeless tobacco: Never    Tobacco comments:     has been a 3 ppd   Vaping Use    Vaping Use: Never used   Substance and Sexual Activity    Alcohol use: Yes     Comment: occasional intake    Drug use: No     Social Determinants of Health     Food Insecurity: No Food Insecurity (2024)    Food Insecurity     Food Insecurity: Never true   Transportation Needs: No Transportation Needs (2024)    Transportation Needs     Lack of Transportation: No   Housing Stability: Low Risk  (2024)    Housing Stability     Housing Instability: No          Current Medications:  Current Facility-Administered Medications   Medication Dose Route Frequency    simethicone (Mylicon) chewable tab 80 mg  80 mg Oral QID PRN    hydrALAzine (Apresoline) 20 mg/mL injection 10 mg  10 mg Intravenous Q6H PRN    piperacillin-tazobactam (Zosyn) 4.5 g in dextrose 5% 100 mL IVPB-ADDV  4.5 g Intravenous Q8H    sodium chloride 0.9% infusion   Intravenous Continuous    acetaminophen (Tylenol Extra Strength) tab 500 mg  500 mg Oral Q4H PRN    ondansetron (Zofran) 4 MG/2ML injection 4 mg  4 mg Intravenous Q6H PRN    metoclopramide (Reglan) 5 mg/mL injection 10 mg  10 mg Intravenous Q8H PRN    polyethylene glycol (PEG 3350) (Miralax) 17 g oral packet 17 g  17 g Oral Daily PRN    sennosides (Senokot) tab 17.2 mg  17.2 mg Oral Nightly PRN    bisacodyl (Dulcolax) 10 MG rectal suppository 10 mg  10 mg Rectal Daily PRN    fleet enema (Fleet) 7-19 GM/118ML rectal enema 133 mL  1 enema Rectal Once PRN    allopurinol  (Zyloprim) tab 100 mg  100 mg Oral Daily    atorvastatin (Lipitor) tab 20 mg  20 mg Oral Daily    metoprolol succinate ER (Toprol XL) 24 hr tab 100 mg  100 mg Oral Daily    pantoprazole (Protonix) DR tab 20 mg  20 mg Oral QAM AC    glucose (Dex4) 15 GM/59ML oral liquid 15 g  15 g Oral Q15 Min PRN    Or    glucose (Glutose) 40% oral gel 15 g  15 g Oral Q15 Min PRN    Or    glucose-vitamin C (Dex-4) chewable tab 4 tablet  4 tablet Oral Q15 Min PRN    Or    dextrose 50% injection 50 mL  50 mL Intravenous Q15 Min PRN    Or    glucose (Dex4) 15 GM/59ML oral liquid 30 g  30 g Oral Q15 Min PRN    Or    glucose (Glutose) 40% oral gel 30 g  30 g Oral Q15 Min PRN    Or    glucose-vitamin C (Dex-4) chewable tab 8 tablet  8 tablet Oral Q15 Min PRN    insulin aspart (NovoLOG) 100 Units/mL FlexPen 1-5 Units  1-5 Units Subcutaneous TID CC    vancomycin (Firvanq) 50 mg/mL oral solution 125 mg  125 mg Oral QID     Medications Prior to Admission   Medication Sig    Magnesium 500 MG Oral Tab Take 1 tablet (500 mg total) by mouth daily.    Turmeric (QC TUMERIC COMPLEX) 500 MG Oral Cap Take 500 mg by mouth daily.    omeprazole 20 MG Oral Capsule Delayed Release Take 1 capsule (20 mg total) by mouth daily.    METOPROLOL SUCCINATE 100 MG Oral Tablet 24 Hr TAKE 1 TABLET BY MOUTH EVERY DAY    atorvastatin 20 MG Oral Tab Take 1 tablet (20 mg total) by mouth daily.    allopurinol 100 MG Oral Tab Take 1 tablet (100 mg total) by mouth daily.    cholecalciferol (VITAMIN D3) 5000 units Oral Cap Take 1 capsule (5,000 Units total) by mouth daily.    aspirin 81 MG Oral Chew Tab Chew 1 tablet (81 mg total) by mouth daily.    Omega-3 Fatty Acids (FISH OIL) 1000 MG Oral Cap ONE DAILY    Multiple Vitamin (MULTI VITAMIN MENS OR) ONE DAILY    Sildenafil Citrate 50 MG Oral Tab Take 1 tablet (50 mg total) by mouth daily as needed. (Patient not taking: Reported on 2/8/2024)    benzonatate 200 MG Oral Cap Take 1 capsule (200 mg total) by mouth 3 (three) times  daily as needed.    Glucose Blood (CONTOUR NEXT TEST) In Vitro Strip TEST TWICE DAILY BEFORE MEALS AS DIRECTED    Microlet Lancets Does not apply Misc USE AS DIRECTED TWICE DAILY    Blood Glucose Monitoring Suppl ([employer] CONTOUR NEXT MONITOR) w/Device Does not apply Kit 1 Units by Does not apply route 2 (two) times daily before meals.       Allergies  No Known Allergies    Review of Systems:    Pertinent items are noted in HPI.    Physical Exam:   Blood pressure 119/57, pulse 83, temperature 97.5 °F (36.4 °C), temperature source Oral, resp. rate 18, height 5' 10\" (1.778 m), weight 226 lb (102.5 kg), SpO2 92%.    GENERAL: stable  SKIN: no rashes, no suspicious lesions  HEENT: atraumatic, normocephalic  CHEST/CARDIO: no chest tenderness, RRR without murmur  LUNGS: clear to auscultation  GI: good BS's and no masses, HSM or tenderness  MUSCULOSKELETAL: back is not tender  EXTREMITIES: no edema      Results:     Laboratory Data:  Lab Results   Component Value Date    WBC 17.9 (H) 02/10/2024    HGB 10.4 (L) 02/10/2024    HCT 30.9 (L) 02/10/2024    .0 02/10/2024    CREATSERUM 0.91 02/10/2024    BUN 9 02/10/2024     02/10/2024    K 3.2 (L) 02/10/2024     02/10/2024    CO2 26.0 02/10/2024     (H) 02/10/2024    CA 8.1 (L) 02/10/2024    ALB 3.8 02/08/2024    ALKPHO 85 02/08/2024    TP 7.6 02/08/2024    AST 20 02/08/2024    ALT 27 02/08/2024    TSH 3.044 05/18/2015    PSA 1.02 11/25/2015    CRP <0.40 02/05/2024    MG 2.2 01/29/2024    TROPHS 6 02/08/2024         Imaging:  No results found.       Impression:   83 year old male with a history of HTN, HLD, DM2, CAD, Gout, DEANGELO and PUD who presents for rectal discomfort.    Rectal Intramural Abscess:  Proctocolitis:  Leukocytosis:  - Patient presents back with fatigue and weakness after recent admission for new proctocolitis and perianal abscess.  Previously managed conservatively with IV antibiotics and PICC line for 3 weeks.   - Imaging with severe  proctocilitis with interval worsening with interval development of large rectal intramural abscess.  Also with heavy stool burden in proximal colon, infrarenal AAA. Colonoscopy 2/2022 with fair prep, small polyp and otherwise normal.   - Discussed with Dr. Patterson and imaging reviewed.  Unable to drain endoscopically with rectal EUS given appearance and location of the abscess.  Unable to drain per IR.   - Dr. Shelby following, plan for MRI Pelvis tomorrow  - ID following, on Tomas Grimes MD  2/10/2024

## 2024-02-11 ENCOUNTER — APPOINTMENT (OUTPATIENT)
Dept: HEMATOLOGY/ONCOLOGY | Facility: HOSPITAL | Age: 84
End: 2024-02-11
Attending: INTERNAL MEDICINE
Payer: MEDICARE

## 2024-02-11 ENCOUNTER — APPOINTMENT (OUTPATIENT)
Dept: MRI IMAGING | Facility: HOSPITAL | Age: 84
End: 2024-02-11
Attending: COLON & RECTAL SURGERY
Payer: MEDICARE

## 2024-02-11 LAB
GLUCOSE BLDC GLUCOMTR-MCNC: 108 MG/DL (ref 70–99)
GLUCOSE BLDC GLUCOMTR-MCNC: 144 MG/DL (ref 70–99)
GLUCOSE BLDC GLUCOMTR-MCNC: 162 MG/DL (ref 70–99)
GLUCOSE BLDC GLUCOMTR-MCNC: 97 MG/DL (ref 70–99)
POTASSIUM SERPL-SCNC: 3.5 MMOL/L (ref 3.5–5.1)

## 2024-02-11 PROCEDURE — 82962 GLUCOSE BLOOD TEST: CPT

## 2024-02-11 PROCEDURE — 84132 ASSAY OF SERUM POTASSIUM: CPT | Performed by: STUDENT IN AN ORGANIZED HEALTH CARE EDUCATION/TRAINING PROGRAM

## 2024-02-11 RX ORDER — DIAZEPAM 2 MG/1
2 TABLET ORAL ONCE
Status: DISCONTINUED | OUTPATIENT
Start: 2024-02-11 | End: 2024-02-11

## 2024-02-11 RX ORDER — DIAZEPAM 2 MG/1
2 TABLET ORAL
Status: COMPLETED | OUTPATIENT
Start: 2024-02-12 | End: 2024-02-12

## 2024-02-11 NOTE — PLAN OF CARE
Patient denied pain or discomfort. Frequent BM overnight, green color stool- mucousy. Aox3-4, forgetful at times. Need frequent reminder to seek assistance prior getting out of the bed. Fall precaution maintained, call light within reach. On IVF 0.9 running at 83 ml/hr, on IV Zosyn and PO Vanco. RA during the day, unable tolerate CPAP overnight. NPO at midnight, plan for rectal MRI, possible surgery based on MRI result.    Getting more confuse this AM. Dr. Toney notified. No new order, will endorsed to day shift.    Problem: Patient Centered Care  Goal: Patient preferences are identified and integrated in the patient's plan of care  Description: Interventions:  - What would you like us to know as we care for you? I was here recently and discharged with IV antibiotics    - Provide timely, complete, and accurate information to patient/family  - Incorporate patient and family knowledge, values, beliefs, and cultural backgrounds into the planning and delivery of care  - Encourage patient/family to participate in care and decision-making at the level they choose  - Honor patient and family perspectives and choices  Outcome: Progressing     Problem: Patient/Family Goals  Goal: Patient/Family Long Term Goal  Description: Patient's Long Term Goal: to return home    Interventions:  - See additional Care Plan goals for specific interventions  Outcome: Progressing  Goal: Patient/Family Short Term Goal  Description: Patient's Short Term Goal: no more diarrhea    Interventions:     - See additional Care Plan goals for specific interventions  Outcome: Progressing     Problem: RISK FOR INFECTION - ADULT  Goal: Absence of fever/infection during anticipated neutropenic period  Description: INTERVENTIONS  - Monitor WBC  - Administer growth factors as ordered  - Implement neutropenic guidelines  Outcome: Progressing     Problem: SAFETY ADULT - FALL  Goal: Free from fall injury  Description: INTERVENTIONS:  - Assess pt frequently for  physical needs  - Identify cognitive and physical deficits and behaviors that affect risk of falls.  - [place] fall precautions as indicated by assessment.  - Educate pt/family on patient safety including physical limitations  - Instruct pt to call for assistance with activity based on assessment  - Modify environment to reduce risk of injury  - Provide assistive devices as appropriate  - Consider OT/PT consult to assist with strengthening/mobility  - Encourage toileting schedule  Outcome: Progressing     Problem: RESPIRATORY - ADULT  Goal: Achieves optimal ventilation and oxygenation  Description: INTERVENTIONS:  - Assess for changes in respiratory status  - Assess for changes in mentation and behavior  - Position to facilitate oxygenation and minimize respiratory effort  - Oxygen supplementation based on oxygen saturation or ABGs  - Provide Smoking Cessation handout, if applicable  - Encourage broncho-pulmonary hygiene including cough, deep breathe, Incentive Spirometry  - Assess the need for suctioning and perform as needed  - Assess and instruct to report SOB or any respiratory difficulty  - Respiratory Therapy support as indicated  - Manage/alleviate anxiety  - Monitor for signs/symptoms of CO2 retention  Outcome: Progressing     Problem: METABOLIC/FLUID AND ELECTROLYTES - ADULT  Goal: Glucose maintained within prescribed range  Description: INTERVENTIONS:  - Monitor Blood Glucose as ordered  - Assess for signs and symptoms of hyperglycemia and hypoglycemia  - Administer ordered medications to maintain glucose within target range  - Assess barriers to adequate nutritional intake and initiate nutrition consult as needed  - Instruct patient on self management of diabetes  Outcome: Progressing     Problem: MUSCULOSKELETAL - ADULT  Goal: Return mobility to safest level of function  Description: INTERVENTIONS:  - Assess patient stability and activity tolerance for standing, transferring and ambulating w/ or w/o  assistive devices  - Assist with transfers and ambulation using safe patient handling equipment as needed  - Ensure adequate protection for wounds/incisions during mobilization  - Obtain PT/OT consults as needed  - Advance activity as appropriate  - Communicate ordered activity level and limitations with patient/family  Outcome: Progressing     Problem: NEUROLOGICAL - ADULT  Goal: Achieves stable or improved neurological status  Description: INTERVENTIONS  - Assess for and report changes in neurological status  - Initiate measures to prevent increased intracranial pressure  - Maintain blood pressure and fluid volume within ordered parameters to optimize cerebral perfusion and minimize risk of hemorrhage  - Monitor temperature, glucose, and sodium. Initiate appropriate interventions as ordered  Outcome: Progressing     Problem: Impaired Functional Mobility  Goal: Achieve highest/safest level of mobility/gait  Description: Interventions:  - Assess patient's functional ability and stability  - Promote increasing activity/tolerance for mobility and gait  - Educate and engage patient/family in tolerated activity level and precautions  - Recommend use of  colby-walker for transfers and ambulation  Outcome: Progressing

## 2024-02-11 NOTE — PROGRESS NOTES
[facility] Infectious Disease  Progress Note    Raul Freed Jr. Patient Status:  Inpatient    1940 MRN [id number]   Location [facility] 5SW/SE Attending Jose D Carrillo DO   Hosp Day # 3 PCP Rao Beyer MD     Subjective:  Patient seen and examined sitting in bedside chair. Wife and nursing present at bedside. Some confusion reported this AM; however, improving. Continues to experience diarrhea. Appetite improving and tolerating PO intake. Denies F/C. Denies abdomina pain, n/v.      Objective:  Blood pressure 144/68, pulse 78, temperature 97.3 °F (36.3 °C), temperature source Oral, resp. rate 18, height 5' 10\" (1.778 m), weight 226 lb (102.5 kg), SpO2 94%.    Intake/Output:    Intake/Output Summary (Last 24 hours) at 2024 1058  Last data filed at 2/10/2024 2035  Gross per 24 hour   Intake 1857.22 ml   Output 175 ml   Net 1682.22 ml       Physical Exam:  General: Awake, alert, non-tox, NAD.  HEENT:  Oropharynx clear, trachea ML.  Heart: RRR S1S2 no murmurs.  Lungs: Essentially CTA b/l, no rhonchi, rales, wheezes.  Abdomen: Soft, NT/ND.  BS present.  No organomegaly.  Extremity: No edema.  Neurological: No focal deficits.  Derm:  Warm and dry.    Lab Data Review:  Lab Results   Component Value Date    K 3.5 2024        Cultures:  Hospital Encounter on 24   1. Blood Culture     Status: None (Preliminary result)    Collection Time: 24  1:14 PM    Specimen: Blood,peripheral   Result Value Ref Range    Blood Culture Result No Growth 2 Days N/A       Radiology:  CT ABDOMEN+PELVIS(CONTRAST ONLY)(CPT=74177)    Result Date: 2024  CONCLUSION:  1. Severe proctocolitis with substantial interval worsening, with interval development of a large intramural rectal abscess.  2. Additional left posterolateral and posterior peripherally enhancing fluid collections are seen, suggesting potential communicating or independent abscesses.  3. A heavy  stool burden is seen throughout the more proximal colon, and there may be some degree of partial large bowel obstruction.   4. Infrarenal abdominal aortic aneurysm measuring up to 5.0 cm.  5. A large complex debris-filled duodenal diverticulum is seen without CT evidence acute complication.  6. Prostatomegaly.  7. Pulmonary interstitial fibrosis is suggested.  8. Possible hepatic steatosis.  9. Lesser incidental findings as above.    elm-remote.   Dictated by (CST): Mike Baum MD on 2/08/2024 at 12:08 PM     Finalized by (CST): Mike Baum MD on 2/08/2024 at 12:20 PM          CTA BRAIN + CTA CAROTIDS (CPT=70496/84093)    Result Date: 2/8/2024  CONCLUSION:  1. No proximal intracranial flow limiting stenosis/large vessel occlusion.  No intracranial aneurysm identified.  2. No hemodynamically significant stenosis or dissection involving the cervical carotid or vertebral arteries.  Non flow-limiting moderate left and mild right carotid bifurcation atherosclerosis.  There is also mild to moderate right vertebral ostial stenosis related to atherosclerosis. 3. Noncontrast head CT demonstrates no acute intracranial abnormality. 4. Nonspecific white matter changes involving both cerebral hemispheres that most likely reflect sequelae of chronic microangiopathy. 5. Emphysema.  Noncalcified right upper lobe lung nodules, which measure up to 6 mm.  Nonemergent follow-up chest CT is suggested for complete evaluation (unless outside institution comparison imaging is available and demonstrates stability).  elm-remote  Dictated by (CST): Toño Chavarria MD on 2/08/2024 at 11:55 AM     Finalized by (CST): Toño Chavarria MD on 2/08/2024 at 12:03 PM             Assessment and Plan:  1.  Proctocolitis with abscess with concerns for interval worsening  - CT with evidence of severe proctocolitis with complex-appearing L lateral fluid/gas on previous admission in January 2024.  - General Surgery consulted at that time and  findings c/w small intramural abscess in L posterior rectal wall; however, not amenable to drainage.  - Discharged on IV Invanz through 2/19/24.  - Now readmitted for weakness, diarrhea and leukocytosis.  - Blood cultures remain negative to date.  - Repeat CT, 2/8, with severe proctocolitis and worsening intramural rectal abscess.  Additional L posterolateral and posterior peripherally enhancing fluid collections concerning for potential communicating or independent abscesses.  - Seen by IR and GI -- not amenable to drainage.  - General Surgery following -- plans for MRI today, 2/11/24.  - IV Invanz had been ongoing as outpatient -> IV meropenem given in the ED -> IV Zosyn.  - IV Zosyn, ongoing.    2.  Diarrhea secondary to the above  - Patient with history of chronic loose stools.  Unclear if this is worse than baseline.  - Stool studies negative on previous admission.  - Now with +c.diff status, 2/8/24.  - PO vancomycin, ongoing, day #4.     4.  Leukocytosis secondary to the above  - WBC 17.9K as of 2/10/24.  - Will trend.    5.  Weakness and feelings of imbalance  - This could be due to evolving sepsis or possibly drug effect of carbapenem class.  - IV meropenem -> IV Zosyn.    6.  Recs  - Continue IV Zosyn at this time.  - Continue PO vancomycin for c.diff.  - F/u WBC and fever curve.  - General Surgery input noted and appreciated -- f/u MRI when available.  - Supportive care per primary team.   - Discussed plan of care with nursing.   - Discussed plan of care with patient. All questions addressed and understanding verbalized.  - Further recommendations pending clinical course.    Discussed case with ID attending/collaborating physician, Steven Dunn, who is in agreement with the above plan of care.    Please note that this report has been produced using speech recognition software and may contain errors related to that system including, but not limited to, errors in grammar, punctuation, and spelling, as  well as words and phrases that possibly may have been recognized inappropriately.  If there are any questions or concerns, contact the dictating provider for clarification.     The 21st Century Cures Act makes medical notes like these available to patients in the interest of transparency. Please be advised this is a medical document. Medical documents are intended to carry relevant information, facts as evident, and the clinical opinion of the practitioner. The medical note is intended as peer to peer communication and may appear blunt or direct. It is written in medical language and may contain abbreviations or verbiage that are unfamiliar.     If you have any questions or concerns please call [facility] Infectious Disease at [phone].     Trace Olson, NILS    2/11/2024  10:58 AM

## 2024-02-11 NOTE — PROGRESS NOTES
[facility] Hospitalist Progress Note     CC: Hospital Follow up    PCP: Rao Beyer MD       Assessment/Plan:     Principal Problem:    Abscess of anal and rectal regions  Active Problems:    Anemia    Hyperglycemia    Leukocytosis    Diarrhea, unspecified type    Weakness    Patient is a 83 year old male with PMH sig for CAD, DM2, HTN, HL, and recent admission for proctocolitis with perianal abscess discharged on invanz presenting with diarrhea and worsening weakness.     Severe proctocolitis  Large intramural rectal abscess  - recent admission for same dc on invanz  - CT reviewed, shows interval worsening  -Continue zosyn   MRI ordered per Gen surg   Gen surg also considering surgical intervention Monday , NPO at [place]   Discussed with Dr. Heron BENITO dif colitis  - PO vancomycin started  - soft Bms now charted  - suspect had some overflow diarrhea prior to coming in as stool burden on CT on admission was extensive     CKD  - chronic, stable     CAD  HLD  - statin, hold ASA for now\     DM2  - home regimen: not on home meds  - accuchecks QID, hypoglycemic protocol, SSI     HTN  - resume home metoprolol     FN:  - IVF: NS-> reduce to 50 ml/hr   - Diet: regular     DVT Prophy: SCD as maybe needs surgery   Lines: PIV       Thank You,  Jose D Carrillo DO     Hospitalist with [facility]     Subjective:     Seems more awake, states bowels are improved as is rectal pain, no nausea     OBJECTIVE:    Blood pressure 144/68, pulse 78, temperature 97.3 °F (36.3 °C), temperature source Oral, resp. rate 18, height 5' 10\" (1.778 m), weight 226 lb (102.5 kg), SpO2 94%.    Temp:  [97.3 °F (36.3 °C)-98.8 °F (37.1 °C)] 97.3 °F (36.3 °C)  Pulse:  [73-79] 78  Resp:  [16-20] 18  BP: (129-144)/(65-77) 144/68  SpO2:  [93 %-94 %] 94 %      Intake/Output:    Intake/Output Summary (Last 24 hours) at 2/11/2024 1229  Last data filed at 2/10/2024 2035  Gross per 24 hour   Intake 1857.22 ml   Output 175 ml   Net 1682.22 ml        Last 3 Weights   02/08/24 1431 226 lb (102.5 kg)   02/08/24 0846 226 lb (102.5 kg)   02/02/24 1630 234 lb 4.8 oz (106.3 kg)   02/01/24 1608 235 lb 9.6 oz (106.9 kg)       Exam   Gen: No acute distress  Pulm: Lungs clear, normal respiratory effort  CV: Heart with regular rate and rhythm,   Abd: Abdomen soft, nontender, nondistended  MSK: no clubbing, no cyanosis        Data Review:       Labs:     Recent Labs   Lab 02/05/24  1550 02/08/24  0854 02/09/24  0503 02/10/24  0510   RBC 3.27* 3.38* 3.25* 3.19*   HGB 10.9* 11.2* 10.4* 10.4*   HCT 31.5* 32.5* 31.1* 30.9*   MCV 96.3 96.2 95.7 96.9   MCH 33.3 33.1 32.0 32.6   MCHC 34.6 34.5 33.4 33.7   RDW 12.9 13.3 13.4 13.2   NEPRELIM 4.60 13.70*  --   --    WBC 8.9 19.3* 19.1* 17.9*   .0 258.0 232.0 215.0         Recent Labs   Lab 02/08/24  0854 02/09/24  0503 02/10/24  0510 02/11/24  0530   * 106* 115*  --    BUN 17 11 9  --    CREATSERUM 1.09 0.89 0.91  --    EGFRCR 67 85 84  --    CA 8.9 8.4* 8.1*  --     140 140  --    K 4.2 3.6 3.2* 3.5    106 109  --    CO2 27.0 26.0 26.0  --        Recent Labs   Lab 02/05/24  1550 02/08/24  0854   ALT 48 27   AST 35* 20   ALB 4.0 3.8         Imaging:  No results found.      Meds:      piperacillin-tazobactam  4.5 g Intravenous Q8H    allopurinol  100 mg Oral Daily    atorvastatin  20 mg Oral Daily    metoprolol succinate ER  100 mg Oral Daily    pantoprazole  20 mg Oral QAM AC    insulin aspart  1-5 Units Subcutaneous TID CC    vancomycin  125 mg Oral QID      sodium chloride 83 mL/hr at 02/11/24 0724     simethicone, hydrALAzine, acetaminophen, ondansetron, metoclopramide, polyethylene glycol (PEG 3350), sennosides, bisacodyl, fleet enema, glucose **OR** glucose **OR** glucose-vitamin C **OR** dextrose **OR** glucose **OR** glucose **OR** glucose-vitamin C

## 2024-02-11 NOTE — PROGRESS NOTES
[facility]    Progress Note    Raul Freed JrTamika Patient Status:  Inpatient    1940 MRN [id number]   Location [facility] 5SW/SE Attending Jose D Carrillo, DO   Hosp Day # 3 PCP Rao Beyer MD     Subjective:   Patient had some confusion this AM per his wife and did not know where he was.  No confusion during the day.      Objective:   Blood pressure 144/68, pulse 78, temperature 97.3 °F (36.3 °C), temperature source Oral, resp. rate 18, height 5' 10\" (1.778 m), weight 226 lb (102.5 kg), SpO2 94%.    GENERAL: in no apparent distress  CHEST/CARDIO: RRR without murmur  LUNGS: clear to auscultation  GI: good BS's and no tenderness      Results:   Lab Results   Component Value Date    WBC 17.9 (H) 02/10/2024    HGB 10.4 (L) 02/10/2024    HCT 30.9 (L) 02/10/2024    .0 02/10/2024    CREATSERUM 0.91 02/10/2024    BUN 9 02/10/2024     02/10/2024    K 3.5 2024     02/10/2024    CO2 26.0 02/10/2024     (H) 02/10/2024    CA 8.1 (L) 02/10/2024    ALB 3.8 2024    ALKPHO 85 2024    BILT 0.6 2024    TP 7.6 2024    AST 20 2024    ALT 27 2024    TSH 3.044 2015    PSA 1.02 2015    CRP <0.40 2024    MG 2.2 2024    TROPHS 6 2024       No results found.        Assessment & Plan:   83 year old male with a history of HTN, HLD, DM2, CAD, Gout, DEANGELO and PUD who presents for rectal discomfort.     Rectal Intramural Abscess:  Proctocolitis:  Leukocytosis:  C.Diff:  - Patient presents back with fatigue and weakness after recent admission for new proctocolitis and perianal abscess.    - Imaging with severe proctocilitis with interval worsening with interval development of large rectal intramural abscess.  Also with heavy stool burden in proximal colon, infrarenal AAA. Colonoscopy 2022 with fair prep, small polyp and otherwise normal.   - Discussed with Dr. Patterson and imaging reviewed.  Unable to drain  endoscopically with rectal EUS given appearance and location of the abscess.  Unable to drain per IR.   - Dr. Shelby following, plan for MRI Pelvis today  - Diarrhea likely due to C.Diff as well as overflow given proximal large stool burden.   On oral vancomycin.  Will monitor.  - Mild confusion overnight, resolved, suspect due to sundowning in setting of infection.  Will monitor.    Aniket Grimes M.D.  [facility]  Gastroenterology & Hepatology

## 2024-02-11 NOTE — PLAN OF CARE
Patient alert & oriented x2. Patient impulsive. Rectal MRI ordered. Wife at bedside earlier and updated on POC. Patient updated on POC throughout the day. Patient requires reinforcement of all teaching. Wife verbalized understanding of POC.    Problem: Patient Centered Care  Goal: Patient preferences are identified and integrated in the patient's plan of care  Description: Interventions:  - What would you like us to know as we care for you? I was here recently and discharged with IV antibiotics.    - Provide timely, complete, and accurate information to patient/family  - Incorporate patient and family knowledge, values, beliefs, and cultural backgrounds into the planning and delivery of care  - Encourage patient/family to participate in care and decision-making at the level they choose  - Honor patient and family perspectives and choices  Outcome: Progressing     Problem: Patient/Family Goals  Goal: Patient/Family Long Term Goal  Description: Patient's Long Term Goal: to return home    Interventions:  - Further testing  - Monitor labs and vital signs  - Medication compliance  - Follow provider recommendations  - See additional Care Plan goals for specific interventions  Outcome: Progressing  Goal: Patient/Family Short Term Goal  Description: Patient's Short Term Goal: no more diarrhea    Interventions:   - Monitor intake and output  - Medication compliance  - Follow provider recommendations  - See additional Care Plan goals for specific interventions  Outcome: Progressing     Problem: RISK FOR INFECTION - ADULT  Goal: Absence of fever/infection during anticipated neutropenic period  Description: INTERVENTIONS  - Monitor WBC  - Administer growth factors as ordered  - Implement neutropenic guidelines  Outcome: Progressing     Problem: SAFETY ADULT - FALL  Goal: Free from fall injury  Description: INTERVENTIONS:  - Assess pt frequently for physical needs  - Identify cognitive and physical deficits and behaviors that  affect risk of falls.  - [facility] fall precautions as indicated by assessment.  - Educate pt/family on patient safety including physical limitations  - Instruct pt to call for assistance with activity based on assessment  - Modify environment to reduce risk of injury  - Provide assistive devices as appropriate  - Consider OT/PT consult to assist with strengthening/mobility  - Encourage toileting schedule  Outcome: Progressing     Problem: RESPIRATORY - ADULT  Goal: Achieves optimal ventilation and oxygenation  Description: INTERVENTIONS:  - Assess for changes in respiratory status  - Assess for changes in mentation and behavior  - Position to facilitate oxygenation and minimize respiratory effort  - Oxygen supplementation based on oxygen saturation or ABGs  - Provide Smoking Cessation handout, if applicable  - Encourage broncho-pulmonary hygiene including cough, deep breathe, Incentive Spirometry  - Assess the need for suctioning and perform as needed  - Assess and instruct to report SOB or any respiratory difficulty  - Respiratory Therapy support as indicated  - Manage/alleviate anxiety  - Monitor for signs/symptoms of CO2 retention  Outcome: Progressing     Problem: METABOLIC/FLUID AND ELECTROLYTES - ADULT  Goal: Glucose maintained within prescribed range  Description: INTERVENTIONS:  - Monitor Blood Glucose as ordered  - Assess for signs and symptoms of hyperglycemia and hypoglycemia  - Administer ordered medications to maintain glucose within target range  - Assess barriers to adequate nutritional intake and initiate nutrition consult as needed  - Instruct patient on self management of diabetes  Outcome: Progressing     Problem: MUSCULOSKELETAL - ADULT  Goal: Return mobility to safest level of function  Description: INTERVENTIONS:  - Assess patient stability and activity tolerance for standing, transferring and ambulating w/ or w/o assistive devices  - Assist with transfers and ambulation using safe patient  handling equipment as needed  - Ensure adequate protection for wounds/incisions during mobilization  - Obtain PT/OT consults as needed  - Advance activity as appropriate  - Communicate ordered activity level and limitations with patient/family  Outcome: Progressing     Problem: NEUROLOGICAL - ADULT  Goal: Achieves stable or improved neurological status  Description: INTERVENTIONS  - Assess for and report changes in neurological status  - Initiate measures to prevent increased intracranial pressure  - Maintain blood pressure and fluid volume within ordered parameters to optimize cerebral perfusion and minimize risk of hemorrhage  - Monitor temperature, glucose, and sodium. Initiate appropriate interventions as ordered  Outcome: Progressing     Problem: Impaired Functional Mobility  Goal: Achieve highest/safest level of mobility/gait  Description: Interventions:  - Assess patient's functional ability and stability  - Promote increasing activity/tolerance for mobility and gait  - Educate and engage patient/family in tolerated activity level and precautions  Outcome: Progressing

## 2024-02-12 ENCOUNTER — APPOINTMENT (OUTPATIENT)
Dept: HEMATOLOGY/ONCOLOGY | Facility: HOSPITAL | Age: 84
End: 2024-02-12
Attending: INTERNAL MEDICINE
Payer: MEDICARE

## 2024-02-12 ENCOUNTER — APPOINTMENT (OUTPATIENT)
Dept: MRI IMAGING | Facility: HOSPITAL | Age: 84
End: 2024-02-12
Attending: COLON & RECTAL SURGERY
Payer: MEDICARE

## 2024-02-12 LAB
ANION GAP SERPL CALC-SCNC: 6 MMOL/L (ref 0–18)
BUN BLD-MCNC: 6 MG/DL (ref 9–23)
BUN/CREAT SERPL: 6.7 (ref 10–20)
CALCIUM BLD-MCNC: 8.9 MG/DL (ref 8.7–10.4)
CHLORIDE SERPL-SCNC: 111 MMOL/L (ref 98–112)
CO2 SERPL-SCNC: 25 MMOL/L (ref 21–32)
CREAT BLD-MCNC: 0.89 MG/DL
DEPRECATED RDW RBC AUTO: 44.6 FL (ref 35.1–46.3)
EGFRCR SERPLBLD CKD-EPI 2021: 85 ML/MIN/1.73M2 (ref 60–?)
ERYTHROCYTE [DISTWIDTH] IN BLOOD BY AUTOMATED COUNT: 13 % (ref 11–15)
GLUCOSE BLD-MCNC: 112 MG/DL (ref 70–99)
GLUCOSE BLDC GLUCOMTR-MCNC: 102 MG/DL (ref 70–99)
GLUCOSE BLDC GLUCOMTR-MCNC: 117 MG/DL (ref 70–99)
GLUCOSE BLDC GLUCOMTR-MCNC: 148 MG/DL (ref 70–99)
GLUCOSE BLDC GLUCOMTR-MCNC: 93 MG/DL (ref 70–99)
HCT VFR BLD AUTO: 31.3 %
HGB BLD-MCNC: 10.7 G/DL
MCH RBC QN AUTO: 32.4 PG (ref 26–34)
MCHC RBC AUTO-ENTMCNC: 34.2 G/DL (ref 31–37)
MCV RBC AUTO: 94.8 FL
OSMOLALITY SERPL CALC.SUM OF ELEC: 292 MOSM/KG (ref 275–295)
PLATELET # BLD AUTO: 283 10(3)UL (ref 150–450)
POTASSIUM SERPL-SCNC: 3.3 MMOL/L (ref 3.5–5.1)
RBC # BLD AUTO: 3.3 X10(6)UL
SODIUM SERPL-SCNC: 142 MMOL/L (ref 136–145)
WBC # BLD AUTO: 8.5 X10(3) UL (ref 4–11)

## 2024-02-12 PROCEDURE — 85027 COMPLETE CBC AUTOMATED: CPT | Performed by: INTERNAL MEDICINE

## 2024-02-12 PROCEDURE — 97116 GAIT TRAINING THERAPY: CPT

## 2024-02-12 PROCEDURE — 72197 MRI PELVIS W/O & W/DYE: CPT | Performed by: COLON & RECTAL SURGERY

## 2024-02-12 PROCEDURE — 80048 BASIC METABOLIC PNL TOTAL CA: CPT | Performed by: INTERNAL MEDICINE

## 2024-02-12 PROCEDURE — 82962 GLUCOSE BLOOD TEST: CPT

## 2024-02-12 PROCEDURE — A9575 INJ GADOTERATE MEGLUMI 0.1ML: HCPCS | Performed by: INTERNAL MEDICINE

## 2024-02-12 RX ORDER — GADOTERATE MEGLUMINE 376.9 MG/ML
20 INJECTION INTRAVENOUS
Status: COMPLETED | OUTPATIENT
Start: 2024-02-12 | End: 2024-02-12

## 2024-02-12 RX ORDER — POTASSIUM CHLORIDE 20 MEQ/1
40 TABLET, EXTENDED RELEASE ORAL ONCE
Status: COMPLETED | OUTPATIENT
Start: 2024-02-12 | End: 2024-02-12

## 2024-02-12 NOTE — PROGRESS NOTES
[facility]    Progress Note    Raul Freed Jr. Patient Status:  Inpatient    1940 MRN [id number]   Location [facility] 5SW/SE Attending Jose D Carrillo DO   Hosp Day # 3 PCP Rao Beyer MD     Subjective:   Main complaint is perineal burning from the loose bowel movements.  He feels the bowel movements may be forming up.  He is using a barrier cream, which helps.  Otherwise, not a great deal of pain.  His wife is at the bedside.    Objective:   Vital Signs:  Blood pressure 140/59, pulse 72, temperature 98.6 °F (37 °C), temperature source Oral, resp. rate 16, height 5' 10\" (1.778 m), weight 226 lb (102.5 kg), SpO2 96%.     General:  No acute distress. Alert and oriented x 3.  HEENT:  Moist mucous membranes.  Neck:  Soft neck.    Respiratory: Good air entry bilaterally, symmetrical expansion.  Cardiovascular:  Regular rate and rhythm.  No JVD.  Abdomen:  Soft, nontender, nondistended.  Positive bowel sounds.  Neurologic:  No focal neurological deficits.  Musculoskeletal:  Full range of motion of all extremities.  No swelling noted.      No intake or output data in the 24 hours ending 24     Results:   Lab Results   Component Value Date    WBC 17.9 (H) 02/10/2024    HGB 10.4 (L) 02/10/2024    HCT 30.9 (L) 02/10/2024    .0 02/10/2024    CREATSERUM 0.91 02/10/2024    BUN 9 02/10/2024     02/10/2024    K 3.5 2024     02/10/2024    CO2 26.0 02/10/2024     (H) 02/10/2024    CA 8.1 (L) 02/10/2024    ALB 3.8 2024    ALKPHO 85 2024    BILT 0.6 2024    TP 7.6 2024    AST 20 2024    ALT 27 2024    TSH 3.044 2015    PSA 1.02 2015    CRP <0.40 2024    MG 2.2 2024       No results found.        Assessment & Plan:   Proctocolitis of perianal abscess  Overall, feeling a bit better.  He a lot of abscess type pain, mainly burning related to the Ronis of his perineum.  He has an MRI pending this  afternoon to assess for fistula and status of the abscess.  Possible examination under anesthesia with definitive surgical management by Dr. Shelby tomorrow.  N.p.o. after midnight.    Alexander Knight MD  2/11/2024

## 2024-02-12 NOTE — PROGRESS NOTES
[facility]    [place] Infectious Disease Progress Note    Raul Freed . Patient Status:  Inpatient    1940 MRN [id number]   Location [facility] 5SW/SE Attending Kerrie Guzman,    Hosp Day # 4 PCP Rao Beyer MD     Subjective:  Pt with no new complaints. Pt with some confusion.    Objective:    Allergies:  No Known Allergies    Medications:    Current Facility-Administered Medications:     potassium chloride (K-Dur) tab 40 mEq, 40 mEq, Oral, Once    simethicone (Mylicon) chewable tab 80 mg, 80 mg, Oral, QID PRN    hydrALAzine (Apresoline) 20 mg/mL injection 10 mg, 10 mg, Intravenous, Q6H PRN    piperacillin-tazobactam (Zosyn) 4.5 g in dextrose 5% 100 mL IVPB-ADDV, 4.5 g, Intravenous, Q8H    acetaminophen (Tylenol Extra Strength) tab 500 mg, 500 mg, Oral, Q4H PRN    ondansetron (Zofran) 4 MG/2ML injection 4 mg, 4 mg, Intravenous, Q6H PRN    metoclopramide (Reglan) 5 mg/mL injection 10 mg, 10 mg, Intravenous, Q8H PRN    polyethylene glycol (PEG 3350) (Miralax) 17 g oral packet 17 g, 17 g, Oral, Daily PRN    sennosides (Senokot) tab 17.2 mg, 17.2 mg, Oral, Nightly PRN    bisacodyl (Dulcolax) 10 MG rectal suppository 10 mg, 10 mg, Rectal, Daily PRN    fleet enema (Fleet) 7-19 GM/118ML rectal enema 133 mL, 1 enema, Rectal, Once PRN    allopurinol (Zyloprim) tab 100 mg, 100 mg, Oral, Daily    atorvastatin (Lipitor) tab 20 mg, 20 mg, Oral, Daily    metoprolol succinate ER (Toprol XL) 24 hr tab 100 mg, 100 mg, Oral, Daily    pantoprazole (Protonix) DR tab 20 mg, 20 mg, Oral, QAM AC    glucose (Dex4) 15 GM/59ML oral liquid 15 g, 15 g, Oral, Q15 Min PRN **OR** glucose (Glutose) 40% oral gel 15 g, 15 g, Oral, Q15 Min PRN **OR** glucose-vitamin C (Dex-4) chewable tab 4 tablet, 4 tablet, Oral, Q15 Min PRN **OR** dextrose 50% injection 50 mL, 50 mL, Intravenous, Q15 Min PRN **OR** glucose (Dex4) 15 GM/59ML oral liquid 30 g, 30 g, Oral, Q15 Min PRN **OR** glucose (Glutose) 40% oral  gel 30 g, 30 g, Oral, Q15 Min PRN **OR** glucose-vitamin C (Dex-4) chewable tab 8 tablet, 8 tablet, Oral, Q15 Min PRN    insulin aspart (NovoLOG) 100 Units/mL FlexPen 1-5 Units, 1-5 Units, Subcutaneous, TID CC    vancomycin (Firvanq) 50 mg/mL oral solution 125 mg, 125 mg, Oral, QID    Physical Exam:  General: Sleeping.  No apparent distress.  Vital Signs:  Blood pressure 142/72, pulse 76, temperature 98.5 °F (36.9 °C), temperature source Oral, resp. rate 18, height 5' 10\" (1.778 m), weight 226 lb (102.5 kg), SpO2 95%.   Temp (24hrs), Av.2 °F (36.8 °C), Min:97.3 °F (36.3 °C), Max:98.6 °F (37 °C)      HEENT: Exam is unremarkable.    Lungs: Clear to auscultation bilaterally.  Cardiac: Regular rate and rhythm. No murmur.  Abdomen:  Soft, non-distended, non-tender, with no rebound or guarding.   Extremities:  No lower extremity edema noted.  Without clubbing or cyanosis.    Skin: Normal texture and turgor.  Neurologic: Cranial nerves are grossly intact.      Labs:  Lab Results   Component Value Date    WBC 8.5 2024    HGB 10.7 2024    HCT 31.3 2024    .0 2024    CREATSERUM 0.89 2024    BUN 6 2024     2024    K 3.3 2024     2024    CO2 25.0 2024     2024    CA 8.9 2024         Assessment/Plan:    1.  Proctocolitis with abscess  -seen on CT at recent admission  -dc on IV ertapenem x 3 weeks through 24  -readmitted for weakness, diarrhea and leukocytosis  -repeat CT with worsening intermural rectal abscess and additional enhancing fluid collections concerning for potential communicating or independent abscesses  -seen by IR, no safe approach for IR drainage  -repeat MRI done today, read pending, possible surgery tomorrow  -on IV zosyn  2.  Leukocytosis  -normalized  3.  Diarrhea  -c diff positive  -on PO vancomycin d#5  4.  AMS  -infection vs other  -IV meropenem dc on   5.  Dispo  -continue IV zosyn and PO  vancomycin  -likely need repeat CT prior to EOT     If you have any questions or concerns please call [facility] Infectious Disease at [phone].     Johnnie Higgins, APRN

## 2024-02-12 NOTE — PHYSICAL THERAPY NOTE
PHYSICAL THERAPY TREATMENT NOTE - INPATIENT     Room Number: 533/533-A       Presenting Problem: abscess of anal and rectal region c diff       Problem List  Principal Problem:    Abscess of anal and rectal regions  Active Problems:    Anemia    Hyperglycemia    Leukocytosis    Diarrhea, unspecified type    Weakness      PHYSICAL THERAPY ASSESSMENT   Patient demonstrates excellent progress this session, goals  updated to reflect patient performance.    Patient continues to function near baseline with bed mobility, transfers, and gait.  Contributing factors to remaining limitations include cognitive deficits (decreased safety awareness, A&Ox2).  Next session anticipate patient to progress bed mobility, transfers, gait, and stair negotiation.  Physical Therapy will continue to follow patient for duration of hospitalization.    Patient continues to benefit from continued skilled PT services: at discharge to promote functional independence and safety with additional support and return home with home health PT.    PLAN  PT Treatment Plan: Bed mobility;Body mechanics;Patient education;Gait training;Transfer training;Balance training  Frequency (Obs): 3-5x/week    SUBJECTIVE  \"Do I get paid for doing this?\"     OBJECTIVE  Precautions: Limb alert - right;Bed/chair alarm;Hard of hearing    WEIGHT BEARING RESTRICTION   none             PAIN ASSESSMENT   Ratin  Location: denies       BALANCE  Static Sitting: Good  Dynamic Sitting: Good  Static Standing: Fair  Dynamic Standing: Fair -      AM-PAC '6-Clicks' INPATIENT SHORT FORM - BASIC MOBILITY  How much difficulty does the patient currently have...  Patient Difficulty: Turning over in bed (including adjusting bedclothes, sheets and blankets)?: None   Patient Difficulty: Sitting down on and standing up from a chair with arms (e.g., wheelchair, bedside commode, etc.): A Little   Patient Difficulty: Moving from lying on back to sitting on the side of the bed?: None   How much  help from another person does the patient currently need...   Help from Another: Moving to and from a bed to a chair (including a wheelchair)?: A Little   Help from Another: Need to walk in hospital room?: A Little   Help from Another: Climbing 3-5 steps with a railing?: A Little     AM-PAC Score:  Raw Score: 20   Approx Degree of Impairment: 35.83%   Standardized Score (AM-PAC Scale): 47.67   CMS Modifier (G-Code): CJ    FUNCTIONAL ABILITY STATUS  Functional Mobility/Gait Assessment  Gait Assistance: Contact guard assist  Distance (ft): 200  Assistive Device: Rolling walker  Pattern: Within Functional Limits (fast meg with cues to slow down, cues for device management, impulsive, no LOB)  Rolling: modified independent with bedrail - climbs into bed on hands and knees then rolls onto back   Sit to Supine: contact guard assist  Sit to Stand: contact guard assist from chair     Additional information: Pt demonstrates lack of safety awareness, slightly impulsive, needing frequent reminders to not get up without assistance and cues for walker safety/cueing with transfers. Pt increased ambulation distance this session, tolerating household and short community distances with a rolling walker.     Patient received seated edge of bed, agreeable to physical therapy. Vital signs monitored as noted above, no adverse symptoms and patient stable during session.     Education with patient provided on Physical therapy plan of care and physiological benefits of out of bed mobility. Patient with fair carryover during session.  Anticipated physical therapy needs have decreased, pt would benefit from continued physical therapy in the home with additional support to manage cognitive deficits.        The patient's Approx Degree of Impairment: 35.83% has been calculated based on documentation in the [facility] '6 clicks' Inpatient Daily Activity Short Form.  Research supports that patients with this level of impairment may benefit from  home with no needs, however pt would benefit from home-health PT with additional support due to cognitive deficits and progress back to PLOF (independent no assistive device).    Final disposition will be made by interdisciplinary medical team.    THERAPEUTIC EXERCISES  Lower Extremity Alternating marching  LAQ  X5-10 reps bilat   5 minutes    Position Sitting       Patient End of Session: In bed;Needs met;Call light within reach;RN aware of session/findings;All patient questions and concerns addressed;Alarm set    CURRENT GOALS   Goals to be met by: 24  Patient Goal Patient's self-stated goal is: to go home   Goal #1 Patient is able to demonstrate supine - sit EOB @ level: modified independent      Goal #1   Current Status  NOT MET/IN PROGRESS    Goal #2 Patient is able to demonstrate transfers Sit to/from Stand at assistance level: modified independent with a rolling walker       Goal #2  Current Status  NOT MET/IN PROGRESS    Goal #3 Patient is able to ambulate 300 feet with assist device: walker - rolling at assistance level: supervision with a rolling walker    Goal #3   Current Status  NOT MET/IN PROGRESS    Goal #4     Goal #4   Current Status     Goal #5 Patient to demonstrate independence with home activity/exercise instructions provided to patient in preparation for discharge.   Goal #5   Current Status  NOT MET/IN PROGRESS      Gait Trainin minutes     Glendy Gandhi, BATOOL  [facility]  DPT Candidate     I provided clinical instruction and supervision for the duration of this session and agree with the above documentation.    Jaqui Olivo, PT, DPT  [facility]

## 2024-02-12 NOTE — PLAN OF CARE
Rec'd alert and and oriented primarily to self. Disoriented to place,situation and time. \"I need to get up-I have to call my dad\" Attempting to reorient but becoming agitated. Multiple attempts to get out of bed alone,despite instruction to call.  Out of bed with assistance and walker.  Unable to send down MRI when called by MRI tech. Patient initially refused meds.-including Valium on call to MRI. \"They already gave me stuff\" Afebrile. Last WBC 17.9 CBC ordered for am. Continues on IV Zosyn and PO Vancomycin. Bed alarm & ibed awareness active. On room air. Refusing CPAP. Denies shortness of breath. NPO as of midnight. Continues with loose stool. Rectal area reddened.   HS blood sugar 162 with AC/HS monitoring.   Problem: Patient Centered Care  Goal: Patient preferences are identified and integrated in the patient's plan of care  Description: Interventions:  - What would you like us to know as we care for you? I was here recently and discharged with IV antibiotics.    - Provide timely, complete, and accurate information to patient/family  - Incorporate patient and family knowledge, values, beliefs, and cultural backgrounds into the planning and delivery of care  - Encourage patient/family to participate in care and decision-making at the level they choose  - Honor patient and family perspectives and choices  Outcome: Progressing     Problem: Patient/Family Goals  Goal: Patient/Family Long Term Goal  Description: Patient's Long Term Goal: to return home    Interventions:  - Further testing  - Monitor labs and vital signs  - Medication compliance  - Follow provider recommendations  - See additional Care Plan goals for specific interventions  Outcome: Progressing  Goal: Patient/Family Short Term Goal  Description: Patient's Short Term Goal: no more diarrhea    Interventions:   - Monitor intake and output  - Medication compliance  - Follow provider recommendations  - See additional Care Plan goals for specific  interventions  Outcome: Progressing     Problem: RISK FOR INFECTION - ADULT  Goal: Absence of fever/infection during anticipated neutropenic period  Description: INTERVENTIONS  - Monitor WBC  - Administer growth factors as ordered  - Implement neutropenic guidelines  Outcome: Progressing     Problem: SAFETY ADULT - FALL  Goal: Free from fall injury  Description: INTERVENTIONS:  - Assess pt frequently for physical needs  - Identify cognitive and physical deficits and behaviors that affect risk of falls.  - Loris fall precautions as indicated by assessment.  - Educate pt/family on patient safety including physical limitations  - Instruct pt to call for assistance with activity based on assessment  - Modify environment to reduce risk of injury  - Provide assistive devices as appropriate  - Consider OT/PT consult to assist with strengthening/mobility  - Encourage toileting schedule  Outcome: Progressing     Problem: RESPIRATORY - ADULT  Goal: Achieves optimal ventilation and oxygenation  Description: INTERVENTIONS:  - Assess for changes in respiratory status  - Assess for changes in mentation and behavior  - Position to facilitate oxygenation and minimize respiratory effort  - Oxygen supplementation based on oxygen saturation or ABGs  - Provide Smoking Cessation handout, if applicable  - Encourage broncho-pulmonary hygiene including cough, deep breathe, Incentive Spirometry  - Assess the need for suctioning and perform as needed  - Assess and instruct to report SOB or any respiratory difficulty  - Respiratory Therapy support as indicated  - Manage/alleviate anxiety  - Monitor for signs/symptoms of CO2 retention  Outcome: Progressing     Problem: METABOLIC/FLUID AND ELECTROLYTES - ADULT  Goal: Glucose maintained within prescribed range  Description: INTERVENTIONS:  - Monitor Blood Glucose as ordered  - Assess for signs and symptoms of hyperglycemia and hypoglycemia  - Administer ordered medications to maintain  glucose within target range  - Assess barriers to adequate nutritional intake and initiate nutrition consult as needed  - Instruct patient on self management of diabetes  Outcome: Progressing     Problem: MUSCULOSKELETAL - ADULT  Goal: Return mobility to safest level of function  Description: INTERVENTIONS:  - Assess patient stability and activity tolerance for standing, transferring and ambulating w/ or w/o assistive devices  - Assist with transfers and ambulation using safe patient handling equipment as needed  - Ensure adequate protection for wounds/incisions during mobilization  - Obtain PT/OT consults as needed  - Advance activity as appropriate  - Communicate ordered activity level and limitations with patient/family  Outcome: Progressing     Problem: NEUROLOGICAL - ADULT  Goal: Achieves stable or improved neurological status  Description: INTERVENTIONS  - Assess for and report changes in neurological status  - Initiate measures to prevent increased intracranial pressure  - Maintain blood pressure and fluid volume within ordered parameters to optimize cerebral perfusion and minimize risk of hemorrhage  - Monitor temperature, glucose, and sodium. Initiate appropriate interventions as ordered  Outcome: Progressing     Problem: Impaired Functional Mobility  Goal: Achieve highest/safest level of mobility/gait  Description: Interventions:  - Assess patient's functional ability and stability  - Promote increasing activity/tolerance for mobility and gait  - Educate and engage patient/family in tolerated activity level and precautions  - Recommend use of  RW for transfers and ambulation  Outcome: Progressing

## 2024-02-12 NOTE — PROGRESS NOTES
[facility] Hospitalist Progress Note     CC: Hospital Follow up    PCP: Rao Beyer MD       Assessment/Plan:     Principal Problem:    Abscess of anal and rectal regions  Active Problems:    Anemia    Hyperglycemia    Leukocytosis    Diarrhea, unspecified type    Weakness    Patient is a 83 year old male with PMH sig for CAD, DM2, HTN, HL, and recent admission for proctocolitis with perianal abscess discharged on invanz presenting with diarrhea and worsening weakness.     Severe proctocolitis  Large intramural rectal abscess  - recent admission for same dc on invanz  - CT reviewed, shows interval worsening  -Continue zosyn   MRI ordered per Gen surg today, maybe to OR tomorrow   \Discussed with Dr. Shelby     Mild confusion today   Maybe sundowning, no sleeping well, pain meds related  Monitor for now , labs reviewed and look ok   Redirected patient to situation     C dif colitis  - PO vancomycin started  - soft Bms now charted  - suspect had some overflow diarrhea prior to coming in as stool burden on CT on admission was extensive     CKD  - chronic, stable     CAD  HLD  - statin, hold ASA for now\     DM2  - home regimen: not on home meds  - accuchecks QID, hypoglycemic protocol, SSI     HTN  - resume home metoprolol     FN:  - IVF: NS-> reduce to 50 ml/hr   - Diet: regular     DVT Prophy: SCD as maybe needs surgery   Lines: PIV       Thank You,  Jose D Carrillo DO     Hospitalist with [facility]     Subjective:   Awake and alert, pleasant but slightly confused, denies pain, no nausea, no fevers or chills     OBJECTIVE:    Blood pressure 142/72, pulse 76, temperature 98.5 °F (36.9 °C), temperature source Oral, resp. rate 18, height 5' 10\" (1.778 m), weight 226 lb (102.5 kg), SpO2 95%.    Temp:  [97.3 °F (36.3 °C)-98.6 °F (37 °C)] 98.5 °F (36.9 °C)  Pulse:  [69-77] 76  Resp:  [16-18] 18  BP: (138-170)/(59-97) 142/72  SpO2:  [95 %-96 %] 95 %      Intake/Output:    Intake/Output Summary (Last  24 hours) at 2/12/2024 1254  Last data filed at 2/12/2024 0700  Gross per 24 hour   Intake 470 ml   Output --   Net 470 ml       Last 3 Weights   02/08/24 1431 226 lb (102.5 kg)   02/08/24 0846 226 lb (102.5 kg)   02/02/24 1630 234 lb 4.8 oz (106.3 kg)   02/01/24 1608 235 lb 9.6 oz (106.9 kg)       Exam   Gen: No acute distress  Pulm: Lungs clear, normal respiratory effort  CV: Heart with regular rate and rhythm,   Abd: Abdomen soft, nontender, nondistended  MSK: no clubbing, no cyanosis        Data Review:       Labs:     Recent Labs   Lab 02/05/24  1550 02/08/24  0854 02/09/24  0503 02/10/24  0510 02/12/24  0606   RBC 3.27* 3.38* 3.25* 3.19* 3.30*   HGB 10.9* 11.2* 10.4* 10.4* 10.7*   HCT 31.5* 32.5* 31.1* 30.9* 31.3*   MCV 96.3 96.2 95.7 96.9 94.8   MCH 33.3 33.1 32.0 32.6 32.4   MCHC 34.6 34.5 33.4 33.7 34.2   RDW 12.9 13.3 13.4 13.2 13.0   NEPRELIM 4.60 13.70*  --   --   --    WBC 8.9 19.3* 19.1* 17.9* 8.5   .0 258.0 232.0 215.0 283.0         Recent Labs   Lab 02/09/24  0503 02/10/24  0510 02/11/24  0530 02/12/24  0606   * 115*  --  112*   BUN 11 9  --  6*   CREATSERUM 0.89 0.91  --  0.89   EGFRCR 85 84  --  85   CA 8.4* 8.1*  --  8.9    140  --  142   K 3.6 3.2* 3.5 3.3*    109  --  111   CO2 26.0 26.0  --  25.0       Recent Labs   Lab 02/05/24  1550 02/08/24  0854   ALT 48 27   AST 35* 20   ALB 4.0 3.8         Imaging:  No results found.      Meds:      piperacillin-tazobactam  4.5 g Intravenous Q8H    allopurinol  100 mg Oral Daily    atorvastatin  20 mg Oral Daily    metoprolol succinate ER  100 mg Oral Daily    pantoprazole  20 mg Oral QAM AC    insulin aspart  1-5 Units Subcutaneous TID CC    vancomycin  125 mg Oral QID         simethicone, hydrALAzine, acetaminophen, ondansetron, metoclopramide, polyethylene glycol (PEG 3350), sennosides, bisacodyl, fleet enema, glucose **OR** glucose **OR** glucose-vitamin C **OR** dextrose **OR** glucose **OR** glucose **OR** glucose-vitamin  C

## 2024-02-12 NOTE — PROGRESS NOTES
[facility]  Gastroenterology Progress Note    Raul Freed Jr. Patient Status:  Inpatient    1940 MRN [id number]   Location [facility] 5SW/SE Attending Jose D Carrillo,    Hosp Day # 4 PCP Rao Beyer MD     Subjective:  Raul Freed Jr. is a(n) 83 year old male.    Current complaints: Says he feels better today.  No abdominal pain, much less rectal discomfort    Objective:  Blood pressure 142/72, pulse 76, temperature 98.5 °F (36.9 °C), temperature source Oral, resp. rate 18, height 5' 10\" (1.778 m), weight 226 lb (102.5 kg), SpO2 95%.  Respiratory: no labored breathing  CV: RRR  Abdomen: nondistended, soft, nontender  Extremities: no calf tenderness  Neurologic: Ox3  Rectal exam: Significant chronic pigmentation of the perianal area from his chronic fecal incontinence, limited digital rectal examination but tenderness in the anal canal with possible posterior anal fissure and no obvious fecal impaction.    Labs:   Lab Results   Component Value Date    WBC 8.5 2024    HGB 10.7 2024    HCT 31.3 2024    .0 2024    CREATSERUM 0.89 2024    BUN 6 2024     2024    K 3.3 2024     2024    CO2 25.0 2024     2024    CA 8.9 2024       Imaging:  No results found.    Problem list:  Patient Active Problem List   Diagnosis    Essential hypertension, benign    Mixed hyperlipidemia due to type 2 diabetes mellitus  (HCC)    Obstructive sleep apnea (adult) (pediatric)    Vitamin D deficiency    AAA (abdominal aortic aneurysm) (HCC)    Diabetes mellitus type 2 in obese  (HCC)    Bilateral sensorineural hearing loss    History of smoking 30 or more pack years    Severe obesity (BMI 35.0-35.9 with comorbidity)  (HCC)    Hiatal hernia with GERD without esophagitis    Benign non-nodular prostatic hyperplasia without lower urinary tract symptoms    Facet arthritis of cervical region    Centrilobular emphysema  (HCC)    Interstitial lung disease (HCC)    Thoracic aortic atherosclerosis (HCC)    History of coronary artery stent placement    CAD in native artery    Spondylosis of cervical region without myelopathy or radiculopathy    Elevated ratio of cholesterol to high density lipoprotein (HDL)    Unspecified inflammatory spondylopathy, cervical region (HCC)    Acute kidney injury (HCC)    Proctocolitis    Perianal abscess    Anemia    Hyperglycemia    Leukocytosis    Abscess of anal and rectal regions    Diarrhea, unspecified type    Weakness       [facility] GI Procedure Report     PATIENT NAME: Raul Freed  : 1940  MRN: [id number]  DATE OF OPERATION: 2022     PROCEDURE PERFORMED: Colonoscopy with biopsies, MAC     SURGEON: Anthony Baez MD      INDICATIONS:   Blood in stool        CONSENT: Informed consent was obtained prior to the procedure. Risks including but not limited to perforation, hemorrhage, risk of sedation, and the potential for missed lesions were all discussed with the patient. All questions were answered.      SEDATION: MAC  TOTAL MODERATE SEDATION TIME:n/a (deep sedation provided by anesthesia)  WITHDRAWAL TIME: 13 min  EXTENT OF EXAMINATION: cecum     PREPARATION: Fair     PREOPERATIVE DIAGNOSIS:    Blood in stool        POSTOPERATIVE DIAGNOSIS:   Colon polyp  Internal hemorrhoids        PROCEDURE: After a digital rectal examination was performed which revealed no masses, the Olympus CF-190 videocolonoscope was inserted into the anus and advanced along to the cecum without difficulty. The appendiceal orifice and ileocecal valve were both clearly identified. Careful re-inspection of the mucosa was then obtained upon slow withdrawal of the endoscope. Multiple passes were performed in the ascending colon, hepatic flexure, splenic flexure and rectosigmoid junction. Retroflexion was performed in the rectum. The patient tolerated the procedure well, and there were no immediate complications.      FINDINGS:  The quality of the prep was fair.  A 4 mm sessile polyp was found in the transverse and was removed completely with cold forceps.  The colonic mucosa was otherwise normal appearing throughout the colon and rectum.  On retroflexion in the rectum, there were small internal hemorrhoids and no masses.      IMPRESSION:   Colon polyp  Internal hemorrhoids        RECOMMENDATIONS:  Follow up on biopsies.   Follow up colonoscopy interval will be determined based on pathology.     CC: Ron Poe MD                     Electronically signed by Anthony Patel MD at 2/28/2022 10:49 AM    Assessment/Plan:  83-year-old who was admitted last month with new onset proctocolitis and perirectal abscess.  Sent home on IV antibiotics but readmitted with worsening symptoms and CT shows interval worsening of large intramural rectal abscess.  Per IR not amenable to drainage by them.  Surgery following and considering drainage pending today's pelvic MRI.  New diagnosis of C. difficile colitis this admission.  On oral vancomycin.  Patient followed as an outpatient by Dr. Patel.  Had colonoscopy 2 years ago that showed no evidence of colitis though the patient reports a several year history of diarrhea with incontinence.    Trace Alvarado MD FACG  2/12/2024  8:13 AM

## 2024-02-13 ENCOUNTER — ANESTHESIA (OUTPATIENT)
Dept: ENDOSCOPY | Facility: HOSPITAL | Age: 84
End: 2024-02-13
Payer: MEDICARE

## 2024-02-13 ENCOUNTER — APPOINTMENT (OUTPATIENT)
Dept: HEMATOLOGY/ONCOLOGY | Facility: HOSPITAL | Age: 84
End: 2024-02-13
Attending: INTERNAL MEDICINE
Payer: MEDICARE

## 2024-02-13 ENCOUNTER — ANESTHESIA EVENT (OUTPATIENT)
Dept: ENDOSCOPY | Facility: HOSPITAL | Age: 84
End: 2024-02-13
Payer: MEDICARE

## 2024-02-13 LAB
ANION GAP SERPL CALC-SCNC: 8 MMOL/L (ref 0–18)
BUN BLD-MCNC: <5 MG/DL (ref 9–23)
CALCIUM BLD-MCNC: 9.1 MG/DL (ref 8.7–10.4)
CHLORIDE SERPL-SCNC: 110 MMOL/L (ref 98–112)
CO2 SERPL-SCNC: 26 MMOL/L (ref 21–32)
CREAT BLD-MCNC: 0.9 MG/DL
DEPRECATED RDW RBC AUTO: 45.1 FL (ref 35.1–46.3)
EGFRCR SERPLBLD CKD-EPI 2021: 85 ML/MIN/1.73M2 (ref 60–?)
ERYTHROCYTE [DISTWIDTH] IN BLOOD BY AUTOMATED COUNT: 13.2 % (ref 11–15)
GLUCOSE BLD-MCNC: 115 MG/DL (ref 70–99)
GLUCOSE BLDC GLUCOMTR-MCNC: 107 MG/DL (ref 70–99)
GLUCOSE BLDC GLUCOMTR-MCNC: 184 MG/DL (ref 70–99)
GLUCOSE BLDC GLUCOMTR-MCNC: 91 MG/DL (ref 70–99)
GLUCOSE BLDC GLUCOMTR-MCNC: 92 MG/DL (ref 70–99)
GLUCOSE BLDC GLUCOMTR-MCNC: 98 MG/DL (ref 70–99)
HCT VFR BLD AUTO: 31.6 %
HGB BLD-MCNC: 10.8 G/DL
MAGNESIUM SERPL-MCNC: 1.9 MG/DL (ref 1.6–2.6)
MCH RBC QN AUTO: 32.4 PG (ref 26–34)
MCHC RBC AUTO-ENTMCNC: 34.2 G/DL (ref 31–37)
MCV RBC AUTO: 94.9 FL
PLATELET # BLD AUTO: 284 10(3)UL (ref 150–450)
POTASSIUM SERPL-SCNC: 3.3 MMOL/L (ref 3.5–5.1)
POTASSIUM SERPL-SCNC: 3.3 MMOL/L (ref 3.5–5.1)
RBC # BLD AUTO: 3.33 X10(6)UL
SODIUM SERPL-SCNC: 144 MMOL/L (ref 136–145)
WBC # BLD AUTO: 6.7 X10(3) UL (ref 4–11)

## 2024-02-13 PROCEDURE — 83735 ASSAY OF MAGNESIUM: CPT | Performed by: NURSE PRACTITIONER

## 2024-02-13 PROCEDURE — 80048 BASIC METABOLIC PNL TOTAL CA: CPT | Performed by: INTERNAL MEDICINE

## 2024-02-13 PROCEDURE — 82962 GLUCOSE BLOOD TEST: CPT

## 2024-02-13 PROCEDURE — 0DBP8ZX EXCISION OF RECTUM, VIA NATURAL OR ARTIFICIAL OPENING ENDOSCOPIC, DIAGNOSTIC: ICD-10-PCS | Performed by: INTERNAL MEDICINE

## 2024-02-13 PROCEDURE — 94799 UNLISTED PULMONARY SVC/PX: CPT

## 2024-02-13 PROCEDURE — 84132 ASSAY OF SERUM POTASSIUM: CPT | Performed by: NURSE PRACTITIONER

## 2024-02-13 PROCEDURE — 85027 COMPLETE CBC AUTOMATED: CPT | Performed by: INTERNAL MEDICINE

## 2024-02-13 PROCEDURE — 88305 TISSUE EXAM BY PATHOLOGIST: CPT | Performed by: INTERNAL MEDICINE

## 2024-02-13 PROCEDURE — 97116 GAIT TRAINING THERAPY: CPT

## 2024-02-13 RX ORDER — SACCHAROMYCES BOULARDII 250 MG
250 CAPSULE ORAL 2 TIMES DAILY
COMMUNITY
Start: 2024-01-26

## 2024-02-13 RX ORDER — HYDROMORPHONE HYDROCHLORIDE 1 MG/ML
0.2 INJECTION, SOLUTION INTRAMUSCULAR; INTRAVENOUS; SUBCUTANEOUS EVERY 5 MIN PRN
Status: DISCONTINUED | OUTPATIENT
Start: 2024-02-13 | End: 2024-02-13 | Stop reason: HOSPADM

## 2024-02-13 RX ORDER — QUETIAPINE FUMARATE 25 MG/1
25 TABLET, FILM COATED ORAL ONCE
Status: COMPLETED | OUTPATIENT
Start: 2024-02-13 | End: 2024-02-13

## 2024-02-13 RX ORDER — ONDANSETRON 8 MG/1
1 TABLET, ORALLY DISINTEGRATING ORAL EVERY 8 HOURS PRN
COMMUNITY
Start: 2024-01-26 | End: 2024-02-16

## 2024-02-13 RX ORDER — NALOXONE HYDROCHLORIDE 0.4 MG/ML
0.08 INJECTION, SOLUTION INTRAMUSCULAR; INTRAVENOUS; SUBCUTANEOUS AS NEEDED
Status: DISCONTINUED | OUTPATIENT
Start: 2024-02-13 | End: 2024-02-13 | Stop reason: HOSPADM

## 2024-02-13 RX ORDER — LIDOCAINE HYDROCHLORIDE 10 MG/ML
INJECTION, SOLUTION EPIDURAL; INFILTRATION; INTRACAUDAL; PERINEURAL AS NEEDED
Status: DISCONTINUED | OUTPATIENT
Start: 2024-02-13 | End: 2024-02-13 | Stop reason: SURG

## 2024-02-13 RX ORDER — MORPHINE SULFATE 4 MG/ML
4 INJECTION, SOLUTION INTRAMUSCULAR; INTRAVENOUS EVERY 10 MIN PRN
Status: DISCONTINUED | OUTPATIENT
Start: 2024-02-13 | End: 2024-02-13 | Stop reason: HOSPADM

## 2024-02-13 RX ORDER — MORPHINE SULFATE 4 MG/ML
2 INJECTION, SOLUTION INTRAMUSCULAR; INTRAVENOUS EVERY 10 MIN PRN
Status: DISCONTINUED | OUTPATIENT
Start: 2024-02-13 | End: 2024-02-13 | Stop reason: HOSPADM

## 2024-02-13 RX ORDER — SODIUM CHLORIDE, SODIUM LACTATE, POTASSIUM CHLORIDE, CALCIUM CHLORIDE 600; 310; 30; 20 MG/100ML; MG/100ML; MG/100ML; MG/100ML
INJECTION, SOLUTION INTRAVENOUS CONTINUOUS PRN
Status: DISCONTINUED | OUTPATIENT
Start: 2024-02-13 | End: 2024-02-13 | Stop reason: SURG

## 2024-02-13 RX ORDER — QUETIAPINE FUMARATE 25 MG/1
25 TABLET, FILM COATED ORAL NIGHTLY
Status: DISCONTINUED | OUTPATIENT
Start: 2024-02-13 | End: 2024-02-13

## 2024-02-13 RX ORDER — HYDROMORPHONE HYDROCHLORIDE 1 MG/ML
0.6 INJECTION, SOLUTION INTRAMUSCULAR; INTRAVENOUS; SUBCUTANEOUS EVERY 5 MIN PRN
Status: DISCONTINUED | OUTPATIENT
Start: 2024-02-13 | End: 2024-02-13 | Stop reason: HOSPADM

## 2024-02-13 RX ORDER — SODIUM CHLORIDE, SODIUM LACTATE, POTASSIUM CHLORIDE, CALCIUM CHLORIDE 600; 310; 30; 20 MG/100ML; MG/100ML; MG/100ML; MG/100ML
INJECTION, SOLUTION INTRAVENOUS CONTINUOUS
Status: DISCONTINUED | OUTPATIENT
Start: 2024-02-13 | End: 2024-02-14

## 2024-02-13 RX ORDER — QUETIAPINE FUMARATE 25 MG/1
25 TABLET, FILM COATED ORAL NIGHTLY
Status: DISCONTINUED | OUTPATIENT
Start: 2024-02-13 | End: 2024-02-15

## 2024-02-13 RX ORDER — HYDROMORPHONE HYDROCHLORIDE 1 MG/ML
0.4 INJECTION, SOLUTION INTRAMUSCULAR; INTRAVENOUS; SUBCUTANEOUS EVERY 5 MIN PRN
Status: DISCONTINUED | OUTPATIENT
Start: 2024-02-13 | End: 2024-02-13 | Stop reason: HOSPADM

## 2024-02-13 RX ORDER — AMLODIPINE BESYLATE 10 MG/1
10 TABLET ORAL DAILY
COMMUNITY
Start: 2024-01-23

## 2024-02-13 RX ORDER — MORPHINE SULFATE 10 MG/ML
6 INJECTION, SOLUTION INTRAMUSCULAR; INTRAVENOUS EVERY 10 MIN PRN
Status: DISCONTINUED | OUTPATIENT
Start: 2024-02-13 | End: 2024-02-13 | Stop reason: HOSPADM

## 2024-02-13 RX ORDER — MELATONIN
3 NIGHTLY
Status: DISCONTINUED | OUTPATIENT
Start: 2024-02-13 | End: 2024-02-15

## 2024-02-13 RX ORDER — AMLODIPINE BESYLATE 10 MG/1
10 TABLET ORAL DAILY
Status: DISCONTINUED | OUTPATIENT
Start: 2024-02-13 | End: 2024-02-16

## 2024-02-13 RX ADMIN — SODIUM CHLORIDE, SODIUM LACTATE, POTASSIUM CHLORIDE, CALCIUM CHLORIDE: 600; 310; 30; 20 INJECTION, SOLUTION INTRAVENOUS at 16:00:00

## 2024-02-13 RX ADMIN — LIDOCAINE HYDROCHLORIDE 25 MG: 10 INJECTION, SOLUTION EPIDURAL; INFILTRATION; INTRACAUDAL; PERINEURAL at 16:04:00

## 2024-02-13 NOTE — PROGRESS NOTES
[facility] AND CARE   Progress Note  -  Raul Freed Jr. Patient Status:  Inpatient    1940 MRN [id number]   Location [facility] 5SW/SE Attending Kevin Da Silva MD   Hosp Day # 5 PCP Rao Beyer MD     PCP: Rao Beyer MD      SEE ATTENDING NOTE AT BOTTOM OF PAGE    Is this a shared or split note between Advanced Practice Provider and Physician? Yes    Assessment and Plan:    patient is a 83 year old male with PMH sig for CAD, DM2, HTN, HL, and recent admission for proctocolitis with perianal abscess discharged on invanz presenting with diarrhea and worsening weakness found to have worsening of a severe proctocolitis versus a large intramural rectal abscess, plans for flex sig today to better visualize situation.  Hospital course with metabolic encephalopathy attributed to infection.  Patient will need follow-up of noted lung nodule on imaging and will need dedicated CT of the chest, in addition patient was also noted to have emphysema on imaging,  see below for details     Severe proctocolitis vs Large intramural rectal abscess  - recent admission for same dc on invanz until   -afebrile. WBC 19.3 on admission now down to 6.7   - CT A/P with worsening large intramural rectal abscess  -MRI pelvis suggesting intramural abscesses with possible fistula formation  -flex sig today to better assess current clinical situation   -abx- zosyn   -as per Dr Shelby, GI and ID     Metabolic Encephalopathy  -spoke with son Raul who states confusion has been occurring for well over a week, patient is seeing things as well as confusing facts of information.  Confusion can occur regardless of time.  Son does state that patient does have moments of answering questions appropriately.  He expressed understanding that this is likely related to infection.  -CT head on admission negative for acute process   -MRI brain negative for acute process, noted moderate microvascular white matter changes  -CTA brain  and carotids- moderate left carotid atherosclerosis, mild right carotid atherosclerosis, mild-moderate right vertebral ostial stenosis   -tx for c-diff and proctocolitis/abscess as per ID  -seroquel now and scheduled seroquel at night with melatonin  -sitter  -son Raul aware of situation as patient wanted me to assist with calling him     C dif colitis  -vanco, day #5    Emphysema  Lung Nodule, RUL  -CTA brain- . Emphysema.  Noncalcified right upper lobe lung nodules, which measure up to 6 mm.   -outpt CT chest for completeness      Acute Kidney Injury-resolved since last admission      CAD  HLD  Infrarenal abdominal aortic aneurysm measuring up to 5.0 cm.   HTN  -noted on CT A/P   -continue toprol.   -norvasc and losartan-hydrochlorothiazide was stopped last admission, will restart norvasc with elevated bp but likely worsened with agitation  -holding ASA for possible additional procedures  -continue lipitor   -appt with Dr Cates 2/27 as previously scheduled      DM2- Diet Controlled   -ssi prn    DEANGELO  -not compliant with CPAP      GOC  -full code  -POA wife but needs help with decision and speaks with Son Raul who is local and another brother in [place]    MA/ACO Reach  -Re- Entry: NO  -Consults: gi, surgery and ID   -Discharge Needs: continued IV abx with hhc   -Appointments:[ ] appt Rao Beyer MD                          [ x] ID-2/19 with NP Terrell mejia in am  -diet-NPO    Prophy  -SCD  -no AC with flex sig     Dispo  -pending clinical coarse  PCP: Rao Beyer MD      Concerns regarding plan of care were discussed with patient. Patient agrees with plan as detailed above. Discussed plan of care with Dr. Da Silva     Note: This chart was prepared using voice recognition software and may contain unintended word substitution errors.      Christy Swartz RN, NP   [facility] Hospitalist Team  Contact via [employer] (Check  Availability)  2/13/2024    SUBJECTIVE:   Patient to patient's room and he was sitting in chair and was very agitated and stated that no one knows what is going on.  Patient wanted to call and speak with his son Raul and I assisted in the phone call, patient was able to calm down after speaking to his son who explained to him what was happening today.  Per chart review patient has been having issues with confusion and son Raul confirms that this has been ongoing for about 1 week.  He states that his father has a little more energy so he is seeming to be more aggressive over the last 3 days.  Son Raul states that his father saw his dead brother and stated he had a facelift, also talking about taking a rocket ride etc.  It sounds as if the confusion occurs regardless of time.  Plans for flex sig today.      OBJECTIVE:   Blood pressure 157/73, pulse 75, temperature 98.1 °F (36.7 °C), temperature source Oral, resp. rate 16, height 5' 10\" (1.778 m), weight 226 lb (102.5 kg), SpO2 95%.    GENERAL: no apparent distress, overweight  NEURO: A/A, agitated   RESP: non labored, CTA  CARDIO: Regular, no murmur  ABD: soft, NT, ND, BS+  EXTREMITIES: no edema    DIAGNOSTIC DATA:   Labs:     Recent Labs   Lab 02/08/24  0854 02/09/24  0503 02/10/24  0510 02/12/24  0606 02/13/24  0527   WBC 19.3* 19.1* 17.9* 8.5 6.7   HGB 11.2* 10.4* 10.4* 10.7* 10.8*   MCV 96.2 95.7 96.9 94.8 94.9   .0 232.0 215.0 283.0 284.0       Recent Labs   Lab 02/08/24  0854 02/09/24  0503 02/10/24  0510 02/11/24  0530 02/12/24  0606 02/13/24  0527    140 140  --  142 144   K 4.2 3.6 3.2* 3.5 3.3* 3.3*  3.3*    106 109  --  111 110   CO2 27.0 26.0 26.0  --  25.0 26.0   BUN 17 11 9  --  6* <5*   CREATSERUM 1.09 0.89 0.91  --  0.89 0.90   CA 8.9 8.4* 8.1*  --  8.9 9.1   MG  --   --   --   --   --  1.9   * 106* 115*  --  112* 115*       Recent Labs   Lab 02/08/24  0854   ALT 27   AST 20   ALB 3.8       Recent Labs   Lab  02/12/24  0630 02/12/24  1316 02/12/24  1810 02/12/24 2054 02/13/24  0855   PGLU 117* 148* 102* 93 107*       No results for input(s): \"TROP\" in the last 168 hours.        MEDICATIONS       potassium chloride  40 mEq Intravenous Once    amLODIPine  10 mg Oral Daily    QUEtiapine  25 mg Oral Nightly    QUEtiapine  25 mg Oral Once    piperacillin-tazobactam  4.5 g Intravenous Q8H    allopurinol  100 mg Oral Daily    atorvastatin  20 mg Oral Daily    metoprolol succinate ER  100 mg Oral Daily    pantoprazole  20 mg Oral QAM AC    insulin aspart  1-5 Units Subcutaneous TID CC    vancomycin  125 mg Oral QID       simethicone, hydrALAzine, acetaminophen, ondansetron, polyethylene glycol (PEG 3350), sennosides, bisacodyl, fleet enema, glucose **OR** glucose **OR** glucose-vitamin C **OR** dextrose **OR** glucose **OR** glucose **OR** glucose-vitamin C    Christy Swartz NP      IMAGING     MRI PELVIS (SOFT TISSUE) (W+WO) (CPT=72197)    Result Date: 2/12/2024  CONCLUSION:  Severely artifactually degraded examination. Within these parameters: 1. Extensive rectal edema with fluid collections suggesting intramural abscesses, possibly with intersphincteric fistula formation. These are difficult to characterize given the substantial motion artifact.  2. Lesser incidental findings as above.    Dictated by (CST): Mike Baum MD on 2/12/2024 at 1:53 PM     Finalized by (CST): Mike Baum MD on 2/12/2024 at 2:18 PM             SEE ATTENDING NOTE BELOW:   Patient seen and examined independently.  Discussed with APN and agree with note above.    S: patient confused was agitated this am, calm now, states he is feeling better, no fevers or chills, no nausea or vomiting    Objective:  BP (!) 160/106 (BP Location: Left arm)   Pulse 68   Temp 98.3 °F (36.8 °C) (Axillary)   Resp 16   Ht 5' 10\" (1.778 m)   Wt 226 lb (102.5 kg)   SpO2 97%   BMI 32.43 kg/m²     Gen: No acute distress,   Neck Supple,    Pulm: Lungs clear, normal  respiratory effort   CV: Heart with regular rate and rhythm, No murmurs, rubs, gallops  Abd: Abdomen soft, nontender, nondistended   MSK:  no clubbing, no cyanosis   Skin: no rashes or lesions, well perfused  Psych: mood stable, cooperative  Neuro: no focal deficits    Assessment and Plan  Severe proctocolitis vs Large intramural rectal abscess  - recent admission for same dc on invanz until 2/19  -afebrile. WBC 19.3 on admission now down to 6.7   - CT A/P with worsening large intramural rectal abscess  -MRI pelvis suggesting intramural abscesses with possible fistula formation  -flex sig today to better assess current clinical situation   -abx- zosyn   -as per Dr Shelby, GI and ID     Metabolic Encephalopathy  -spoke with son Raul who states confusion has been occurring for well over a week, patient is seeing things as well as confusing facts of information.  Confusion can occur regardless of time.  Son does state that patient does have moments of answering questions appropriately.  He expressed understanding that this is likely related to infection.  -CT head on admission negative for acute process   -MRI brain negative for acute process, noted moderate microvascular white matter changes  -CTA brain and carotids- moderate left carotid atherosclerosis, mild right carotid atherosclerosis, mild-moderate right vertebral ostial stenosis   -tx for c-diff and proctocolitis/abscess as per ID  -seroquel now and scheduled seroquel at night with melatonin  -sitter  -son Raul aware of situation as patient wanted me to assist with calling him     C dif colitis  -vanco, day #5    Emphysema  Lung Nodule, RUL  -CTA brain- . Emphysema.  Noncalcified right upper lobe lung nodules, which measure up to 6 mm.   -outpt CT chest for completeness      Acute Kidney Injury-resolved since last admission      CAD  HLD  Infrarenal abdominal aortic aneurysm measuring up to 5.0 cm.   HTN  -noted on CT A/P   -continue toprol.   -norvasc and  losartan-hydrochlorothiazide was stopped last admission, will restart norvasc with elevated bp but likely worsened with agitation  -holding ASA for possible additional procedures  -continue lipitor   -appt with Dr Cates 2/27 as previously scheduled      DM2- Diet Controlled   -ssi prn    DEANGELO  -not compliant with CPAP    Rest as above with above    Kevin Da Silva MD

## 2024-02-13 NOTE — PROGRESS NOTES
[facility]  Gastroenterology Progress Note    Raul Freed Jr. Patient Status:  Inpatient    1940 MRN [id number]   Location [facility] 5SW/SE Attending Jose D Carrillo,    Hosp Day # 5 PCP Rao Beyer MD     Subjective:  Raul Freed Jr. is a(n) 83 year old male.    Current complaints: Slept well last night.  Denies abdominal pain.  Still having diarrhea but no bleeding    Objective:  Blood pressure 145/64, pulse 74, temperature 97.6 °F (36.4 °C), temperature source Axillary, resp. rate 18, height 5' 10\" (1.778 m), weight 226 lb (102.5 kg), SpO2 92%.  Respiratory: no labored breathing  CV: RRR  Abdomen: nondistended, soft, nontender  Extremities: no calf tenderness  Neurologic: Ox3    Labs:   Lab Results   Component Value Date    WBC 6.7 2024    HGB 10.8 2024    HCT 31.6 2024    .0 2024    CREATSERUM 0.90 2024    BUN <5 2024     2024    K 3.3 2024    K 3.3 2024     2024    CO2 26.0 2024     2024    CA 9.1 2024       Imaging:  MRI PELVIS (SOFT TISSUE) (W+WO) (CPT=72197)    Result Date: 2024  CONCLUSION:  Severely artifactually degraded examination. Within these parameters: 1. Extensive rectal edema with fluid collections suggesting intramural abscesses, possibly with intersphincteric fistula formation. These are difficult to characterize given the substantial motion artifact.  2. Lesser incidental findings as above.    Dictated by (CST): Mike Baum MD on 2024 at 1:53 PM     Finalized by (CST): Mike Baum MD on 2024 at 2:18 PM           Problem list:  Patient Active Problem List   Diagnosis    Essential hypertension, benign    Mixed hyperlipidemia due to type 2 diabetes mellitus  (HCC)    Obstructive sleep apnea (adult) (pediatric)    Vitamin D deficiency    AAA (abdominal aortic aneurysm) (HCC)    Diabetes mellitus type 2 in obese  (HCC)    Bilateral  sensorineural hearing loss    History of smoking 30 or more pack years    Severe obesity (BMI 35.0-35.9 with comorbidity)  (HCC)    Hiatal hernia with GERD without esophagitis    Benign non-nodular prostatic hyperplasia without lower urinary tract symptoms    Facet arthritis of cervical region    Centrilobular emphysema (HCC)    Interstitial lung disease (HCC)    Thoracic aortic atherosclerosis (HCC)    History of coronary artery stent placement    CAD in native artery    Spondylosis of cervical region without myelopathy or radiculopathy    Elevated ratio of cholesterol to high density lipoprotein (HDL)    Unspecified inflammatory spondylopathy, cervical region (HCC)    Acute kidney injury (HCC)    Proctocolitis    Perianal abscess    Anemia    Hyperglycemia    Leukocytosis    Abscess of anal and rectal regions    Diarrhea, unspecified type    Weakness       ASC GI Procedure Report     PATIENT NAME: Raul Freed  : 1940  MRN: [id number]  DATE OF OPERATION: 2022     PROCEDURE PERFORMED: Colonoscopy with biopsies, MAC     SURGEON: Anthony Baez MD      INDICATIONS:   Blood in stool        CONSENT: Informed consent was obtained prior to the procedure. Risks including but not limited to perforation, hemorrhage, risk of sedation, and the potential for missed lesions were all discussed with the patient. All questions were answered.      SEDATION: MAC  TOTAL MODERATE SEDATION TIME:n/a (deep sedation provided by anesthesia)  WITHDRAWAL TIME: 13 min  EXTENT OF EXAMINATION: cecum     PREPARATION: Fair     PREOPERATIVE DIAGNOSIS:    Blood in stool        POSTOPERATIVE DIAGNOSIS:   Colon polyp  Internal hemorrhoids        PROCEDURE: After a digital rectal examination was performed which revealed no masses, the Olympus CF-190 videocolonoscope was inserted into the anus and advanced along to the cecum without difficulty. The appendiceal orifice and ileocecal valve were both clearly identified. Careful  re-inspection of the mucosa was then obtained upon slow withdrawal of the endoscope. Multiple passes were performed in the ascending colon, hepatic flexure, splenic flexure and rectosigmoid junction. Retroflexion was performed in the rectum. The patient tolerated the procedure well, and there were no immediate complications.     FINDINGS:  The quality of the prep was fair.  A 4 mm sessile polyp was found in the transverse and was removed completely with cold forceps.  The colonic mucosa was otherwise normal appearing throughout the colon and rectum.  On retroflexion in the rectum, there were small internal hemorrhoids and no masses.      IMPRESSION:   Colon polyp  Internal hemorrhoids        RECOMMENDATIONS:  Follow up on biopsies.   Follow up colonoscopy interval will be determined based on pathology.     CC: Ron Poe MD                     Electronically signed by Anhtony Patel MD at 2/28/2022 10:49 AM    Assessment/Plan:  83-year-old who was admitted last month with new onset proctocolitis and perirectal abscess.  Sent home on IV antibiotics but readmitted with worsening symptoms and CT suggests interval worsening of intramural rectal abscess.  C. difficile positive on readmission (ordered but not sent during previous admission).  Pelvic MR limited by artifact.  Case reviewed with surgery.  Etiology of original proctocolitis unclear since C. difficile specimen was not obtained during his initial admission.  Although he reports chronic diarrhea, no colitis was found during his colonoscopy 2 years ago.  CT and poor quality MR suggest intramural rectal fluid collection but patient has a benign abdominal and anorectal exam, is afebrile with normal white count.  Not a candidate for IR drainage and EUA felt to be low yield at this point.  We therefore discussed limited unprepped diagnostic sigmoidoscopy as the next diagnostic step.  I discussed this with the patient and reviewed risks especially  associated with a intramural rectal fluid collection such as risk of perforation.  All questions were answered and he agrees to proceed later today.    Trace Alvarado MD FACG  2/12/2024  8:13 AM

## 2024-02-13 NOTE — PHYSICAL THERAPY NOTE
PHYSICAL THERAPY TREATMENT NOTE - INPATIENT     Room Number: 533/533-A       Presenting Problem: abscess of anal and rectal region c diff       Problem List  Principal Problem:    Abscess of anal and rectal regions  Active Problems:    Anemia    Hyperglycemia    Leukocytosis    Diarrhea, unspecified type    Weakness      PHYSICAL THERAPY ASSESSMENT   Patient demonstrates limited progress this session, goals  remain in progress.    Patient continues to function near baseline with bed mobility, transfers, gait, maintaining seated position, standing prolonged periods, and performing household tasks.  Contributing factors to remaining limitations include decreased functional strength, decreased endurance/aerobic capacity, impaired standing balance, and difficulty maintaining precautions.  Next session anticipate patient to progress bed mobility, transfers, gait, maintaining seated position, standing prolonged periods, and performing household tasks.  Physical Therapy will continue to follow patient for duration of hospitalization.    Patient continues to benefit from continued skilled PT services: at discharge to promote functional independence and safety with additional support and return home with home health PT.    PLAN  PT Treatment Plan: Bed mobility;Body mechanics;Endurance;Energy conservation;Patient education;Gait training;Range of motion;Strengthening;Transfer training;Balance training  Frequency (Obs): 3-5x/week    SUBJECTIVE  I am irritated I do not feel like walking I just want to get up to the chair. Everyone is aggravating me today. It's not pain I just want to do want I want to do.    OBJECTIVE  Precautions: Limb alert - right;Bed/chair alarm;Hard of hearing    WEIGHT BEARING RESTRICTION                PAIN ASSESSMENT   Ratin  Location: denies       BALANCE  Static Sitting: Good  Dynamic Sitting: Good  Static Standing: Fair  Dynamic Standing: Fair -    ACTIVITY TOLERANCE                          O2  WALK  Oxygen Therapy  SPO2% on Room Air at Rest: 94  SPO2% Ambulation on Room Air: 90    AM-PAC '6-Clicks' INPATIENT SHORT FORM - BASIC MOBILITY  How much difficulty does the patient currently have...  Patient Difficulty: Turning over in bed (including adjusting bedclothes, sheets and blankets)?: None   Patient Difficulty: Sitting down on and standing up from a chair with arms (e.g., wheelchair, bedside commode, etc.): None   Patient Difficulty: Moving from lying on back to sitting on the side of the bed?: None   How much help from another person does the patient currently need...   Help from Another: Moving to and from a bed to a chair (including a wheelchair)?: A Little   Help from Another: Need to walk in hospital room?: A Little   Help from Another: Climbing 3-5 steps with a railing?: A Little     AM-PAC Score:  Raw Score: 21   Approx Degree of Impairment: 28.97%   Standardized Score (AM-PAC Scale): 50.25   CMS Modifier (G-Code): CJ    FUNCTIONAL ABILITY STATUS  Functional Mobility/Gait Assessment  Gait Assistance: Contact guard assist  Distance (ft): 10  Assistive Device: Rolling walker  Pattern: Within Functional Limits  Rolling: independent  Supine to Sit: independent  Sit to Supine: supervision  Sit to Stand: supervision    Additional information: therex in chair pt only tolerating amb in room presenting as irritable. Chair alarm in place and pt has set off alarm multiple times not asking for assistance prior to standing demonstrating impulsive behavior. RN is aware, CGA assist is recommended with a RW for amb.     The patient's Approx Degree of Impairment: 28.97% has been calculated based on documentation in the [facility] '6 clicks' Inpatient Daily Activity Short Form.  Research supports that patients with this level of impairment may benefit from Home PT.  Final disposition will be made by interdisciplinary medical team.    THERAPEUTIC EXERCISES  Lower Extremity Ankle pumps  Heel slides  LAQ     Position  Sitting       Patient End of Session: Up in chair;With  staff;Needs met;Call light within reach;RN aware of session/findings;All patient questions and concerns addressed;Ice applied    CURRENT GOALS   Goals to be met by: 2/24/24  Patient Goal Patient's self-stated goal is: to go home   Goal #1 Patient is able to demonstrate supine - sit EOB @ level: modified independent      Goal #1   Current Status  SBA to EOB   Goal #2 Patient is able to demonstrate transfers Sit to/from Stand at assistance level: modified independent with a rolling walker       Goal #2  Current Status  CGA with RW    Goal #3 Patient is able to ambulate 300 feet with assist device: walker - rolling at assistance level: supervision with a rolling walker    Goal #3   Current Status  10' RW CGA distractible agitated behavior   Goal #4     Goal #4   Current Status     Goal #5 Patient to demonstrate independence with home activity/exercise instructions provided to patient in preparation for discharge.   Goal #5   Current Status  NOT MET/IN PROGRESS      Gait training: 10 min A

## 2024-02-13 NOTE — PROGRESS NOTES
[facility] Infectious Disease  Progress Note    Raul Freed Jr. Patient Status:  Inpatient    1940 MRN [id number]   Location [facility] 5SW/SE Attending Kevin Da Silva MD   Hosp Day # 5 PCP Rao Beyer MD     Subjective:  Patient seen/examined.  Clinical course reviewed since my initial examination of him.  MRI with worsening abscess and possible fistula.  Also with c.diff positive.  Now with plans for sigmoid scope today.    Patient with newer onset confusion and agitation.    Objective:  Blood pressure 157/73, pulse 75, temperature 98.1 °F (36.7 °C), temperature source Oral, resp. rate 16, height 5' 10\" (1.778 m), weight 226 lb (102.5 kg), SpO2 95%.    Intake/Output:    Intake/Output Summary (Last 24 hours) at 2024 1110  Last data filed at 2024 0440  Gross per 24 hour   Intake 200 ml   Output 500 ml   Net -300 ml       Physical Exam:  General: Some agitation, confusion.  HEENT:  Oropharynx clear, trachea ML.  Heart: RRR S1S2 no murmurs.  Lungs: Essentially CTA b/l, no rhonchi, rales, wheezes.  Abdomen: Soft, NT/ND.  BS present.  No organomegaly.  Extremity: No edema.  Neurological: No focal deficits.  Derm:  Warm, dry, free from rashes.    Lab Data Review:  Lab Results   Component Value Date    WBC 6.7 2024    HGB 10.8 2024    HCT 31.6 2024    .0 2024    CREATSERUM 0.90 2024    BUN <5 2024     2024    K 3.3 2024    K 3.3 2024     2024    CO2 26.0 2024     2024    CA 9.1 2024    MG 1.9 2024      Cultures:   C.diff positive    Radiology:  CONCLUSION:   Severely artifactually degraded examination. Within these parameters:   1. Extensive rectal edema with fluid collections suggesting intramural abscesses, possibly with intersphincteric fistula formation. These are difficult to characterize given the substantial motion artifact.      2.  Lesser incidental findings as above.      Assessment and Plan:     Syndrome of weakness, diarrhea, and known proctocolitis with new leukocytosis and concern for interval worsening  - CT with possibly several communicating abscesses and initial intramural abscess worsening  - To have sigmoid scope today to assess further  - IV zosyn ongoing while here     2.  Recent admission in January 2024 for sepsis in this gentleman presenting with fevers, leukocytosis, N/V/D  - CT at that time with evidence of severe proctocolitis with complex-appearing L lateral fluid/gas  - Seen by Dr. Shelby - consistent with a small intramural abscess in the L posterior rectal wall not amenable to drainage previously     3.  Acute on chronic diarrhea - now c.diff positive  - Patient does have a h/o chronic loose stools so unclear if this is much worse than baseline  - C.diff did return positive this admission  - p.o. vancomycin day #6 ongoing     4.  Leukocytosis due to the above  - At 6K today and normalized     5.  Weakness and some feeling off balance  - This could be because of evolving sepsis or could possibly be drug effect due to carbapenem class  - IV zosyn as above      4.  Disposition - inpatient.  Continue IV zosyn and p.o. vancomycin as Rx.  At risk for recurrent/protracted c.diff complications so anticipate a more prolonged course.  Sigmoid scope today to assess abscess/fistula further.  New stop date and possible new choice of antibiotic agents to be determined.  Will follow.    Nupur Bradford DO, [facility] Infectious Disease  [phone]    2/13/2024  11:10 AM

## 2024-02-13 NOTE — PLAN OF CARE
Patient is confused, agitated at times, frequently getting up without using the call light. Educated on importance of using call light, patient noncompliant. Call light within reach. Fall precautions maintained.       Problem: RISK FOR INFECTION - ADULT  Goal: Absence of fever/infection during anticipated neutropenic period  Description: INTERVENTIONS  - Monitor WBC  - Administer growth factors as ordered  - Implement neutropenic guidelines  Outcome: Progressing     Problem: SAFETY ADULT - FALL  Goal: Free from fall injury  Description: INTERVENTIONS:  - Assess pt frequently for physical needs  - Identify cognitive and physical deficits and behaviors that affect risk of falls.  - [place] fall precautions as indicated by assessment.  - Educate pt/family on patient safety including physical limitations  - Instruct pt to call for assistance with activity based on assessment  - Modify environment to reduce risk of injury  - Provide assistive devices as appropriate  - Consider OT/PT consult to assist with strengthening/mobility  - Encourage toileting schedule  Outcome: Progressing

## 2024-02-13 NOTE — ANESTHESIA POSTPROCEDURE EVALUATION
Patient: Raul Freed Jr.    Procedure Summary       Date: 02/13/24 Room / Location: [facility] ENDOSCOPY 05 / [facility] ENDOSCOPY    Anesthesia Start: 1600 Anesthesia Stop: 1628    Procedure: FLEXIBLE SIGMOIDOSCOPY Diagnosis: (colitis)    Surgeons: Trace Alvarado MD Anesthesiologist: Bony Torres MD    Anesthesia Type: MAC ASA Status: 3            Anesthesia Type: MAC    Vitals Value Taken Time   /59 02/13/24 1625   Temp 98.1 02/13/24 1628   Pulse 65 02/13/24 1628   Resp 12 02/13/24 1628   SpO2 93 % 02/13/24 1628   Vitals shown include unfiled device data.    [facility] AN Post Evaluation:   Patient Evaluated in PACU  Patient Participation: complete - patient participated  Level of Consciousness: awake and alert and awake  Pain Score: 1  Pain Management: adequate  Airway Patency:patent  Dental exam unchanged from preop  Yes    Cardiovascular Status: acceptable, blood pressure returned to baseline and hemodynamically stable  Respiratory Status: acceptable  Postoperative Hydration acceptable      Bony Torres MD  2/13/2024 4:28 PM

## 2024-02-13 NOTE — PROGRESS NOTES
[facility] reviewed [facility] plan and has no additional suggestions for management of patient behaviors. Treatment team can consult psychiatry for medication management if behavioral interventions prove insufficient.     Nolvia Pool, JAIDA  [id number]

## 2024-02-13 NOTE — PLAN OF CARE
Problem: Patient Centered Care  Goal: Patient preferences are identified and integrated in the patient's plan of care  Description: Interventions:  - What would you like us to know as we care for you? I was here recently and discharged with IV antibiotics.    - Provide timely, complete, and accurate information to patient/family  - Incorporate patient and family knowledge, values, beliefs, and cultural backgrounds into the planning and delivery of care  - Encourage patient/family to participate in care and decision-making at the level they choose  - Honor patient and family perspectives and choices  2/13/2024 1223 by Srikanth Junior RN  Outcome: Progressing  2/13/2024 1223 by Srikanth Junior RN  Outcome: Progressing     Problem: Patient/Family Goals  Goal: Patient/Family Long Term Goal  Description: Patient's Long Term Goal: to return home    Interventions:  - Further testing  - Monitor labs and vital signs  - Medication compliance  - Follow provider recommendations  - See additional Care Plan goals for specific interventions  2/13/2024 1223 by Srikanth Junior RN  Outcome: Progressing  2/13/2024 1223 by Srikanth Junior RN  Outcome: Progressing  Goal: Patient/Family Short Term Goal  Description: Patient's Short Term Goal: no more diarrhea    Interventions:   - Monitor intake and output  - Medication compliance  - Follow provider recommendations  - See additional Care Plan goals for specific interventions  2/13/2024 1223 by Srikanth Junior RN  Outcome: Progressing  2/13/2024 1223 by Srikanth Junior RN  Outcome: Progressing     Problem: RISK FOR INFECTION - ADULT  Goal: Absence of fever/infection during anticipated neutropenic period  Description: INTERVENTIONS  - Monitor WBC  - Administer growth factors as ordered  - Implement neutropenic guidelines  2/13/2024 1223 by Srikanth Junior RN  Outcome: Progressing  2/13/2024 1223 by Srikanth Junior RN  Outcome: Progressing     Problem: SAFETY ADULT - FALL  Goal: Free from fall  injury  Description: INTERVENTIONS:  - Assess pt frequently for physical needs  - Identify cognitive and physical deficits and behaviors that affect risk of falls.  - [place] fall precautions as indicated by assessment.  - Educate pt/family on patient safety including physical limitations  - Instruct pt to call for assistance with activity based on assessment  - Modify environment to reduce risk of injury  - Provide assistive devices as appropriate  - Consider OT/PT consult to assist with strengthening/mobility  - Encourage toileting schedule  2/13/2024 1223 by Srikanth Junior RN  Outcome: Progressing  2/13/2024 1223 by Srikanth Junior RN  Outcome: Progressing     Problem: RESPIRATORY - ADULT  Goal: Achieves optimal ventilation and oxygenation  Description: INTERVENTIONS:  - Assess for changes in respiratory status  - Assess for changes in mentation and behavior  - Position to facilitate oxygenation and minimize respiratory effort  - Oxygen supplementation based on oxygen saturation or ABGs  - Provide Smoking Cessation handout, if applicable  - Encourage broncho-pulmonary hygiene including cough, deep breathe, Incentive Spirometry  - Assess the need for suctioning and perform as needed  - Assess and instruct to report SOB or any respiratory difficulty  - Respiratory Therapy support as indicated  - Manage/alleviate anxiety  - Monitor for signs/symptoms of CO2 retention  2/13/2024 1223 by Srikanth Junior, RN  Outcome: Progressing  2/13/2024 1223 by Srikanth Junior, RN  Outcome: Progressing     Problem: METABOLIC/FLUID AND ELECTROLYTES - ADULT  Goal: Glucose maintained within prescribed range  Description: INTERVENTIONS:  - Monitor Blood Glucose as ordered  - Assess for signs and symptoms of hyperglycemia and hypoglycemia  - Administer ordered medications to maintain glucose within target range  - Assess barriers to adequate nutritional intake and initiate nutrition consult as needed  - Instruct patient on self management of  diabetes  2/13/2024 1223 by Srikanth Junior RN  Outcome: Progressing  2/13/2024 1223 by Srikanth Junior RN  Outcome: Progressing     Problem: MUSCULOSKELETAL - ADULT  Goal: Return mobility to safest level of function  Description: INTERVENTIONS:  - Assess patient stability and activity tolerance for standing, transferring and ambulating w/ or w/o assistive devices  - Assist with transfers and ambulation using safe patient handling equipment as needed  - Ensure adequate protection for wounds/incisions during mobilization  - Obtain PT/OT consults as needed  - Advance activity as appropriate  - Communicate ordered activity level and limitations with patient/family  2/13/2024 1223 by Srikanth Junior RN  Outcome: Progressing  2/13/2024 1223 by Srikanth Junior RN  Outcome: Progressing     Problem: NEUROLOGICAL - ADULT  Goal: Achieves stable or improved neurological status  Description: INTERVENTIONS  - Assess for and report changes in neurological status  - Initiate measures to prevent increased intracranial pressure  - Maintain blood pressure and fluid volume within ordered parameters to optimize cerebral perfusion and minimize risk of hemorrhage  - Monitor temperature, glucose, and sodium. Initiate appropriate interventions as ordered  2/13/2024 1223 by Srikanth Junior RN  Outcome: Progressing  2/13/2024 1223 by Srikanth Junior RN  Outcome: Progressing     Problem: Impaired Functional Mobility  Goal: Achieve highest/safest level of mobility/gait  Description: Interventions:  - Assess patient's functional ability and stability  - Promote increasing activity/tolerance for mobility and gait  - Educate and engage patient/family in tolerated activity level and precautions    2/13/2024 1223 by Srikanth Junior RN  Outcome: Progressing  2/13/2024 1223 by Srikanth Junior RN  Outcome: Progressing     Problem: Risk for Violence/Aggression  Goal: Absence of Violence/Aggression  Description: INTERVENTIONS:   - Identify precipitating factors for  behavior   - Notify Charge RN/Provider   - Consider decreasing stimulation   - Consider distraction measures   - Consider discussion with provider regarding prn meds    - Consider SUSANNAH (Moderate Risk only)   - Consider Code Support (High Risk only)   - Consider room safety checks   - Consider restraints  2/13/2024 1223 by Srikanth Junior, RN  Outcome: Progressing  2/13/2024 1223 by Srikanth Junior, RN  Outcome: Progressing

## 2024-02-13 NOTE — PROGRESS NOTES
[facility]    Progress Note    Raul Freed Jr. Patient Status:  Inpatient    1940 MRN [id number]   Location [facility] 5SW/SE Attending Kerrie Guzman, DO   Hosp Day # 5 PCP Rao Beyer MD       Subjective:   Raul Freed Jr. is a(n) 83 year old   male with complaints of diarrhea, lethargy.    He is C.diff positive, frequent loose stools appear to be resolving with oral Vanc  Perianal discomfort from diarrhea has resolved and he denies anal pain.  He denies abdominal pain.  - Nausea, - Vomiting    Flex sig planned this afternoon with Dr Montilla    Assessment and Plan:   Raul Freed Jr. is a(n) 83 year old male    HD 6 with Perianal abscess and proctocolitis.    Patient with recent hospitalization for same problem, was discharged with Invanz.  Patient returned with continued weakness and diarrhea. CT showed increase in abscess and worsening proctocolitis.    C. Diff (+) - started on PO Vancomycin 2024    Location of abscess is primarily intramural (extent of abscess vs \"proctocolitis\" unclear), within rectal wall and on left side of anorectal junction  Likelihood of being able to drain this surgically may be limited as this is fairly extensive    Clinically he is doing well following initiation of treatment for C. Diff  He denies anal pain and is nontoxic (afebrile with normal HR and WBC)    Plan  Given his overall clinical improvement and confusing clinical picture (proctocolitis vs intramural abscess), lack of palpable abscess on  JEFRY and rectal MRI with extensive rectal edema, an EUA is very low yield at this point.    Case discussed with Dr. Alvarado yesterday,  await results of gentle and limited flex sig this afternoon  Continue antibiotics per ID  Will likely reconsider interval imaging and need for EUA in the near future      Objective:   Blood pressure (!) 160/106, pulse 68, temperature 98.3 °F (36.8 °C), temperature source Axillary, resp. rate 16,  height 5' 10\" (1.778 m), weight 226 lb (102.5 kg), SpO2 97%. I/O last 3 completed shifts:  In: 670 [I.V.:470; IV PIGGYBACK:200]  Out: 500 [Urine:500]   Much more alert, communicative and coherent  General appearance: alert, appears stated age, cooperative, and moderately obese  Neck: no JVD and supple, symmetrical, trachea midline  Pulmonary: No labored breathing, equal respiratory excursion  Cardiovascular: regular rate and rhythm  Abdominal: soft, non-tender; bowel sounds normal; no masses,  no organomegaly.  Extremities: extremities normal, atraumatic, no cyanosis or edema  Anal:   Perianal skin with dry scaling, less irritated, not tender to palpation  JEFRY with 2 assist - abscess not palpable, scarring and rigidity noted with possible ulcer posterior midline about level of dentate        Results:       Recent Labs   Lab 02/08/24  0854 02/09/24  0503 02/10/24  0510 02/12/24  0606 02/13/24  0527   RBC 3.38*   < > 3.19* 3.30* 3.33*   HGB 11.2*   < > 10.4* 10.7* 10.8*   HCT 32.5*   < > 30.9* 31.3* 31.6*   MCV 96.2   < > 96.9 94.8 94.9   MCH 33.1   < > 32.6 32.4 32.4   MCHC 34.5   < > 33.7 34.2 34.2   RDW 13.3   < > 13.2 13.0 13.2   NEPRELIM 13.70*  --   --   --   --    WBC 19.3*   < > 17.9* 8.5 6.7   .0   < > 215.0 283.0 284.0    < > = values in this interval not displayed.     No results found for: \"PT\", \"INR\"    Recent Labs   Lab 02/08/24  0854 02/09/24  0503 02/10/24  0510 02/11/24  0530 02/12/24  0606 02/13/24  0527   *   < > 115*  --  112* 115*   BUN 17   < > 9  --  6* <5*   CREATSERUM 1.09   < > 0.91  --  0.89 0.90   CA 8.9   < > 8.1*  --  8.9 9.1   ALB 3.8  --   --   --   --   --       < > 140  --  142 144   K 4.2   < > 3.2* 3.5 3.3* 3.3*  3.3*      < > 109  --  111 110   CO2 27.0   < > 26.0  --  25.0 26.0   ALKPHO 85  --   --   --   --   --    AST 20  --   --   --   --   --    ALT 27  --   --   --   --   --    BILT 0.6  --   --   --   --   --    TP 7.6  --   --   --   --   --     <  > = values in this interval not displayed.             MRI PELVIS (SOFT TISSUE) (W+WO) (CPT=72197)    Result Date: 2/12/2024  CONCLUSION:  Severely artifactually degraded examination. Within these parameters: 1. Extensive rectal edema with fluid collections suggesting intramural abscesses, possibly with intersphincteric fistula formation. These are difficult to characterize given the substantial motion artifact.  2. Lesser incidental findings as above.    Dictated by (CST): Mike Baum MD on 2/12/2024 at 1:53 PM     Finalized by (CST): Mike Baum MD on 2/12/2024 at 2:18 PM                 Time spent in direct patient contact and decision making as well as counseling/coordination of care:    41002- 35 min      Musa Shelby MD

## 2024-02-13 NOTE — ANESTHESIA PREPROCEDURE EVALUATION
Anesthesia PreOp Note    HPI:     Raul Freed Jr. is a 83 year old male who presents for preoperative consultation requested by: Trace Alvarado MD    Date of Surgery: 2/8/2024 - 2/13/2024    Procedure(s):  FLEXIBLE SIGMOIDOSCOPY  Indication: colitis    Relevant Problems   No relevant active problems       NPO:  Last Liquid Consumption Date: 02/13/24  Last Liquid Consumption Time: 0830  Last Solid Consumption Date: 02/13/24  Last Solid Consumption Time: 0000 (npo since midnight)  Last Liquid Consumption Date: 02/13/24          History Review:  Patient Active Problem List    Diagnosis Date Noted    Anemia 02/08/2024    Hyperglycemia 02/08/2024    Leukocytosis 02/08/2024    Abscess of anal and rectal regions 02/08/2024    Diarrhea, unspecified type 02/08/2024    Weakness 02/08/2024    Acute kidney injury (HCC) 01/28/2024    Proctocolitis 01/28/2024    Perianal abscess 01/28/2024    Unspecified inflammatory spondylopathy, cervical region ([facility]) 12/10/2021    Elevated ratio of cholesterol to high density lipoprotein (HDL) 12/11/2020    Spondylosis of cervical region without myelopathy or radiculopathy 06/11/2018    History of coronary artery stent placement 03/20/2018    CAD in native artery 03/20/2018    Centrilobular emphysema ([facility]) 03/19/2018    Interstitial lung disease ([facility]) 03/19/2018    Thoracic aortic atherosclerosis ([facility]) 03/19/2018    Facet arthritis of cervical region 11/13/2017    Benign non-nodular prostatic hyperplasia without lower urinary tract symptoms 05/22/2017    Hiatal hernia with GERD without esophagitis 05/23/2016    Severe obesity (BMI 35.0-35.9 with comorbidity)  ([facility]) 03/14/2016    History of smoking 30 or more pack years 11/30/2015    Bilateral sensorineural hearing loss 05/29/2015    Diabetes mellitus type 2 in obese  ([facility]) 08/20/2013    AAA (abdominal aortic aneurysm) ([facility]) 11/19/2012    Vitamin D deficiency 02/19/2011    Mixed hyperlipidemia due to type 2 diabetes mellitus  ([facility])  09/25/2008    Obstructive sleep apnea (adult) (pediatric) 09/25/2008    Essential hypertension, benign 07/10/2008       Past Medical History:   Diagnosis Date    Abdominal aortic aneurysm (AAA) 3.0 cm to 5.0 cm in diameter in female (HCC)     CAD involving native coronary artery of native heart without angina pectoris 03/20/2018    Cardiac LV ejection fraction 50-55% per 2018 angio  03/20/2018    Centrilobular emphysema (HCC) 03/19/2018    Coronary atherosclerosis     COVID 12/2020    Diabetes mellitus (HCC)     Gastric ulcer 2008    Healed 2009    GOUT     High blood pressure     High cholesterol     Hypercholesterolemia     HYPERLIPIDEMIA     HYPERTENSION     Hypertension     OBESITY     OSTEOARTHRITIS     osteoarthritis knees    Prediabetes     Presence of drug coated stent in prox & Mid LAD coronary artery 03/19/2018 03/20/2018    2.75 x 15 mm Xcience drug-eluting stent into the mid LAD 3.5 x 12 mm Xcience drug-eluting stent into the proximal LAD    Pulmonary emphysema (HCC)     SLEEP APNEA     [facility] fax [phone]    Sleep apnea        Past Surgical History:   Procedure Laterality Date    CATH BARE METAL STENT (BMS)      COLONOSCOPY  2009= Declines repeat    2009, complicated by anal fissure    COLONOSCOPY      COLONOSCOPY  03/2022    one small TA, int hem, can consider d/cing surveillance    COLONOSCOPY N/A 02/28/2022    Procedure: COLONOSCOPY,  with polypectomy;  Surgeon: Anthony Patel MD;  Location: [facility]    HEMORRHOIDECTOMY  Dr Kamara 3/12/10 [facility]     Rectal exam under anesthesia/anoscopy. Incision and drainage of perirectal abscess. Placement of seton . Excision of anal tags. Destruction of internal hemorrhoids.  Surgery done at [facility] on 3/12/10.    OTHER SURGICAL HISTORY      left knee arthroscopy    PATIENT DOCUMENTED NOT TO HAVE EXPERIENCED ANY OF THE FOLLOWING EVENTS N/A 02/18/2015    Procedure: ESOPHAGOGASTRODUODENOSCOPY, POSSIBLE BIOPSY, POSSIBLE POLYPECTOMY 12718;   Surgeon: Musa Cook MD;  Location: [facility]    PATIENT WITHOUGH PREOPERATIVE ORDER FOR IV ANTIBIOTIC SURGICAL SITE INFECTION PROPHYLAXIS. N/A 02/18/2015    Procedure: ESOPHAGOGASTRODUODENOSCOPY, POSSIBLE BIOPSY, POSSIBLE POLYPECTOMY 19019;  Surgeon: Musa Cook MD;  Location: [facility]    UPPER GI ENDOSCOPY PERFORMED  2008, 2/27/2009    UPPER GI ENDOSCOPY,BIOPSY  2/18/15=Gastritis.  H pylori negative, normal SB Bx    UPPER GI ENDOSCOPY,BIOPSY N/A 02/18/2015    Procedure: ESOPHAGOGASTRODUODENOSCOPY, POSSIBLE BIOPSY, POSSIBLE POLYPECTOMY 07512;  Surgeon: Musa Cook MD;  Location: [facility]    UPPER GI ENDOSCOPY,EXAM         Medications Prior to Admission   Medication Sig Dispense Refill Last Dose    amLODIPine 10 MG Oral Tab Take 1 tablet (10 mg total) by mouth daily.       saccharomyces boulardii 250 MG Oral Cap Take 1 capsule (250 mg total) by mouth 2 (two) times daily.       ondansetron 8 MG Oral Tablet Dispersible Take 1 tablet (8 mg total) by mouth every 8 (eight) hours as needed for Nausea.       Magnesium 500 MG Oral Tab Take 1 tablet (500 mg total) by mouth daily.   2/7/2024    Turmeric (QC TUMERIC COMPLEX) 500 MG Oral Cap Take 500 mg by mouth daily.   2/7/2024    omeprazole 20 MG Oral Capsule Delayed Release Take 1 capsule (20 mg total) by mouth daily.   2/7/2024    METOPROLOL SUCCINATE 100 MG Oral Tablet 24 Hr TAKE 1 TABLET BY MOUTH EVERY DAY 90 tablet 1 2/7/2024    atorvastatin 20 MG Oral Tab Take 1 tablet (20 mg total) by mouth daily. 90 tablet 3 2/7/2024    allopurinol 100 MG Oral Tab Take 1 tablet (100 mg total) by mouth daily.   2/7/2024    aspirin 81 MG Oral Chew Tab Chew 1 tablet (81 mg total) by mouth daily.   2/7/2024    Omega-3 Fatty Acids (FISH OIL) 1000 MG Oral Cap ONE DAILY   2/7/2024    Multiple Vitamin (MULTI VITAMIN MENS OR) ONE DAILY   2/7/2024    Glucose Blood (CONTOUR NEXT TEST) In Vitro Strip TEST TWICE DAILY BEFORE MEALS AS  DIRECTED 200 strip 3     Microlet Lancets Does not apply Misc USE AS DIRECTED TWICE DAILY 200 each 3     Blood Glucose Monitoring Suppl (NORBERTO CONTOUR NEXT MONITOR) w/Device Does not apply Kit 1 Units by Does not apply route 2 (two) times daily before meals. 1 kit 0      Current Facility-Administered Medications Ordered in Epic   Medication Dose Route Frequency Provider Last Rate Last Admin    amLODIPine (Norvasc) tab 10 mg  10 mg Oral Daily Bertoni, Christy A, NP   10 mg at 02/13/24 1013    QUEtiapine (SEROquel) tab 25 mg  25 mg Oral Nightly Bertoni, Christy A, NP        melatonin tab 3 mg  3 mg Oral Nightly Bertoni, Christy A, NP        simethicone (Mylicon) chewable tab 80 mg  80 mg Oral QID PRN Jose D Carrillo,         hydrALAzine (Apresoline) 20 mg/mL injection 10 mg  10 mg Intravenous Q6H PRN Kerrie Guzman, DO   10 mg at 02/13/24 0440    piperacillin-tazobactam (Zosyn) 4.5 g in dextrose 5% 100 mL IVPB-ADDV  4.5 g Intravenous Q8H SuzetteNupur Mirtha, DO 25 mL/hr at 02/13/24 1316 4.5 g at 02/13/24 1316    acetaminophen (Tylenol Extra Strength) tab 500 mg  500 mg Oral Q4H PRN Kerrie Guzman, DO   500 mg at 02/09/24 2110    ondansetron (Zofran) 4 MG/2ML injection 4 mg  4 mg Intravenous Q6H PRN Kerrie Guzman, DO        polyethylene glycol (PEG 3350) (Miralax) 17 g oral packet 17 g  17 g Oral Daily PRN Kerrie Guzman, DO        sennosides (Senokot) tab 17.2 mg  17.2 mg Oral Nightly PRN Kerrie Guzman, DO        bisacodyl (Dulcolax) 10 MG rectal suppository 10 mg  10 mg Rectal Daily PRN Kerrie Guzman, DO        fleet enema (Fleet) 7-19 GM/118ML rectal enema 133 mL  1 enema Rectal Once PRN Kerrie Guzman, DO        allopurinol (Zyloprim) tab 100 mg  100 mg Oral Daily Kerrie Guzman DO   100 mg at 02/12/24 0814    atorvastatin (Lipitor) tab 20 mg  20 mg Oral Daily Kerrie Guzman DO   20 mg at 02/12/24 0814    metoprolol succinate ER (Toprol XL) 24 hr tab 100 mg  100 mg  Oral Daily Krasilnikova, Kerrie, DO   100 mg at 24 0905    pantoprazole (Protonix) DR tab 20 mg  20 mg Oral QAM AC Krasilnikova, Kerrie, DO   20 mg at 24 0413    glucose (Dex4) 15 GM/59ML oral liquid 15 g  15 g Oral Q15 Min PRN Krasilnikova, Kerrie, DO        Or    glucose (Glutose) 40% oral gel 15 g  15 g Oral Q15 Min PRN Krasilnikova, Kerrie, DO        Or    glucose-vitamin C (Dex-4) chewable tab 4 tablet  4 tablet Oral Q15 Min PRN Krasilnikova, Kerrie, DO        Or    dextrose 50% injection 50 mL  50 mL Intravenous Q15 Min PRN Krasilnikova, Kerrie, DO        Or    glucose (Dex4) 15 GM/59ML oral liquid 30 g  30 g Oral Q15 Min PRN Krasilnikova, Kerrie, DO        Or    glucose (Glutose) 40% oral gel 30 g  30 g Oral Q15 Min PRN Krasilnikova, Kerrie, DO        Or    glucose-vitamin C (Dex-4) chewable tab 8 tablet  8 tablet Oral Q15 Min PRN Krasilnikova, Kerrie, DO        insulin aspart (NovoLOG) 100 Units/mL FlexPen 1-5 Units  1-5 Units Subcutaneous TID CC Krasilnikova, Kerrie, DO        vancomycin (Firvanq) 50 mg/mL oral solution 125 mg  125 mg Oral QID Armando Forrest MD   125 mg at 24 0905     No current [facility]-ordered outpatient medications on file.       No Known Allergies    Family History   Problem Relation Age of Onset    Cancer Mother         lung    Obesity Son     Obesity Sister     Lipids Sister     Obesity Son      Social History     Socioeconomic History    Marital status:    Tobacco Use    Smoking status: Former     Packs/day: 1.00     Years: 40.00     Additional pack years: 0.00     Total pack years: 40.00     Types: Cigarettes     Quit date: 2009     Years since quittin.7    Smokeless tobacco: Never    Tobacco comments:     has been a 3 ppd   Vaping Use    Vaping Use: Never used   Substance and Sexual Activity    Alcohol use: Yes     Comment: occasional intake    Drug use: No       Available pre-op labs reviewed.  Lab Results   Component Value Date    WBC 6.7  02/13/2024    RBC 3.33 (L) 02/13/2024    HGB 10.8 (L) 02/13/2024    HCT 31.6 (L) 02/13/2024    MCV 94.9 02/13/2024    MCH 32.4 02/13/2024    MCHC 34.2 02/13/2024    RDW 13.2 02/13/2024    .0 02/13/2024     Lab Results   Component Value Date     02/13/2024    K 3.3 (L) 02/13/2024    K 3.3 (L) 02/13/2024     02/13/2024    CO2 26.0 02/13/2024    BUN <5 (L) 02/13/2024    CREATSERUM 0.90 02/13/2024     (H) 02/13/2024    PGLU 91 02/13/2024    CA 9.1 02/13/2024          Vital Signs:  Body mass index is 32.43 kg/m².   height is 1.778 m (5' 10\") and weight is 102.5 kg (226 lb). His axillary temperature is 98.3 °F (36.8 °C). His blood pressure is 108/87 and his pulse is 73. His respiration is 14 and oxygen saturation is 94%.   Vitals:    02/13/24 1150 02/13/24 1435 02/13/24 1438 02/13/24 1449   BP: (!) 160/106 (!) 181/90 (!) 151/116 108/87   Pulse: 68 69 73    Resp: 16 14 14    Temp: 98.3 °F (36.8 °C)      TempSrc: Axillary      SpO2: 97% 96% 94%    Weight:       Height:            Anesthesia Evaluation     Patient summary reviewed and Nursing notes reviewed    Airway   Mallampati: II  TM distance: >3 FB  Neck ROM: full  Dental - Dentition appears grossly intact   (+) upper dentures and lower dentures    Pulmonary - normal exam   (+) COPD, sleep apnea  Cardiovascular - normal exam  (+) hypertension, CAD    Neuro/Psych    (+)  neuromuscular disease,        GI/Hepatic/Renal    (+) GERD    Endo/Other    (+) diabetes mellitus type 2  Abdominal  - normal exam                 Anesthesia Plan:   ASA:  3  Plan:   MAC  Informed Consent Plan and Risks Discussed With:  Patient  Use of Blood Products Discussed With:  Patient      I have informed Raul J Benes Jr. and/or legal guardian or family member of the nature of the anesthetic plan, benefits, risks including possible dental damage if relevant, major complications, and any alternative forms of anesthetic management.   All of the patient's questions were  answered to the best of my ability. The patient desires the anesthetic management as planned.  Bony Torres MD  2/13/2024 3:53 PM  Present on Admission:   Anemia   Hyperglycemia   Leukocytosis

## 2024-02-13 NOTE — OPERATIVE REPORT
SIGMOIDOSCOPY REPORT    Patient Name:  Raul Freed  Medical Record #: [id number]  YOB: 1940  Date of Procedure: 2/13/2024    Referring physician: Rao Beyer MD    Surgeon:  Trace Alvarado MD    Pre-op diagnosis: Abnormal CT scan of the rectum    Post-op diagnosis: Distal rectal ulcers, diverticulosis    Medications given:  MAC    Procedure:    After informed consent the patient was placed in the left lateral decubitus position and the colonoscope was introduced into the rectum and advanced under direct visualization to 40 cm.  Bowel preparation was poor.  The mucosa was carefully inspected upon withdrawal of the scope.  Minimal air insufflation was given and no retroflexion in the rectum was performed.    Findings:   The visualized colonic mucosa was normal with the exception of a couple of diverticular openings in the sigmoid colon and 2 serpiginous white-based 1 cm ulcers just above the dentate line in the distal rectum.  The rest of the mucosa appeared normal.  There is no colitis is seen elsewhere albeit with poor visualization overall.  We saw no evidence of swelling or fistula.  The ulcers were biopsied.  There was no evidence of pseudomembranes seen.    The above findings on this very limited examination are less suggestive of stercoral ulceration.  Could be due to Crohn's disease.  We saw no fistulous openings nor any difficulty distending the rectum nor any suggestion of intramural fluid collections.    Will complete his treatment for C. Difficile  Await pathology and stain for CMV.  Will need short interval repeat imaging of the rectal region.  When stable for a more complete colonoscopy this will be important.  Will discuss with surgery.    Specimens: Rectum  EBL: minimal

## 2024-02-13 NOTE — PLAN OF CARE
Patient blood pressure elevated during evening, treated with PRN hydralazine. NPO after midnight for possible procedure in AM. Frequently getting out of bed overnight, difficult to reorient. One time dose of Seroquel given, per Dr. Melissa DESAI to give while NPO. Poorly tolerated CPAP during the night. Video camera monitoring put in place. Bed put on zone 2.    Problem: Patient Centered Care  Goal: Patient preferences are identified and integrated in the patient's plan of care  Description: Interventions:  - What would you like us to know as we care for you? I was here recently and discharged with IV antibiotics.    - Provide timely, complete, and accurate information to patient/family  - Incorporate patient and family knowledge, values, beliefs, and cultural backgrounds into the planning and delivery of care  - Encourage patient/family to participate in care and decision-making at the level they choose  - Honor patient and family perspectives and choices  Outcome: Progressing     Problem: Patient/Family Goals  Goal: Patient/Family Long Term Goal  Description: Patient's Long Term Goal: to return home    Interventions:  - Further testing  - Monitor labs and vital signs  - Medication compliance  - Follow provider recommendations  - See additional Care Plan goals for specific interventions  Outcome: Progressing  Goal: Patient/Family Short Term Goal  Description: Patient's Short Term Goal: no more diarrhea    Interventions:   - Monitor intake and output  - Medication compliance  - Follow provider recommendations  - See additional Care Plan goals for specific interventions  Outcome: Progressing     Problem: RISK FOR INFECTION - ADULT  Goal: Absence of fever/infection during anticipated neutropenic period  Description: INTERVENTIONS  - Monitor WBC  - Administer growth factors as ordered  - Implement neutropenic guidelines  Outcome: Progressing     Problem: SAFETY ADULT - FALL  Goal: Free from fall injury  Description:  INTERVENTIONS:  - Assess pt frequently for physical needs  - Identify cognitive and physical deficits and behaviors that affect risk of falls.  - [place] fall precautions as indicated by assessment.  - Educate pt/family on patient safety including physical limitations  - Instruct pt to call for assistance with activity based on assessment  - Modify environment to reduce risk of injury  - Provide assistive devices as appropriate  - Consider OT/PT consult to assist with strengthening/mobility  - Encourage toileting schedule  Outcome: Progressing     Problem: RESPIRATORY - ADULT  Goal: Achieves optimal ventilation and oxygenation  Description: INTERVENTIONS:  - Assess for changes in respiratory status  - Assess for changes in mentation and behavior  - Position to facilitate oxygenation and minimize respiratory effort  - Oxygen supplementation based on oxygen saturation or ABGs  - Provide Smoking Cessation handout, if applicable  - Encourage broncho-pulmonary hygiene including cough, deep breathe, Incentive Spirometry  - Assess the need for suctioning and perform as needed  - Assess and instruct to report SOB or any respiratory difficulty  - Respiratory Therapy support as indicated  - Manage/alleviate anxiety  - Monitor for signs/symptoms of CO2 retention  Outcome: Progressing     Problem: METABOLIC/FLUID AND ELECTROLYTES - ADULT  Goal: Glucose maintained within prescribed range  Description: INTERVENTIONS:  - Monitor Blood Glucose as ordered  - Assess for signs and symptoms of hyperglycemia and hypoglycemia  - Administer ordered medications to maintain glucose within target range  - Assess barriers to adequate nutritional intake and initiate nutrition consult as needed  - Instruct patient on self management of diabetes  Outcome: Progressing     Problem: MUSCULOSKELETAL - ADULT  Goal: Return mobility to safest level of function  Description: INTERVENTIONS:  - Assess patient stability and activity tolerance for  standing, transferring and ambulating w/ or w/o assistive devices  - Assist with transfers and ambulation using safe patient handling equipment as needed  - Ensure adequate protection for wounds/incisions during mobilization  - Obtain PT/OT consults as needed  - Advance activity as appropriate  - Communicate ordered activity level and limitations with patient/family  Outcome: Progressing     Problem: Impaired Functional Mobility  Goal: Achieve highest/safest level of mobility/gait  Description: Interventions:  - Assess patient's functional ability and stability  - Promote increasing activity/tolerance for mobility and gait  - Educate and engage patient/family in tolerated activity level and precautions  - Recommend use of  RW for transfers and ambulation  Outcome: Progressing     Problem: NEUROLOGICAL - ADULT  Goal: Achieves stable or improved neurological status  Description: INTERVENTIONS  - Assess for and report changes in neurological status  - Initiate measures to prevent increased intracranial pressure  - Maintain blood pressure and fluid volume within ordered parameters to optimize cerebral perfusion and minimize risk of hemorrhage  - Monitor temperature, glucose, and sodium. Initiate appropriate interventions as ordered  Outcome: Not Progressing

## 2024-02-14 ENCOUNTER — APPOINTMENT (OUTPATIENT)
Dept: CT IMAGING | Facility: HOSPITAL | Age: 84
End: 2024-02-14
Attending: INTERNAL MEDICINE
Payer: MEDICARE

## 2024-02-14 ENCOUNTER — APPOINTMENT (OUTPATIENT)
Dept: HEMATOLOGY/ONCOLOGY | Facility: HOSPITAL | Age: 84
End: 2024-02-14
Attending: INTERNAL MEDICINE
Payer: MEDICARE

## 2024-02-14 LAB
ANION GAP SERPL CALC-SCNC: 8 MMOL/L (ref 0–18)
BUN BLD-MCNC: 7 MG/DL (ref 9–23)
BUN/CREAT SERPL: 7.2 (ref 10–20)
CALCIUM BLD-MCNC: 9.1 MG/DL (ref 8.7–10.4)
CHLORIDE SERPL-SCNC: 109 MMOL/L (ref 98–112)
CO2 SERPL-SCNC: 28 MMOL/L (ref 21–32)
CREAT BLD-MCNC: 0.97 MG/DL
DEPRECATED RDW RBC AUTO: 48 FL (ref 35.1–46.3)
EGFRCR SERPLBLD CKD-EPI 2021: 77 ML/MIN/1.73M2 (ref 60–?)
ERYTHROCYTE [DISTWIDTH] IN BLOOD BY AUTOMATED COUNT: 13.4 % (ref 11–15)
GLUCOSE BLD-MCNC: 104 MG/DL (ref 70–99)
GLUCOSE BLDC GLUCOMTR-MCNC: 107 MG/DL (ref 70–99)
GLUCOSE BLDC GLUCOMTR-MCNC: 114 MG/DL (ref 70–99)
GLUCOSE BLDC GLUCOMTR-MCNC: 120 MG/DL (ref 70–99)
GLUCOSE BLDC GLUCOMTR-MCNC: 99 MG/DL (ref 70–99)
HCT VFR BLD AUTO: 34.9 %
HGB BLD-MCNC: 11.5 G/DL
MCH RBC QN AUTO: 31.8 PG (ref 26–34)
MCHC RBC AUTO-ENTMCNC: 33 G/DL (ref 31–37)
MCV RBC AUTO: 96.4 FL
OSMOLALITY SERPL CALC.SUM OF ELEC: 298 MOSM/KG (ref 275–295)
PLATELET # BLD AUTO: 327 10(3)UL (ref 150–450)
POTASSIUM SERPL-SCNC: 3.7 MMOL/L (ref 3.5–5.1)
POTASSIUM SERPL-SCNC: 3.7 MMOL/L (ref 3.5–5.1)
RBC # BLD AUTO: 3.62 X10(6)UL
SODIUM SERPL-SCNC: 145 MMOL/L (ref 136–145)
WBC # BLD AUTO: 8.2 X10(3) UL (ref 4–11)

## 2024-02-14 PROCEDURE — 82962 GLUCOSE BLOOD TEST: CPT

## 2024-02-14 PROCEDURE — 80048 BASIC METABOLIC PNL TOTAL CA: CPT | Performed by: INTERNAL MEDICINE

## 2024-02-14 PROCEDURE — 94799 UNLISTED PULMONARY SVC/PX: CPT

## 2024-02-14 PROCEDURE — 85027 COMPLETE CBC AUTOMATED: CPT | Performed by: INTERNAL MEDICINE

## 2024-02-14 PROCEDURE — 74177 CT ABD & PELVIS W/CONTRAST: CPT | Performed by: INTERNAL MEDICINE

## 2024-02-14 PROCEDURE — 84132 ASSAY OF SERUM POTASSIUM: CPT | Performed by: INTERNAL MEDICINE

## 2024-02-14 RX ORDER — HEPARIN SODIUM 5000 [USP'U]/ML
5000 INJECTION, SOLUTION INTRAVENOUS; SUBCUTANEOUS EVERY 8 HOURS SCHEDULED
Status: DISCONTINUED | OUTPATIENT
Start: 2024-02-14 | End: 2024-02-16

## 2024-02-14 RX ORDER — HEPARIN SODIUM 5000 [USP'U]/ML
5000 INJECTION, SOLUTION INTRAVENOUS; SUBCUTANEOUS EVERY 8 HOURS SCHEDULED
Status: DISCONTINUED | OUTPATIENT
Start: 2024-02-14 | End: 2024-02-14

## 2024-02-14 NOTE — OCCUPATIONAL THERAPY NOTE
Attempted to see patient this AM for OT follow up treat. Patient sleeping soundly in bed. Patient awoken, but refusing any OOB activity this session. Education provided on importance of OOB activity. Will continue to follow and follow up as scheduling allows and patient is willing to participate.    Isela Watson, OTR/L  [facility]  [phone]

## 2024-02-14 NOTE — PLAN OF CARE
Patient more alert and oriented today. IV abx. Repeat CT done. Tara matthew. Family at bedside and updated on POC. Fall and isolation precautions in place. Call light within reach.     Problem: Patient Centered Care  Goal: Patient preferences are identified and integrated in the patient's plan of care  Description: Interventions:  - What would you like us to know as we care for you? I was here recently and discharged with IV antibiotics.    - Provide timely, complete, and accurate information to patient/family  - Incorporate patient and family knowledge, values, beliefs, and cultural backgrounds into the planning and delivery of care  - Encourage patient/family to participate in care and decision-making at the level they choose  - Honor patient and family perspectives and choices  Outcome: Progressing     Problem: Patient/Family Goals  Goal: Patient/Family Long Term Goal  Description: Patient's Long Term Goal: to return home    Interventions:  - Further testing  - Monitor labs and vital signs  - Medication compliance  - Follow provider recommendations  - See additional Care Plan goals for specific interventions  Outcome: Progressing  Goal: Patient/Family Short Term Goal  Description: Patient's Short Term Goal: no more diarrhea    Interventions:   - Monitor intake and output  - Medication compliance  - Follow provider recommendations  - See additional Care Plan goals for specific interventions  Outcome: Progressing     Problem: RISK FOR INFECTION - ADULT  Goal: Absence of fever/infection during anticipated neutropenic period  Description: INTERVENTIONS  - Monitor WBC  - Administer growth factors as ordered  - Implement neutropenic guidelines  Outcome: Progressing     Problem: SAFETY ADULT - FALL  Goal: Free from fall injury  Description: INTERVENTIONS:  - Assess pt frequently for physical needs  - Identify cognitive and physical deficits and behaviors that affect risk of falls.  - Orient fall precautions as  indicated by assessment.  - Educate pt/family on patient safety including physical limitations  - Instruct pt to call for assistance with activity based on assessment  - Modify environment to reduce risk of injury  - Provide assistive devices as appropriate  - Consider OT/PT consult to assist with strengthening/mobility  - Encourage toileting schedule  Outcome: Progressing     Problem: RESPIRATORY - ADULT  Goal: Achieves optimal ventilation and oxygenation  Description: INTERVENTIONS:  - Assess for changes in respiratory status  - Assess for changes in mentation and behavior  - Position to facilitate oxygenation and minimize respiratory effort  - Oxygen supplementation based on oxygen saturation or ABGs  - Provide Smoking Cessation handout, if applicable  - Encourage broncho-pulmonary hygiene including cough, deep breathe, Incentive Spirometry  - Assess the need for suctioning and perform as needed  - Assess and instruct to report SOB or any respiratory difficulty  - Respiratory Therapy support as indicated  - Manage/alleviate anxiety  - Monitor for signs/symptoms of CO2 retention  Outcome: Progressing     Problem: METABOLIC/FLUID AND ELECTROLYTES - ADULT  Goal: Glucose maintained within prescribed range  Description: INTERVENTIONS:  - Monitor Blood Glucose as ordered  - Assess for signs and symptoms of hyperglycemia and hypoglycemia  - Administer ordered medications to maintain glucose within target range  - Assess barriers to adequate nutritional intake and initiate nutrition consult as needed  - Instruct patient on self management of diabetes  Outcome: Progressing     Problem: MUSCULOSKELETAL - ADULT  Goal: Return mobility to safest level of function  Description: INTERVENTIONS:  - Assess patient stability and activity tolerance for standing, transferring and ambulating w/ or w/o assistive devices  - Assist with transfers and ambulation using safe patient handling equipment as needed  - Ensure adequate protection  for wounds/incisions during mobilization  - Obtain PT/OT consults as needed  - Advance activity as appropriate  - Communicate ordered activity level and limitations with patient/family  Outcome: Progressing     Problem: NEUROLOGICAL - ADULT  Goal: Achieves stable or improved neurological status  Description: INTERVENTIONS  - Assess for and report changes in neurological status  - Initiate measures to prevent increased intracranial pressure  - Maintain blood pressure and fluid volume within ordered parameters to optimize cerebral perfusion and minimize risk of hemorrhage  - Monitor temperature, glucose, and sodium. Initiate appropriate interventions as ordered  Outcome: Progressing     Problem: Impaired Functional Mobility  Goal: Achieve highest/safest level of mobility/gait  Description: Interventions:  - Assess patient's functional ability and stability  - Promote increasing activity/tolerance for mobility and gait  - Educate and engage patient/family in tolerated activity level and precautions  - Recommend use of  RW for transfers and ambulation  Outcome: Progressing     Problem: Risk for Violence/Aggression  Goal: Absence of Violence/Aggression  Description: INTERVENTIONS:   - Identify precipitating factors for behavior   - Notify Charge RN/Provider   - Consider decreasing stimulation   - Consider distraction measures   - Consider discussion with provider regarding prn meds    - Consider SUSANNAH (Moderate Risk only)   - Consider Code Support (High Risk only)   - Consider room safety checks   - Consider restraints  Outcome: Progressing

## 2024-02-14 NOTE — PROGRESS NOTES
[facility] Infectious Disease  Progress Note    Raul Freed Jr. Patient Status:  Inpatient    1940 MRN [id number]   Location [facility] 5SW/SE Attending Kevin Da Silva MD   Hosp Day # 6 PCP Rao Beyer MD     Subjective:    Pt seen and examined resting in bed, tolerating the abx. States diarrhea improving, denies abdominal pain. No new complaints or concerns    Review of Systems:  Review of systems reviewed and negative except as mentioned    Objective:  Blood pressure 100/48, pulse 84, temperature 98.6 °F (37 °C), temperature source Oral, resp. rate 16, height 5' 10\" (1.778 m), weight 226 lb (102.5 kg), SpO2 98%.        Physical Exam:  General: Awake, alert, non-tox, NAD.  HEENT:  Oropharynx clear, trachea ML.  Heart: RRR S1S2 no murmurs.  Lungs: Essentially CTA b/l, no rhonchi, rales, wheezes.  Abdomen: Soft, NT/ND.  BS present.  No organomegaly.  Extremity: No edema.  Neurological: No focal deficits.  Derm:  Warm, dry, free from rashes.    Lab Data Review:  Lab Results   Component Value Date    WBC 8.2 2024    HGB 11.5 2024    HCT 34.9 2024    .0 2024    CREATSERUM 0.97 2024    BUN 7 2024     2024    K 3.7 2024    K 3.7 2024     2024    CO2 28.0 2024     2024    CA 9.1 2024        Cultures:  Hospital Encounter on 24   1. Blood Culture     Status: None    Collection Time: 24  1:14 PM    Specimen: Blood,peripheral   Result Value Ref Range    Blood Culture Result No Growth 5 Days N/A        Radiology:    MRI PELVIS (SOFT TISSUE) (W+WO) (CPT=72197)    Result Date: 2024  CONCLUSION:  Severely artifactually degraded examination. Within these parameters: 1. Extensive rectal edema with fluid collections suggesting intramural abscesses, possibly with intersphincteric fistula formation. These are difficult to characterize given the  substantial motion artifact.  2. Lesser incidental findings as above.    Dictated by (CST): Mike Baum MD on 2/12/2024 at 1:53 PM     Finalized by (CST): Mike Baum MD on 2/12/2024 at 2:18 PM          CT ABDOMEN+PELVIS(CONTRAST ONLY)(CPT=74177)    Result Date: 2/8/2024  CONCLUSION:  1. Severe proctocolitis with substantial interval worsening, with interval development of a large intramural rectal abscess.  2. Additional left posterolateral and posterior peripherally enhancing fluid collections are seen, suggesting potential communicating or independent abscesses.  3. A heavy stool burden is seen throughout the more proximal colon, and there may be some degree of partial large bowel obstruction.   4. Infrarenal abdominal aortic aneurysm measuring up to 5.0 cm.  5. A large complex debris-filled duodenal diverticulum is seen without CT evidence acute complication.  6. Prostatomegaly.  7. Pulmonary interstitial fibrosis is suggested.  8. Possible hepatic steatosis.  9. Lesser incidental findings as above.    elm-remote.   Dictated by (CST): Mike Baum MD on 2/08/2024 at 12:08 PM     Finalized by (CST): Mike Baum MD on 2/08/2024 at 12:20 PM          CTA BRAIN + CTA CAROTIDS (CPT=70496/61247)    Result Date: 2/8/2024  CONCLUSION:  1. No proximal intracranial flow limiting stenosis/large vessel occlusion.  No intracranial aneurysm identified.  2. No hemodynamically significant stenosis or dissection involving the cervical carotid or vertebral arteries.  Non flow-limiting moderate left and mild right carotid bifurcation atherosclerosis.  There is also mild to moderate right vertebral ostial stenosis related to atherosclerosis. 3. Noncontrast head CT demonstrates no acute intracranial abnormality. 4. Nonspecific white matter changes involving both cerebral hemispheres that most likely reflect sequelae of chronic microangiopathy. 5. Emphysema.  Noncalcified right upper lobe lung nodules, which measure up  to 6 mm.  Nonemergent follow-up chest CT is suggested for complete evaluation (unless outside institution comparison imaging is available and demonstrates stability).  elm-remote  Dictated by (CST): Toño Chavarria MD on 2/08/2024 at 11:55 AM     Finalized by (CST): Toño Chavarria MD on 2/08/2024 at 12:03 PM          XR CHEST AP/PA (1 VIEW) (CPT=71045)    Result Date: 2/8/2024  CONCLUSION: No acute cardiopulmonary abnormality.  Interval placement of a right upper extremity PICC with tip in the cavoatrial junction.   Dictated by (CST): Sean Singer MD on 2/08/2024 at 10:41 AM     Finalized by (CST): Sean Singer MD on 2/08/2024 at 10:43 AM          MRI BRAIN WO ACUTE (3) SEQUENCE (CPT=70551)    Result Date: 2/8/2024  CONCLUSION: No acute intracranial process.  No evidence of acute or subacute infarct. There are moderate microvascular white matter ischemic changes, likely related to long-standing hypertension and/or diabetes.   Dictated by (CST): Sean Singer MD on 2/08/2024 at 10:00 AM     Finalized by (CST): Sean Singer MD on 2/08/2024 at 10:01 AM             Assessment and Plan:    Syndrome of weakness, diarrhea, and known proctocolitis with new leukocytosis and concern for interval worsening  - CT with possibly several communicating abscesses and initial intramural abscess worsening  - MRI with extensive rectal edema with fluid collection suggesting intramural abscesses, possibly with intersphincteric fistula formation  - s/p sigmoidoscopy on 2/13/24, plans for eventual colonoscopy when able per GI   - had repeat CT this morning, results pending   - IV Zosyn ongoing      Recent admission in January 2024 for sepsis in this gentleman presenting with fevers, leukocytosis, N/V/D  - CT at that time with evidence of severe proctocolitis with complex-appearing L lateral fluid/gas  - Seen by Dr. Shelby - consistent with a small intramural abscess in the L posterior rectal wall not amenable to drainage previously      Acute on chronic diarrhea   - now c.diff positive  - Patient does have a h/o chronic loose stools so unclear if this is much worse than baseline  - C.diff did return positive this admission  - diarrhea improving per patient report   - p.o. vancomycin day #7 ongoing     Leukocytosis due to the above  - resolved  - will trend      Weakness and some feeling off balance  - This could be because of evolving sepsis or could possibly be drug effect due to carbapenem class  - IV zosyn as above     Recommendations  - Continue IV Zosyn and PO Vancomycin  - Repeat labs tomorrow  - Further recommendations to follow    If you have any questions or concerns please call [facility] Infectious Disease at [phone].     BABITA Barone    2/14/2024  8:52 AM

## 2024-02-14 NOTE — CM/SW NOTE
MSW spoke to [facility] rep and entered KENYETTA order. MSW spoke to [facility] who states pt was getting infusions there but his February appointments were all cancelled.    MSW will review chart and F/U with Pt

## 2024-02-14 NOTE — PROGRESS NOTES
[facility] HEALTH AND CARE   Progress Note  -  Raul Freed Jr. Patient Status:  Inpatient    1940 MRN [id number]   Location [facility] 5SW/SE Attending Kevin Da Silva MD   Hosp Day # 6 PCP Rao Beyer MD     PCP: Rao Beyer MD      SEE ATTENDING NOTE AT BOTTOM OF PAGE    Is this a shared or split note between Advanced Practice Provider and Physician? Yes    Assessment and Plan:    Patient is a 83 year old male with PMH sig for CAD, DM2, HTN, HL, and recent admission for proctocolitis with perianal abscess discharged on invanz presenting with diarrhea and worsening weakness found to have worsening of a severe proctocolitis versus a large intramural rectal abscess vs other, currently  undergoing evaluation with imaging and scope as per GI and surgery. Hospital course with metabolic encephalopathy attributed to infection.  Patient will need follow-up of noted lung nodule on imaging and will need dedicated CT of the chest, in addition patient was also noted to have emphysema on imaging,  see below for details     Severe proctocolitis vs Large intramural rectal abscess vs Other  - recent admission for same dc on invanz until   -afebrile. WBC 19.3 on admission now down to 8.2   - CT A/P with worsening large intramural rectal abscess  -MRI pelvis suggesting intramural abscesses with possible fistula formation  -flex sig  with distal rectal ulcers and diverticulosis   -repeat CT today and plans for colonoscopy  -abx- zosyn   -as per Dr Shelby, GI and ID     Metabolic Encephalopathy-improved   -spoke with son Raul who states confusion has been occurring for well over a week, patient is seeing things as well as confusing facts of information.  Confusion can occur regardless of time.  Son does state that patient does have moments of answering questions appropriately.  He expressed understanding that this is likely related to infection.  -CT head on admission negative for acute process   -MRI brain  negative for acute process, noted moderate microvascular white matter changes  -CTA brain and carotids- moderate left carotid atherosclerosis, mild right carotid atherosclerosis, mild-moderate right vertebral ostial stenosis   -tx for c-diff and proctocolitis/abscess vs other as per ID  -scheduled seroquel at night with melatonin  -discontinue sitter      C dif colitis  -vanco, day #6    Hx Constipation  -per wife takes miralex and colace daily, currently on hold    Emphysema  Lung Nodule, RUL  -CTA brain- . Emphysema.  Noncalcified right upper lobe lung nodules, which measure up to 6 mm.   -outpt CT chest for completeness         CAD  HLD  Infrarenal abdominal aortic aneurysm measuring up to 5.0 cm.   HTN  -noted on CT A/P   -continue toprol.   -norvasc and losartan-hydrochlorothiazide was stopped last admission, will restart norvasc with elevated bp but likely worsened with agitation  -holding ASA for possible additional procedures  -continue lipitor   -appt with Dr Cates 2/27 as previously scheduled      DM2- Diet Controlled   -ssi prn    DEANGELO  -not compliant with CPAP      GOC  -full code  -POA wife but needs help with decision and speaks with Ricki Carter who is local and another brother in [place]    MA/ACO Reach  -Re- Entry: NO  -Consults: gi, surgery and ID   -Discharge Needs: continued IV abx at infusion center   -Appointments:[ ] appt Rao Beyer MD                          [ x] ID-2/19 with NP Terrell mejia in am  -diet-FLD, as per gi/surgery     Prophy  -SCD  -heparin if no plans for surgery     Dispo  -pending clinical coarse  -2/14 update given to wife Chel via phone  -2/14 unable to leave message for son Raul on cell [phone], grandson Raul is at [phone]  PCP: Rao Beyer MD      Concerns regarding plan of care were discussed with patient. Patient agrees with plan as detailed above. Discussed plan of care with Dr. Da Silva     Note: This chart was prepared  using voice recognition software and may contain unintended word substitution errors.      Christy Swartz RN, NP   [facility] Hospitalist Team  Contact via [employer] (Check Availability)  2/14/2024    SUBJECTIVE:   Calm lying in bed.  Already was up in the chair and had breakfast.  He is orientated to person place and overall time.  He is not able to tell me why he is here other than he was sick.  Results of flex Sig noted.  Plans for CT today. No bm's since 2/12      OBJECTIVE:   Blood pressure 100/48, pulse 84, temperature 98.6 °F (37 °C), temperature source Oral, resp. rate 16, height 5' 10\" (1.778 m), weight 226 lb (102.5 kg), SpO2 98%.    GENERAL: no apparent distress, overweight  NEURO: A/A, Ox2-3  RESP: non labored, CTA  CARDIO: Regular, no murmur  ABD: soft, NT, ND, BS+  EXTREMITIES: no edema    DIAGNOSTIC DATA:   Labs:     Recent Labs   Lab 02/09/24  0503 02/10/24  0510 02/12/24  0606 02/13/24  0527 02/14/24  0603   WBC 19.1* 17.9* 8.5 6.7 8.2   HGB 10.4* 10.4* 10.7* 10.8* 11.5*   MCV 95.7 96.9 94.8 94.9 96.4   .0 215.0 283.0 284.0 327.0       Recent Labs   Lab 02/09/24  0503 02/10/24  0510 02/11/24  0530 02/12/24  0606 02/13/24  0527 02/14/24  0603    140  --  142 144 145   K 3.6 3.2* 3.5 3.3* 3.3*  3.3* 3.7  3.7    109  --  111 110 109   CO2 26.0 26.0  --  25.0 26.0 28.0   BUN 11 9  --  6* <5* 7*   CREATSERUM 0.89 0.91  --  0.89 0.90 0.97   CA 8.4* 8.1*  --  8.9 9.1 9.1   MG  --   --   --   --  1.9  --    * 115*  --  112* 115* 104*       Recent Labs   Lab 02/08/24  0854   ALT 27   AST 20   ALB 3.8       Recent Labs   Lab 02/13/24  1156 02/13/24  1506 02/13/24  1733 02/13/24  2113 02/14/24  0729   PGLU 98 91 92 184* 99       No results for input(s): \"TROP\" in the last 168 hours.        MEDICATIONS       amLODIPine  10 mg Oral Daily    QUEtiapine  25 mg Oral Nightly    melatonin  3 mg Oral Nightly    piperacillin-tazobactam  4.5 g Intravenous Q8H     allopurinol  100 mg Oral Daily    atorvastatin  20 mg Oral Daily    metoprolol succinate ER  100 mg Oral Daily    pantoprazole  20 mg Oral QAM AC    insulin aspart  1-5 Units Subcutaneous TID CC    vancomycin  125 mg Oral QID      lactated ringers       simethicone, hydrALAzine, acetaminophen, ondansetron, polyethylene glycol (PEG 3350), sennosides, bisacodyl, fleet enema, glucose **OR** glucose **OR** glucose-vitamin C **OR** dextrose **OR** glucose **OR** glucose **OR** glucose-vitamin C    Christy Swartz NP      IMAGING     MRI PELVIS (SOFT TISSUE) (W+WO) (CPT=72197)    Result Date: 2/12/2024  CONCLUSION:  Severely artifactually degraded examination. Within these parameters: 1. Extensive rectal edema with fluid collections suggesting intramural abscesses, possibly with intersphincteric fistula formation. These are difficult to characterize given the substantial motion artifact.  2. Lesser incidental findings as above.    Dictated by (CST): Mike Baum MD on 2/12/2024 at 1:53 PM     Finalized by (CST): Mike Baum MD on 2/12/2024 at 2:18 PM             SEE ATTENDING NOTE BELOW:   Patient seen and examined independently.  Discussed with APN and agree with note above.    S: patient alert this morning, a&O *3, no focal deficits, states he is feeling better, no fevers, no abd pain    Objective:  /48 (BP Location: Left arm)   Pulse 84   Temp 98.6 °F (37 °C) (Oral)   Resp 16   Ht 5' 10\" (1.778 m)   Wt 226 lb (102.5 kg)   SpO2 98%   BMI 32.43 kg/m²     Gen: No acute distress,   Neck Supple,    Pulm: Lungs clear, normal respiratory effort   CV: Heart with regular rate and rhythm,   Abd: Abdomen soft, nontender, nondistended   MSK:  no clubbing, no cyanosis   Skin: no rashes or lesions, well perfused  Psych: mood stable, cooperative  Neuro: no focal deficits    Assessment and Plan  Severe proctocolitis vs Large intramural rectal abscess vs Other  - recent admission for same dc on invanz until  2/19  -afebrile. WBC 19.3 on admission now down to 8.2   - CT A/P with worsening large intramural rectal abscess  -MRI pelvis suggesting intramural abscesses with possible fistula formation  -flex sig  with distal rectal ulcers and diverticulosis   -repeat CT today and plans for colonoscopy  -abx- zosyn   -as per Dr Shelby, GI and ID     Metabolic Encephalopathy-improved   -spoke with son Raul who states confusion has been occurring for well over a week, patient is seeing things as well as confusing facts of information.  Confusion can occur regardless of time.  Son does state that patient does have moments of answering questions appropriately.  He expressed understanding that this is likely related to infection.  -CT head on admission negative for acute process   -MRI brain negative for acute process, noted moderate microvascular white matter changes  -CTA brain and carotids- moderate left carotid atherosclerosis, mild right carotid atherosclerosis, mild-moderate right vertebral ostial stenosis   -tx for c-diff and proctocolitis/abscess vs other as per ID  -scheduled seroquel at night with melatonin  -discontinue sitter      C dif colitis  -vanco, day #6    Hx Constipation  -per wife takes miralex and colace daily, currently on hold    Emphysema  Lung Nodule, RUL  -CTA brain- . Emphysema.  Noncalcified right upper lobe lung nodules, which measure up to 6 mm.   -outpt CT chest for completeness         CAD  HLD  Infrarenal abdominal aortic aneurysm measuring up to 5.0 cm.   HTN  -noted on CT A/P   -continue toprol.   -norvasc and losartan-hydrochlorothiazide was stopped last admission, will restart norvasc with elevated bp but likely worsened with agitation  -holding ASA for possible additional procedures  -continue lipitor   -appt with Dr Cates 2/27 as previously scheduled      DM2- Diet Controlled   -ssi prn    DEANGELO  -not compliant with CPAP    Rest as above with above    Kevin Da Silva MD

## 2024-02-14 NOTE — PLAN OF CARE
Patient vital signs stable. Sitter at bedside. IV antibiotic given. CPAP on overnight. Patient slept better tonight. Still intermittently confused.    Problem: Patient Centered Care  Goal: Patient preferences are identified and integrated in the patient's plan of care  Description: Interventions:  - What would you like us to know as we care for you? I was here recently and discharged with IV antibiotics.    - Provide timely, complete, and accurate information to patient/family  - Incorporate patient and family knowledge, values, beliefs, and cultural backgrounds into the planning and delivery of care  - Encourage patient/family to participate in care and decision-making at the level they choose  - Honor patient and family perspectives and choices  Outcome: Progressing     Problem: Patient/Family Goals  Goal: Patient/Family Long Term Goal  Description: Patient's Long Term Goal: to return home    Interventions:  - Further testing  - Monitor labs and vital signs  - Medication compliance  - Follow provider recommendations  - See additional Care Plan goals for specific interventions  Outcome: Progressing  Goal: Patient/Family Short Term Goal  Description: Patient's Short Term Goal: no more diarrhea    Interventions:   - Monitor intake and output  - Medication compliance  - Follow provider recommendations  - See additional Care Plan goals for specific interventions  Outcome: Progressing     Problem: RISK FOR INFECTION - ADULT  Goal: Absence of fever/infection during anticipated neutropenic period  Description: INTERVENTIONS  - Monitor WBC  - Administer growth factors as ordered  - Implement neutropenic guidelines  Outcome: Progressing     Problem: SAFETY ADULT - FALL  Goal: Free from fall injury  Description: INTERVENTIONS:  - Assess pt frequently for physical needs  - Identify cognitive and physical deficits and behaviors that affect risk of falls.  - [place] fall precautions as indicated by assessment.  - Educate  pt/family on patient safety including physical limitations  - Instruct pt to call for assistance with activity based on assessment  - Modify environment to reduce risk of injury  - Provide assistive devices as appropriate  - Consider OT/PT consult to assist with strengthening/mobility  - Encourage toileting schedule  Outcome: Progressing     Problem: RESPIRATORY - ADULT  Goal: Achieves optimal ventilation and oxygenation  Description: INTERVENTIONS:  - Assess for changes in respiratory status  - Assess for changes in mentation and behavior  - Position to facilitate oxygenation and minimize respiratory effort  - Oxygen supplementation based on oxygen saturation or ABGs  - Provide Smoking Cessation handout, if applicable  - Encourage broncho-pulmonary hygiene including cough, deep breathe, Incentive Spirometry  - Assess the need for suctioning and perform as needed  - Assess and instruct to report SOB or any respiratory difficulty  - Respiratory Therapy support as indicated  - Manage/alleviate anxiety  - Monitor for signs/symptoms of CO2 retention  Outcome: Progressing     Problem: METABOLIC/FLUID AND ELECTROLYTES - ADULT  Goal: Glucose maintained within prescribed range  Description: INTERVENTIONS:  - Monitor Blood Glucose as ordered  - Assess for signs and symptoms of hyperglycemia and hypoglycemia  - Administer ordered medications to maintain glucose within target range  - Assess barriers to adequate nutritional intake and initiate nutrition consult as needed  - Instruct patient on self management of diabetes  Outcome: Progressing     Problem: MUSCULOSKELETAL - ADULT  Goal: Return mobility to safest level of function  Description: INTERVENTIONS:  - Assess patient stability and activity tolerance for standing, transferring and ambulating w/ or w/o assistive devices  - Assist with transfers and ambulation using safe patient handling equipment as needed  - Ensure adequate protection for wounds/incisions during  mobilization  - Obtain PT/OT consults as needed  - Advance activity as appropriate  - Communicate ordered activity level and limitations with patient/family  Outcome: Progressing     Problem: NEUROLOGICAL - ADULT  Goal: Achieves stable or improved neurological status  Description: INTERVENTIONS  - Assess for and report changes in neurological status  - Initiate measures to prevent increased intracranial pressure  - Maintain blood pressure and fluid volume within ordered parameters to optimize cerebral perfusion and minimize risk of hemorrhage  - Monitor temperature, glucose, and sodium. Initiate appropriate interventions as ordered  Outcome: Progressing     Problem: Impaired Functional Mobility  Goal: Achieve highest/safest level of mobility/gait  Description: Interventions:  - Assess patient's functional ability and stability  - Promote increasing activity/tolerance for mobility and gait  - Educate and engage patient/family in tolerated activity level and precautions  - Recommend use of  RW for transfers and ambulation  Outcome: Progressing     Problem: Risk for Violence/Aggression  Goal: Absence of Violence/Aggression  Description: INTERVENTIONS:   - Identify precipitating factors for behavior   - Notify Charge RN/Provider   - Consider decreasing stimulation   - Consider distraction measures   - Consider discussion with provider regarding prn meds    - Consider SUSANNAH (Moderate Risk only)   - Consider Code Support (High Risk only)   - Consider room safety checks   - Consider restraints  Outcome: Progressing

## 2024-02-14 NOTE — PROGRESS NOTES
[facility]  Gastroenterology Progress Note    Raul Freed Jr. Patient Status:  Inpatient    1940 MRN [id number]   Location [facility] 5SW/SE Attending Jose D Carrillo,    Hosp Day # 6 PCP Rao Beyer MD     Subjective:  Raul Freed Jr. is a(n) 83 year old male.    Current complaints: No complaints.  No abdominal pain.  No bowel movement today.  No rectal pain today    Objective:  Blood pressure 152/73, pulse 73, temperature 98.5 °F (36.9 °C), temperature source Oral, resp. rate 18, height 5' 10\" (1.778 m), weight 226 lb (102.5 kg), SpO2 96%.  Respiratory: no labored breathing  CV: RRR  Abdomen: nondistended, soft, nontender  Extremities: no calf tenderness  Neurologic: Lucid    Labs:   Lab Results   Component Value Date    WBC 8.2 2024    HGB 11.5 2024    HCT 34.9 2024    .0 2024    CREATSERUM 0.97 2024    BUN 7 2024     2024    K 3.7 2024    K 3.7 2024     2024    CO2 28.0 2024     2024    CA 9.1 2024       Imaging:  MRI PELVIS (SOFT TISSUE) (W+WO) (CPT=72197)    Result Date: 2024  CONCLUSION:  Severely artifactually degraded examination. Within these parameters: 1. Extensive rectal edema with fluid collections suggesting intramural abscesses, possibly with intersphincteric fistula formation. These are difficult to characterize given the substantial motion artifact.  2. Lesser incidental findings as above.    Dictated by (CST): Mike Baum MD on 2024 at 1:53 PM     Finalized by (CST): Mike Baum MD on 2024 at 2:18 PM           Problem list:  Patient Active Problem List   Diagnosis    Essential hypertension, benign    Mixed hyperlipidemia due to type 2 diabetes mellitus  (HCC)    Obstructive sleep apnea (adult) (pediatric)    Vitamin D deficiency    AAA (abdominal aortic aneurysm) (HCC)    Diabetes mellitus type 2 in obese  (HCC)    Bilateral  sensorineural hearing loss    History of smoking 30 or more pack years    Severe obesity (BMI 35.0-35.9 with comorbidity)  (HCC)    Hiatal hernia with GERD without esophagitis    Benign non-nodular prostatic hyperplasia without lower urinary tract symptoms    Facet arthritis of cervical region    Centrilobular emphysema (HCC)    Interstitial lung disease (HCC)    Thoracic aortic atherosclerosis (HCC)    History of coronary artery stent placement    CAD in native artery    Spondylosis of cervical region without myelopathy or radiculopathy    Elevated ratio of cholesterol to high density lipoprotein (HDL)    Unspecified inflammatory spondylopathy, cervical region (HCC)    Acute kidney injury (HCC)    Proctocolitis    Perianal abscess    Anemia    Hyperglycemia    Leukocytosis    Abscess of anal and rectal regions    Diarrhea, unspecified type    Weakness       ASC GI Procedure Report     PATIENT NAME: Raul Freed  : 1940  MRN: [id number]  DATE OF OPERATION: 2022     PROCEDURE PERFORMED: Colonoscopy with biopsies, MAC     SURGEON: Anthony Baez MD      INDICATIONS:   Blood in stool        CONSENT: Informed consent was obtained prior to the procedure. Risks including but not limited to perforation, hemorrhage, risk of sedation, and the potential for missed lesions were all discussed with the patient. All questions were answered.      SEDATION: MAC  TOTAL MODERATE SEDATION TIME:n/a (deep sedation provided by anesthesia)  WITHDRAWAL TIME: 13 min  EXTENT OF EXAMINATION: cecum     PREPARATION: Fair     PREOPERATIVE DIAGNOSIS:    Blood in stool        POSTOPERATIVE DIAGNOSIS:   Colon polyp  Internal hemorrhoids        PROCEDURE: After a digital rectal examination was performed which revealed no masses, the Olympus CF-190 videocolonoscope was inserted into the anus and advanced along to the cecum without difficulty. The appendiceal orifice and ileocecal valve were both clearly identified. Careful  re-inspection of the mucosa was then obtained upon slow withdrawal of the endoscope. Multiple passes were performed in the ascending colon, hepatic flexure, splenic flexure and rectosigmoid junction. Retroflexion was performed in the rectum. The patient tolerated the procedure well, and there were no immediate complications.     FINDINGS:  The quality of the prep was fair.  A 4 mm sessile polyp was found in the transverse and was removed completely with cold forceps.  The colonic mucosa was otherwise normal appearing throughout the colon and rectum.  On retroflexion in the rectum, there were small internal hemorrhoids and no masses.      IMPRESSION:   Colon polyp  Internal hemorrhoids        RECOMMENDATIONS:  Follow up on biopsies.   Follow up colonoscopy interval will be determined based on pathology.     CC: Ron Poe MD                     Electronically signed by Anthony Patel MD at 2/28/2022 10:49 AM    Assessment/Plan:  83-year-old who was admitted last month with perirectal abscess.  Sent home on IV antibiotics but readmitted with worsening symptoms and CT suggested interval worsening of intramural rectal abscess.  C. difficile positive on readmission.  Pelvic MR limited by artifact.  Limited unprepped sigmoidoscopy yesterday showed anorectal junction ulcers but no evidence of colitis or pseudomembranes elsewhere.  Pathology currently pending.  CTs reviewed with radiology today.  Inflammation only seen in the rectum, remainder of the colon appears unremarkable.    Challenging diagnostic and management issues.  I do not think that C. difficile is the primary event here.  Crohn's disease with intramural abscess formation is possible.  Stercoral ulcerations less likely.  Most recent imaging does not demonstrate a drainable fluid collection.  Will ultimately benefit from complete colonoscopy with appropriate bowel prep when safe to do so.  Repeat pelvic CT at this time.    Trace Alvarado MD  FACG  2/12/2024  8:13 AM

## 2024-02-15 ENCOUNTER — APPOINTMENT (OUTPATIENT)
Dept: HEMATOLOGY/ONCOLOGY | Facility: HOSPITAL | Age: 84
End: 2024-02-15
Attending: INTERNAL MEDICINE
Payer: MEDICARE

## 2024-02-15 LAB
ANION GAP SERPL CALC-SCNC: 2 MMOL/L (ref 0–18)
BASOPHILS # BLD AUTO: 0.04 X10(3) UL (ref 0–0.2)
BASOPHILS NFR BLD AUTO: 0.5 %
BUN BLD-MCNC: 10 MG/DL (ref 9–23)
BUN/CREAT SERPL: 9.3 (ref 10–20)
CALCIUM BLD-MCNC: 9.2 MG/DL (ref 8.7–10.4)
CHLORIDE SERPL-SCNC: 110 MMOL/L (ref 98–112)
CO2 SERPL-SCNC: 27 MMOL/L (ref 21–32)
CREAT BLD-MCNC: 1.07 MG/DL
DEPRECATED RDW RBC AUTO: 48.5 FL (ref 35.1–46.3)
EGFRCR SERPLBLD CKD-EPI 2021: 69 ML/MIN/1.73M2 (ref 60–?)
EOSINOPHIL # BLD AUTO: 0.23 X10(3) UL (ref 0–0.7)
EOSINOPHIL NFR BLD AUTO: 3 %
ERYTHROCYTE [DISTWIDTH] IN BLOOD BY AUTOMATED COUNT: 13.6 % (ref 11–15)
GLUCOSE BLD-MCNC: 124 MG/DL (ref 70–99)
GLUCOSE BLDC GLUCOMTR-MCNC: 108 MG/DL (ref 70–99)
GLUCOSE BLDC GLUCOMTR-MCNC: 113 MG/DL (ref 70–99)
GLUCOSE BLDC GLUCOMTR-MCNC: 119 MG/DL (ref 70–99)
GLUCOSE BLDC GLUCOMTR-MCNC: 143 MG/DL (ref 70–99)
HCT VFR BLD AUTO: 33.3 %
HGB BLD-MCNC: 10.8 G/DL
IMM GRANULOCYTES # BLD AUTO: 0.13 X10(3) UL (ref 0–1)
IMM GRANULOCYTES NFR BLD: 1.7 %
LYMPHOCYTES # BLD AUTO: 1.79 X10(3) UL (ref 1–4)
LYMPHOCYTES NFR BLD AUTO: 23.1 %
MAGNESIUM SERPL-MCNC: 2 MG/DL (ref 1.6–2.6)
MCH RBC QN AUTO: 31.4 PG (ref 26–34)
MCHC RBC AUTO-ENTMCNC: 32.4 G/DL (ref 31–37)
MCV RBC AUTO: 96.8 FL
MONOCYTES # BLD AUTO: 1.26 X10(3) UL (ref 0.1–1)
MONOCYTES NFR BLD AUTO: 16.3 %
NEUTROPHILS # BLD AUTO: 4.3 X10 (3) UL (ref 1.5–7.7)
NEUTROPHILS # BLD AUTO: 4.3 X10(3) UL (ref 1.5–7.7)
NEUTROPHILS NFR BLD AUTO: 55.4 %
OSMOLALITY SERPL CALC.SUM OF ELEC: 288 MOSM/KG (ref 275–295)
PLATELET # BLD AUTO: 313 10(3)UL (ref 150–450)
POTASSIUM SERPL-SCNC: 3.5 MMOL/L (ref 3.5–5.1)
RBC # BLD AUTO: 3.44 X10(6)UL
SODIUM SERPL-SCNC: 139 MMOL/L (ref 136–145)
WBC # BLD AUTO: 7.8 X10(3) UL (ref 4–11)

## 2024-02-15 PROCEDURE — 83735 ASSAY OF MAGNESIUM: CPT | Performed by: HOSPITALIST

## 2024-02-15 PROCEDURE — 94799 UNLISTED PULMONARY SVC/PX: CPT

## 2024-02-15 PROCEDURE — 85025 COMPLETE CBC W/AUTO DIFF WBC: CPT | Performed by: HOSPITALIST

## 2024-02-15 PROCEDURE — 82962 GLUCOSE BLOOD TEST: CPT

## 2024-02-15 PROCEDURE — 80048 BASIC METABOLIC PNL TOTAL CA: CPT | Performed by: HOSPITALIST

## 2024-02-15 RX ORDER — MELATONIN
3 NIGHTLY PRN
Status: DISCONTINUED | OUTPATIENT
Start: 2024-02-15 | End: 2024-02-16

## 2024-02-15 RX ORDER — DOCUSATE SODIUM 100 MG/1
100 CAPSULE, LIQUID FILLED ORAL DAILY
Status: DISCONTINUED | OUTPATIENT
Start: 2024-02-15 | End: 2024-02-16

## 2024-02-15 RX ORDER — QUETIAPINE FUMARATE 25 MG/1
12.5 TABLET, FILM COATED ORAL NIGHTLY
Status: DISCONTINUED | OUTPATIENT
Start: 2024-02-15 | End: 2024-02-16

## 2024-02-15 NOTE — PROGRESS NOTES
[facility] Infectious Disease  Progress Note    Raul Freed Jr. Patient Status:  Inpatient    1940 MRN [id number]   Location [facility] 5SW/SE Attending Kevin Da Silva MD   Hosp Day # 7 PCP Rao Beyer MD     Subjective:  Patient seen/examined.  He is up in bed, comfortable.  Unprepped sigmoid scope with ulcers.  Concern for possible Crohn's disease with intramural abscess.  Improvement noted on CT.  Eventually will need a full colonoscopy.    Objective:  Blood pressure 157/74, pulse 78, temperature 97.7 °F (36.5 °C), temperature source Oral, resp. rate 16, height 5' 10\" (1.778 m), weight 226 lb (102.5 kg), SpO2 96%.    Intake/Output:    Intake/Output Summary (Last 24 hours) at 2/15/2024 1304  Last data filed at 2/15/2024 0907  Gross per 24 hour   Intake 595 ml   Output --   Net 595 ml       Physical Exam:  General: Awake, alert, non-tox, NAD.  HEENT:  Oropharynx clear, trachea ML.  Heart: RRR S1S2 no murmurs.  Lungs: Essentially CTA b/l, no rhonchi, rales, wheezes.  Abdomen: Soft, NT/ND.  BS present.  No organomegaly.  Extremity: No edema.  Neurological: No focal deficits.  Derm:  Warm, dry, free from rashes.    Lab Data Review:  Lab Results   Component Value Date    WBC 7.8 02/15/2024    HGB 10.8 02/15/2024    HCT 33.3 02/15/2024    .0 02/15/2024    CREATSERUM 1.07 02/15/2024    BUN 10 02/15/2024     02/15/2024    K 3.5 02/15/2024     02/15/2024    CO2 27.0 02/15/2024     02/15/2024    CA 9.2 02/15/2024    MG 2.0 02/15/2024      Cultures:   C.diff positive    Radiology:  CONCLUSION:   1. Persistent findings of proctitis with decrease in conspicuity of peripherally enhancing fluid collections previously noted along the left lateral and posterior aspects of the anorectal junction suggesting resolving abscesses.  Note that the larger crescentic intramural fluid collection questioned on the previous CT is no longer seen with  certainty.   2. Uncomplicated periampullary and distal colonic diverticulosis.   3. Stable 5.0 cm fusiform infrarenal abdominal aortic aneurysm.  Follow-up imaging in 3-6 months is recommended for abdominal aortic aneurysms of 5.0-5.5 cm diameter in asymptomatic individuals.  Each individual's clinical risk factors should be considered in evaluating these general guidelines. Also consider surgical or endovascular specialist consultation.   4. Lesser incidental findings as above.      Assessment and Plan:     Syndrome of weakness, diarrhea, and known proctocolitis with new leukocytosis and concern for interval worsening  - CT with possibly several communicating abscesses and initial intramural abscess worsening  - Unprepped sigmoid scope with deep ulcers, associated ?abscesses  - IV zosyn ongoing while here     2.  Recent admission in January 2024 for sepsis in this gentleman presenting with fevers, leukocytosis, N/V/D  - CT at that time with evidence of severe proctocolitis with complex-appearing L lateral fluid/gas  - Seen by Dr. Shelby - consistent with a small intramural abscess in the L posterior rectal wall not amenable to drainage previously     3.  Acute on chronic diarrhea - now c.diff positive  - Patient does have a h/o chronic loose stools so unclear if this is much worse than baseline  - C.diff did return positive this admission  - p.o. vancomycin day #8 ongoing     4.  Leukocytosis due to the above  - At 7K today and normalized     5.  Weakness and some feeling off balance  - This could be because of evolving sepsis or could possibly be drug effect due to carbapenem class  - IV zosyn as above      4.  Disposition - inpatient.  Continue IV zosyn and p.o. vancomycin as Rx.  At risk for recurrent/protracted c.diff complications so anticipate a more prolonged course.  Eventual full colonoscopy will be needed. New stop date and possible new choice of antibiotic agents to be determined.  Will follow.    Nupur GALLAGHER  DO [facility]  [phone]    2/15/2024  1:04 PM

## 2024-02-15 NOTE — PROGRESS NOTES
[facility]  Gastroenterology Progress Note    Raul Freed Jr. Patient Status:  Inpatient    1940 MRN [id number]   Location [facility] 5SW/SE Attending Jose D Carrillo, DO   Hosp Day # 7 PCP Rao Beyer MD     Subjective:  Raul Freed Jr. is a(n) 83 year old male.    Current complaints: Slept well last night.  No complaints.  No abdominal pain.  No bowel movement today.  No rectal pain today    Objective:  Blood pressure 112/51, pulse 70, temperature 98.3 °F (36.8 °C), temperature source Axillary, resp. rate 18, height 5' 10\" (1.778 m), weight 226 lb (102.5 kg), SpO2 94%.  Respiratory: no labored breathing  CV: RRR  Abdomen: nondistended, soft, nontender  Extremities: no calf tenderness  Neurologic: Lucid    Labs:   Lab Results   Component Value Date    WBC 7.8 02/15/2024    HGB 10.8 02/15/2024    HCT 33.3 02/15/2024    .0 02/15/2024    CREATSERUM 1.07 02/15/2024    BUN 10 02/15/2024     02/15/2024    K 3.5 02/15/2024     02/15/2024    CO2 27.0 02/15/2024     02/15/2024    CA 9.2 02/15/2024    MG 2.0 02/15/2024       Imaging:  CT ABDOMEN+PELVIS(CONTRAST ONLY)(CPT=74177)    Result Date: 2024  CONCLUSION:  1. Persistent findings of proctitis with decrease in conspicuity of peripherally enhancing fluid collections previously noted along the left lateral and posterior aspects of the anorectal junction suggesting resolving abscesses.  Note that the larger crescentic intramural fluid collection questioned on the previous CT is no longer seen with certainty. 2. Uncomplicated periampullary and distal colonic diverticulosis. 3. Stable 5.0 cm fusiform infrarenal abdominal aortic aneurysm.  Follow-up imaging in 3-6 months is recommended for abdominal aortic aneurysms of 5.0-5.5 cm diameter in asymptomatic individuals.  Each individual's clinical risk factors should be considered in evaluating these general guidelines. Also consider surgical or endovascular  specialist consultation. Source:  Shahid F, Shelton G, Mary Carmen M, Aayush EK,  Michelle LL. Managing Incidental Findings on Abdominal and Pelvic CT and MRI, Part 2: White Paper of the ACR Incidental Findings Committee II on Vascular Findings.  J Am Mikayla Radiol 2013;10:789-794. 4. Lesser incidental findings as above.    Dictated by (CST): Aaron Orozco MD on 2024 at 11:33 AM     Finalized by (CST): Aaron Orozco MD on 2024 at 11:49 AM           Problem list:  Patient Active Problem List   Diagnosis    Essential hypertension, benign    Mixed hyperlipidemia due to type 2 diabetes mellitus  (HCC)    Obstructive sleep apnea (adult) (pediatric)    Vitamin D deficiency    AAA (abdominal aortic aneurysm) (HCC)    Diabetes mellitus type 2 in obese  (HCC)    Bilateral sensorineural hearing loss    History of smoking 30 or more pack years    Severe obesity (BMI 35.0-35.9 with comorbidity)  (HCC)    Hiatal hernia with GERD without esophagitis    Benign non-nodular prostatic hyperplasia without lower urinary tract symptoms    Facet arthritis of cervical region    Centrilobular emphysema (HCC)    Interstitial lung disease (HCC)    Thoracic aortic atherosclerosis (HCC)    History of coronary artery stent placement    CAD in native artery    Spondylosis of cervical region without myelopathy or radiculopathy    Elevated ratio of cholesterol to high density lipoprotein (HDL)    Unspecified inflammatory spondylopathy, cervical region (HCC)    Acute kidney injury (HCC)    Proctocolitis    Perianal abscess    Anemia    Hyperglycemia    Leukocytosis    Abscess of anal and rectal regions    Diarrhea, unspecified type    Weakness       ASC GI Procedure Report     PATIENT NAME: Raul Freed  : 1940  MRN: [id number]  DATE OF OPERATION: 2022     PROCEDURE PERFORMED: Colonoscopy with biopsies, MAC     SURGEON: Anthony Baez MD      INDICATIONS:   Blood in stool        CONSENT: Informed consent was obtained  prior to the procedure. Risks including but not limited to perforation, hemorrhage, risk of sedation, and the potential for missed lesions were all discussed with the patient. All questions were answered.      SEDATION: MAC  TOTAL MODERATE SEDATION TIME:n/a (deep sedation provided by anesthesia)  WITHDRAWAL TIME: 13 min  EXTENT OF EXAMINATION: cecum     PREPARATION: Fair     PREOPERATIVE DIAGNOSIS:    Blood in stool        POSTOPERATIVE DIAGNOSIS:   Colon polyp  Internal hemorrhoids        PROCEDURE: After a digital rectal examination was performed which revealed no masses, the Olympus CF-190 videocolonoscope was inserted into the anus and advanced along to the cecum without difficulty. The appendiceal orifice and ileocecal valve were both clearly identified. Careful re-inspection of the mucosa was then obtained upon slow withdrawal of the endoscope. Multiple passes were performed in the ascending colon, hepatic flexure, splenic flexure and rectosigmoid junction. Retroflexion was performed in the rectum. The patient tolerated the procedure well, and there were no immediate complications.     FINDINGS:  The quality of the prep was fair.  A 4 mm sessile polyp was found in the transverse and was removed completely with cold forceps.  The colonic mucosa was otherwise normal appearing throughout the colon and rectum.  On retroflexion in the rectum, there were small internal hemorrhoids and no masses.      IMPRESSION:   Colon polyp  Internal hemorrhoids        RECOMMENDATIONS:  Follow up on biopsies.   Follow up colonoscopy interval will be determined based on pathology.     CC: Ron Poe MD                     Electronically signed by Anthony Patel MD at 2/28/2022 10:49 AM    Assessment/Plan:  83-year-old who was admitted last month with perirectal abscess.  Sent home on IV antibiotics but readmitted with worsening symptoms and CT suggested interval worsening of intramural rectal abscess.  C. difficile  positive on readmission.  Unprepped sigmoidoscopy revealed very distal somewhat deep rectal ulcers but no colitis elsewhere.  Pathology shows acute and chronic inflammation.  Repeat CT yesterday reviewed with radiology.  Improved appearance of rectal wall thickening without obvious fluid collection and with now only trace intramural air where air pockets were seen.  Impression: Intrarectal fluid collections likely to have originated from deep ulcerations in the distal rectum near the dentate line.  No evidence of colitis elsewhere thus far on imaging or endoscopy.  Etiologic possibilities include Crohn's disease (albeit with no evidence of this elsewhere in an 83-year-old), stercoral ulceration in a somewhat atypical location, unusual location for ischemic injury.  Recommendation: Given the improvement seen on today's CT scan would continue treatment with IV antibiotics.  Complete treatment for C. difficile.  Repeat CT scan of the rectum in 1 to 2 weeks.  Eventual full colonoscopy with appropriate bowel prep.  No apparent need to limit oral intake at this time as he has a benign examination and normal white count.    Trace Alvarado MD [facility]  2/12/2024  8:13 AM

## 2024-02-15 NOTE — PROGRESS NOTES
[facility] AND CARE   Progress Note  -  Raul Freed Jr. Patient Status:  Inpatient    1940 MRN [id number]   Location [facility] 5SW/SE Attending Kevin Da Silva MD   Hosp Day # 7 PCP Rao Beyer MD     PCP: Rao Beyer MD      SEE ATTENDING NOTE AT BOTTOM OF PAGE    Is this a shared or split note between Advanced Practice Provider and Physician? Yes    Assessment and Plan:    Patient is a 83 year old male with PMH sig for CAD, DM2, HTN, HL, and recent admission for proctocolitis with perianal abscess discharged on invanz presenting with diarrhea and worsening weakness found to have worsening of a severe proctocolitis versus a large intramural rectal abscess vs other, currently  undergoing evaluation with imaging and scope as per GI and surgery. Hospital course with metabolic encephalopathy attributed to infection.  Patient will need follow-up of noted lung nodule on imaging and will need dedicated CT of the chest, in addition patient was also noted to have emphysema on imaging,  see below for details     Perirectal Abscess with Sterocoral Ulceration vs Crohns vs ischemic injury  - recent admission for same dc on invanz until   -afebrile. WBC 19.3 on admission now down to 8.2   - CT A/P with worsening large intramural rectal abscess  -MRI pelvis suggesting intramural abscesses with possible fistula formation  -flex sig  with distal rectal ulcers and diverticulosis. Pathology with acute on chronic inflammation   -CT with improvement  -abx- zosyn, will need new picc and final abx plan, pt currently at infusion center and if abx need more than daily he will need to go to [facility]  -plans for outpt CT and colonoscopy   -as per Dr Shelby, GI and ID     Metabolic Encephalopathy-improved   -spoke with son Raul who states confusion has been occurring for well over a week, patient is seeing things as well as confusing facts of information.  Confusion can occur regardless of time.  Son does state  that patient does have moments of answering questions appropriately.  He expressed understanding that this is likely related to infection.  -CT head on admission negative for acute process   -MRI brain negative for acute process, noted moderate microvascular white matter changes  -CTA brain and carotids- moderate left carotid atherosclerosis, mild right carotid atherosclerosis, mild-moderate right vertebral ostial stenosis   -2/15 decrease seroquel to 12. 5 mg and change melatonin to prn as pt seems very sleep toarlettea     THONG dif colitis  -vanco, day #7    Hx Constipation  -per wife takes miralex and colace daily,  -will resume colace daily for now and reassess     Emphysema  Lung Nodule, RUL  -CTA brain- . Emphysema.  Noncalcified right upper lobe lung nodules, which measure up to 6 mm.   -outpt CT chest for completeness       CAD  HLD  Infrarenal abdominal aortic aneurysm measuring up to 5.0 cm.   HTN  -noted on CT A/P   -continue toprol.   -norvasc and losartan-hydrochlorothiazide was stopped last admission, norvasc was restarted with elevated bp   -holding ASA for possible additional procedures  -continue lipitor   -appt with Dr Cates 2/27 as previously scheduled      DM2- Diet Controlled   -ssi prn    DEANGELO  -not compliant with CPAP      GOC  -full code  -POA wife but needs help with decision and speaks with Son Raul who is local and another brother in [place]    MA/ACO Reach  -Re- Entry: NO  -Consults: gi, surgery and ID   -Discharge Needs: continued IV abx at infusion center   -Appointments:[ ] appt Rao Beyer MD                          [ x] ID-2/19 with NP Terrell mejia in am  -diet-LFD    Prophy  -SCD  -heparin    Dispo  -pending clinical coarse  -IF unable to leave message for jostin Carter on cell [phone], grandson Raul is at [phone]  PCP: Rao Beyer MD      Concerns regarding plan of care were discussed with patient. Patient agrees with plan as detailed above.  Discussed plan of care with Dr. Da Silva     Note: This chart was prepared using voice recognition software and may contain unintended word substitution errors.      Christy Swartz RN, NP   [facility] Hospitalist Team  Contact via [employer] (Check Availability)    SUBJECTIVE:   Was sleeping in chair. Per nurse pt was very sleepy this am. Pt awoke when I went in the room. He is calm and cognitively improved.   Had small bm this am.     OBJECTIVE:   Blood pressure 157/74, pulse 78, temperature 97.7 °F (36.5 °C), temperature source Oral, resp. rate 16, height 5' 10\" (1.778 m), weight 226 lb (102.5 kg), SpO2 96%.    GENERAL: no apparent distress, overweight  NEURO: A/A, Ox3  RESP: non labored, CTA  CARDIO: Regular, no murmur  ABD: soft, NT, ND, BS+  EXTREMITIES: no edema    DIAGNOSTIC DATA:   Labs:     Recent Labs   Lab 02/10/24  0510 02/12/24  0606 02/13/24  0527 02/14/24  0603 02/15/24  0541   WBC 17.9* 8.5 6.7 8.2 7.8   HGB 10.4* 10.7* 10.8* 11.5* 10.8*   MCV 96.9 94.8 94.9 96.4 96.8   .0 283.0 284.0 327.0 313.0       Recent Labs   Lab 02/10/24  0510 02/11/24  0530 02/12/24  0606 02/13/24  0527 02/14/24  0603 02/15/24  0541     --  142 144 145 139   K 3.2* 3.5 3.3* 3.3*  3.3* 3.7  3.7 3.5     --  111 110 109 110   CO2 26.0  --  25.0 26.0 28.0 27.0   BUN 9  --  6* <5* 7* 10   CREATSERUM 0.91  --  0.89 0.90 0.97 1.07   CA 8.1*  --  8.9 9.1 9.1 9.2   MG  --   --   --  1.9  --  2.0   *  --  112* 115* 104* 124*       No results for input(s): \"ALT\", \"AST\", \"ALB\", \"AMYLASE\", \"LIPASE\", \"LDH\" in the last 168 hours.    Invalid input(s): \"ALPHOS\", \"TBIL\", \"DBIL\", \"TPROT\"      Recent Labs   Lab 02/14/24  0729 02/14/24  1212 02/14/24  1728 02/14/24  2135 02/15/24  0933   PGLU 99 107* 114* 120* 113*       No results for input(s): \"TROP\" in the last 168 hours.        MEDICATIONS       QUEtiapine  12.5 mg Oral Nightly    heparin  5,000 Units Subcutaneous Q8H MATT    amLODIPine  10 mg  Oral Daily    melatonin  3 mg Oral Nightly    piperacillin-tazobactam  4.5 g Intravenous Q8H    allopurinol  100 mg Oral Daily    atorvastatin  20 mg Oral Daily    metoprolol succinate ER  100 mg Oral Daily    pantoprazole  20 mg Oral QAM AC    insulin aspart  1-5 Units Subcutaneous TID CC    vancomycin  125 mg Oral QID         simethicone, acetaminophen, ondansetron, polyethylene glycol (PEG 3350), sennosides, bisacodyl, fleet enema, glucose **OR** glucose **OR** glucose-vitamin C **OR** dextrose **OR** glucose **OR** glucose **OR** glucose-vitamin C    Christy Swartz NP      IMAGING     CT ABDOMEN+PELVIS(CONTRAST ONLY)(CPT=74177)    Result Date: 2/14/2024  CONCLUSION:  1. Persistent findings of proctitis with decrease in conspicuity of peripherally enhancing fluid collections previously noted along the left lateral and posterior aspects of the anorectal junction suggesting resolving abscesses.  Note that the larger crescentic intramural fluid collection questioned on the previous CT is no longer seen with certainty. 2. Uncomplicated periampullary and distal colonic diverticulosis. 3. Stable 5.0 cm fusiform infrarenal abdominal aortic aneurysm.  Follow-up imaging in 3-6 months is recommended for abdominal aortic aneurysms of 5.0-5.5 cm diameter in asymptomatic individuals.  Each individual's clinical risk factors should be considered in evaluating these general guidelines. Also consider surgical or endovascular specialist consultation. Source:  Shahid F, Shelton G, Mary Carmen M, Aayush EK,  GILES Martinez. Managing Incidental Findings on Abdominal and Pelvic CT and MRI, Part 2: White Paper of the ACR Incidental Findings Committee II on Vascular Findings.  J Am Mikayla Radiol 2013;10:789-794. 4. Lesser incidental findings as above.    Dictated by (CST): Aaron Orozco MD on 2/14/2024 at 11:33 AM     Finalized by (CST): Aaron Orozco MD on 2/14/2024 at 11:49 AM          MRI PELVIS (SOFT TISSUE) (W+WO)  (CPT=72197)    Result Date: 2/12/2024  CONCLUSION:  Severely artifactually degraded examination. Within these parameters: 1. Extensive rectal edema with fluid collections suggesting intramural abscesses, possibly with intersphincteric fistula formation. These are difficult to characterize given the substantial motion artifact.  2. Lesser incidental findings as above.    Dictated by (CST): Mike Baum MD on 2/12/2024 at 1:53 PM     Finalized by (CST): Mike Baum MD on 2/12/2024 at 2:18 PM             SEE ATTENDING NOTE BELOW:   Patient seen and examined independently.  Discussed with APN and agree with note above.    S: Feeling better, no abdominal pain, no fevers, no nausea, no diarrhea,    Objective:  /74 (BP Location: Right arm)   Pulse 78   Temp 97.7 °F (36.5 °C) (Oral)   Resp 16   Ht 5' 10\" (1.778 m)   Wt 226 lb (102.5 kg)   SpO2 96%   BMI 32.43 kg/m²     Gen: No acute distress, alert and oriented x3  Neck Supple,    Pulm: Lungs clear, normal respiratory effort,   CV: Heart with regular rate and rhythm,    Abd: Abdomen soft, nontender, nondistended,   MSK:  no clubbing, no cyanosis.  No Lower extremity edema  Skin: no rashes or lesions, well perfused  Psych: mood stable, cooperative  Neuro: no focal deficits    Assessment and Plan  Perirectal Abscess with Sterocoral Ulceration vs Crohns vs ischemic injury  - recent admission for same dc on invanz until 2/19  -afebrile. WBC 19.3 on admission now down to 8.2   - CT A/P with worsening large intramural rectal abscess  -MRI pelvis suggesting intramural abscesses with possible fistula formation  -flex sig  with distal rectal ulcers and diverticulosis. Pathology with acute on chronic inflammation   -CT with improvement  -abx- zosyn, will need new picc and final abx plan, pt currently at infusion center and if abx need more than daily he will need to go to [facility]  -plans for outpt CT and colonoscopy   -as per Dr Shelby, GI and ID     Metabolic  Encephalopathy-improved   -spoke with son Raul who states confusion has been occurring for well over a week, patient is seeing things as well as confusing facts of information.  Confusion can occur regardless of time.  Son does state that patient does have moments of answering questions appropriately.  He expressed understanding that this is likely related to infection.  -CT head on admission negative for acute process   -MRI brain negative for acute process, noted moderate microvascular white matter changes  -CTA brain and carotids- moderate left carotid atherosclerosis, mild right carotid atherosclerosis, mild-moderate right vertebral ostial stenosis   -2/15 decrease seroquel to 12. 5 mg and change melatonin to prn as pt seems very sleep todya     C dif colitis  -vanco, day #7    Hx Constipation  -per wife takes miralex and colace daily,  -will resume colace daily for now and reassess     Emphysema  Lung Nodule, RUL  -CTA brain- . Emphysema.  Noncalcified right upper lobe lung nodules, which measure up to 6 mm.   -outpt CT chest for completeness       CAD  HLD  Infrarenal abdominal aortic aneurysm measuring up to 5.0 cm.   HTN  -noted on CT A/P   -continue toprol.   -norvasc and losartan-hydrochlorothiazide was stopped last admission, norvasc was restarted with elevated bp   -holding ASA for possible additional procedures  -continue lipitor   -appt with Dr Cates 2/27 as previously scheduled      DM2- Diet Controlled   -ssi prn    DEANGELO  -not compliant with CPAP        Rest as above with above    Kevin Da Silva MD

## 2024-02-15 NOTE — CM/SW NOTE
Expected Discharge Plan:    Destination: RESIDENTIAL Home with Home Health Care:  TBD IV ABX INFUSION      Current Barriers to d/c: Medical Clearance;  Pt/Family aware of plan:   YES-WIFE    [facility] Staff aware of dc plan: Patient discussed in care rounds.  PER ID NOTE: Sigmoid scope today to assess abscess/fistula further. New stop date and possible new choice of antibiotic agents to be determined.       Discharge Planner Follow up Needed:  OUTPT IV ABX     DC planner/CM to remain available for support and/or discharge planning.    Shaista Larson RN, [facility]  DC PLANNER / RN Case Manager  Ext.  [phone]

## 2024-02-15 NOTE — PLAN OF CARE
Pt up ambulating to bathroom x1 assist w/ walker. No complaints of pain. CPAP worn overnight. IV abx continued. Safety precautions maintained and in place. Plan for home w/ HH pending medical clearance.     Problem: Patient Centered Care  Goal: Patient preferences are identified and integrated in the patient's plan of care  Description: Interventions:  - What would you like us to know as we care for you? I was here recently and discharged with IV antibiotics.    - Provide timely, complete, and accurate information to patient/family  - Incorporate patient and family knowledge, values, beliefs, and cultural backgrounds into the planning and delivery of care  - Encourage patient/family to participate in care and decision-making at the level they choose  - Honor patient and family perspectives and choices  Outcome: Progressing     Problem: Patient/Family Goals  Goal: Patient/Family Long Term Goal  Description: Patient's Long Term Goal: to return home    Interventions:  - Further testing  - Monitor labs and vital signs  - Medication compliance  - Follow provider recommendations  - See additional Care Plan goals for specific interventions  Outcome: Progressing  Goal: Patient/Family Short Term Goal  Description: Patient's Short Term Goal: no more diarrhea    Interventions:   - Monitor intake and output  - Medication compliance  - Follow provider recommendations  - See additional Care Plan goals for specific interventions  Outcome: Progressing     Problem: RISK FOR INFECTION - ADULT  Goal: Absence of fever/infection during anticipated neutropenic period  Description: INTERVENTIONS  - Monitor WBC  - Administer growth factors as ordered  - Implement neutropenic guidelines  Outcome: Progressing     Problem: SAFETY ADULT - FALL  Goal: Free from fall injury  Description: INTERVENTIONS:  - Assess pt frequently for physical needs  - Identify cognitive and physical deficits and behaviors that affect risk of falls.  - [place] fall  precautions as indicated by assessment.  - Educate pt/family on patient safety including physical limitations  - Instruct pt to call for assistance with activity based on assessment  - Modify environment to reduce risk of injury  - Provide assistive devices as appropriate  - Consider OT/PT consult to assist with strengthening/mobility  - Encourage toileting schedule  Outcome: Progressing     Problem: RESPIRATORY - ADULT  Goal: Achieves optimal ventilation and oxygenation  Description: INTERVENTIONS:  - Assess for changes in respiratory status  - Assess for changes in mentation and behavior  - Position to facilitate oxygenation and minimize respiratory effort  - Oxygen supplementation based on oxygen saturation or ABGs  - Provide Smoking Cessation handout, if applicable  - Encourage broncho-pulmonary hygiene including cough, deep breathe, Incentive Spirometry  - Assess the need for suctioning and perform as needed  - Assess and instruct to report SOB or any respiratory difficulty  - Respiratory Therapy support as indicated  - Manage/alleviate anxiety  - Monitor for signs/symptoms of CO2 retention  Outcome: Progressing     Problem: METABOLIC/FLUID AND ELECTROLYTES - ADULT  Goal: Glucose maintained within prescribed range  Description: INTERVENTIONS:  - Monitor Blood Glucose as ordered  - Assess for signs and symptoms of hyperglycemia and hypoglycemia  - Administer ordered medications to maintain glucose within target range  - Assess barriers to adequate nutritional intake and initiate nutrition consult as needed  - Instruct patient on self management of diabetes  Outcome: Progressing     Problem: MUSCULOSKELETAL - ADULT  Goal: Return mobility to safest level of function  Description: INTERVENTIONS:  - Assess patient stability and activity tolerance for standing, transferring and ambulating w/ or w/o assistive devices  - Assist with transfers and ambulation using safe patient handling equipment as needed  - Ensure  adequate protection for wounds/incisions during mobilization  - Obtain PT/OT consults as needed  - Advance activity as appropriate  - Communicate ordered activity level and limitations with patient/family  Outcome: Progressing     Problem: NEUROLOGICAL - ADULT  Goal: Achieves stable or improved neurological status  Description: INTERVENTIONS  - Assess for and report changes in neurological status  - Initiate measures to prevent increased intracranial pressure  - Maintain blood pressure and fluid volume within ordered parameters to optimize cerebral perfusion and minimize risk of hemorrhage  - Monitor temperature, glucose, and sodium. Initiate appropriate interventions as ordered  Outcome: Progressing     Problem: Impaired Functional Mobility  Goal: Achieve highest/safest level of mobility/gait  Description: Interventions:  - Assess patient's functional ability and stability  - Promote increasing activity/tolerance for mobility and gait  - Educate and engage patient/family in tolerated activity level and precautions  - Recommend use of  RW for transfers and ambulation  Outcome: Progressing     Problem: Risk for Violence/Aggression  Goal: Absence of Violence/Aggression  Description: INTERVENTIONS:   - Identify precipitating factors for behavior   - Notify Charge RN/Provider   - Consider decreasing stimulation   - Consider distraction measures   - Consider discussion with provider regarding prn meds    - Consider SUSANNAH (Moderate Risk only)   - Consider Code Support (High Risk only)   - Consider room safety checks   - Consider restraints  Outcome: Progressing

## 2024-02-16 ENCOUNTER — APPOINTMENT (OUTPATIENT)
Dept: GENERAL RADIOLOGY | Facility: HOSPITAL | Age: 84
End: 2024-02-16
Attending: INTERNAL MEDICINE
Payer: MEDICARE

## 2024-02-16 ENCOUNTER — APPOINTMENT (OUTPATIENT)
Dept: PICC SERVICES | Facility: HOSPITAL | Age: 84
End: 2024-02-16
Attending: INTERNAL MEDICINE
Payer: MEDICARE

## 2024-02-16 ENCOUNTER — APPOINTMENT (OUTPATIENT)
Dept: HEMATOLOGY/ONCOLOGY | Facility: HOSPITAL | Age: 84
End: 2024-02-16
Attending: INTERNAL MEDICINE
Payer: MEDICARE

## 2024-02-16 VITALS
WEIGHT: 226 LBS | BODY MASS INDEX: 32.35 KG/M2 | TEMPERATURE: 98 F | SYSTOLIC BLOOD PRESSURE: 137 MMHG | HEIGHT: 70 IN | RESPIRATION RATE: 18 BRPM | OXYGEN SATURATION: 94 % | HEART RATE: 78 BPM | DIASTOLIC BLOOD PRESSURE: 61 MMHG

## 2024-02-16 PROBLEM — R53.1 WEAKNESS: Status: RESOLVED | Noted: 2024-02-08 | Resolved: 2024-02-16

## 2024-02-16 PROBLEM — R19.7 DIARRHEA, UNSPECIFIED TYPE: Status: RESOLVED | Noted: 2024-02-08 | Resolved: 2024-02-16

## 2024-02-16 PROBLEM — D72.829 LEUKOCYTOSIS: Status: RESOLVED | Noted: 2024-02-08 | Resolved: 2024-02-16

## 2024-02-16 PROBLEM — R73.9 HYPERGLYCEMIA: Status: RESOLVED | Noted: 2024-02-08 | Resolved: 2024-02-16

## 2024-02-16 PROBLEM — D64.9 ANEMIA: Status: RESOLVED | Noted: 2024-02-08 | Resolved: 2024-02-16

## 2024-02-16 LAB
ANION GAP SERPL CALC-SCNC: 7 MMOL/L (ref 0–18)
BASOPHILS # BLD AUTO: 0.04 X10(3) UL (ref 0–0.2)
BASOPHILS NFR BLD AUTO: 0.6 %
BUN BLD-MCNC: 9 MG/DL (ref 9–23)
BUN/CREAT SERPL: 8.7 (ref 10–20)
CALCIUM BLD-MCNC: 9.4 MG/DL (ref 8.7–10.4)
CHLORIDE SERPL-SCNC: 108 MMOL/L (ref 98–112)
CO2 SERPL-SCNC: 29 MMOL/L (ref 21–32)
CREAT BLD-MCNC: 1.03 MG/DL
DEPRECATED RDW RBC AUTO: 47.3 FL (ref 35.1–46.3)
EGFRCR SERPLBLD CKD-EPI 2021: 72 ML/MIN/1.73M2 (ref 60–?)
EOSINOPHIL # BLD AUTO: 0.28 X10(3) UL (ref 0–0.7)
EOSINOPHIL NFR BLD AUTO: 4.4 %
ERYTHROCYTE [DISTWIDTH] IN BLOOD BY AUTOMATED COUNT: 13.4 % (ref 11–15)
GLUCOSE BLD-MCNC: 107 MG/DL (ref 70–99)
GLUCOSE BLDC GLUCOMTR-MCNC: 119 MG/DL (ref 70–99)
GLUCOSE BLDC GLUCOMTR-MCNC: 123 MG/DL (ref 70–99)
HCT VFR BLD AUTO: 35.1 %
HGB BLD-MCNC: 11.7 G/DL
IMM GRANULOCYTES # BLD AUTO: 0.14 X10(3) UL (ref 0–1)
IMM GRANULOCYTES NFR BLD: 2.2 %
LYMPHOCYTES # BLD AUTO: 1.95 X10(3) UL (ref 1–4)
LYMPHOCYTES NFR BLD AUTO: 30.6 %
MCH RBC QN AUTO: 31.9 PG (ref 26–34)
MCHC RBC AUTO-ENTMCNC: 33.3 G/DL (ref 31–37)
MCV RBC AUTO: 95.6 FL
MONOCYTES # BLD AUTO: 0.87 X10(3) UL (ref 0.1–1)
MONOCYTES NFR BLD AUTO: 13.7 %
NEUTROPHILS # BLD AUTO: 3.09 X10 (3) UL (ref 1.5–7.7)
NEUTROPHILS # BLD AUTO: 3.09 X10(3) UL (ref 1.5–7.7)
NEUTROPHILS NFR BLD AUTO: 48.5 %
OSMOLALITY SERPL CALC.SUM OF ELEC: 297 MOSM/KG (ref 275–295)
PLATELET # BLD AUTO: 320 10(3)UL (ref 150–450)
POTASSIUM SERPL-SCNC: 3.5 MMOL/L (ref 3.5–5.1)
RBC # BLD AUTO: 3.67 X10(6)UL
SODIUM SERPL-SCNC: 144 MMOL/L (ref 136–145)
WBC # BLD AUTO: 6.4 X10(3) UL (ref 4–11)

## 2024-02-16 PROCEDURE — 80048 BASIC METABOLIC PNL TOTAL CA: CPT | Performed by: NURSE PRACTITIONER

## 2024-02-16 PROCEDURE — 71045 X-RAY EXAM CHEST 1 VIEW: CPT | Performed by: INTERNAL MEDICINE

## 2024-02-16 PROCEDURE — 82962 GLUCOSE BLOOD TEST: CPT

## 2024-02-16 PROCEDURE — 02HV33Z INSERTION OF INFUSION DEVICE INTO SUPERIOR VENA CAVA, PERCUTANEOUS APPROACH: ICD-10-PCS | Performed by: STUDENT IN AN ORGANIZED HEALTH CARE EDUCATION/TRAINING PROGRAM

## 2024-02-16 PROCEDURE — 36569 INSJ PICC 5 YR+ W/O IMAGING: CPT

## 2024-02-16 PROCEDURE — 85025 COMPLETE CBC W/AUTO DIFF WBC: CPT | Performed by: NURSE PRACTITIONER

## 2024-02-16 PROCEDURE — 94799 UNLISTED PULMONARY SVC/PX: CPT

## 2024-02-16 RX ORDER — VANCOMYCIN HYDROCHLORIDE 50 MG/ML
KIT ORAL
Qty: 130 ML | Refills: 0 | Status: SHIPPED | OUTPATIENT
Start: 2024-02-16 | End: 2024-03-07

## 2024-02-16 RX ORDER — LIDOCAINE HYDROCHLORIDE 10 MG/ML
5 INJECTION, SOLUTION EPIDURAL; INFILTRATION; INTRACAUDAL; PERINEURAL
Status: COMPLETED | OUTPATIENT
Start: 2024-02-16 | End: 2024-02-16

## 2024-02-16 RX ORDER — PSEUDOEPHEDRINE HCL 30 MG
100 TABLET ORAL DAILY
Status: SHIPPED | COMMUNITY
Start: 2024-02-17

## 2024-02-16 RX ORDER — ACETAMINOPHEN 500 MG
500 TABLET ORAL EVERY 4 HOURS PRN
Status: SHIPPED | COMMUNITY
Start: 2024-02-16

## 2024-02-16 RX ORDER — MELATONIN
Status: SHIPPED | COMMUNITY
Start: 2024-02-16

## 2024-02-16 RX ORDER — QUETIAPINE FUMARATE 25 MG/1
12.5 TABLET, FILM COATED ORAL NIGHTLY
Status: SHIPPED | COMMUNITY
Start: 2024-02-16

## 2024-02-16 RX ORDER — POLYETHYLENE GLYCOL 3350 17 G/17G
17 POWDER, FOR SOLUTION ORAL DAILY PRN
Status: SHIPPED | COMMUNITY
Start: 2024-02-16

## 2024-02-16 NOTE — PROGRESS NOTES
[facility] Infectious Disease  Progress Note    Raul Freed Jr. Patient Status:  Inpatient    1940 MRN [id number]   Location [facility] 5SW/SE Attending Kerrie Guzman, DO   Hosp Day # 8 PCP Rao Beyer MD     Subjective:    Patient seen and examined, wife at bedside. He is tolerating the IV abx without any issues. Still some diarrhea.    Review of Systems:  Review of systems reviewed and negative except as mentioned    Objective:  Blood pressure 137/61, pulse 78, temperature 97.8 °F (36.6 °C), temperature source Oral, resp. rate 18, height 5' 10\" (1.778 m), weight 226 lb (102.5 kg), SpO2 94%.      Physical Exam:  General: Awake, alert, non-tox, NAD.  HEENT:  Oropharynx clear, trachea ML.  Heart: RRR S1S2 no murmurs.  Lungs: Essentially CTA b/l, no rhonchi, rales, wheezes.  Abdomen: Soft, NT/ND.  BS present.    Extremity: No edema.  Neurological: No focal deficits.  Derm:  Warm, dry, free from rashes.  Right arm PICC    Lab Data Review:  Lab Results   Component Value Date    WBC 6.4 2024    HGB 11.7 2024    HCT 35.1 2024    .0 2024    CREATSERUM 1.03 2024    BUN 9 2024     2024    K 3.5 2024     2024    CO2 29.0 2024     2024    CA 9.4 2024        Cultures:  Hospital Encounter on 24   1. Blood Culture     Status: None    Collection Time: 24  1:14 PM    Specimen: Blood,peripheral   Result Value Ref Range    Blood Culture Result No Growth 5 Days N/A        Radiology:    CT ABDOMEN+PELVIS(CONTRAST ONLY)(CPT=74177)    Result Date: 2024  CONCLUSION:  1. Persistent findings of proctitis with decrease in conspicuity of peripherally enhancing fluid collections previously noted along the left lateral and posterior aspects of the anorectal junction suggesting resolving abscesses.  Note that the larger crescentic intramural fluid collection  questioned on the previous CT is no longer seen with certainty. 2. Uncomplicated periampullary and distal colonic diverticulosis. 3. Stable 5.0 cm fusiform infrarenal abdominal aortic aneurysm.  Follow-up imaging in 3-6 months is recommended for abdominal aortic aneurysms of 5.0-5.5 cm diameter in asymptomatic individuals.  Each individual's clinical risk factors should be considered in evaluating these general guidelines. Also consider surgical or endovascular specialist consultation. Source:  Shahid F, Shelton G, Mary Carmen M, Aayush EK,  GILES Martinez. Managing Incidental Findings on Abdominal and Pelvic CT and MRI, Part 2: White Paper of the ACR Incidental Findings Committee II on Vascular Findings.  J Am Mikayla Radiol 2013;10:789-794. 4. Lesser incidental findings as above.    Dictated by (CST): Aaron Orozco MD on 2/14/2024 at 11:33 AM     Finalized by (CST): Aaron Orozco MD on 2/14/2024 at 11:49 AM          MRI PELVIS (SOFT TISSUE) (W+WO) (CPT=72197)    Result Date: 2/12/2024  CONCLUSION:  Severely artifactually degraded examination. Within these parameters: 1. Extensive rectal edema with fluid collections suggesting intramural abscesses, possibly with intersphincteric fistula formation. These are difficult to characterize given the substantial motion artifact.  2. Lesser incidental findings as above.    Dictated by (CST): Mike Baum MD on 2/12/2024 at 1:53 PM     Finalized by (CST): Mike Baum MD on 2/12/2024 at 2:18 PM          CT ABDOMEN+PELVIS(CONTRAST ONLY)(CPT=74177)    Result Date: 2/8/2024  CONCLUSION:  1. Severe proctocolitis with substantial interval worsening, with interval development of a large intramural rectal abscess.  2. Additional left posterolateral and posterior peripherally enhancing fluid collections are seen, suggesting potential communicating or independent abscesses.  3. A heavy stool burden is seen throughout the more proximal colon, and there may be some degree of partial  large bowel obstruction.   4. Infrarenal abdominal aortic aneurysm measuring up to 5.0 cm.  5. A large complex debris-filled duodenal diverticulum is seen without CT evidence acute complication.  6. Prostatomegaly.  7. Pulmonary interstitial fibrosis is suggested.  8. Possible hepatic steatosis.  9. Lesser incidental findings as above.    elm-remote.   Dictated by (CST): Mike Baum MD on 2/08/2024 at 12:08 PM     Finalized by (CST): Mike Baum MD on 2/08/2024 at 12:20 PM          CTA BRAIN + CTA CAROTIDS (CPT=70496/10501)    Result Date: 2/8/2024  CONCLUSION:  1. No proximal intracranial flow limiting stenosis/large vessel occlusion.  No intracranial aneurysm identified.  2. No hemodynamically significant stenosis or dissection involving the cervical carotid or vertebral arteries.  Non flow-limiting moderate left and mild right carotid bifurcation atherosclerosis.  There is also mild to moderate right vertebral ostial stenosis related to atherosclerosis. 3. Noncontrast head CT demonstrates no acute intracranial abnormality. 4. Nonspecific white matter changes involving both cerebral hemispheres that most likely reflect sequelae of chronic microangiopathy. 5. Emphysema.  Noncalcified right upper lobe lung nodules, which measure up to 6 mm.  Nonemergent follow-up chest CT is suggested for complete evaluation (unless outside institution comparison imaging is available and demonstrates stability).  elm-remote  Dictated by (CST): Toño Chavarria MD on 2/08/2024 at 11:55 AM     Finalized by (CST): Toño Chavarria MD on 2/08/2024 at 12:03 PM          XR CHEST AP/PA (1 VIEW) (CPT=71045)    Result Date: 2/8/2024  CONCLUSION: No acute cardiopulmonary abnormality.  Interval placement of a right upper extremity PICC with tip in the cavoatrial junction.   Dictated by (CST): Sena Singer MD on 2/08/2024 at 10:41 AM     Finalized by (CST): Sean Singer MD on 2/08/2024 at 10:43 AM          MRI BRAIN WO ACUTE (3) SEQUENCE  (CPT=70551)    Result Date: 2/8/2024  CONCLUSION: No acute intracranial process.  No evidence of acute or subacute infarct. There are moderate microvascular white matter ischemic changes, likely related to long-standing hypertension and/or diabetes.   Dictated by (CST): Sean Singer MD on 2/08/2024 at 10:00 AM     Finalized by (CST): Sean Singer MD on 2/08/2024 at 10:01 AM             Assessment and Plan:    Syndrome of weakness, diarrhea, and known proctocolitis with new leukocytosis and concern for interval worsening  - CT with possibly several communicating abscesses and initial intramural abscess worsening  - MRI with extensive rectal edema with fluid collection suggesting intramural abscesses, possibly with intersphincteric fistula formation  - s/p sigmoidoscopy on 2/13/24, plans for eventual colonoscopy when able per GI   - IV Zosyn ongoing      Recent admission in January 2024 for sepsis in this gentleman presenting with fevers, leukocytosis, N/V/D  - CT at that time with evidence of severe proctocolitis with complex-appearing L lateral fluid/gas  - Seen by Dr. Shelby - consistent with a small intramural abscess in the L posterior rectal wall not amenable to drainage previously     Acute on chronic diarrhea   - now c.diff positive  - Patient does have a h/o chronic loose stools so unclear if this is much worse than baseline  - C.diff did return positive this admission  - diarrhea improving per patient report   - p.o. vancomycin day #9 ongoing     Leukocytosis due to the above  - resolved  - will trend      Weakness and some feeling off balance  - This could be because of evolving sepsis or could possibly be drug effect due to carbapenem class  - IV zosyn as above      Recommendations  - Continue IV Zosyn for at least two more weeks with plans for repeat imaging prior to EOT   - PICC in place, weekly labs while on IV abx  - Continue PO Vancomycin QID for a total of 14 days then will step down to BID dosing  until off IV abx  - Pts wife prefers [facility] if possible, if discharged there we will follow there. Otherwise, follow up with ID one week after discharge, okay for VV if needed.   - Stable to dc from ID standpoint when okay with others, follow up with ID one week from discharge      Plan of care discussed with Dr. Bradford         If you have any questions or concerns please call [facility] Infectious Disease at [phone].     BABITA Barone    2/16/2024  9:52 AM

## 2024-02-16 NOTE — DISCHARGE INSTRUCTIONS
Infection  Will need repeat CT rectum in 2 weeks. Will need to follow up with Dr Patel from GI about colonoscopy  See med sheet for antibiotics   Weekly labs and dressing change as per ID     Emphysema  Lung Nodule, RUL  -CTA brain-  Emphysema.  Noncalcified right upper lobe lung nodules, which measure up to 6 mm.

## 2024-02-16 NOTE — DISCHARGE SUMMARY
[facility] Summary   Patient ID:  Raul Freed Jr.  [id number]  83 year old  2/19/1940    Admit date: 2/8/2024  Discharge date: 2/16/2024    Primary Care Physician: Rao Beyer MD   Attending Physician: Kerrie Guzman DO   Consults:   Consultants         Provider   Role Specialty     Aniket Grimes MD  Consulting Physician GASTROENTEROLOGY     Nupur Bradford DO  Consulting Physician INFECTIOUS DISEASES     Musa Shelby MD  Consulting Physician SURGERY, [facility]            Hospital Discharge Diagnoses:   Abscess of anal and rectal regions  ----See D/C Summary for further Dx    Risk of Readmission Lace+ Score: 78  59-90 High Risk  29-58 Medium Risk  0-28   Low Risk.    TCM Follow-Up Recommendation:  LACE > 58: High Risk of readmission after discharge from the hospital.    Please note that only IHP DMG and EMG patients enrolled in the Medicare ACO, [facility] ACO and [facility] HMOs will be handled by the [facility] Care Management team.  For all other patients, please follow usual protocol for discharge care transition.    Reason for admission  Per H/P Dated 2/8/2024 by Dr Guzman   Patient is a 83 year old male with PMH sig for CAD, DM2, HTN, HL, and recent admission for proctocolitis with perianal abscess discharged on invanz presenting with diarrhea and worsening weakness. Upon presentation he was afebrile. Labwork showed a worsening leukocytosis of 19.3     CT A/P showed:  1. Severe proctocolitis with substantial interval worsening, with interval development of a large intramural rectal abscess.      2. Additional left posterolateral and posterior peripherally enhancing fluid collections are seen, suggesting potential communicating or independent abscesses.      3. A heavy stool burden is seen throughout the more proximal colon, and there may be some degree of partial large bowel obstruction.        Patient was started on merrem and admitted for further medical management.       Hospital Course:    Patient is a 83 year old male with PMH sig for CAD, DM2, HTN, HL, and recent admission for proctocolitis with perianal abscess discharged on invanz presenting with diarrhea and worsening weakness found to have leukocytosis and worsening imaging on CT scan.  Patient had CT, MRI and flex to determine exactly what was going on, based on these results it is felt that the patient had a intrarectal fluid collection likely to have originated from deep ulcerations in the distal rectum near the dentate line there was no evidence of colitis on imaging or endoscopy.  Etiologies included Crohn's disease, stercoral ulceration and a somewhat atypical location and on versus unusual location for ischemic injury.  Patient was switched from Invanz to Zosyn and will complete a 2-week course and will need repeat CT scan of the rectum as well as outpatient colonoscopy.  During his hospital course patient was also found to be C. difficile positive and had issues with metabolic encephalopathy attributed to infection currently on Seroquel of note patient does have underlying cognitive issues. Patient will need follow-up of noted lung nodule on imaging and will need dedicated CT of the chest, in addition patient was also noted to have emphysema on imaging. Pt to be discharged to [facility] for IV abx,  see below for details     Intrarectal fluid collection due to deep ulcerations  -Etiologies included Crohn's disease, stercoral ulceration and a somewhat atypical location and on versus unusual location for ischemic injury  - CT A/P with worsening large intramural rectal abscess  -MRI pelvis suggesting intramural abscesses with possible fistula formation  -flex sig  with distal rectal ulcers and diverticulosis. Pathology with acute on chronic inflammation   -CT with improvement  -abx- zosyn x 2 weeks (was on ivanz PTA), weekly labs, right arm picc  -plans for outpt CT rectum and colonoscopy-pt should follow up with   [facility]  -follow up with ID in 1 week unless at  [facility] as they can follow there     Metabolic Encephalopathy-resolved  Baseline Cognitive Issues per wife  -spoke with son Raul who states confusion has been occurring for well over a week, patient is seeing things as well as confusing facts of information.  Confusion can occur regardless of time.  Son does state that patient does have moments of answering questions appropriately.  He expressed understanding that this is likely related to infection.  -CT head on admission negative for acute process   -MRI brain negative for acute process, noted moderate microvascular white matter changes  -CTA brain and carotids- moderate left carotid atherosclerosis, mild right carotid atherosclerosis, mild-moderate right vertebral ostial stenosis   -seroquel and prn melatonin     C dif colitis  -vanco with long taper-  Take 2.5 mL (125 mg total) by mouth 4 (four) times daily for 6 days, THEN 2.5 mL (125 mg total) 2 (two) times a day for 14 days.   -follow up with ID      Hx Constipation  -per wife takes miralex and colace daily,  -will resume colace daily for now      Emphysema  Lung Nodule, RUL  -CTA brain- . Emphysema.  Noncalcified right upper lobe lung nodules, which measure up to 6 mm.   -outpt CT chest for completeness       CAD  HLD  Infrarenal abdominal aortic aneurysm measuring up to 5.0 cm.   HTN  -noted on CT A/P   -continue toprol and norvasc   -holding ASA for possible additional procedures  -continue lipitor   -appt with Dr Cates 2/27 as previously scheduled      DM2- Diet Controlled   -ssi prn     DEANGELO  -not compliant with CPAP      GOC  -full code  -POA wife but needs help with decision and speaks with Son Raul who is local and another brother in [place]     MA/ACO Reach  -Re- Entry: NO  -Consults: gi, surgery and ID   -Discharge Needs: [facility] at  [facility]        EXAM:   GENERAL: no apparent distress, overweight  NEURO: A/A Ox3  RESP: non labored,  CTA  CARDIO: Regular, no murmur  ABD: soft, NT, ND, BS+  EXTREMITIES: right arm picc    Operative Procedures: Procedure(s) (LRB):  FLEXIBLE SIGMOIDOSCOPY (N/A)  Radiology:   CT ABDOMEN+PELVIS(CONTRAST ONLY)(CPT=74177)    Result Date: 2/14/2024  CONCLUSION:  1. Persistent findings of proctitis with decrease in conspicuity of peripherally enhancing fluid collections previously noted along the left lateral and posterior aspects of the anorectal junction suggesting resolving abscesses.  Note that the larger crescentic intramural fluid collection questioned on the previous CT is no longer seen with certainty. 2. Uncomplicated periampullary and distal colonic diverticulosis. 3. Stable 5.0 cm fusiform infrarenal abdominal aortic aneurysm.  Follow-up imaging in 3-6 months is recommended for abdominal aortic aneurysms of 5.0-5.5 cm diameter in asymptomatic individuals.  Each individual's clinical risk factors should be considered in evaluating these general guidelines. Also consider surgical or endovascular specialist consultation. Source:  Shahid F, Shelton G, Mary Carmen M, Aayush EK,  GILES Martinez. Managing Incidental Findings on Abdominal and Pelvic CT and MRI, Part 2: White Paper of the ACR Incidental Findings Committee II on Vascular Findings.  J Am Mikayla Radiol 2013;10:789-794. 4. Lesser incidental findings as above.    Dictated by (CST): Aaron Orozco MD on 2/14/2024 at 11:33 AM     Finalized by (CST): Aaron Orozco MD on 2/14/2024 at 11:49 AM           Discharge Instructions     Medication List        START taking these medications      acetaminophen 500 MG Tabs  Commonly known as: Tylenol Extra Strength     dextrose 5% SOLN 100 mL with piperacillin-tazobactam 4.5 (4-0.5) g SOLR 4.5 g  Inject 4.5 g into the vein every 8 (eight) hours for 14 days. Weekly cbc w/diff, cmp, crp while on IV abx  PICC care per protocol     docusate sodium 100 MG Caps  Commonly known as: COLACE  Start taking on: February 17, 2024      melatonin 3 MG Tabs     Polyethylene Glycol 3350 17 g Pack  Commonly known as: MIRALAX     QUEtiapine 25 MG Tabs  Commonly known as: SEROquel     vancomycin 50 mg/mL Solr  Commonly known as: Firvanq  Take 2.5 mL (125 mg total) by mouth 4 (four) times daily for 6 days, THEN 2.5 mL (125 mg total) 2 (two) times a day for 14 days.  Start taking on: February 16, 2024            CONTINUE taking these medications      allopurinol 100 MG Tabs  Commonly known as: Zyloprim     amLODIPine 10 MG Tabs  Commonly known as: Norvasc     atorvastatin 20 MG Tabs  Commonly known as: Lipitor  Take 1 tablet (20 mg total) by mouth daily.     Kyra Contour Next Monitor w/Device Kit  1 Units by Does not apply route 2 (two) times daily before meals.     Magnesium 500 MG Tabs     metoprolol succinate  MG Tb24  Commonly known as: Toprol XL  TAKE 1 TABLET BY MOUTH EVERY DAY     Microlet Lancets Misc  USE AS DIRECTED TWICE DAILY     MULTI VITAMIN MENS OR     omega-3 fatty acids 1000 MG Caps  Commonly known as: Fish Oil     omeprazole 20 MG Cpdr  Commonly known as: PriLOSEC     saccharomyces boulardii 250 MG Caps  Commonly known as: Florastor            STOP taking these medications      aspirin 81 MG Chew     Contour Next Test Strp  Generic drug: Glucose Blood     ondansetron 8 MG Tbdp  Commonly known as: Zofran-ODT     QC Tumeric Complex 500 MG Caps  Generic drug: Turmeric               Where to Get Your Medications        You can get these medications from any pharmacy    Bring a paper prescription for each of these medications  dextrose 5% SOLN 100 mL with piperacillin-tazobactam 4.5 (4-0.5) g SOLR 4.5 g  vancomycin 50 mg/mL Solr       Important follow up:   Follow-up Information       Tiffani Hensley, NILS. Call in 1 week(s).    Specialty: Nurse Practitioner  Why: Call to follow up with ID within one week of discharge.    If patient is discharged to [facility] to follow there. Please consult  him.  Contact information:  [address]  [address]  [place] [address]  [phone]               Anthony Patel MD Follow up.    Specialty: GASTROENTEROLOGY  Why: to see in 2 weeks. will need ct of rectum and eventual colonoscopy  Contact information:  [address]  [facility] [id number] [phone]               Rao Beyer MD Follow up.    Specialty: Internal Medicine  Why: within 1 week of discharge  Contact information:  [address] [facility]  [address]  [facility] [id number] [phone]                             Disposition: SNF  Discharged Condition: stable      Additional patient instructions:  Infection  Will need repeat CT rectum in 2 weeks. Will need to follow up with Dr Patel from GI about colonoscopy  See med sheet for antibiotics   Weekly labs and dressing change as per ID     Emphysema  Lung Nodule, RUL  -CTA brain-  Emphysema.  Noncalcified right upper lobe lung nodules, which measure up to 6 mm.   =========================================================================================================================    I Reconciled current and discharge medications on day of discharge  Patient had opportunity to ask questions and state understand and agree with therapeutic plan as outlined    Total Time Coordinating Care: greater than 30 minutes  Is this a shared or split note between Advanced Practice Provider and Physician? Yes    Note: This chart was prepared using voice recognition software and may contain unintended word substitution errors.     Christy Swartz RN, NP   [facility] Hospitalist Team   2/16/2024      SEE ATTENDING NOTE BELOW

## 2024-02-16 NOTE — CM/SW NOTE
Expected Discharge Plan:    Destination: Subacute Rehab: Referrals pending to:[facility]      Current Barriers to d/c: Medical Clearance;      [facility] Staff aware of dc plan: Patient discussed in care rounds.  [facility] sent by Charito [facility]UGH supervisor  [facility] approved [facility]  PASRR completed    CM requested department  (DSC) to initiate [facility] referral for [facility].       Discharge Planner Follow up Needed: [facility] CHOICE    DC planner/CM to remain available for support and/or discharge planning.    Shaista Larson RN, [facility]  DC PLANNER / RN Case Manager  Ext.  [phone]

## 2024-02-16 NOTE — CM/SW NOTE
Department  notified of request for [facility] referrals started. Assigned CM/SW to follow up with pt/family on further discharge planning.     Destiny Parks, DSC

## 2024-02-16 NOTE — PLAN OF CARE
No acute changes. PICC line placed. Cleared for discharge to [facility]. Wife at bedside aware of discharge plan. Report given to [facility], BILL Jones. Transportation provided by [facility].     Problem: Patient Centered Care  Goal: Patient preferences are identified and integrated in the patient's plan of care  Description: Interventions:  - What would you like us to know as we care for you? I was here recently and discharged with IV antibiotics.  - Provide timely, complete, and accurate information to patient/family  - Incorporate patient and family knowledge, values, beliefs, and cultural backgrounds into the planning and delivery of care  - Encourage patient/family to participate in care and decision-making at the level they choose  - Honor patient and family perspectives and choices  2/16/2024 1349 by Susan Melendez  Outcome: Adequate for Discharge     Problem: Patient/Family Goals  Goal: Patient/Family Long Term Goal  Description: Patient's Long Term Goal: to return home  Interventions:  - Further testing  - Monitor labs and vital signs  - Medication compliance  - Follow provider recommendations  - See additional Care Plan goals for specific interventions  2/16/2024 1349 by Susan Melendez  Outcome: Adequate for Discharge  Goal: Patient/Family Short Term Goal  Description: Patient's Short Term Goal: no more diarrhea    Interventions:   - Monitor intake and output  - Medication compliance  - Follow provider recommendations  - See additional Care Plan goals for specific interventions  2/16/2024 1349 by Susan Melendez  Outcome: Adequate for Discharge     Problem: RISK FOR INFECTION - ADULT  Goal: Absence of fever/infection during anticipated neutropenic period  Description: INTERVENTIONS  - Monitor WBC  - Administer growth factors as ordered  - Implement neutropenic guidelines  2/16/2024 1349 by Susan Melendez  Outcome: Adequate for Discharge     Problem: SAFETY ADULT - FALL  Goal: Free from fall  injury  Description: INTERVENTIONS:  - Assess pt frequently for physical needs  - Identify cognitive and physical deficits and behaviors that affect risk of falls.  - [place] fall precautions as indicated by assessment.  - Educate pt/family on patient safety including physical limitations  - Instruct pt to call for assistance with activity based on assessment  - Modify environment to reduce risk of injury  - Provide assistive devices as appropriate  - Consider OT/PT consult to assist with strengthening/mobility  - Encourage toileting schedule  2/16/2024 1349 by Susan Melendez  Outcome: Adequate for Discharge     Problem: RESPIRATORY - ADULT  Goal: Achieves optimal ventilation and oxygenation  Description: INTERVENTIONS:  - Assess for changes in respiratory status  - Assess for changes in mentation and behavior  - Position to facilitate oxygenation and minimize respiratory effort  - Oxygen supplementation based on oxygen saturation or ABGs  - Provide Smoking Cessation handout, if applicable  - Encourage broncho-pulmonary hygiene including cough, deep breathe, Incentive Spirometry  - Assess the need for suctioning and perform as needed  - Assess and instruct to report SOB or any respiratory difficulty  - Respiratory Therapy support as indicated  - Manage/alleviate anxiety  - Monitor for signs/symptoms of CO2 retention  2/16/2024 1349 by Susan Melendez  Outcome: Adequate for Discharge     Problem: METABOLIC/FLUID AND ELECTROLYTES - ADULT  Goal: Glucose maintained within prescribed range  Description: INTERVENTIONS:  - Monitor Blood Glucose as ordered  - Assess for signs and symptoms of hyperglycemia and hypoglycemia  - Administer ordered medications to maintain glucose within target range  - Assess barriers to adequate nutritional intake and initiate nutrition consult as needed  - Instruct patient on self management of diabetes  2/16/2024 1349 by Susan Melendez  Outcome: Adequate for Discharge     Problem:  MUSCULOSKELETAL - ADULT  Goal: Return mobility to safest level of function  Description: INTERVENTIONS:  - Assess patient stability and activity tolerance for standing, transferring and ambulating w/ or w/o assistive devices  - Assist with transfers and ambulation using safe patient handling equipment as needed  - Ensure adequate protection for wounds/incisions during mobilization  - Obtain PT/OT consults as needed  - Advance activity as appropriate  - Communicate ordered activity level and limitations with patient/family  2/16/2024 1349 by Susan Melendez  Outcome: Adequate for Discharge     Problem: NEUROLOGICAL - ADULT  Goal: Achieves stable or improved neurological status  Description: INTERVENTIONS  - Assess for and report changes in neurological status  - Initiate measures to prevent increased intracranial pressure  - Maintain blood pressure and fluid volume within ordered parameters to optimize cerebral perfusion and minimize risk of hemorrhage  - Monitor temperature, glucose, and sodium. Initiate appropriate interventions as ordered  2/16/2024 1349 by Susan Melendez  Outcome: Adequate for Discharge    Problem: Impaired Functional Mobility  Goal: Achieve highest/safest level of mobility/gait  Description: Interventions:  - Assess patient's functional ability and stability  - Promote increasing activity/tolerance for mobility and gait  - Educate and engage patient/family in tolerated activity level and precautions  2/16/2024 1349 by Susan Melendez  Outcome: Adequate for Discharge     Problem: Risk for Violence/Aggression  Goal: Absence of Violence/Aggression  Description: INTERVENTIONS:   - Identify precipitating factors for behavior   - Notify Charge RN/Provider   - Consider decreasing stimulation   - Consider distraction measures   - Consider discussion with provider regarding prn meds    - Consider SUSANNAH (Moderate Risk only)   - Consider Code Support (High Risk only)   - Consider room safety checks   - Consider  restraints  2/16/2024 1349 by Susan Melendez  Outcome: Adequate for Discharge

## 2024-02-16 NOTE — PLAN OF CARE
Patient more alert today. Patient will need PICC on DC. Iv abx. [facility]. Fall and isolation precautions in place. Family at bedside and updated on POC.     Problem: Patient Centered Care  Goal: Patient preferences are identified and integrated in the patient's plan of care  Description: Interventions:  - What would you like us to know as we care for you? I was here recently and discharged with IV antibiotics.    - Provide timely, complete, and accurate information to patient/family  - Incorporate patient and family knowledge, values, beliefs, and cultural backgrounds into the planning and delivery of care  - Encourage patient/family to participate in care and decision-making at the level they choose  - Honor patient and family perspectives and choices  Outcome: Progressing     Problem: Patient/Family Goals  Goal: Patient/Family Long Term Goal  Description: Patient's Long Term Goal: to return home    Interventions:  - Further testing  - Monitor labs and vital signs  - Medication compliance  - Follow provider recommendations  - See additional Care Plan goals for specific interventions  Outcome: Progressing  Goal: Patient/Family Short Term Goal  Description: Patient's Short Term Goal: no more diarrhea    Interventions:   - Monitor intake and output  - Medication compliance  - Follow provider recommendations  - See additional Care Plan goals for specific interventions  Outcome: Progressing     Problem: RISK FOR INFECTION - ADULT  Goal: Absence of fever/infection during anticipated neutropenic period  Description: INTERVENTIONS  - Monitor WBC  - Administer growth factors as ordered  - Implement neutropenic guidelines  Outcome: Progressing     Problem: SAFETY ADULT - FALL  Goal: Free from fall injury  Description: INTERVENTIONS:  - Assess pt frequently for physical needs  - Identify cognitive and physical deficits and behaviors that affect risk of falls.  - [place] fall precautions as indicated by assessment.  -  Educate pt/family on patient safety including physical limitations  - Instruct pt to call for assistance with activity based on assessment  - Modify environment to reduce risk of injury  - Provide assistive devices as appropriate  - Consider OT/PT consult to assist with strengthening/mobility  - Encourage toileting schedule  Outcome: Progressing     Problem: RESPIRATORY - ADULT  Goal: Achieves optimal ventilation and oxygenation  Description: INTERVENTIONS:  - Assess for changes in respiratory status  - Assess for changes in mentation and behavior  - Position to facilitate oxygenation and minimize respiratory effort  - Oxygen supplementation based on oxygen saturation or ABGs  - Provide Smoking Cessation handout, if applicable  - Encourage broncho-pulmonary hygiene including cough, deep breathe, Incentive Spirometry  - Assess the need for suctioning and perform as needed  - Assess and instruct to report SOB or any respiratory difficulty  - Respiratory Therapy support as indicated  - Manage/alleviate anxiety  - Monitor for signs/symptoms of CO2 retention  Outcome: Progressing     Problem: METABOLIC/FLUID AND ELECTROLYTES - ADULT  Goal: Glucose maintained within prescribed range  Description: INTERVENTIONS:  - Monitor Blood Glucose as ordered  - Assess for signs and symptoms of hyperglycemia and hypoglycemia  - Administer ordered medications to maintain glucose within target range  - Assess barriers to adequate nutritional intake and initiate nutrition consult as needed  - Instruct patient on self management of diabetes  Outcome: Progressing     Problem: MUSCULOSKELETAL - ADULT  Goal: Return mobility to safest level of function  Description: INTERVENTIONS:  - Assess patient stability and activity tolerance for standing, transferring and ambulating w/ or w/o assistive devices  - Assist with transfers and ambulation using safe patient handling equipment as needed  - Ensure adequate protection for wounds/incisions during  mobilization  - Obtain PT/OT consults as needed  - Advance activity as appropriate  - Communicate ordered activity level and limitations with patient/family  Outcome: Progressing     Problem: NEUROLOGICAL - ADULT  Goal: Achieves stable or improved neurological status  Description: INTERVENTIONS  - Assess for and report changes in neurological status  - Initiate measures to prevent increased intracranial pressure  - Maintain blood pressure and fluid volume within ordered parameters to optimize cerebral perfusion and minimize risk of hemorrhage  - Monitor temperature, glucose, and sodium. Initiate appropriate interventions as ordered  Outcome: Progressing     Problem: Impaired Functional Mobility  Goal: Achieve highest/safest level of mobility/gait  Description: Interventions:  - Assess patient's functional ability and stability  - Promote increasing activity/tolerance for mobility and gait  - Educate and engage patient/family in tolerated activity level and precautions  - Recommend use of  RW for transfers and ambulation  Outcome: Progressing     Problem: Risk for Violence/Aggression  Goal: Absence of Violence/Aggression  Description: INTERVENTIONS:   - Identify precipitating factors for behavior   - Notify Charge RN/Provider   - Consider decreasing stimulation   - Consider distraction measures   - Consider discussion with provider regarding prn meds    - Consider SUSANNAH (Moderate Risk only)   - Consider Code Support (High Risk only)   - Consider room safety checks   - Consider restraints  Outcome: Progressing      [General Appearance - Alert] : alert [General Appearance - In No Acute Distress] : in no acute distress [General Appearance - Well Nourished] : well nourished [General Appearance - Well Developed] : well developed [General Appearance - Well-Appearing] : healthy appearing [Sclera] : the sclera and conjunctiva were normal [PERRL With Normal Accommodation] : pupils were equal in size, round, and reactive to light [Extraocular Movements] : extraocular movements were intact [Outer Ear] : the ears and nose were normal in appearance [Neck Appearance] : the appearance of the neck was normal [Neck Cervical Mass (___cm)] : no neck mass was observed [Jugular Venous Distention Increased] : there was no jugular-venous distention [Thyroid Diffuse Enlargement] : the thyroid was not enlarged [Thyroid Nodule] : there were no palpable thyroid nodules [Lungs Percussion] : the lungs were normal to percussion [Heart Rate And Rhythm] : heart rate was normal and rhythm regular [Edema] : there was no peripheral edema [Abdomen Soft] : soft [Abdomen Tenderness] : non-tender [Abdomen Mass (___ Cm)] : no abdominal mass palpated [Cervical Lymph Nodes Enlarged Posterior Bilaterally] : posterior cervical [Cervical Lymph Nodes Enlarged Anterior Bilaterally] : anterior cervical [Supraclavicular Lymph Nodes Enlarged Bilaterally] : supraclavicular [Axillary Lymph Nodes Enlarged Bilaterally] : axillary [No CVA Tenderness] : no ~M costovertebral angle tenderness [No Spinal Tenderness] : no spinal tenderness [Skin Color & Pigmentation] : normal skin color and pigmentation [Skin Turgor] : normal skin turgor [] : no rash [Cranial Nerves] : cranial nerves 2-12 were intact [Deep Tendon Reflexes (DTR)] : deep tendon reflexes were 2+ and symmetric [Sensation] : the sensory exam was normal to light touch and pinprick [Motor Exam] : the motor exam was normal [No Focal Deficits] : no focal deficits [Oriented To Time, Place, And Person] : oriented to person, place, and time [Impaired Insight] : insight and judgment were intact [Affect] : the affect was normal [Mood] : the mood was normal [FreeTextEntry1] : Strength-5/5

## 2024-02-16 NOTE — CM/SW NOTE
02/16/24 1256   Discharge disposition   Expected discharge disposition subacute   Post Acute Care Provider   (ALEYDA JARRETT)   Discharge transportation [facility]     RN REPORT# [phone]    AMBULANCE P/U 3PM  PCS DONE    MD/KAYCEE KHAN notified of above    DC planner/CM to remain available for support and/or discharge planning.    Shaista Larson RN, [facility]  DC PLANNER / RN Case Manager  Ext.  [phone]

## 2024-02-16 NOTE — PLAN OF CARE
Problem: Patient Centered Care  Goal: Patient preferences are identified and integrated in the patient's plan of care  Description: Interventions:  - What would you like us to know as we care for you? I was here recently and discharged with IV antibiotics.    - Provide timely, complete, and accurate information to patient/family  - Incorporate patient and family knowledge, values, beliefs, and cultural backgrounds into the planning and delivery of care  - Encourage patient/family to participate in care and decision-making at the level they choose  - Honor patient and family perspectives and choices  Outcome: Progressing     Problem: Patient/Family Goals  Goal: Patient/Family Long Term Goal  Description: Patient's Long Term Goal: to return home    Interventions:  - Further testing  - Monitor labs and vital signs  - Medication compliance  - Follow provider recommendations  - See additional Care Plan goals for specific interventions  Outcome: Progressing  Goal: Patient/Family Short Term Goal  Description: Patient's Short Term Goal: no more diarrhea    Interventions:   - Monitor intake and output  - Medication compliance  - Follow provider recommendations  - See additional Care Plan goals for specific interventions  Outcome: Progressing     Problem: RISK FOR INFECTION - ADULT  Goal: Absence of fever/infection during anticipated neutropenic period  Description: INTERVENTIONS  - Monitor WBC  - Administer growth factors as ordered  - Implement neutropenic guidelines  Outcome: Progressing     Problem: SAFETY ADULT - FALL  Goal: Free from fall injury  Description: INTERVENTIONS:  - Assess pt frequently for physical needs  - Identify cognitive and physical deficits and behaviors that affect risk of falls.  - [place] fall precautions as indicated by assessment.  - Educate pt/family on patient safety including physical limitations  - Instruct pt to call for assistance with activity based on assessment  - Modify environment  to reduce risk of injury  - Provide assistive devices as appropriate  - Consider OT/PT consult to assist with strengthening/mobility  - Encourage toileting schedule  Outcome: Progressing     Problem: RESPIRATORY - ADULT  Goal: Achieves optimal ventilation and oxygenation  Description: INTERVENTIONS:  - Assess for changes in respiratory status  - Assess for changes in mentation and behavior  - Position to facilitate oxygenation and minimize respiratory effort  - Oxygen supplementation based on oxygen saturation or ABGs  - Provide Smoking Cessation handout, if applicable  - Encourage broncho-pulmonary hygiene including cough, deep breathe, Incentive Spirometry  - Assess the need for suctioning and perform as needed  - Assess and instruct to report SOB or any respiratory difficulty  - Respiratory Therapy support as indicated  - Manage/alleviate anxiety  - Monitor for signs/symptoms of CO2 retention  Outcome: Progressing     Problem: METABOLIC/FLUID AND ELECTROLYTES - ADULT  Goal: Glucose maintained within prescribed range  Description: INTERVENTIONS:  - Monitor Blood Glucose as ordered  - Assess for signs and symptoms of hyperglycemia and hypoglycemia  - Administer ordered medications to maintain glucose within target range  - Assess barriers to adequate nutritional intake and initiate nutrition consult as needed  - Instruct patient on self management of diabetes  Outcome: Progressing     Problem: MUSCULOSKELETAL - ADULT  Goal: Return mobility to safest level of function  Description: INTERVENTIONS:  - Assess patient stability and activity tolerance for standing, transferring and ambulating w/ or w/o assistive devices  - Assist with transfers and ambulation using safe patient handling equipment as needed  - Ensure adequate protection for wounds/incisions during mobilization  - Obtain PT/OT consults as needed  - Advance activity as appropriate  - Communicate ordered activity level and limitations with  patient/family  Outcome: Progressing     Problem: NEUROLOGICAL - ADULT  Goal: Achieves stable or improved neurological status  Description: INTERVENTIONS  - Assess for and report changes in neurological status  - Initiate measures to prevent increased intracranial pressure  - Maintain blood pressure and fluid volume within ordered parameters to optimize cerebral perfusion and minimize risk of hemorrhage  - Monitor temperature, glucose, and sodium. Initiate appropriate interventions as ordered  Outcome: Progressing     Problem: Impaired Functional Mobility  Goal: Achieve highest/safest level of mobility/gait  Description: Interventions:  - Assess patient's functional ability and stability  - Promote increasing activity/tolerance for mobility and gait  - Educate and engage patient/family in tolerated activity level and precautions  Outcome: Progressing     Problem: Risk for Violence/Aggression  Goal: Absence of Violence/Aggression  Description: INTERVENTIONS:   - Identify precipitating factors for behavior   - Notify Charge RN/Provider   - Consider decreasing stimulation   - Consider distraction measures   - Consider discussion with provider regarding prn meds    - Consider SUSANNAH (Moderate Risk only)   - Consider Code Support (High Risk only)   - Consider room safety checks   - Consider restraints  Outcome: Progressing     Alert and oriented x3- pleasant throughout shift. Monitoring vital signs- stable at this time.  Ambulating to bathroom with standby assist. Safety and fall precautions maintained- bed alarm on, bed locked in lowest position, call light within reach.

## 2024-02-16 NOTE — CONGREGATE LIVING REVIEW
[facility] Living Authorization    The [facility] Review Committee has reviewed this case and the patient IS APPROVED for discharge to a facility for Short Term Skilled once the following procedure is followed:     - The physician discharge instructions (contained within the WOJCIECH note for SNF) must inlcude the below appropriate and approved COVID instructions to the facility    For questions regarding CLRC approval process, please contact the CM assigned to the case.  For questions regarding RN discharge workflow, please contact the unit Clinical Leader.

## 2024-02-17 ENCOUNTER — APPOINTMENT (OUTPATIENT)
Dept: HEMATOLOGY/ONCOLOGY | Facility: HOSPITAL | Age: 84
End: 2024-02-17
Attending: INTERNAL MEDICINE
Payer: MEDICARE

## 2024-02-18 ENCOUNTER — APPOINTMENT (OUTPATIENT)
Dept: HEMATOLOGY/ONCOLOGY | Facility: HOSPITAL | Age: 84
End: 2024-02-18
Attending: INTERNAL MEDICINE
Payer: MEDICARE

## 2024-02-19 ENCOUNTER — APPOINTMENT (OUTPATIENT)
Dept: HEMATOLOGY/ONCOLOGY | Facility: HOSPITAL | Age: 84
End: 2024-02-19
Attending: INTERNAL MEDICINE
Payer: MEDICARE

## 2024-03-01 ENCOUNTER — HOSPITAL ENCOUNTER (INPATIENT)
Facility: HOSPITAL | Age: 84
LOS: 4 days | Discharge: HOME | End: 2024-03-06
Attending: EMERGENCY MEDICINE | Admitting: HOSPITALIST
Payer: MEDICARE

## 2024-03-01 DIAGNOSIS — E11.69 MIXED HYPERLIPIDEMIA DUE TO TYPE 2 DIABETES MELLITUS (HCC): ICD-10-CM

## 2024-03-01 DIAGNOSIS — K61.1 PERIRECTAL ABSCESS: Primary | ICD-10-CM

## 2024-03-01 DIAGNOSIS — E78.2 MIXED HYPERLIPIDEMIA DUE TO TYPE 2 DIABETES MELLITUS (HCC): ICD-10-CM

## 2024-03-01 DIAGNOSIS — Z95.5 PRESENCE OF DRUG COATED STENT IN LAD CORONARY ARTERY: ICD-10-CM

## 2024-03-01 DIAGNOSIS — I25.10 CORONARY ARTERY DISEASE INVOLVING NATIVE CORONARY ARTERY OF NATIVE HEART WITHOUT ANGINA PECTORIS: ICD-10-CM

## 2024-03-01 LAB
ALBUMIN SERPL-MCNC: 4.5 G/DL (ref 3.2–4.8)
ALBUMIN/GLOB SERPL: 1.1 {RATIO} (ref 1–2)
ALP LIVER SERPL-CCNC: 114 U/L
ALT SERPL-CCNC: <7 U/L
ANION GAP SERPL CALC-SCNC: 5 MMOL/L (ref 0–18)
AST SERPL-CCNC: 17 U/L (ref ?–34)
BASOPHILS # BLD AUTO: 0.03 X10(3) UL (ref 0–0.2)
BASOPHILS NFR BLD AUTO: 0.4 %
BILIRUB SERPL-MCNC: 0.4 MG/DL (ref 0.2–1.1)
BUN BLD-MCNC: 17 MG/DL (ref 9–23)
BUN/CREAT SERPL: 13.3 (ref 10–20)
CALCIUM BLD-MCNC: 9.8 MG/DL (ref 8.7–10.4)
CHLORIDE SERPL-SCNC: 106 MMOL/L (ref 98–112)
CO2 SERPL-SCNC: 29 MMOL/L (ref 21–32)
CREAT BLD-MCNC: 1.28 MG/DL
DEPRECATED RDW RBC AUTO: 48 FL (ref 35.1–46.3)
EGFRCR SERPLBLD CKD-EPI 2021: 55 ML/MIN/1.73M2 (ref 60–?)
EOSINOPHIL # BLD AUTO: 0.47 X10(3) UL (ref 0–0.7)
EOSINOPHIL NFR BLD AUTO: 5.7 %
ERYTHROCYTE [DISTWIDTH] IN BLOOD BY AUTOMATED COUNT: 13.7 % (ref 11–15)
GLOBULIN PLAS-MCNC: 4.1 G/DL (ref 2.8–4.4)
GLUCOSE BLD-MCNC: 128 MG/DL (ref 70–99)
HCT VFR BLD AUTO: 35.7 %
HGB BLD-MCNC: 11.6 G/DL
IMM GRANULOCYTES # BLD AUTO: 0.04 X10(3) UL (ref 0–1)
IMM GRANULOCYTES NFR BLD: 0.5 %
LACTATE SERPL-SCNC: 1.1 MMOL/L (ref 0.5–2)
LYMPHOCYTES # BLD AUTO: 1.93 X10(3) UL (ref 1–4)
LYMPHOCYTES NFR BLD AUTO: 23.3 %
MCH RBC QN AUTO: 30.9 PG (ref 26–34)
MCHC RBC AUTO-ENTMCNC: 32.5 G/DL (ref 31–37)
MCV RBC AUTO: 95.2 FL
MONOCYTES # BLD AUTO: 1.18 X10(3) UL (ref 0.1–1)
MONOCYTES NFR BLD AUTO: 14.2 %
NEUTROPHILS # BLD AUTO: 4.65 X10 (3) UL (ref 1.5–7.7)
NEUTROPHILS # BLD AUTO: 4.65 X10(3) UL (ref 1.5–7.7)
NEUTROPHILS NFR BLD AUTO: 55.9 %
OSMOLALITY SERPL CALC.SUM OF ELEC: 293 MOSM/KG (ref 275–295)
PLATELET # BLD AUTO: 257 10(3)UL (ref 150–450)
POTASSIUM SERPL-SCNC: 3.9 MMOL/L (ref 3.5–5.1)
PROT SERPL-MCNC: 8.6 G/DL (ref 5.7–8.2)
RBC # BLD AUTO: 3.75 X10(6)UL
SODIUM SERPL-SCNC: 140 MMOL/L (ref 136–145)
WBC # BLD AUTO: 8.3 X10(3) UL (ref 4–11)

## 2024-03-01 PROCEDURE — 80053 COMPREHEN METABOLIC PANEL: CPT | Performed by: EMERGENCY MEDICINE

## 2024-03-01 PROCEDURE — 84466 ASSAY OF TRANSFERRIN: CPT | Performed by: INTERNAL MEDICINE

## 2024-03-01 PROCEDURE — 87040 BLOOD CULTURE FOR BACTERIA: CPT | Performed by: EMERGENCY MEDICINE

## 2024-03-01 PROCEDURE — 96365 THER/PROPH/DIAG IV INF INIT: CPT

## 2024-03-01 PROCEDURE — 99285 EMERGENCY DEPT VISIT HI MDM: CPT

## 2024-03-01 PROCEDURE — 96375 TX/PRO/DX INJ NEW DRUG ADDON: CPT

## 2024-03-01 PROCEDURE — 82728 ASSAY OF FERRITIN: CPT | Performed by: INTERNAL MEDICINE

## 2024-03-01 PROCEDURE — 96361 HYDRATE IV INFUSION ADD-ON: CPT

## 2024-03-01 PROCEDURE — 36415 COLL VENOUS BLD VENIPUNCTURE: CPT

## 2024-03-01 PROCEDURE — 83540 ASSAY OF IRON: CPT | Performed by: INTERNAL MEDICINE

## 2024-03-01 PROCEDURE — 85025 COMPLETE CBC W/AUTO DIFF WBC: CPT | Performed by: EMERGENCY MEDICINE

## 2024-03-01 PROCEDURE — 83605 ASSAY OF LACTIC ACID: CPT | Performed by: EMERGENCY MEDICINE

## 2024-03-01 RX ORDER — VANCOMYCIN 2 GRAM/500 ML IN 0.9 % SODIUM CHLORIDE INTRAVENOUS
20 ONCE
Status: COMPLETED | OUTPATIENT
Start: 2024-03-01 | End: 2024-03-02

## 2024-03-02 ENCOUNTER — APPOINTMENT (OUTPATIENT)
Dept: GENERAL RADIOLOGY | Facility: HOSPITAL | Age: 84
End: 2024-03-02
Attending: INTERNAL MEDICINE
Payer: MEDICARE

## 2024-03-02 ENCOUNTER — APPOINTMENT (OUTPATIENT)
Dept: CT IMAGING | Facility: HOSPITAL | Age: 84
End: 2024-03-02
Attending: HOSPITALIST
Payer: MEDICARE

## 2024-03-02 LAB
DEPRECATED HBV CORE AB SER IA-ACNC: 348.5 NG/ML
GLUCOSE BLDC GLUCOMTR-MCNC: 103 MG/DL (ref 70–99)
GLUCOSE BLDC GLUCOMTR-MCNC: 106 MG/DL (ref 70–99)
GLUCOSE BLDC GLUCOMTR-MCNC: 117 MG/DL (ref 70–99)
GLUCOSE BLDC GLUCOMTR-MCNC: 129 MG/DL (ref 70–99)
GLUCOSE BLDC GLUCOMTR-MCNC: 179 MG/DL (ref 70–99)
IRON SATN MFR SERPL: 24 %
IRON SERPL-MCNC: 57 UG/DL
TIBC SERPL-MCNC: 235 UG/DL (ref 250–425)
TRANSFERRIN SERPL-MCNC: 158 MG/DL (ref 215–365)

## 2024-03-02 PROCEDURE — 71045 X-RAY EXAM CHEST 1 VIEW: CPT | Performed by: INTERNAL MEDICINE

## 2024-03-02 PROCEDURE — 82962 GLUCOSE BLOOD TEST: CPT

## 2024-03-02 PROCEDURE — 74177 CT ABD & PELVIS W/CONTRAST: CPT | Performed by: HOSPITALIST

## 2024-03-02 PROCEDURE — 94660 CPAP INITIATION&MGMT: CPT

## 2024-03-02 RX ORDER — MULTIVITAMIN/IRON/FOLIC ACID 18MG-0.4MG
500 TABLET ORAL DAILY
Status: DISCONTINUED | OUTPATIENT
Start: 2024-03-02 | End: 2024-03-06

## 2024-03-02 RX ORDER — METOPROLOL SUCCINATE 100 MG/1
100 TABLET, EXTENDED RELEASE ORAL DAILY
Status: DISCONTINUED | OUTPATIENT
Start: 2024-03-02 | End: 2024-03-06

## 2024-03-02 RX ORDER — ALLOPURINOL 100 MG/1
100 TABLET ORAL DAILY
Status: DISCONTINUED | OUTPATIENT
Start: 2024-03-02 | End: 2024-03-06

## 2024-03-02 RX ORDER — ACETAMINOPHEN 500 MG
500 TABLET ORAL EVERY 4 HOURS PRN
Status: DISCONTINUED | OUTPATIENT
Start: 2024-03-02 | End: 2024-03-06

## 2024-03-02 RX ORDER — DOCUSATE SODIUM 100 MG/1
100 CAPSULE, LIQUID FILLED ORAL DAILY
Status: DISCONTINUED | OUTPATIENT
Start: 2024-03-02 | End: 2024-03-06

## 2024-03-02 RX ORDER — PANTOPRAZOLE SODIUM 20 MG/1
20 TABLET, DELAYED RELEASE ORAL
Status: DISCONTINUED | OUTPATIENT
Start: 2024-03-02 | End: 2024-03-06

## 2024-03-02 RX ORDER — SODIUM CHLORIDE 9 MG/ML
INJECTION, SOLUTION INTRAVENOUS CONTINUOUS
Status: DISCONTINUED | OUTPATIENT
Start: 2024-03-02 | End: 2024-03-04

## 2024-03-02 RX ORDER — AMLODIPINE BESYLATE 10 MG/1
10 TABLET ORAL DAILY
Status: DISCONTINUED | OUTPATIENT
Start: 2024-03-02 | End: 2024-03-06

## 2024-03-02 RX ORDER — MINERAL OIL/UREA/PEG/WATER
1 LOTION (ML) TOPICAL DAILY
COMMUNITY
End: 2024-03-06

## 2024-03-02 RX ORDER — ONDANSETRON 2 MG/ML
4 INJECTION INTRAMUSCULAR; INTRAVENOUS EVERY 6 HOURS PRN
Status: DISCONTINUED | OUTPATIENT
Start: 2024-03-02 | End: 2024-03-06

## 2024-03-02 RX ORDER — DEXTROSE MONOHYDRATE 25 G/50ML
50 INJECTION, SOLUTION INTRAVENOUS
Status: DISCONTINUED | OUTPATIENT
Start: 2024-03-02 | End: 2024-03-06

## 2024-03-02 RX ORDER — NICOTINE POLACRILEX 4 MG
30 LOZENGE BUCCAL
Status: DISCONTINUED | OUTPATIENT
Start: 2024-03-02 | End: 2024-03-06

## 2024-03-02 RX ORDER — ATORVASTATIN CALCIUM 20 MG/1
20 TABLET, FILM COATED ORAL NIGHTLY
Status: DISCONTINUED | OUTPATIENT
Start: 2024-03-02 | End: 2024-03-06

## 2024-03-02 RX ORDER — METOCLOPRAMIDE HYDROCHLORIDE 5 MG/ML
10 INJECTION INTRAMUSCULAR; INTRAVENOUS EVERY 8 HOURS PRN
Status: DISCONTINUED | OUTPATIENT
Start: 2024-03-02 | End: 2024-03-06

## 2024-03-02 RX ORDER — VANCOMYCIN HYDROCHLORIDE 125 MG/1
125 CAPSULE ORAL DAILY
Status: DISCONTINUED | OUTPATIENT
Start: 2024-03-02 | End: 2024-03-06

## 2024-03-02 RX ORDER — NICOTINE POLACRILEX 4 MG
15 LOZENGE BUCCAL
Status: DISCONTINUED | OUTPATIENT
Start: 2024-03-02 | End: 2024-03-06

## 2024-03-02 RX ORDER — HEPARIN SODIUM 5000 [USP'U]/ML
5000 INJECTION, SOLUTION INTRAVENOUS; SUBCUTANEOUS EVERY 8 HOURS SCHEDULED
Status: DISCONTINUED | OUTPATIENT
Start: 2024-03-02 | End: 2024-03-06

## 2024-03-02 RX ORDER — MELATONIN
3 NIGHTLY
Status: DISCONTINUED | OUTPATIENT
Start: 2024-03-02 | End: 2024-03-05

## 2024-03-02 RX ORDER — POLYETHYLENE GLYCOL 3350 17 G/17G
17 POWDER, FOR SOLUTION ORAL DAILY
Status: DISCONTINUED | OUTPATIENT
Start: 2024-03-02 | End: 2024-03-06

## 2024-03-02 NOTE — PLAN OF CARE
New admit overnight. No c/o pain. On IV zosyn and vancomycin. NPO. Vitals stable. Safety precautions in place. CPAP overnight. Continue to monitor.    Problem: Patient Centered Care  Goal: Patient preferences are identified and integrated in the patient's plan of care  Description: Interventions:  - What would you like us to know as we care for you?   - Provide timely, complete, and accurate information to patient/family  - Incorporate patient and family knowledge, values, beliefs, and cultural backgrounds into the planning and delivery of care  - Encourage patient/family to participate in care and decision-making at the level they choose  - Honor patient and family perspectives and choices  Outcome: Progressing     Problem: Diabetes/Glucose Control  Goal: Glucose maintained within prescribed range  Description: INTERVENTIONS:  - Monitor Blood Glucose as ordered  - Assess for signs and symptoms of hyperglycemia and hypoglycemia  - Administer ordered medications to maintain glucose within target range  - Assess barriers to adequate nutritional intake and initiate nutrition consult as needed  - Instruct patient on self management of diabetes  Outcome: Progressing     Problem: PAIN - ADULT  Goal: Verbalizes/displays adequate comfort level or patient's stated pain goal  Description: INTERVENTIONS:  - Encourage pt to monitor pain and request assistance  - Assess pain using appropriate pain scale  - Administer analgesics based on type and severity of pain and evaluate response  - Implement non-pharmacological measures as appropriate and evaluate response  - Consider cultural and social influences on pain and pain management  - Manage/alleviate anxiety  - Utilize distraction and/or relaxation techniques  - Monitor for opioid side effects  - Notify MD/LIP if interventions unsuccessful or patient reports new pain  - Anticipate increased pain with activity and pre-medicate as appropriate  Outcome: Progressing     Problem:  RISK FOR INFECTION - ADULT  Goal: Absence of fever/infection during anticipated neutropenic period  Description: INTERVENTIONS  - Monitor WBC  - Administer growth factors as ordered  - Implement neutropenic guidelines  Outcome: Progressing     Problem: SAFETY ADULT - FALL  Goal: Free from fall injury  Description: INTERVENTIONS:  - Assess pt frequently for physical needs  - Identify cognitive and physical deficits and behaviors that affect risk of falls.  - [place] fall precautions as indicated by assessment.  - Educate pt/family on patient safety including physical limitations  - Instruct pt to call for assistance with activity based on assessment  - Modify environment to reduce risk of injury  - Provide assistive devices as appropriate  - Consider OT/PT consult to assist with strengthening/mobility  - Encourage toileting schedule  Outcome: Progressing     Problem: METABOLIC/FLUID AND ELECTROLYTES - ADULT  Goal: Glucose maintained within prescribed range  Description: INTERVENTIONS:  - Monitor Blood Glucose as ordered  - Assess for signs and symptoms of hyperglycemia and hypoglycemia  - Administer ordered medications to maintain glucose within target range  - Assess barriers to adequate nutritional intake and initiate nutrition consult as needed  - Instruct patient on self management of diabetes  Outcome: Progressing     Problem: SKIN/TISSUE INTEGRITY - ADULT  Goal: Skin integrity remains intact  Description: INTERVENTIONS  - Assess and document risk factors for pressure ulcer development  - Assess and document skin integrity  - Monitor for areas of redness and/or skin breakdown  - Initiate interventions, skin care algorithm/standards of care as needed  Outcome: Progressing     Problem: Impaired Functional Mobility  Goal: Achieve highest/safest level of mobility/gait  Description: Interventions:  - Assess patient's functional ability and stability  - Promote increasing activity/tolerance for mobility and gait  -  Educate and engage patient/family in tolerated activity level and precautions    Outcome: Progressing

## 2024-03-02 NOTE — H&P
[facility] Hospitalist H&P       CC:   Chief Complaint   Patient presents with    Abscess        PCP: Rao Beyer MD    Date of Admission: 3/1/2024  8:02 PM    ASSESSMENT / PLAN:       Mr. Freed is a 83 year old male with PMH sig for CAD, DM2, HTN, HL, and recent admission for proctocolitis with perianal abscess discharged on IV abx then readmitted for diarrhea and C.diff who presents with worsening abscess.     Intrarectal fluid collection  - has been on abx since January with slow improvement  - seen by surgery, GI, thought to be 2/2 stercoral ulceration and a somewhat atypical location and on versus unusual location for ischemic injury, vs IBD  - had outpatient CT with increased abscess size and sent to the ER for eval  - GI consulted, may need IR drainage  - will consult IR on Monday  - continue abx as per ID recs     Baseline Cognitive Issues per wife  - issues with encephalopathy on last admission, monitor for delirium  -seroquel and prn melatonin     C dif colitis  -vanco with long taper-  Take 2.5 mL (125 mg total) by mouth 4 (four) times daily for 6 days, THEN 2.5 mL (125 mg total) 2 (two) times a day for 14 days.   -follow up with ID      Hx Constipation  -per wife takes miralex and colace daily,  -will resume colace daily for now      Emphysema  Lung Nodule, RUL  -CTA brain- . Emphysema.  Noncalcified right upper lobe lung nodules, which measure up to 6 mm.   -outpt CT chest for completeness       CAD  HLD  Infrarenal abdominal aortic aneurysm measuring up to 5.0 cm.   HTN  -noted on CT A/P   -continue toprol and norvasc   -holding ASA for possible additional procedures  -continue lipitor      DM2- Diet Controlled   -ssi prn     DEANGELO  -not compliant with CPAP      GOC  -full code per prior notes  -POA wife but needs help with decision and speaks with Son Raul who is local and another brother in [place]     MA/ACO Reach  -Re- Entry: NO  -Consults: gi, and ID   -Discharge Needs: likely back to [facility] at [facility]      FN:  - IVF: none  - Diet: cardiac    DVT Prophy: SCD, HSQ  Lines: PIV    Dispo: pending clinical course    Outpatient records or previous hospital records reviewed.     Further recommendations pending patient's clinical course.  [facility] hospitalist to continue to follow patient while in house    Patient and/or patient's family given opportunity to ask questions and note understanding and agreeing with therapeutic plan as outlined    Grecia Mc MD  [facility] Hospitalist  Answering Service number: [phone]    HPI       History of Present Illness:       Mr. Freed is a 83 year old male with PMH sig for CAD, DM2, HTN, HL, and recent admission for proctocolitis with perianal abscess currently on IV abx who presented with worsening abscess on outpatient CT. Patient was recently admitted 2/8-2/16 and 1/27- 1/30. He completed IV zosyn and had repeat CT 2/28 that showed continued intramural abscess and was sent to the ER for eval.     In the ED, vitals stable, denies pain. States he has been having diarrhea and has had some pain due to his rectum being raw. Had issues with delirium on prior admission and at SNF. Knows he is in the hospital. When asked if he was having any confusion at SNF he said he had 2 episodes that were really bad. No fevers or chills.       PMH  Past Medical History:   Diagnosis Date    Abdominal aortic aneurysm (AAA) 3.0 cm to 5.0 cm in diameter in female (HCC)     CAD involving native coronary artery of native heart without angina pectoris 03/20/2018    Cardiac LV ejection fraction 50-55% per 2018 angio  03/20/2018    Centrilobular emphysema (HCC) 03/19/2018    Coronary atherosclerosis     COVID 12/2020    Diabetes mellitus (HCC)     Gastric ulcer 2008    Healed 2009    GOUT     HYPERLIPIDEMIA     HYPERTENSION     OBESITY     OSTEOARTHRITIS     osteoarthritis knees    Presence of drug coated stent in prox & Mid LAD coronary artery 03/19/2018 03/20/2018    2.75 x 15 mm Xcience  drug-eluting stent into the mid LAD 3.5 x 12 mm Xcience drug-eluting stent into the proximal LAD    Pulmonary emphysema (HCC)     SLEEP APNEA     [facility] fax [phone]        [facility]  Past Surgical History:   Procedure Laterality Date    CATH BARE METAL STENT (BMS)      COLONOSCOPY  2009= Declines repeat    2009, complicated by anal fissure    COLONOSCOPY      COLONOSCOPY  03/2022    one small TA, int hem, can consider d/cing surveillance    COLONOSCOPY N/A 02/28/2022    Procedure: COLONOSCOPY,  with polypectomy;  Surgeon: Anthony Patel MD;  Location: [facility]    HEMORRHOIDECTOMY  Dr Kamara 3/12/10 [facility]     Rectal exam under anesthesia/anoscopy. Incision and drainage of perirectal abscess. Placement of seton . Excision of anal tags. Destruction of internal hemorrhoids.  Surgery done at [facility] on 3/12/10.    OTHER SURGICAL HISTORY      left knee arthroscopy    PATIENT DOCUMENTED NOT TO HAVE EXPERIENCED ANY OF THE FOLLOWING EVENTS N/A 02/18/2015    Procedure: ESOPHAGOGASTRODUODENOSCOPY, POSSIBLE BIOPSY, POSSIBLE POLYPECTOMY 30309;  Surgeon: Musa Cook MD;  Location: [facility]    PATIENT WITHOUGH PREOPERATIVE ORDER FOR IV ANTIBIOTIC SURGICAL SITE INFECTION PROPHYLAXIS. N/A 02/18/2015    Procedure: ESOPHAGOGASTRODUODENOSCOPY, POSSIBLE BIOPSY, POSSIBLE POLYPECTOMY 12263;  Surgeon: Musa Cook MD;  Location: [facility]    UPPER GI ENDOSCOPY PERFORMED  2008, 2/27/2009    UPPER GI ENDOSCOPY,BIOPSY  2/18/15=Gastritis.  H pylori negative, normal SB Bx    UPPER GI ENDOSCOPY,BIOPSY N/A 02/18/2015    Procedure: ESOPHAGOGASTRODUODENOSCOPY, POSSIBLE BIOPSY, POSSIBLE POLYPECTOMY 66462;  Surgeon: Musa Cook MD;  Location: [facility]    UPPER GI ENDOSCOPY,EXAM          ALL:  No Known Allergies     Home Medications:  Outpatient Medications Marked as Taking for the 3/1/24 encounter (Hospital Encounter)   Medication Sig Dispense Refill    Emollient (EUCERIN  PLUS) 5-5 % External Lotion Apply 1 Application topically daily. Upper and lower extremities      Zinc Oxide 10 % External Ointment Apply 1 Application topically as needed.      vancomycin 50 mg/mL Oral Recon Soln Take 2.5 mL (125 mg total) by mouth 4 (four) times daily for 6 days, THEN 2.5 mL (125 mg total) 2 (two) times a day for 14 days. 130 mL 0    dextrose 5% SOLN 100 mL with piperacillin-tazobactam 4.5 (4-0.5) g SOLR 4.5 g Inject 4.5 g into the vein every 8 (eight) hours for 14 days. Weekly cbc w/diff, cmp, crp while on IV abx  PICC care per protocol 1 Dose 0    acetaminophen 500 MG Oral Tab Take 1 tablet (500 mg total) by mouth every 4 (four) hours as needed for Pain or Fever.      docusate sodium 100 MG Oral Cap Take 100 mg by mouth daily.      melatonin 3 MG Oral Tab Take 1 tablet (3 mg total) by mouth nightly.      polyethylene glycol, PEG 3350, 17 g Oral Powd Pack Take 17 g by mouth daily as needed (If no bowel movement in last 24 hours).      amLODIPine 10 MG Oral Tab Take 1 tablet (10 mg total) by mouth daily.      saccharomyces boulardii 250 MG Oral Cap Take 1 capsule (250 mg total) by mouth 2 (two) times daily.      Magnesium 500 MG Oral Tab Take 1 tablet (500 mg total) by mouth daily.      omeprazole 20 MG Oral Capsule Delayed Release Take 1 capsule (20 mg total) by mouth daily.      METOPROLOL SUCCINATE 100 MG Oral Tablet 24 Hr TAKE 1 TABLET BY MOUTH EVERY DAY 90 tablet 1    atorvastatin 20 MG Oral Tab Take 1 tablet (20 mg total) by mouth daily. (Patient taking differently: Take 1 tablet (20 mg total) by mouth nightly.) 90 tablet 3    allopurinol 100 MG Oral Tab Take 1 tablet (100 mg total) by mouth daily.      Omega-3 Fatty Acids (FISH OIL) 1000 MG Oral Cap Take 1,000 mg by mouth daily.      Multiple Vitamin (MULTI VITAMIN MENS OR) Take 1 tablet by mouth daily.           Soc Hx  Social History     Tobacco Use    Smoking status: Former     Packs/day: 1.00     Years: 40.00     Additional pack years:  0.00     Total pack years: 40.00     Types: Cigarettes     Quit date: 2009     Years since quittin.7    Smokeless tobacco: Never    Tobacco comments:     has been a 3 ppd   Substance Use Topics    Alcohol use: Yes     Comment: occasional intake        Fam Hx  Family History   Problem Relation Age of Onset    Cancer Mother         lung    Obesity Son     Obesity Sister     Lipids Sister     Obesity Son        Review of Systems  Comprehensive ROS reviewed and negative except for what's stated above.     OBJECTIVE:  BP (!) 170/70 (BP Location: Left arm)   Pulse 64   Temp 97 °F (36.1 °C) (Axillary)   Resp 18   Ht 5' 8\" (1.727 m)   Wt 218 lb 8 oz (99.1 kg)   SpO2 95%   BMI 33.22 kg/m²     GEN: elderly male in NAD, A&Ox 2-3  HEENT: EOMI  Pulm: CTAB, no crackles or wheezes  CV: RRR, no murmurs  ABD: Soft, non-tender, non-distended, +BS  SKIN: warm, dry  EXT: no edema    Diagnostic Data:    CBC/Chem    Recent Labs   Lab 24   RBC 3.75*   HGB 11.6*   HCT 35.7*   MCV 95.2   MCH 30.9   MCHC 32.5   RDW 13.7   NEPRELIM 4.65   WBC 8.3   .0         Recent Labs   Lab 24   *   BUN 17   CREATSERUM 1.28   EGFRCR 55*   CA 9.8      K 3.9      CO2 29.0     Lab Results   Component Value Date    WBC 8.3 2024    HGB 11.6 2024    HCT 35.7 2024    .0 2024    CREATSERUM 1.28 2024    BUN 17 2024     2024    K 3.9 2024     2024    CO2 29.0 2024     2024    CA 9.8 2024    ALB 4.5 2024    ALKPHO 114 2024    BILT 0.4 2024    TP 8.6 2024    AST 17 2024    ALT <7 2024       No results for input(s): \"TROP\" in the last 168 hours.    Additional Diagnostics:    Radiology: No results found.    CT ABDOMEN+PELVIS(CONTRAST ONLY)(CPT=74177)    Anatomical Region Laterality Modality   Abdomen -- Computed Tomography   Pelvis -- --     Impression    IMPRESSION:    1.   Intramural abscess involving the left lateral rectal wall extending to the posterior perirectal  space, with perirectal adenopathy. Prior imaging unavailable for direct comparison.  2.  Abdominal aortic aneurysm .    Clinicians: If you have any questions or concerns regarding the study or report, contact the  interpreting radiologist Dilma Crawford MD directly at extension [phone].  Narrative    DATE OF SERVICE: 02.28.2024  PROCEDURE:  CT ABDOMEN+PELVIS(CONTRAST ONLY)(CPT=74177)    HISTORY: Rectal abscess.    TECHNIQUE: Routine contrast enhanced helical scanning was performed from the dome of the diaphragm  to the pubic symphysis. Coronal and sagittal multiplanar reformatted images were included.    Automated exposure control and ALARA manual techniques for patient specific dose reduction were  followed while maintaining the necessary diagnostic image quality.    CONTRAST: 80 mL of Isovue-370.    COMPARISON:  CT abdomen 3/20/2010    FINDINGS:    LUNG BASES: Bibasilar subpleural reticulation without honeycombing.  LIVER:  Normal.  GALLBLADDER: Normal.  BILIARY TREE: Normal  PANCREAS:  Moderate-sized periampullary duodenal diverticulum. No ductal dilatation or focal lesion.    SPLEEN:  Normal.  ADRENALS:  Normal.  KIDNEYS/URETERS:  Simple renal cysts and additional subcentimeter hypodensities which are too small  to characterize.  AORTA/VASCULAR:  Infrarenal abdominal aortic aneurysm measures 4.6 x 4.6 cm with atherosclerosis and  eccentric mural thrombus.  LYMPH NODES:  Prominent perirectal nodes measuring up to 7 mm.  GASTROINTESTINAL TRACT:  Distal rectal wall thickening, with a left lateral wall intramural abscess  extending to the posterior perirectal space measuring 5.0 x 2.0 x 2.0 cm (series 302 image 162).  PERITONEUM:Normal.  PELVIC ORGANS:  Normal.  BLADDER:Normal.  ABDOMINAL WALL:  Normal.  MUSCULOSKELETAL:  Degenerative changes of the spine.  Procedure Note    Dilma Crawford MD -  02/28/2024  Formatting of this note might be different from the original.  DATE OF SERVICE: 02.28.2024  PROCEDURE:  CT ABDOMEN+PELVIS(CONTRAST ONLY)(CPT=74177)    HISTORY: Rectal abscess.    TECHNIQUE: Routine contrast enhanced helical scanning was performed from the dome of the diaphragm  to the pubic symphysis. Coronal and sagittal multiplanar reformatted images were included.    Automated exposure control and ALARA manual techniques for patient specific dose reduction were  followed while maintaining the necessary diagnostic image quality.    CONTRAST: 80 mL of Isovue-370.    COMPARISON:  CT abdomen 3/20/2010    FINDINGS:    LUNG BASES: Bibasilar subpleural reticulation without honeycombing.  LIVER:  Normal.  GALLBLADDER: Normal.  BILIARY TREE: Normal  PANCREAS:  Moderate-sized periampullary duodenal diverticulum. No ductal dilatation or focal lesion.    SPLEEN:  Normal.  ADRENALS:  Normal.  KIDNEYS/URETERS:  Simple renal cysts and additional subcentimeter hypodensities which are too small  to characterize.  AORTA/VASCULAR:  Infrarenal abdominal aortic aneurysm measures 4.6 x 4.6 cm with atherosclerosis and  eccentric mural thrombus.  LYMPH NODES:  Prominent perirectal nodes measuring up to 7 mm.  GASTROINTESTINAL TRACT:  Distal rectal wall thickening, with a left lateral wall intramural abscess  extending to the posterior perirectal space measuring 5.0 x 2.0 x 2.0 cm (series 302 image 162).  PERITONEUM:Normal.  PELVIC ORGANS:  Normal.  BLADDER:Normal.  ABDOMINAL WALL:  Normal.  MUSCULOSKELETAL:  Degenerative changes of the spine.    =====  IMPRESSION:    1.  Intramural abscess involving the left lateral rectal wall extending to the posterior perirectal  space, with perirectal adenopathy. Prior imaging unavailable for direct comparison.  2.  Abdominal aortic aneurysm .    Clinicians: If you have any questions or concerns regarding the study or report, contact the  interpreting radiologist Dilma Crawford MD  directly at extension [phone].

## 2024-03-02 NOTE — CONSULTS
[facility]  part of [facility] Infectious Disease  Report of Consultation    Raul Freed Jr. Patient Status:  Inpatient    1940 MRN [id number]   Location [facility] 5SW/SE Attending Grecia Mc MD   Hosp Day # 0 PCP Rao Beyer MD     Date of Admission:  3/1/2024  Date of Consult:  3/2/2024    Reason for Consultation:  Perianal abscess, AMS    History of Present Illness:  Raul Freed Jr. is a a(n) 84 year old male being seen at your request regarding a known rectal intramural abscess with diarrhea.  Patient is well known to myself from a h/o recent admission for the same.    Patient recently presented to the ED on 24 with a c/o HN/V/D.  He had a CT at that time demonstrate a L lateral rectal fluid collection and he had some fevers.  He was ultimately discharged on a course of IV invanz and had normal WBCs.  Plan was for a ~3 week course through 24.      He came back into the hospital in February with weakness, dizziness, and balance issues.  There was some concern for invanz side effect.  Patient also with acute c.diff positive.  He was transitioned to IV zosyn with p.o. vancomycin at [facility].  He returned to the ED yesterday with worsening.  Confusion ongoing once again as well.  Recent CT as an outpatient with worsening size of abscess but CT done 3/2/24 without abscess noted.    Patient continues on IV zosyn and p.o. vancomycin and we are asked to see and assist once again.  Much more awake and alert than on presentation.     History:  Past Medical History:   Diagnosis Date    Abdominal aortic aneurysm (AAA) 3.0 cm to 5.0 cm in diameter in female (HCC)     CAD involving native coronary artery of native heart without angina pectoris 2018    Cardiac LV ejection fraction 50-55% per 2018 angio  2018    Centrilobular emphysema (HCC) 2018    Coronary atherosclerosis     COVID 2020    Diabetes mellitus ([facility])      Gastric ulcer 2008    Healed 2009    GOUT     HYPERLIPIDEMIA     HYPERTENSION     OBESITY     OSTEOARTHRITIS     osteoarthritis knees    Presence of drug coated stent in prox & Mid LAD coronary artery 03/19/2018 03/20/2018    2.75 x 15 mm Xcience drug-eluting stent into the mid LAD 3.5 x 12 mm Xcience drug-eluting stent into the proximal LAD    Pulmonary emphysema (HCC)     SLEEP APNEA     [facility] fax [phone]     Past Surgical History:   Procedure Laterality Date    CATH BARE METAL STENT (BMS)      COLONOSCOPY  2009= Declines repeat    2009, complicated by anal fissure    COLONOSCOPY      COLONOSCOPY  03/2022    one small TA, int hem, can consider d/cing surveillance    COLONOSCOPY N/A 02/28/2022    Procedure: COLONOSCOPY,  with polypectomy;  Surgeon: Anthony Patel MD;  Location: [facility]    HEMORRHOIDECTOMY  Dr Kamara 3/12/10 [facility]     Rectal exam under anesthesia/anoscopy. Incision and drainage of perirectal abscess. Placement of seton . Excision of anal tags. Destruction of internal hemorrhoids.  Surgery done at [facility] on 3/12/10.    OTHER SURGICAL HISTORY      left knee arthroscopy    PATIENT DOCUMENTED NOT TO HAVE EXPERIENCED ANY OF THE FOLLOWING EVENTS N/A 02/18/2015    Procedure: ESOPHAGOGASTRODUODENOSCOPY, POSSIBLE BIOPSY, POSSIBLE POLYPECTOMY 42011;  Surgeon: Musa Cook MD;  Location: [facility]    PATIENT WITHOUGH PREOPERATIVE ORDER FOR IV ANTIBIOTIC SURGICAL SITE INFECTION PROPHYLAXIS. N/A 02/18/2015    Procedure: ESOPHAGOGASTRODUODENOSCOPY, POSSIBLE BIOPSY, POSSIBLE POLYPECTOMY 64055;  Surgeon: Musa Cook MD;  Location: [facility]    UPPER GI ENDOSCOPY PERFORMED  2008, 2/27/2009    UPPER GI ENDOSCOPY,BIOPSY  2/18/15=Gastritis.  H pylori negative, normal SB Bx    UPPER GI ENDOSCOPY,BIOPSY N/A 02/18/2015    Procedure: ESOPHAGOGASTRODUODENOSCOPY, POSSIBLE BIOPSY, POSSIBLE POLYPECTOMY 10677;  Surgeon: Musa Cook MD;  Location: [facility]  [place], [facility]    UPPER GI ENDOSCOPY,EXAM       Family History   Problem Relation Age of Onset    Cancer Mother         lung    Obesity Son     Obesity Sister     Lipids Sister     Obesity Son       reports that he quit smoking about 14 years ago. He has a 40 pack-year smoking history. He has never used smokeless tobacco. He reports current alcohol use. He reports that he does not use drugs.    Allergies:  No Known Allergies    Medications:    Current Facility-Administered Medications:     sodium chloride 0.9% infusion, , Intravenous, Continuous    acetaminophen (Tylenol Extra Strength) tab 500 mg, 500 mg, Oral, Q4H PRN    ondansetron (Zofran) 4 MG/2ML injection 4 mg, 4 mg, Intravenous, Q6H PRN    metoclopramide (Reglan) 5 mg/mL injection 10 mg, 10 mg, Intravenous, Q8H PRN    piperacillin-tazobactam (Zosyn) 3.375 g in dextrose 5% 100 mL IVPB-ADDV, 3.375 g, Intravenous, Q8H    vancomycin (Vancocin) cap 125 mg, 125 mg, Oral, Daily    allopurinol (Zyloprim) tab 100 mg, 100 mg, Oral, Daily    amLODIPine (Norvasc) tab 10 mg, 10 mg, Oral, Daily    atorvastatin (Lipitor) tab 20 mg, 20 mg, Oral, Nightly    docusate sodium (Colace) cap 100 mg, 100 mg, Oral, Daily    magnesium oxide (Mag-Ox) tab 500 mg, 500 mg, Oral, Daily    metoprolol succinate ER (Toprol XL) 24 hr tab 100 mg, 100 mg, Oral, Daily    pantoprazole (Protonix) DR tab 20 mg, 20 mg, Oral, QAM AC    glucose (Dex4) 15 GM/59ML oral liquid 15 g, 15 g, Oral, Q15 Min PRN **OR** glucose (Glutose) 40% oral gel 15 g, 15 g, Oral, Q15 Min PRN **OR** glucose-vitamin C (Dex-4) chewable tab 4 tablet, 4 tablet, Oral, Q15 Min PRN **OR** dextrose 50% injection 50 mL, 50 mL, Intravenous, Q15 Min PRN **OR** glucose (Dex4) 15 GM/59ML oral liquid 30 g, 30 g, Oral, Q15 Min PRN **OR** glucose (Glutose) 40% oral gel 30 g, 30 g, Oral, Q15 Min PRN **OR** glucose-vitamin C (Dex-4) chewable tab 8 tablet, 8 tablet, Oral, Q15 Min PRN    insulin aspart (NovoLOG) 100 Units/mL FlexPen 1-5 Units,  1-5 Units, Subcutaneous, TID CC    melatonin tab 3 mg, 3 mg, Oral, Nightly    polyethylene glycol (PEG 3350) (Miralax) 17 g oral packet 17 g, 17 g, Oral, Daily    alteplase (Activase) 2 mg in sterile water for injection (PF) 2.2 mL IV push to declot line, 2 mg, Intravenous, Once    Review of Systems:    Constitutional:  Confusion.   HEENT:  No visual changes, oral ulcers, sore throat, difficulty swallowing.   Respiratory: Negative for cough, sputum, hemoptysis, chest pain, wheezing, dyspnea on exertion, or stridor.   Cardiovascular: Negative for chest pain, palpitations, irregular heart beats.   Gastrointestinal:  No abdominal pain, nausea, vomiting, diarrhea, or constipation.   Genitourinary:  No dysuria, hematuria, urine urgency or frequency.   Integument/breast: Negative for rash, skin lesions, and pruritus.   Hematologic/lymphatic: Negative for easy bruising, bleeding, and lymphadenopathy.   Musculoskeletal: Negative for myalgias, arthralgias, muscle weakness.   Neurological: No focal neurologic deficits, seizures, tremors.   Psych:  No h/o anxiety, depression, other psych d/o.   Endocrine: No history of of diabetes, thyroid disorder.    Remainder of 12 point review of systems otherwise negative.    Vital signs in last 24 hours:  Patient Vitals for the past 24 hrs:   BP Temp Temp src Pulse Resp SpO2 Height Weight   03/02/24 0852 (!) 117/100 98.2 °F (36.8 °C) Oral 72 18 98 % -- --   03/02/24 0537 (!) 170/70 97 °F (36.1 °C) Axillary 64 18 95 % -- --   03/02/24 0133 -- -- -- 72 -- 95 % -- --   03/02/24 0050 159/75 97.7 °F (36.5 °C) Oral 74 20 97 % 5' 8\" (1.727 m) 218 lb 8 oz (99.1 kg)   03/02/24 0030 150/72 -- -- 74 -- 94 % -- --   03/01/24 2330 155/70 -- -- 75 -- 99 % -- --   03/01/24 2312 -- -- -- -- -- -- -- 220 lb 7.4 oz (100 kg)   03/01/24 2040 148/77 -- -- -- -- -- -- --   03/01/24 2004 -- -- -- -- -- 98 % -- --   03/01/24 2003 -- 98 °F (36.7 °C) Oral 81 18 (!) 88 % -- --       Intake/Output:  I/O this  shift:  In: -   Out: 225 [Urine:225]    Physical Exam:   General: Awake, alert, non-tox and in NAD.   Head: Normocephalic, without obvious abnormality, atraumatic.   Eyes: Conjunctivae/corneas clear.  No scleral icterus.  No conjunctival     hemorrhage.   Nose: Nares normal.   Throat:  Oropharynx clear, MMs moist.   Neck: Trachea ML, no masses.   Lungs: CTA b/l no rhonchi, rales, wheezes.   Chest wall: No tenderness or deformity.   Heart: Regular rate and rhythm, normal S1S2, no murmurs.   Abdomen: Soft, NT/ND.  Bowel sounds present.  No organomegaly.   Extremity: No edema.   Skin: No rashes or lesions.   Neurological: No focal neurologic deficits.    Lab Data Review:  Lab Results   Component Value Date    WBC 8.3 03/01/2024    HGB 11.6 03/01/2024    HCT 35.7 03/01/2024    .0 03/01/2024    CREATSERUM 1.28 03/01/2024    BUN 17 03/01/2024     03/01/2024    K 3.9 03/01/2024     03/01/2024    CO2 29.0 03/01/2024     03/01/2024    CA 9.8 03/01/2024    ALB 4.5 03/01/2024    ALKPHO 114 03/01/2024    BILT 0.4 03/01/2024    TP 8.6 03/01/2024    AST 17 03/01/2024    ALT <7 03/01/2024       Cultures:   C.diff positive 2/8/24    Blood cultures pending    Radiology:  CONCLUSION:      Diffuse proctitis, improved but not resolved since 2/14/2024.      No abscess.      If not already performed, after resolution of patient's symptoms, direct visualization is recommended to exclude underlying rectal malignancy.      Aneurysmal dilation of the infrarenal abdominal aorta, unchanged since the prior exams. Nonemergent interventional radiology or vascular surgery consultation suggested. Yearly surveillance could be performed if intervention is deferred.           Multiple other incidental findings as described in the body of the report which are unchanged.      Assessment and Plan:     Syndrome of weakness, diarrhea, and known proctocolitis with another concern for interval worsening  - CT reported on 2/28/24  with intramural abscess as an outpatient but not apparently noted on 3/2/24 study  - Unprepped sigmoid scope with deep ulcers, associated ?abscesses historically  - IV zosyn ongoing since last admission     2.  Initial admission in January 2024 for sepsis in this gentleman presenting with fevers, leukocytosis, N/V/D  - CT at that time with evidence of severe proctocolitis with complex-appearing L lateral fluid/gas  - Seen by Dr. Shelby - consistent with a small intramural abscess in the L posterior rectal wall not amenable to drainage previously    3.  2nd admission 2/8/24 with acute on chronic diarrhea - now c.diff positive  - Patient does have a h/o chronic loose stools so unclear if this is much worse than baseline  - p.o. vancomycin ongoing    4.  Leukocytosis due to the above  - At 8.3K today and normalized     5.  Weakness and and confusion once again  - Thought to be due to invanz previously but continuing now on zosyn     4.  Disposition - inpatient.  Continue IV zosyn and p.o. vancomycin as Rx.  This is patient's 3rd admission for the same process/concerns.  GI input appreciated - at this time favor IR drainage if possible so we can obtain cultures and source control for more definitive plan of care.  Trending temps and WBCs.  Will follow with further recommendations.    Nupur Bradford DO, [facility] Infectious Disease  [phone]    3/2/2024  2:50 PM

## 2024-03-02 NOTE — ED QUICK NOTES
Orders for admission, patient is aware of plan and ready to go upstairs. Any questions, please call ED BILL Curry  at extension [phone].     Patient Covid vaccination status: Fully vaccinated     COVID Test Ordered in ED: None    COVID Suspicion at Admission: N/A    Running Infusions:      Mental Status/LOC at time of transport: A&ox2    Other pertinent information:   CIWA score: N/A   NIH score:  N/A

## 2024-03-02 NOTE — ED INITIAL ASSESSMENT (HPI)
Pt presents to ED via [facility] for perirectal abscess. Per EMS report pt is getting IV zozyn and vanco at the Nursing home but daughter states the treatment is not working. Denies fevers.

## 2024-03-02 NOTE — ED PROVIDER NOTES
Patient Seen in: [facility] Emergency Department      History     Chief Complaint   Patient presents with    Abscess     Stated Complaint: abcesss    Subjective:   HPI    84 year old male with multiple medical issues including CAD, DM, HTN, HL, recent ongoing issue with proctocolitis and perianal abscess treated with abx. He is still on zosyn outpatient. He also developed c diff and is on oral vancomycin. His daughter at bedside states pt doing worse instead of better, states his most recent CT (as below) done outpatient shows that abscess is increasing. Pt also becoming confused again, which was part of his original presentation. Daughter states that pt thought he was going on a \"road trip\" at 3am at the NH and was waiting to be picked up, thought a family friend is the one who brought him back to his room.    CT Abd/Pel performed by [facility] on 2/28:    Distal rectal wall thickening, with a left lateral wall intramural abscess   extending to the posterior perirectal space measuring 5.0 x 2.0 x 2.0 cm     IMPRESSION:    1.  Intramural abscess involving the left lateral rectal wall extending to the posterior perirectal   space, with perirectal adenopathy. Prior imaging unavailable for direct comparison.   2.  Abdominal aortic aneurysm      Objective:   No pertinent past medical history.            No pertinent past surgical history.              No pertinent social history.            Review of Systems    Positive for stated complaint: abcesss  Other systems are as noted in HPI.  Constitutional and vital signs reviewed.      All other systems reviewed and negative except as noted above.    Physical Exam     ED Triage Vitals   BP 03/01/24 2040 148/77   Pulse 03/01/24 2003 81   Resp 03/01/24 2003 18   Temp 03/01/24 2003 98 °F (36.7 °C)   Temp src 03/01/24 2003 Oral   SpO2 03/01/24 2003 (!) 88 %   O2 Device 03/01/24 2003 None (Room air)       Current:/75 (BP Location: Left arm)   Pulse 72   Temp  97.7 °F (36.5 °C) (Oral)   Resp 20   Ht 172.7 cm (5' 8\")   Wt 99.1 kg   SpO2 95%   BMI 33.22 kg/m²         Physical Exam  Vitals and nursing note reviewed.   Constitutional:       General: He is not in acute distress.     Appearance: Normal appearance. He is well-developed. He is not ill-appearing, toxic-appearing or diaphoretic.   HENT:      Head: Normocephalic and atraumatic.   Eyes:      Conjunctiva/sclera: Conjunctivae normal.      Pupils: Pupils are equal, round, and reactive to light.   Cardiovascular:      Rate and Rhythm: Normal rate and regular rhythm.      Pulses: Normal pulses.      Heart sounds: Normal heart sounds. No murmur heard.  Pulmonary:      Effort: Pulmonary effort is normal. No respiratory distress.      Breath sounds: Normal breath sounds. No wheezing.   Abdominal:      General: There is no distension.      Palpations: Abdomen is soft.      Tenderness: There is no abdominal tenderness. There is no guarding.   Genitourinary:     Comments: External anal exam - no protruding abscess, no erythema  Musculoskeletal:         General: No tenderness. Normal range of motion.      Cervical back: Full passive range of motion without pain, normal range of motion and neck supple. No rigidity. Normal range of motion.      Right lower leg: No edema.      Left lower leg: No edema.   Skin:     General: Skin is warm and dry.      Findings: No rash.   Neurological:      Mental Status: He is alert and oriented to person, place, and time.      GCS: GCS eye subscore is 4. GCS verbal subscore is 5. GCS motor subscore is 6.      Sensory: Sensation is intact. No sensory deficit.      Motor: Motor function is intact. No weakness.   Psychiatric:         Attention and Perception: Attention normal.         Mood and Affect: Mood normal.         Behavior: Behavior normal. Behavior is cooperative.           ED Course     Labs Reviewed   COMP METABOLIC PANEL (14) - Abnormal; Notable for the following components:        Result Value    Glucose 128 (*)     eGFR-Cr 55 (*)     ALT <7 (*)     Total Protein 8.6 (*)     All other components within normal limits   POCT GLUCOSE - Abnormal; Notable for the following components:    POC Glucose  106 (*)     All other components within normal limits   CBC W/ DIFFERENTIAL - Abnormal; Notable for the following components:    RBC 3.75 (*)     HGB 11.6 (*)     HCT 35.7 (*)     RDW-SD 48.0 (*)     Monocyte Absolute 1.18 (*)     All other components within normal limits   LACTIC ACID, PLASMA - Normal   CBC WITH DIFFERENTIAL WITH PLATELET    Narrative:     The following orders were created for panel order CBC With Differential With Platelet.  Procedure                               Abnormality         Status                     ---------                               -----------         ------                     CBC W/ DIFFERENTIAL[[id number]]          Abnormal            Final result                 Please view results for these tests on the individual orders.   RAINBOW DRAW LAVENDER   RAINBOW DRAW LIGHT GREEN   BLOOD CULTURE   BLOOD CULTURE              MDM      Pulse Ox: 95%, Normal, RA    Medications   sodium chloride 0.9% infusion ( Intravenous New Bag 3/2/24 0106)   acetaminophen (Tylenol Extra Strength) tab 500 mg (has no administration in time range)   ondansetron (Zofran) 4 MG/2ML injection 4 mg (has no administration in time range)   metoclopramide (Reglan) 5 mg/mL injection 10 mg (has no administration in time range)   piperacillin-tazobactam (Zosyn) 3.375 g in dextrose 5% 100 mL IVPB-ADDV (has no administration in time range)   vancomycin (Vancocin) cap 125 mg (has no administration in time range)   allopurinol (Zyloprim) tab 100 mg (has no administration in time range)   amLODIPine (Norvasc) tab 10 mg (has no administration in time range)   atorvastatin (Lipitor) tab 20 mg (20 mg Oral Given 3/2/24 0111)   docusate sodium (Colace) cap 100 mg (100 mg Oral Not Given 3/2/24 0100)   magnesium  oxide (Mag-Ox) tab 500 mg (has no administration in time range)   metoprolol succinate ER (Toprol XL) 24 hr tab 100 mg (has no administration in time range)   pantoprazole (Protonix) DR tab 20 mg (has no administration in time range)   glucose (Dex4) 15 GM/59ML oral liquid 15 g (has no administration in time range)     Or   glucose (Glutose) 40% oral gel 15 g (has no administration in time range)     Or   glucose-vitamin C (Dex-4) chewable tab 4 tablet (has no administration in time range)     Or   dextrose 50% injection 50 mL (has no administration in time range)     Or   glucose (Dex4) 15 GM/59ML oral liquid 30 g (has no administration in time range)     Or   glucose (Glutose) 40% oral gel 30 g (has no administration in time range)     Or   glucose-vitamin C (Dex-4) chewable tab 8 tablet (has no administration in time range)   insulin aspart (NovoLOG) 100 Units/mL FlexPen 1-5 Units (has no administration in time range)   melatonin tab 3 mg (3 mg Oral Given 3/2/24 0204)   sodium chloride 0.9 % IV bolus 1,000 mL (0 mL Intravenous Stopped 3/1/24 2352)   vancomycin (Vancocin) 2 g in sodium chloride 0.9% 500mL IVPB premix (2,000 mg Intravenous Handoff 3/2/24 0041)   piperacillin-tazobactam (Zosyn) 4.5 g in dextrose 5% 100 mL IVPB-ADDV (0 g Intravenous Stopped 3/2/24 0041)        D/w Dr Guzman - will admit, req GI and ID on consult  D/w Dr Graham - will consult,  D/w Dr Bradford - advises ok to add iv vanc, give dose of zosyn as well    Disposition and Plan     Clinical Impression:  1. Perirectal abscess         Disposition:  Admit  3/1/2024 10:55 pm    Follow-up:  No follow-up provider specified.  We recommend that you schedule follow up care with a primary care provider within the next three months to obtain basic health screening including reassessment of your blood pressure.      Medications Prescribed:  Current Discharge Medication List                            Hospital Problems       Present on Admission   Date Reviewed: 10/24/2023            ICD-10-CM Noted POA    * (Principal) Perirectal abscess K61.1 3/1/2024 Unknown

## 2024-03-02 NOTE — CONSULTS
[facility]  part of [facility]    Report of Consultation    Raul Freed Jr. Patient Status:  Inpatient    1940 MRN [id number]   Location [facility] 5SW/SE Attending Grecia Mc MD   Hosp Day # 0 PCP Rao Beyer MD     Date of Admission:  3/1/2024  Reason for Consultation:   Perirectal abscess    History of Present Illness:   Patient is a 84 year old male with hx of CAD with stents, DM2, HTN, HLP, PUD, Gout, pulmonary emphysema, OA, DEANGELO who was admitted to the hospital for Perirectal abscess and confusion after recent admission for proctocolitis with perianal abscess discharged on invanz.  Patient had CT, MRI and flex to determine exactly what was going on, based on these results it is felt that the patient had a intrarectal fluid collection likely to have originated from deep ulcerations in the distal rectum near the dentate line there was no evidence of colitis on imaging or endoscopy.  Etiologies included Crohn's disease, stercoral ulceration and a somewhat atypical location and on versus unusual location for ischemic injury.  Patient was switched from Invanz to Zosyn and will complete a 2-week course and will need repeat CT scan of the rectum as well as outpatient colonoscopy.  During his hospital course patient was also found to be C. difficile positive and on vanc and had issues with metabolic encephalopathy attributed to infection currently on Seroquel of note patient does have underlying cognitive issues.      CT with possibly several communicating abscesses and initial intramural abscess worsening.  MRI with extensive rectal edema with fluid collection suggesting intramural abscesses, possibly with intersphincteric fistula formation, s/p sigmoidoscopy on 24, plans for eventual colonoscopy.    Recent imaging on  showing intramural abscess 5x2x2 cm with perirectal adenopathy (review of imaging over the month)    Colonoscopy done on 2022 for  hematochezia work up showed:  The quality of the prep was fair.  A 4 mm sessile polyp was found in the transverse and was removed completely with cold forceps.  The colonic mucosa was otherwise normal appearing throughout the colon and rectum.  On retroflexion in the rectum, there were small internal hemorrhoids and no masses.      IMPRESSION:   Colon polyp - transverse colon adenoma  Internal hemorrhoids    Unpreped Flex sig done during hospitalization on 2/13/2024:  Very distal somewhat deep rectal ulcers near dentate line but no colitis elsewhere. Pathology shows acute and chronic inflammation.     Past Medical History  Past Medical History:   Diagnosis Date    Abdominal aortic aneurysm (AAA) 3.0 cm to 5.0 cm in diameter in female (HCC)     CAD involving native coronary artery of native heart without angina pectoris 03/20/2018    Cardiac LV ejection fraction 50-55% per 2018 angio  03/20/2018    Centrilobular emphysema (HCC) 03/19/2018    Coronary atherosclerosis     COVID 12/2020    Diabetes mellitus (HCC)     Gastric ulcer 2008    Healed 2009    GOUT     HYPERLIPIDEMIA     HYPERTENSION     OBESITY     OSTEOARTHRITIS     osteoarthritis knees    Presence of drug coated stent in prox & Mid LAD coronary artery 03/19/2018 03/20/2018    2.75 x 15 mm Xcience drug-eluting stent into the mid LAD 3.5 x 12 mm Xcience drug-eluting stent into the proximal LAD    Pulmonary emphysema (HCC)     SLEEP APNEA     [facility] fax [phone]       Past Surgical History  Past Surgical History:   Procedure Laterality Date    CATH BARE METAL STENT (BMS)      COLONOSCOPY  2009= Declines repeat    2009, complicated by anal fissure    COLONOSCOPY      COLONOSCOPY  03/2022    one small TA, int hem, can consider d/cing surveillance    COLONOSCOPY N/A 02/28/2022    Procedure: COLONOSCOPY,  with polypectomy;  Surgeon: Anthony Patel MD;  Location: [facility]    HEMORRHOIDECTOMY  Dr Kamara 3/12/10 [facility]     Rectal exam under  anesthesia/anoscopy. Incision and drainage of perirectal abscess. Placement of seton . Excision of anal tags. Destruction of internal hemorrhoids.  Surgery done at [facility] on 3/12/10.    OTHER SURGICAL HISTORY      left knee arthroscopy    PATIENT DOCUMENTED NOT TO HAVE EXPERIENCED ANY OF THE FOLLOWING EVENTS N/A 2015    Procedure: ESOPHAGOGASTRODUODENOSCOPY, POSSIBLE BIOPSY, POSSIBLE POLYPECTOMY 52367;  Surgeon: Musa Cook MD;  Location: [facility]    PATIENT WITHOUGH PREOPERATIVE ORDER FOR IV ANTIBIOTIC SURGICAL SITE INFECTION PROPHYLAXIS. N/A 2015    Procedure: ESOPHAGOGASTRODUODENOSCOPY, POSSIBLE BIOPSY, POSSIBLE POLYPECTOMY 42242;  Surgeon: Musa Cook MD;  Location: [facility]    UPPER GI ENDOSCOPY PERFORMED  , 2009    UPPER GI ENDOSCOPY,BIOPSY  2/18/15=Gastritis.  H pylori negative, normal SB Bx    UPPER GI ENDOSCOPY,BIOPSY N/A 2015    Procedure: ESOPHAGOGASTRODUODENOSCOPY, POSSIBLE BIOPSY, POSSIBLE POLYPECTOMY 63899;  Surgeon: Musa Cook MD;  Location: [facility]    UPPER GI ENDOSCOPY,EXAM       Family History  Family History   Problem Relation Age of Onset    Cancer Mother         lung    Obesity Son     Obesity Sister     Lipids Sister     Obesity Son        Social History  Social History     Socioeconomic History    Marital status:    Tobacco Use    Smoking status: Former     Packs/day: 1.00     Years: 40.00     Additional pack years: 0.00     Total pack years: 40.00     Types: Cigarettes     Quit date: 2009     Years since quittin.7    Smokeless tobacco: Never    Tobacco comments:     has been a 3 ppd   Vaping Use    Vaping Use: Never used   Substance and Sexual Activity    Alcohol use: Yes     Comment: occasional intake    Drug use: No     Social Determinants of Health     Food Insecurity: No Food Insecurity (3/2/2024)    Food Insecurity     Food Insecurity: Never true   Transportation Needs: No Transportation  Needs (3/2/2024)    Transportation Needs     Lack of Transportation: No   Housing Stability: Low Risk  (3/2/2024)    Housing Stability     Housing Instability: No          Current Medications:  Current Facility-Administered Medications   Medication Dose Route Frequency    sodium chloride 0.9% infusion   Intravenous Continuous    acetaminophen (Tylenol Extra Strength) tab 500 mg  500 mg Oral Q4H PRN    ondansetron (Zofran) 4 MG/2ML injection 4 mg  4 mg Intravenous Q6H PRN    metoclopramide (Reglan) 5 mg/mL injection 10 mg  10 mg Intravenous Q8H PRN    piperacillin-tazobactam (Zosyn) 3.375 g in dextrose 5% 100 mL IVPB-ADDV  3.375 g Intravenous Q8H    vancomycin (Vancocin) cap 125 mg  125 mg Oral Daily    allopurinol (Zyloprim) tab 100 mg  100 mg Oral Daily    amLODIPine (Norvasc) tab 10 mg  10 mg Oral Daily    atorvastatin (Lipitor) tab 20 mg  20 mg Oral Nightly    docusate sodium (Colace) cap 100 mg  100 mg Oral Daily    magnesium oxide (Mag-Ox) tab 500 mg  500 mg Oral Daily    metoprolol succinate ER (Toprol XL) 24 hr tab 100 mg  100 mg Oral Daily    pantoprazole (Protonix) DR tab 20 mg  20 mg Oral QAM AC    glucose (Dex4) 15 GM/59ML oral liquid 15 g  15 g Oral Q15 Min PRN    Or    glucose (Glutose) 40% oral gel 15 g  15 g Oral Q15 Min PRN    Or    glucose-vitamin C (Dex-4) chewable tab 4 tablet  4 tablet Oral Q15 Min PRN    Or    dextrose 50% injection 50 mL  50 mL Intravenous Q15 Min PRN    Or    glucose (Dex4) 15 GM/59ML oral liquid 30 g  30 g Oral Q15 Min PRN    Or    glucose (Glutose) 40% oral gel 30 g  30 g Oral Q15 Min PRN    Or    glucose-vitamin C (Dex-4) chewable tab 8 tablet  8 tablet Oral Q15 Min PRN    insulin aspart (NovoLOG) 100 Units/mL FlexPen 1-5 Units  1-5 Units Subcutaneous TID CC    melatonin tab 3 mg  3 mg Oral Nightly     Medications Prior to Admission   Medication Sig    Emollient (EUCERIN PLUS) 5-5 % External Lotion Apply 1 Application topically daily. Upper and lower extremities    Zinc  Oxide 10 % External Ointment Apply 1 Application topically as needed.    vancomycin 50 mg/mL Oral Recon Soln Take 2.5 mL (125 mg total) by mouth 4 (four) times daily for 6 days, THEN 2.5 mL (125 mg total) 2 (two) times a day for 14 days.    dextrose 5% SOLN 100 mL with piperacillin-tazobactam 4.5 (4-0.5) g SOLR 4.5 g Inject 4.5 g into the vein every 8 (eight) hours for 14 days. Weekly cbc w/diff, cmp, crp while on IV abx  PICC care per protocol    acetaminophen 500 MG Oral Tab Take 1 tablet (500 mg total) by mouth every 4 (four) hours as needed for Pain or Fever.    docusate sodium 100 MG Oral Cap Take 100 mg by mouth daily.    melatonin 3 MG Oral Tab Take 1 tablet (3 mg total) by mouth nightly.    polyethylene glycol, PEG 3350, 17 g Oral Powd Pack Take 17 g by mouth daily as needed (If no bowel movement in last 24 hours).    amLODIPine 10 MG Oral Tab Take 1 tablet (10 mg total) by mouth daily.    saccharomyces boulardii 250 MG Oral Cap Take 1 capsule (250 mg total) by mouth 2 (two) times daily.    Magnesium 500 MG Oral Tab Take 1 tablet (500 mg total) by mouth daily.    omeprazole 20 MG Oral Capsule Delayed Release Take 1 capsule (20 mg total) by mouth daily.    METOPROLOL SUCCINATE 100 MG Oral Tablet 24 Hr TAKE 1 TABLET BY MOUTH EVERY DAY    atorvastatin 20 MG Oral Tab Take 1 tablet (20 mg total) by mouth daily. (Patient taking differently: Take 1 tablet (20 mg total) by mouth nightly.)    allopurinol 100 MG Oral Tab Take 1 tablet (100 mg total) by mouth daily.    Omega-3 Fatty Acids (FISH OIL) 1000 MG Oral Cap Take 1,000 mg by mouth daily.    Multiple Vitamin (MULTI VITAMIN MENS OR) Take 1 tablet by mouth daily.    QUEtiapine 25 MG Oral Tab Take 0.5 tablets (12.5 mg total) by mouth nightly. (Patient not taking: Reported on 3/2/2024)    Microlet Lancets Does not apply Misc USE AS DIRECTED TWICE DAILY    Blood Glucose Monitoring Suppl ([employer] CONTOUR NEXT MONITOR) w/Device Does not apply Kit 1 Units by Does not  apply route 2 (two) times daily before meals.       Allergies  No Known Allergies    Review of Systems:      ROS:  No fevers, chills, night sweats. No weight loss nor weight gain.  Denies nausea, vomiting.  Denies diarrhea or constipation, no persistent changes in caliber of stool, no dysphagia, no jaundice, no heartburn, no early satiety, no hematemesis, no melena. Denies abdominal pain.  Denies hematochezia.    History of obstructive sleep apnea: Yes - not on cpap  History of respiratory illness/home oxygen supplementation: No  History of cardiac illness/pacemaker/AICD/blood thinners: No  History of anxiety/depression: YES  History of chronic narcotic pain medication use: No  History of diabetes: YES  History of mobility issues: No  History of C-Diff Colitis:YES  History of MRSA: No  History of abdominal surgeries:  none    ENDOSCOPIC REPORTS:   Colonoscopy:2022    History of Prep or Sedation intolerance with previous endoscopic procedures: None    GENERAL: feels well otherwise.  SKIN: denies any unusual skin lesions  EYES: denies any new visual changes or double vision, denies red painful eyes  HEENT: denies any new hearing changes, nasal congestion, sinus pain  LUNGS: denies shortness of breath with exertion or persistent cough  CARDIOVASCULAR: denies chest pain on exertion  GI: as above  : denies dysuria, nocturia or changes in stream  MUSCULOSKELETAL: denies new myalgias, swelling  NEURO: denies new sensory or motor deficits.  PSYCHE: denies depression or anxiety  HEMATOLOGIC: denies bleeding or bruising      Physical Exam:   Blood pressure (!) 170/70, pulse 64, temperature 97 °F (36.1 °C), temperature source Axillary, resp. rate 18, height 5' 8\" (1.727 m), weight 218 lb 8 oz (99.1 kg), SpO2 95%.    General appearance:  alert, appears stated age and cooperative    HEENT: negative findings: lids and lashes normal and conjunctivae and sclerae normal.     Pulmonary: clear to auscultation bilaterally of anterior  chest    Cardiovascular: regular rate and rhythm    Abdominal: soft, bowel sounds present; non-distended, non-tender    Extremities: No edema, cyanosis, or clubbing    Skin: warm and dry    Neurologic: Grossly normal    Psychiatric: calm    Results:     Laboratory Data:  Lab Results   Component Value Date    WBC 8.3 03/01/2024    HGB 11.6 (L) 03/01/2024    HCT 35.7 (L) 03/01/2024    .0 03/01/2024    CREATSERUM 1.28 03/01/2024    BUN 17 03/01/2024     03/01/2024    K 3.9 03/01/2024     03/01/2024    CO2 29.0 03/01/2024     (H) 03/01/2024    CA 9.8 03/01/2024    ALB 4.5 03/01/2024    ALKPHO 114 03/01/2024    TP 8.6 (H) 03/01/2024    AST 17 03/01/2024    ALT <7 (L) 03/01/2024    TSH 3.044 05/18/2015    PSA 1.02 11/25/2015    CRP <0.40 02/05/2024    MG 2.0 02/15/2024    TROPHS 6 02/08/2024       Component      Latest Ref Rng 2/15/2024 2/16/2024 3/1/2024   WBC      4.0 - 11.0 x10(3) uL 7.8  6.4  8.3    RBC      3.80 - 5.80 x10(6)uL 3.44 (L)  3.67 (L)  3.75 (L)    Hemoglobin      13.0 - 17.5 g/dL 10.8 (L)  11.7 (L)  11.6 (L)    Hematocrit      39.0 - 53.0 % 33.3 (L)  35.1 (L)  35.7 (L)    MCV      80.0 - 100.0 fL 96.8  95.6  95.2    MCH      26.0 - 34.0 pg 31.4  31.9  30.9    MCHC      31.0 - 37.0 g/dL 32.4  33.3  32.5    RDW-SD      35.1 - 46.3 fL 48.5 (H)  47.3 (H)  48.0 (H)    RDW      11.0 - 15.0 % 13.6  13.4  13.7    Platelet Count      150.0 - 450.0 10(3)uL 313.0  320.0  257.0    Prelim Neutrophil Abs      1.50 - 7.70 x10 (3) uL 4.30  3.09  4.65    Neutrophils Absolute      1.50 - 7.70 x10(3) uL 4.30  3.09  4.65    Lymphocytes Absolute      1.00 - 4.00 x10(3) uL 1.79  1.95  1.93    Monocytes Absolute      0.10 - 1.00 x10(3) uL 1.26 (H)  0.87  1.18 (H)    Eosinophils Absolute      0.00 - 0.70 x10(3) uL 0.23  0.28  0.47    Basophils Absolute      0.00 - 0.20 x10(3) uL 0.04  0.04  0.03    Immature Granulocyte Absolute      0.00 - 1.00 x10(3) uL 0.13  0.14  0.04    Neutrophils %      % 55.4   48.5  55.9    Lymphocytes %      % 23.1  30.6  23.3    Monocytes %      % 16.3  13.7  14.2    Eosinophils %      % 3.0  4.4  5.7    Basophils %      % 0.5  0.6  0.4    Immature Granulocyte %      % 1.7  2.2  0.5    Glucose      70 - 99 mg/dL 124 (H)  107 (H)  128 (H)    Sodium      136 - 145 mmol/L 139  144  140    Potassium      3.5 - 5.1 mmol/L 3.5  3.5  3.9    Chloride      98 - 112 mmol/L 110  108  106    Carbon Dioxide, Total      21.0 - 32.0 mmol/L 27.0  29.0  29.0    ANION GAP      0 - 18 mmol/L 2  7  5    BUN      9 - 23 mg/dL 10  9  17    CREATININE      0.70 - 1.30 mg/dL 1.07  1.03  1.28    BUN/CREATININE RATIO      10.0 - 20.0  9.3 (L)  8.7 (L)  13.3    CALCIUM      8.7 - 10.4 mg/dL 9.2  9.4  9.8    CALCULATED OSMOLALITY      275 - 295 mOsm/kg 288  297 (H)  293    EGFR      >=60 mL/min/1.73m2 69  72  55 (L)    ALT (SGPT)      10 - 49 U/L   <7 (L)    AST (SGOT)      <=34 U/L   17    ALKALINE PHOSPHATASE      45 - 117 U/L   114    Total Bilirubin      0.2 - 1.1 mg/dL   0.4    PROTEIN, TOTAL      5.7 - 8.2 g/dL   8.6 (H)    Albumin      3.2 - 4.8 g/dL   4.5    Globulin      2.8 - 4.4 g/dL   4.1    A/G Ratio      1.0 - 2.0    1.1    LACTIC ACID      0.5 - 2.0 mmol/L   1.1       Legend:  (L) Low  (H) High    Component      Latest Ref Rng 2/15/2024 2/16/2024 3/1/2024   WBC      4.0 - 11.0 x10(3) uL 7.8  6.4  8.3    RBC      3.80 - 5.80 x10(6)uL 3.44 (L)  3.67 (L)  3.75 (L)    Hemoglobin      13.0 - 17.5 g/dL 10.8 (L)  11.7 (L)  11.6 (L)    Hematocrit      39.0 - 53.0 % 33.3 (L)  35.1 (L)  35.7 (L)    MCV      80.0 - 100.0 fL 96.8  95.6  95.2    MCH      26.0 - 34.0 pg 31.4  31.9  30.9    MCHC      31.0 - 37.0 g/dL 32.4  33.3  32.5    RDW-SD      35.1 - 46.3 fL 48.5 (H)  47.3 (H)  48.0 (H)    RDW      11.0 - 15.0 % 13.6  13.4  13.7    Platelet Count      150.0 - 450.0 10(3)uL 313.0  320.0  257.0    Prelim Neutrophil Abs      1.50 - 7.70 x10 (3) uL 4.30  3.09  4.65    Neutrophils Absolute      1.50 - 7.70 x10(3)  uL 4.30  3.09  4.65    Lymphocytes Absolute      1.00 - 4.00 x10(3) uL 1.79  1.95  1.93    Monocytes Absolute      0.10 - 1.00 x10(3) uL 1.26 (H)  0.87  1.18 (H)    Eosinophils Absolute      0.00 - 0.70 x10(3) uL 0.23  0.28  0.47    Basophils Absolute      0.00 - 0.20 x10(3) uL 0.04  0.04  0.03    Immature Granulocyte Absolute      0.00 - 1.00 x10(3) uL 0.13  0.14  0.04    Neutrophils %      % 55.4  48.5  55.9    Lymphocytes %      % 23.1  30.6  23.3    Monocytes %      % 16.3  13.7  14.2    Eosinophils %      % 3.0  4.4  5.7    Basophils %      % 0.5  0.6  0.4    Immature Granulocyte %      % 1.7  2.2  0.5    Glucose      70 - 99 mg/dL 124 (H)  107 (H)  128 (H)    Sodium      136 - 145 mmol/L 139  144  140    Potassium      3.5 - 5.1 mmol/L 3.5  3.5  3.9    Chloride      98 - 112 mmol/L 110  108  106    Carbon Dioxide, Total      21.0 - 32.0 mmol/L 27.0  29.0  29.0    ANION GAP      0 - 18 mmol/L 2  7  5    BUN      9 - 23 mg/dL 10  9  17    CREATININE      0.70 - 1.30 mg/dL 1.07  1.03  1.28    BUN/CREATININE RATIO      10.0 - 20.0  9.3 (L)  8.7 (L)  13.3    CALCIUM      8.7 - 10.4 mg/dL 9.2  9.4  9.8    CALCULATED OSMOLALITY      275 - 295 mOsm/kg 288  297 (H)  293    EGFR      >=60 mL/min/1.73m2 69  72  55 (L)    ALT (SGPT)      10 - 49 U/L   <7 (L)    AST (SGOT)      <=34 U/L   17    ALKALINE PHOSPHATASE      45 - 117 U/L   114    Total Bilirubin      0.2 - 1.1 mg/dL   0.4    PROTEIN, TOTAL      5.7 - 8.2 g/dL   8.6 (H)    Albumin      3.2 - 4.8 g/dL   4.5    Globulin      2.8 - 4.4 g/dL   4.1    A/G Ratio      1.0 - 2.0    1.1    LACTIC ACID      0.5 - 2.0 mmol/L   1.1       Legend:  (L) Low  (H) High  Imagin2024:  1. Imaging findings suggesting severe proctocolitis with complex-appearing left lateral fluid and gas collection concerning for associated abscess formation.      2. Fluid-filled loops of small and large bowel, which could reflect additional underlying enterocolitis or malabsorption in the  appropriate clinical setting.      3. Reactive ileus or partial large bowel obstruction.      4. Infrarenal abdominal aortic aneurysm measuring up to 5.0 cm maximally.      5. Mild prostatomegaly.      6. Large gas and debris-filled duodenal diverticulum.      7. Suspected mild pulmonary interstitial fibrosis    2/8/2024:    CONCLUSION:  1. Severe proctocolitis with substantial interval worsening, with interval development of a large intramural rectal abscess.     2. Additional left posterolateral and posterior peripherally enhancing fluid collections are seen, suggesting potential communicating or independent abscesses.     3. A heavy stool burden is seen throughout the more proximal colon, and there may be some degree of partial large bowel obstruction.       4. Infrarenal abdominal aortic aneurysm measuring up to 5.0 cm.        2/14/2024:    CONCLUSION:  1. Persistent findings of proctitis with decrease in conspicuity of peripherally enhancing fluid collections previously noted along the left lateral and posterior aspects of the anorectal junction suggesting resolving abscesses.  Note that the larger  crescentic intramural fluid collection questioned on the previous CT is no longer seen with certainty.  2. Uncomplicated periampullary and distal colonic diverticulosis.     2/28/2024:  IMPRESSION:    1.  Intramural abscess involving the left lateral rectal wall extending to the posterior perirectal   space, with perirectal adenopathy. Prior imaging unavailable for direct comparison.   Distal rectal wall thickening, with a left lateral wall intramural abscess extending to the posterior perirectal space measuring 5.0 x 2.0 x 2.0 cm (series 302 image 162).   2.  Abdominal aortic aneurysm     Impression:   Perirectal abscess   -Since initial imagining in 1/27/2028, improving in inflammation and number of abscess but as long as underlying inflammation does not resolve, always at risk.  No granulomas supporting Crohn's  but chronic colitis in setting of remote hx of anal fissure keeps IBD on differential. Ddx also includes chronic infectious etiology - ID on board, no source found on biopsies.  Does not appear to be malignancy   -based on size, needs IR drainage with culturing of fluid/sensitivities    -broad spec iv antibiotics    -bowel regimen to limit constipation or large stool burden     Anemia - stable, normocytic    -will get iron studies to assess for iron def likely 2/2 to anemia of chronic disease    Thank you for allowing me to participate in the care of your patient.    Ene Graham MD  3/2/2024

## 2024-03-03 LAB
ANION GAP SERPL CALC-SCNC: 8 MMOL/L (ref 0–18)
BUN BLD-MCNC: 12 MG/DL (ref 9–23)
BUN/CREAT SERPL: 11.1 (ref 10–20)
CALCIUM BLD-MCNC: 9.2 MG/DL (ref 8.7–10.4)
CHLORIDE SERPL-SCNC: 108 MMOL/L (ref 98–112)
CO2 SERPL-SCNC: 26 MMOL/L (ref 21–32)
CREAT BLD-MCNC: 1.08 MG/DL
DEPRECATED RDW RBC AUTO: 47.4 FL (ref 35.1–46.3)
EGFRCR SERPLBLD CKD-EPI 2021: 68 ML/MIN/1.73M2 (ref 60–?)
ERYTHROCYTE [DISTWIDTH] IN BLOOD BY AUTOMATED COUNT: 13.7 % (ref 11–15)
GLUCOSE BLD-MCNC: 114 MG/DL (ref 70–99)
GLUCOSE BLDC GLUCOMTR-MCNC: 116 MG/DL (ref 70–99)
GLUCOSE BLDC GLUCOMTR-MCNC: 117 MG/DL (ref 70–99)
GLUCOSE BLDC GLUCOMTR-MCNC: 118 MG/DL (ref 70–99)
GLUCOSE BLDC GLUCOMTR-MCNC: 141 MG/DL (ref 70–99)
HCT VFR BLD AUTO: 33.7 %
HGB BLD-MCNC: 11.2 G/DL
INR BLD: 1.18 (ref 0.8–1.2)
MCH RBC QN AUTO: 31.4 PG (ref 26–34)
MCHC RBC AUTO-ENTMCNC: 33.2 G/DL (ref 31–37)
MCV RBC AUTO: 94.4 FL
OSMOLALITY SERPL CALC.SUM OF ELEC: 295 MOSM/KG (ref 275–295)
PLATELET # BLD AUTO: 226 10(3)UL (ref 150–450)
POTASSIUM SERPL-SCNC: 3.8 MMOL/L (ref 3.5–5.1)
PROTHROMBIN TIME: 15.8 SECONDS (ref 11.6–14.8)
RBC # BLD AUTO: 3.57 X10(6)UL
SODIUM SERPL-SCNC: 142 MMOL/L (ref 136–145)
WBC # BLD AUTO: 5.7 X10(3) UL (ref 4–11)

## 2024-03-03 PROCEDURE — 97530 THERAPEUTIC ACTIVITIES: CPT

## 2024-03-03 PROCEDURE — 97165 OT EVAL LOW COMPLEX 30 MIN: CPT

## 2024-03-03 PROCEDURE — 85610 PROTHROMBIN TIME: CPT | Performed by: HOSPITALIST

## 2024-03-03 PROCEDURE — 82962 GLUCOSE BLOOD TEST: CPT

## 2024-03-03 PROCEDURE — 97162 PT EVAL MOD COMPLEX 30 MIN: CPT

## 2024-03-03 PROCEDURE — 80048 BASIC METABOLIC PNL TOTAL CA: CPT | Performed by: STUDENT IN AN ORGANIZED HEALTH CARE EDUCATION/TRAINING PROGRAM

## 2024-03-03 PROCEDURE — 85027 COMPLETE CBC AUTOMATED: CPT | Performed by: STUDENT IN AN ORGANIZED HEALTH CARE EDUCATION/TRAINING PROGRAM

## 2024-03-03 PROCEDURE — 94799 UNLISTED PULMONARY SVC/PX: CPT

## 2024-03-03 RX ORDER — HYDRALAZINE HYDROCHLORIDE 20 MG/ML
5 INJECTION INTRAMUSCULAR; INTRAVENOUS EVERY 6 HOURS PRN
Status: DISCONTINUED | OUTPATIENT
Start: 2024-03-03 | End: 2024-03-06

## 2024-03-03 NOTE — PLAN OF CARE
No acute changes overnight. Tolerating diet. Accuchecks AC/HS. Denies nausea/vomiting. Denies pain. Ambulating with SBA + RW. Voiding freely. CPAP in place. Remote tele in place. Receiving IVF and IV abx. Plan is pending medical course. Appropriate safety measures maintained.   Problem: Patient Centered Care  Goal: Patient preferences are identified and integrated in the patient's plan of care  Description: Interventions:  - What would you like us to know as we care for you?   - Provide timely, complete, and accurate information to patient/family  - Incorporate patient and family knowledge, values, beliefs, and cultural backgrounds into the planning and delivery of care  - Encourage patient/family to participate in care and decision-making at the level they choose  - Honor patient and family perspectives and choices  Outcome: Progressing     Problem: Diabetes/Glucose Control  Goal: Glucose maintained within prescribed range  Description: INTERVENTIONS:  - Monitor Blood Glucose as ordered  - Assess for signs and symptoms of hyperglycemia and hypoglycemia  - Administer ordered medications to maintain glucose within target range  - Assess barriers to adequate nutritional intake and initiate nutrition consult as needed  - Instruct patient on self management of diabetes  Outcome: Progressing     Problem: Patient/Family Goals  Goal: Patient/Family Long Term Goal  Description: Patient's Long Term Goal:     Interventions:  -   - See additional Care Plan goals for specific interventions  Outcome: Progressing  Goal: Patient/Family Short Term Goal  Description: Patient's Short Term Goal:     Interventions:   -   - See additional Care Plan goals for specific interventions  Outcome: Progressing     Problem: PAIN - ADULT  Goal: Verbalizes/displays adequate comfort level or patient's stated pain goal  Description: INTERVENTIONS:  - Encourage pt to monitor pain and request assistance  - Assess pain using appropriate pain scale  -  Administer analgesics based on type and severity of pain and evaluate response  - Implement non-pharmacological measures as appropriate and evaluate response  - Consider cultural and social influences on pain and pain management  - Manage/alleviate anxiety  - Utilize distraction and/or relaxation techniques  - Monitor for opioid side effects  - Notify MD/LIP if interventions unsuccessful or patient reports new pain  - Anticipate increased pain with activity and pre-medicate as appropriate  Outcome: Progressing     Problem: RISK FOR INFECTION - ADULT  Goal: Absence of fever/infection during anticipated neutropenic period  Description: INTERVENTIONS  - Monitor WBC  - Administer growth factors as ordered  - Implement neutropenic guidelines  Outcome: Progressing     Problem: SAFETY ADULT - FALL  Goal: Free from fall injury  Description: INTERVENTIONS:  - Assess pt frequently for physical needs  - Identify cognitive and physical deficits and behaviors that affect risk of falls.  - [place] fall precautions as indicated by assessment.  - Educate pt/family on patient safety including physical limitations  - Instruct pt to call for assistance with activity based on assessment  - Modify environment to reduce risk of injury  - Provide assistive devices as appropriate  - Consider OT/PT consult to assist with strengthening/mobility  - Encourage toileting schedule  Outcome: Progressing     Problem: METABOLIC/FLUID AND ELECTROLYTES - ADULT  Goal: Glucose maintained within prescribed range  Description: INTERVENTIONS:  - Monitor Blood Glucose as ordered  - Assess for signs and symptoms of hyperglycemia and hypoglycemia  - Administer ordered medications to maintain glucose within target range  - Assess barriers to adequate nutritional intake and initiate nutrition consult as needed  - Instruct patient on self management of diabetes  Outcome: Progressing     Problem: SKIN/TISSUE INTEGRITY - ADULT  Goal: Skin integrity remains  intact  Description: INTERVENTIONS  - Assess and document risk factors for pressure ulcer development  - Assess and document skin integrity  - Monitor for areas of redness and/or skin breakdown  - Initiate interventions, skin care algorithm/standards of care as needed  Outcome: Progressing     Problem: Impaired Functional Mobility  Goal: Achieve highest/safest level of mobility/gait  Description: Interventions:  - Assess patient's functional ability and stability  - Promote increasing activity/tolerance for mobility and gait  - Educate and engage patient/family in tolerated activity level and precautions    Outcome: Progressing

## 2024-03-03 NOTE — PHYSICAL THERAPY NOTE
PHYSICAL THERAPY EVALUATION - INPATIENT     Room Number: 547A/547-A  Evaluation Date: 3/3/2024  Type of Evaluation: Initial        Presenting Problem: perirectal abscess  Co-Morbidities : CAD with stents, DM2, HTN, HLP, PUD, Gout, pulmonary emphysema, OA, DEANGELO  Reason for Therapy: Mobility Dysfunction and Discharge Planning    PHYSICAL THERAPY ASSESSMENT   Patient is a 84 year old male admitted 3/1/2024 for perirectal abscess.  Prior to admission, patient's baseline is indep.  Patient is currently functioning near baseline with bed mobility, transfers, gait, and stair negotiation.  Patient is requiring supervision as a result of the following impairments: decreased functional strength and pain.  Physical Therapy will continue to follow for duration of hospitalization.    Patient will benefit from continued skilled PT Services For duration of hospitalization, however, given the patient is functioning near baseline level do not anticipate skilled therapy needs at discharge .    PLAN  Patient has been evaluated and presents with no skilled Physical Therapy needs at this time.  Patient will be discharged from Physical Therapy services. Please re-order if a new functional limitation presents during this admission.    PHYSICAL THERAPY MEDICAL/SOCIAL HISTORY   History related to current admission: EMS with abscess, recent protcocolitis     Problem List  Principal Problem:    Perirectal abscess      HOME SITUATION  Home Situation  Type of Home: House  Home Layout: One level (3 JAVY)  Stairs to Enter : 3  Lives With: Spouse  Drives: No  Patient Owned Equipment: None  Patient Regularly Uses: Glasses     Prior Level of [place]: lives in 3 step ranch with wife.  Indep with ADLs    SUBJECTIVE  Feeling fine    PHYSICAL THERAPY EXAMINATION   OBJECTIVE     Fall Risk: Standard fall risk    WEIGHT BEARING RESTRICTION  Weight Bearing Restriction: None                PAIN ASSESSMENT  Ratin        COGNITION  Overall Cognitive  Status:  WFL - within functional limits    RANGE OF MOTION AND STRENGTH ASSESSMENT  Upper extremity ROM and strength are within functional limits see OT  Lower extremity ROM is within functional limits   Lower extremity strength is within functional limits     BALANCE  Static Sitting: Good  Dynamic Sitting: Good  Static Standing: Fair +  Dynamic Standing: Fair +    ADDITIONAL TESTS                                    NEUROLOGICAL FINDINGS                      ACTIVITY TOLERANCE                         O2 WALK       AM-PAC '6-Clicks' INPATIENT SHORT FORM - BASIC MOBILITY  How much difficulty does the patient currently have...  Patient Difficulty: Turning over in bed (including adjusting bedclothes, sheets and blankets)?: None   Patient Difficulty: Sitting down on and standing up from a chair with arms (e.g., wheelchair, bedside commode, etc.): None   Patient Difficulty: Moving from lying on back to sitting on the side of the bed?: None   How much help from another person does the patient currently need...   Help from Another: Moving to and from a bed to a chair (including a wheelchair)?: None   Help from Another: Need to walk in hospital room?: None   Help from Another: Climbing 3-5 steps with a railing?: A Little     AM-PAC Score:  Raw Score: 23   Approx Degree of Impairment: 11.2%   Standardized Score (AM-PAC Scale): 56.93   CMS Modifier (G-Code): CI    FUNCTIONAL ABILITY STATUS  Functional Mobility/Gait Assessment  Gait Assistance: Supervision  Distance (ft): 2x300  Assistive Device: None  Pattern: Ataxic  Stairs: Stairs  How Many Stairs: 4  Device: 2 Rails  Assist: Supervision  Rolling: supervision  Supine to Sit: supervision  Sit to Supine: supervision  Sit to Stand: supervision    Exercise/Education Provided:  Bed mobility  Body mechanics  Energy conservation  Functional activity tolerated  Gait training  Transfer training    The patient's Approx Degree of Impairment: 11.2% has been calculated based on  documentation in the [facility] '6 clicks' Inpatient Basic Mobility Short Form.  Research supports that patients with this level of impairment may benefit from DC to home with family.  Final disposition will be made by interdisciplinary medical team.    Patient End of Session: Up in chair;Needs met;Call light within reach;RN aware of session/findings;All patient questions and concerns addressed;Alarm set    Patient was able to achieve the following ...   Patient able to transfer  Safely and independently    Patient able to ambulate on level surfaces  Safely and independently     Patient Evaluation Complexity Level:  History Moderate - 1 or 2 personal factors and/or co-morbidities   Examination of body systems Moderate - addressing a total of 3 or more elements   Clinical Presentation  Moderate - Evolving   Clinical Decision Making  Moderate Complexity     Gait Trainin minutes  Therapeutic Activity:  25 minutes  Neuromuscular Re-education: 0 minutes  Therapeutic Exercise: 0 minutes  [place] Repositionin minutes  Manual Therapy: 0 minutes  Can add/delete any of these

## 2024-03-03 NOTE — OCCUPATIONAL THERAPY NOTE
OCCUPATIONAL THERAPY EVALUATION - INPATIENT     Room Number: 547A/547-A  Evaluation Date: 3/3/2024  Type of Evaluation: Initial  Presenting Problem: Proctocolitis with abscess    Physician Order: IP Consult to Occupational Therapy  Reason for Therapy: ADL/IADL Dysfunction and Discharge Planning    OCCUPATIONAL THERAPY ASSESSMENT   Patient is a 84 year old male admitted 3/1/2024 for Perirectal abscess and confusion after recent admission for proctocolitis with perianal abscess discharged on invanz   Prior to admission, patient's baseline is independent with ADLs, IADLs.  Patient is currently functioning near baseline with toileting, bathing, upper body dressing, lower body dressing, bed mobility, and transfers.  Patient is requiring supervision as a result of the following impairments: pain, dyanmic balance, and decreased safety awareness.     Patient will benefit from continued skilled OT Services For duration of hospitalization, however, given the patient is functioning near baseline level do not anticipate skilled therapy needs at discharge     PLAN  OT Treatment Plan: Balance activities;Energy conservation/work simplification techniques;ADL training;IADL training;Patient/Family education;Functional transfer training  OT Device Recommendations: None    OCCUPATIONAL THERAPY MEDICAL/SOCIAL HISTORY   Problem List  Principal Problem:    Perirectal abscess    HOME SITUATION  Type of Home: House  Home Layout: One level (3 JAVY)  Lives With: Spouse  Toilet and Equipment: Toilet riser; Grab bar  Shower/Tub and Equipment: Walk-in shower; Shower chair  Other Equipment: None  Occupation/Status: Retired  Hand Dominance: Right  Drives: No  Patient Regularly Uses: Glasses    Stairs in Home: 3 JAVY  Use of Assistive Device(s): Does not use, has a RW and rollator    Prior Level of [place]: Pt poor historian. 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     SUBJECTIVE  \"I don't know where I was before this\"      OCCUPATIONAL THERAPY EXAMINATION    OBJECTIVE  Fall Risk: Standard fall risk    PAIN ASSESSMENT  Ratin      COGNITION  Overall Cognitive Status:  Impaired, decreased memory    VISION  Current Vision: wears glasses all the time    PERCEPTION  Overall Perception Status:   WFL - within functional limits    SENSATION  Light touch:  intact    Communication: WFL    Behavioral/Emotional/Social: Alert, cooperative    RANGE OF MOTION   Upper extremity ROM is within functional limits     STRENGTH ASSESSMENT  Upper extremity strength is within functional limits     COORDINATION  Gross Motor: WFL   Fine Motor: WFL     ACTIVITIES OF DAILY LIVING ASSESSMENT  AM-PAC ‘6-Clicks’ Inpatient Daily Activity Short Form  How much help from another person does the patient currently need…  -   Putting on and taking off regular lower body clothing?: A Little  -   Bathing (including washing, rinsing, drying)?: A Little  -   Toileting, which includes using toilet, bedpan or urinal? : None  -   Putting on and taking off regular upper body clothing?: None  -   Taking care of personal grooming such as brushing teeth?: None  -   Eating meals?: None    AM-PAC Score:  Score: 22  Approx Degree of Impairment: 25.8%  Standardized Score (AM-PAC Scale): 47.1  CMS Modifier (G-Code): CJ    FUNCTIONAL TRANSFER ASSESSMENT  Sit to Stand: Edge of Bed; Chair  Edge of Bed: Contact Guard Assist  Chair: Contact Guard Assist       BALANCE ASSESSMENT     FUNCTIONAL ADL ASSESSMENT  Eating: Stand-by Assist  Grooming Seated: Stand-by Assist  Bathing Seated: Contact Guard Assist  UB Dressing Seated: Contact Guard Assist  LB Dressing Seated: Stand-by Assist  Toileting Seated: Contact Guard Assist    THERAPEUTIC EXERCISE     Skilled Therapy Provided: Pt seen for skilled OT evaluation to address pt's functional mobility, ADLs, and IADL. Pt completing functional t/fs with CGA without a device due to impulsivity and decreased balance. Pt edu provided re: safe t/f  techniques, safe body mechanics, and role of OT. Pt verbalized understanding. Recommend OT while in house to address safety awareness, dynamic balance, and ADL compensatory techniques.     EDUCATION PROVIDED  Patient: Role of Occupational Therapy; Plan of Care; Discharge Recommendations; Fall Prevention; Posture/Positioning; Proper Body Mechanics; Compensatory ADL Techniques  Patient's Response to Education: Verbalized Understanding; Returned Demonstration    The patient's Approx Degree of Impairment: 25.8% has been calculated based on documentation in the [facility] '6 clicks' Inpatient Daily Activity Short Form.  Research supports that patients with this level of impairment may benefit from home with no services.  Final disposition will be made by interdisciplinary medical team.     Patient End of Session: Up in chair;Needs met;Call light within reach;Alarm set    OT Goals  Patients self stated goal is: get stronger     Patient will complete functional transfer with I  Comment:     Patient will complete toileting with I  Comment:     Patient will tolerate standing for 5 minutes minutes in prep for adls with I   Comment:    Patient will complete item retrieval with I  Comment:          Goals  on: 3/10/24   Frequency: 3x week    Patient Evaluation Complexity Level:   Occupational Profile/Medical History MODERATE - Expanded review of history including review of medical or therapy record   Specific performance deficits impacting engagement in ADL/IADL MODERATE  3 - 5 performance deficits   Client Assessment/Performance Deficits MODERATE - Comorbidities and min to mod modifications of tasks    Clinical Decision Making MODERATE - Analysis of occupational profile, detailed assessments, several treatment options    Overall Complexity MODERATE     OT Session Time: 15 minutes  Therapeutic Activity: 15 minutes    Angeline Foster MS, OTR/L

## 2024-03-03 NOTE — PLAN OF CARE
Pt. A&o x 3. On AXB Q8. CT abd/pelvis completed. No acute changes.   Problem: Patient Centered Care  Goal: Patient preferences are identified and integrated in the patient's plan of care  Description: Interventions:  - What would you like us to know as we care for you? I am from home  - Provide timely, complete, and accurate information to patient/family  - Incorporate patient and family knowledge, values, beliefs, and cultural backgrounds into the planning and delivery of care  - Encourage patient/family to participate in care and decision-making at the level they choose  - Honor patient and family perspectives and choices  Outcome: Progressing     Problem: Diabetes/Glucose Control  Goal: Glucose maintained within prescribed range  Description: INTERVENTIONS:  - Monitor Blood Glucose as ordered  - Assess for signs and symptoms of hyperglycemia and hypoglycemia  - Administer ordered medications to maintain glucose within target range  - Assess barriers to adequate nutritional intake and initiate nutrition consult as needed  - Instruct patient on self management of diabetes  Outcome: Progressing     Problem: Patient/Family Goals  Goal: Patient/Family Long Term Goal  Description: Patient's Long Term Goal: get home    Interventions:  - axb  - See additional Care Plan goals for specific interventions  Outcome: Progressing  Goal: Patient/Family Short Term Goal  Description: Patient's Short Term Goal: go home    Interventions:   - axb  - See additional Care Plan goals for specific interventions  Outcome: Progressing     Problem: PAIN - ADULT  Goal: Verbalizes/displays adequate comfort level or patient's stated pain goal  Description: INTERVENTIONS:  - Encourage pt to monitor pain and request assistance  - Assess pain using appropriate pain scale  - Administer analgesics based on type and severity of pain and evaluate response  - Implement non-pharmacological measures as appropriate and evaluate response  - Consider  cultural and social influences on pain and pain management  - Manage/alleviate anxiety  - Utilize distraction and/or relaxation techniques  - Monitor for opioid side effects  - Notify MD/LIP if interventions unsuccessful or patient reports new pain  - Anticipate increased pain with activity and pre-medicate as appropriate  Outcome: Progressing     Problem: RISK FOR INFECTION - ADULT  Goal: Absence of fever/infection during anticipated neutropenic period  Description: INTERVENTIONS  - Monitor WBC  - Administer growth factors as ordered  - Implement neutropenic guidelines  Outcome: Progressing     Problem: SAFETY ADULT - FALL  Goal: Free from fall injury  Description: INTERVENTIONS:  - Assess pt frequently for physical needs  - Identify cognitive and physical deficits and behaviors that affect risk of falls.  - [place] fall precautions as indicated by assessment.  - Educate pt/family on patient safety including physical limitations  - Instruct pt to call for assistance with activity based on assessment  - Modify environment to reduce risk of injury  - Provide assistive devices as appropriate  - Consider OT/PT consult to assist with strengthening/mobility  - Encourage toileting schedule  Outcome: Progressing     Problem: METABOLIC/FLUID AND ELECTROLYTES - ADULT  Goal: Glucose maintained within prescribed range  Description: INTERVENTIONS:  - Monitor Blood Glucose as ordered  - Assess for signs and symptoms of hyperglycemia and hypoglycemia  - Administer ordered medications to maintain glucose within target range  - Assess barriers to adequate nutritional intake and initiate nutrition consult as needed  - Instruct patient on self management of diabetes  Outcome: Progressing     Problem: SKIN/TISSUE INTEGRITY - ADULT  Goal: Skin integrity remains intact  Description: INTERVENTIONS  - Assess and document risk factors for pressure ulcer development  - Assess and document skin integrity  - Monitor for areas of redness  and/or skin breakdown  - Initiate interventions, skin care algorithm/standards of care as needed  Outcome: Progressing     Problem: Impaired Functional Mobility  Goal: Achieve highest/safest level of mobility/gait  Description: Interventions:  - Assess patient's functional ability and stability  - Promote increasing activity/tolerance for mobility and gait  - Educate and engage patient/family in tolerated activity level and precautions  - Recommend patient transfer to bedside chair toward strongest side  Outcome: Progressing

## 2024-03-03 NOTE — PLAN OF CARE
No acute medical changes. Family at bedside. Call light within reach. Frequent rounding.  Problem: Patient Centered Care  Goal: Patient preferences are identified and integrated in the patient's plan of care  Description: Interventions:  - What would you like us to know as we care for you? I am from home  - Provide timely, complete, and accurate information to patient/family  - Incorporate patient and family knowledge, values, beliefs, and cultural backgrounds into the planning and delivery of care  - Encourage patient/family to participate in care and decision-making at the level they choose  - Honor patient and family perspectives and choices  Outcome: Progressing     Problem: Diabetes/Glucose Control  Goal: Glucose maintained within prescribed range  Description: INTERVENTIONS:  - Monitor Blood Glucose as ordered  - Assess for signs and symptoms of hyperglycemia and hypoglycemia  - Administer ordered medications to maintain glucose within target range  - Assess barriers to adequate nutritional intake and initiate nutrition consult as needed  - Instruct patient on self management of diabetes  Outcome: Progressing     Problem: Patient/Family Goals  Goal: Patient/Family Long Term Goal  Description: Patient's Long Term Goal:     Interventions:  -   - See additional Care Plan goals for specific interventions  Outcome: Progressing  Goal: Patient/Family Short Term Goal  Description: Patient's Short Term Goal: to go home    Interventions:   - Follow MD POC  - See additional Care Plan goals for specific interventions  Outcome: Progressing     Problem: PAIN - ADULT  Goal: Verbalizes/displays adequate comfort level or patient's stated pain goal  Description: INTERVENTIONS:  - Encourage pt to monitor pain and request assistance  - Assess pain using appropriate pain scale  - Administer analgesics based on type and severity of pain and evaluate response  - Implement non-pharmacological measures as appropriate and evaluate  response  - Consider cultural and social influences on pain and pain management  - Manage/alleviate anxiety  - Utilize distraction and/or relaxation techniques  - Monitor for opioid side effects  - Notify MD/LIP if interventions unsuccessful or patient reports new pain  - Anticipate increased pain with activity and pre-medicate as appropriate  Outcome: Progressing     Problem: RISK FOR INFECTION - ADULT  Goal: Absence of fever/infection during anticipated neutropenic period  Description: INTERVENTIONS  - Monitor WBC  - Administer growth factors as ordered  - Implement neutropenic guidelines  Outcome: Progressing     Problem: SAFETY ADULT - FALL  Goal: Free from fall injury  Description: INTERVENTIONS:  - Assess pt frequently for physical needs  - Identify cognitive and physical deficits and behaviors that affect risk of falls.  - [place] fall precautions as indicated by assessment.  - Educate pt/family on patient safety including physical limitations  - Instruct pt to call for assistance with activity based on assessment  - Modify environment to reduce risk of injury  - Provide assistive devices as appropriate  - Consider OT/PT consult to assist with strengthening/mobility  - Encourage toileting schedule  Outcome: Progressing     Problem: METABOLIC/FLUID AND ELECTROLYTES - ADULT  Goal: Glucose maintained within prescribed range  Description: INTERVENTIONS:  - Monitor Blood Glucose as ordered  - Assess for signs and symptoms of hyperglycemia and hypoglycemia  - Administer ordered medications to maintain glucose within target range  - Assess barriers to adequate nutritional intake and initiate nutrition consult as needed  - Instruct patient on self management of diabetes  Outcome: Progressing     Problem: SKIN/TISSUE INTEGRITY - ADULT  Goal: Skin integrity remains intact  Description: INTERVENTIONS  - Assess and document risk factors for pressure ulcer development  - Assess and document skin integrity  - Monitor for  areas of redness and/or skin breakdown  - Initiate interventions, skin care algorithm/standards of care as needed  Outcome: Progressing     Problem: Impaired Functional Mobility  Goal: Achieve highest/safest level of mobility/gait  Description: Interventions:  - Assess patient's functional ability and stability  - Promote increasing activity/tolerance for mobility and gait  - Educate and engage patient/family in tolerated activity level and precautions  - Recommend patient transfer to bedside chair toward strongest side  Outcome: Progressing

## 2024-03-03 NOTE — PROGRESS NOTES
[facility]  part of [facility] Infectious Disease Progress Note    Raul Freed . Patient Status:  Inpatient    1940 MRN [id number]   Location [facility] 5SW/SE Attending Grecia Mc MD   Hosp Day # 1 PCP Rao Beyer MD     Subjective:  Pt with no new complaints.     Objective:    Allergies:  No Known Allergies    Medications:    Current Facility-Administered Medications:     sodium chloride 0.9% infusion, , Intravenous, Continuous    acetaminophen (Tylenol Extra Strength) tab 500 mg, 500 mg, Oral, Q4H PRN    ondansetron (Zofran) 4 MG/2ML injection 4 mg, 4 mg, Intravenous, Q6H PRN    metoclopramide (Reglan) 5 mg/mL injection 10 mg, 10 mg, Intravenous, Q8H PRN    piperacillin-tazobactam (Zosyn) 3.375 g in dextrose 5% 100 mL IVPB-ADDV, 3.375 g, Intravenous, Q8H    vancomycin (Vancocin) cap 125 mg, 125 mg, Oral, Daily    allopurinol (Zyloprim) tab 100 mg, 100 mg, Oral, Daily    amLODIPine (Norvasc) tab 10 mg, 10 mg, Oral, Daily    atorvastatin (Lipitor) tab 20 mg, 20 mg, Oral, Nightly    docusate sodium (Colace) cap 100 mg, 100 mg, Oral, Daily    magnesium oxide (Mag-Ox) tab 500 mg, 500 mg, Oral, Daily    metoprolol succinate ER (Toprol XL) 24 hr tab 100 mg, 100 mg, Oral, Daily    pantoprazole (Protonix) DR tab 20 mg, 20 mg, Oral, QAM AC    glucose (Dex4) 15 GM/59ML oral liquid 15 g, 15 g, Oral, Q15 Min PRN **OR** glucose (Glutose) 40% oral gel 15 g, 15 g, Oral, Q15 Min PRN **OR** glucose-vitamin C (Dex-4) chewable tab 4 tablet, 4 tablet, Oral, Q15 Min PRN **OR** dextrose 50% injection 50 mL, 50 mL, Intravenous, Q15 Min PRN **OR** glucose (Dex4) 15 GM/59ML oral liquid 30 g, 30 g, Oral, Q15 Min PRN **OR** glucose (Glutose) 40% oral gel 30 g, 30 g, Oral, Q15 Min PRN **OR** glucose-vitamin C (Dex-4) chewable tab 8 tablet, 8 tablet, Oral, Q15 Min PRN    insulin aspart (NovoLOG) 100 Units/mL FlexPen 1-5 Units, 1-5 Units, Subcutaneous, TID CC    melatonin tab 3  mg, 3 mg, Oral, Nightly    polyethylene glycol (PEG 3350) (Miralax) 17 g oral packet 17 g, 17 g, Oral, Daily    heparin (Porcine) 5000 UNIT/ML injection 5,000 Units, 5,000 Units, Subcutaneous, Q8H MATT    Physical Exam:  General: Alert, orientated x3.  Cooperative.  No apparent distress.  Vital Signs:  Blood pressure 130/84, pulse 73, temperature 97.3 °F (36.3 °C), temperature source Oral, resp. rate 18, height 5' 8\" (1.727 m), weight 218 lb 8 oz (99.1 kg), SpO2 98%.   Temp (24hrs), Av.4 °F (36.3 °C), Min:96.5 °F (35.8 °C), Max:98.3 °F (36.8 °C)      HEENT: Exam is unremarkable.  Without scleral icterus.  Mucous membranes are moist. PERRLA.  Oropharynx is clear.  Neck: No tenderness to palpitation.  Full range of motion to flexion and extension, lateral rotation and lateral flexion of cervical spine.  No JVD. Supple.   Lungs: Clear to auscultation bilaterally.  Cardiac: Regular rate and rhythm. No murmur.  Abdomen:  Soft, non-distended, non-tender, with no rebound or guarding.   Extremities:  No lower extremity edema noted.  Without clubbing or cyanosis.    Skin: Normal texture and turgor.  Neurologic: Cranial nerves are grossly intact.  Motor strength and sensory examination is grossly normal.  No focal neurologic deficit.    Labs:  Lab Results   Component Value Date    WBC 5.7 2024    HGB 11.2 2024    HCT 33.7 2024    .0 2024    CREATSERUM 1.08 2024    BUN 12 2024     2024    K 3.8 2024     2024    CO2 26.0 2024     2024    CA 9.2 2024    INR 1.18 2024         Assessment/Plan:    1.  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  AMS  -appears to be back to baseline  3. Hx of C diff  -on PO vancomycin  4.  Hx of AMS possibly from meropenem    If you have any questions or concerns please call [facility] Infectious Disease at [phone].     Johnnie Higgins, NILS

## 2024-03-03 NOTE — PROGRESS NOTES
[facility] Hospitalist Progress Note     CC: Hospital Follow up    PCP: Rao Beyer MD       Assessment/Plan:     Principal Problem:    Perirectal abscess    Mr. Freed is a 83 year old male with PMH sig for CAD, DM2, HTN, HL, and recent admission for proctocolitis with perianal abscess discharged on IV abx then readmitted for diarrhea and C.diff who presents with worsening abscess.      Proctitis  Intrarectal fluid collection  - has been on abx since January with slow improvement  - seen by surgery, GI, thought to be 2/2 stercoral ulceration and a somewhat atypical location and on versus unusual location for ischemic injury, vs IBD  - had outpatient CT with increased abscess size and sent to the ER for eval  - GI consulted, may need IR drainage  - will consult IR on Monday  - repeat CT does not show abscess, discussed with radiologist, similar findings to prior CT but improving  - continue abx as per ID recs     Baseline Cognitive Issues per wife  - issues with encephalopathy on last admission, monitor for delirium  -seroquel and prn melatonin     C dif colitis  -vanco with long taper-  Take 2.5 mL (125 mg total) by mouth 4 (four) times daily for 6 days, THEN 2.5 mL (125 mg total) 2 (two) times a day for 14 days.   -follow up with ID      Hx Constipation  -per wife takes miralex and colace daily,  -will resume colace daily for now      Emphysema  Lung Nodule, RUL  -CTA brain- . Emphysema.  Noncalcified right upper lobe lung nodules, which measure up to 6 mm.   -outpt CT chest for completeness       CAD  HLD  Infrarenal abdominal aortic aneurysm measuring up to 5.0 cm.   HTN  -noted on CT A/P   -continue toprol and norvasc   -holding ASA for possible additional procedures  -continue lipitor      DM2- Diet Controlled   -ssi prn     DEANGELO  -not compliant with CPAP      GOC  -full code per prior notes  -POA wife but needs help with decision and speaks with Son Raul who is local and another brother in [place]     MA/LATA  Reach  -Re- Entry: NO  -Consults: gi, and ID   -Discharge Needs: likely back to [facility]      FN:  - IVF: none  - Diet: cardiac     DVT Prophy: SCD, HSQ  Lines: PIV     Dispo: pending clinical course     Outpatient records or previous hospital records reviewed.      Further recommendations pending patient's clinical course.  [facility] hospitalist to continue to follow patient while in house     Patient and/or patient's family given opportunity to ask questions and note understanding and agreeing with therapeutic plan as outlined     Grecia Mc MD  [facility] Hospitalist  Answering Service number: [phone]   Subjective:     No complaints, denies pain, no fevers. No family at bedside.     OBJECTIVE:    Blood pressure 130/84, pulse 73, temperature 97.3 °F (36.3 °C), temperature source Oral, resp. rate 18, height 5' 8\" (1.727 m), weight 218 lb 8 oz (99.1 kg), SpO2 98%.    Temp:  [96.5 °F (35.8 °C)-98.3 °F (36.8 °C)] 97.3 °F (36.3 °C)  Pulse:  [66-73] 73  Resp:  [16-20] 18  BP: (130-144)/(46-84) 130/84  SpO2:  [94 %-98 %] 98 %      Intake/Output:    Intake/Output Summary (Last 24 hours) at 3/3/2024 1522  Last data filed at 3/3/2024 0122  Gross per 24 hour   Intake 240 ml   Output 250 ml   Net -10 ml       Last 3 Weights   03/02/24 0050 218 lb 8 oz (99.1 kg)   03/01/24 2312 220 lb 7.4 oz (100 kg)   02/08/24 1431 226 lb (102.5 kg)   02/08/24 0846 226 lb (102.5 kg)   02/02/24 1630 234 lb 4.8 oz (106.3 kg)       Exam   GEN: elderly male in NAD, A&Ox 23  HEENT: EOMI  Pulm: CTAB, no crackles or wheezes  CV: RRR, no murmurs  ABD: Soft, non-tender, non-distended, +BS  SKIN: warm, dry  EXT: no edema      Data Review:       Labs:     Recent Labs   Lab 03/01/24 2009 03/03/24  0006   RBC 3.75* 3.57*   HGB 11.6* 11.2*   HCT 35.7* 33.7*   MCV 95.2 94.4   MCH 30.9 31.4   MCHC 32.5 33.2   RDW 13.7 13.7   NEPRELIM 4.65  --    WBC 8.3 5.7   .0 226.0         Recent Labs   Lab 03/01/24 2009 03/03/24  0006   * 114*    BUN 17 12   CREATSERUM 1.28 1.08   EGFRCR 55* 68   CA 9.8 9.2    142   K 3.9 3.8    108   CO2 29.0 26.0       Recent Labs   Lab 03/01/24 2009   ALT <7*   AST 17   ALB 4.5         Imaging:  CT ABDOMEN+PELVIS(CONTRAST ONLY)(CPT=74177)    Result Date: 3/2/2024  CONCLUSION:   Diffuse proctitis, improved but not resolved since 2/14/2024.  No abscess.  If not already performed, after resolution of patient's symptoms, direct visualization is recommended to exclude underlying rectal malignancy.  Aneurysmal dilation of the infrarenal abdominal aorta, unchanged since the prior exams. Nonemergent interventional radiology or vascular surgery consultation suggested. Yearly surveillance could be performed if intervention is deferred.        Multiple other incidental findings as described in the body of the report which are unchanged.   Dictated by (CST): Basil Varela MD on 3/02/2024 at 2:31 PM     Finalized by (CST): Basil Varela MD on 3/02/2024 at 2:38 PM          XR CHEST AP PORTABLE  (CPT=71045)    Result Date: 3/2/2024  CONCLUSION:   Right PICC with tip at the cavoatrial junction.  No acute cardiopulmonary abnormality.    Dictated by (CST): Basil Varela MD on 3/02/2024 at 9:20 AM     Finalized by (CST): Basil Varela MD on 3/02/2024 at 9:22 AM             Meds:      piperacillin-tazobactam  3.375 g Intravenous Q8H    vancomycin  125 mg Oral Daily    allopurinol  100 mg Oral Daily    amLODIPine  10 mg Oral Daily    atorvastatin  20 mg Oral Nightly    docusate sodium  100 mg Oral Daily    magnesium oxide  500 mg Oral Daily    metoprolol succinate ER  100 mg Oral Daily    pantoprazole  20 mg Oral QAM AC    insulin aspart  1-5 Units Subcutaneous TID CC    melatonin  3 mg Oral Nightly    polyethylene glycol (PEG 3350)  17 g Oral Daily    heparin  5,000 Units Subcutaneous Q8H MATT      sodium chloride 75 mL/hr at 03/02/24 1408     acetaminophen, ondansetron, metoclopramide, glucose **OR** glucose **OR** glucose-vitamin  C **OR** dextrose **OR** glucose **OR** glucose **OR** glucose-vitamin C

## 2024-03-03 NOTE — PROGRESS NOTES
[facility]    Progress Note    Raul Freed JrTamika Patient Status:  Inpatient    1933 MRN [id number]   Location [facility] 3W/SW Attending Kevin Da Silva MD   Hosp Day # 1 PCP Rao Beyer MD     SUBJECTIVE   Patient is a 84 year old male who was admitted to the hospital for Perirectal abscess:    Overnight:no issues    Current Medications:  Current Facility-Administered Medications   Medication Dose Route Frequency    sodium chloride 0.9% infusion   Intravenous Continuous    acetaminophen (Tylenol Extra Strength) tab 500 mg  500 mg Oral Q4H PRN    ondansetron (Zofran) 4 MG/2ML injection 4 mg  4 mg Intravenous Q6H PRN    metoclopramide (Reglan) 5 mg/mL injection 10 mg  10 mg Intravenous Q8H PRN    piperacillin-tazobactam (Zosyn) 3.375 g in dextrose 5% 100 mL IVPB-ADDV  3.375 g Intravenous Q8H    vancomycin (Vancocin) cap 125 mg  125 mg Oral Daily    allopurinol (Zyloprim) tab 100 mg  100 mg Oral Daily    amLODIPine (Norvasc) tab 10 mg  10 mg Oral Daily    atorvastatin (Lipitor) tab 20 mg  20 mg Oral Nightly    docusate sodium (Colace) cap 100 mg  100 mg Oral Daily    magnesium oxide (Mag-Ox) tab 500 mg  500 mg Oral Daily    metoprolol succinate ER (Toprol XL) 24 hr tab 100 mg  100 mg Oral Daily    pantoprazole (Protonix) DR tab 20 mg  20 mg Oral QAM AC    glucose (Dex4) 15 GM/59ML oral liquid 15 g  15 g Oral Q15 Min PRN    Or    glucose (Glutose) 40% oral gel 15 g  15 g Oral Q15 Min PRN    Or    glucose-vitamin C (Dex-4) chewable tab 4 tablet  4 tablet Oral Q15 Min PRN    Or    dextrose 50% injection 50 mL  50 mL Intravenous Q15 Min PRN    Or    glucose (Dex4) 15 GM/59ML oral liquid 30 g  30 g Oral Q15 Min PRN    Or    glucose (Glutose) 40% oral gel 30 g  30 g Oral Q15 Min PRN    Or    glucose-vitamin C (Dex-4) chewable tab 8 tablet  8 tablet Oral Q15 Min PRN    insulin aspart (NovoLOG) 100 Units/mL FlexPen 1-5 Units  1-5 Units Subcutaneous TID CC    melatonin tab 3 mg  3 mg Oral  Nightly    polyethylene glycol (PEG 3350) (Miralax) 17 g oral packet 17 g  17 g Oral Daily    heparin (Porcine) 5000 UNIT/ML injection 5,000 Units  5,000 Units Subcutaneous Q8H MATT       Medications Prior to Admission   Medication Sig    Emollient (EUCERIN PLUS) 5-5 % External Lotion Apply 1 Application topically daily. Upper and lower extremities    Zinc Oxide 10 % External Ointment Apply 1 Application topically as needed.    vancomycin 50 mg/mL Oral Recon Soln Take 2.5 mL (125 mg total) by mouth 4 (four) times daily for 6 days, THEN 2.5 mL (125 mg total) 2 (two) times a day for 14 days.    [] dextrose 5% SOLN 100 mL with piperacillin-tazobactam 4.5 (4-0.5) g SOLR 4.5 g Inject 4.5 g into the vein every 8 (eight) hours for 14 days. Weekly cbc w/diff, cmp, crp while on IV abx  PICC care per protocol    acetaminophen 500 MG Oral Tab Take 1 tablet (500 mg total) by mouth every 4 (four) hours as needed for Pain or Fever.    docusate sodium 100 MG Oral Cap Take 100 mg by mouth daily.    melatonin 3 MG Oral Tab Take 1 tablet (3 mg total) by mouth nightly.    polyethylene glycol, PEG 3350, 17 g Oral Powd Pack Take 17 g by mouth daily as needed (If no bowel movement in last 24 hours).    amLODIPine 10 MG Oral Tab Take 1 tablet (10 mg total) by mouth daily.    saccharomyces boulardii 250 MG Oral Cap Take 1 capsule (250 mg total) by mouth 2 (two) times daily.    Magnesium 500 MG Oral Tab Take 1 tablet (500 mg total) by mouth daily.    omeprazole 20 MG Oral Capsule Delayed Release Take 1 capsule (20 mg total) by mouth daily.    METOPROLOL SUCCINATE 100 MG Oral Tablet 24 Hr TAKE 1 TABLET BY MOUTH EVERY DAY    atorvastatin 20 MG Oral Tab Take 1 tablet (20 mg total) by mouth daily. (Patient taking differently: Take 1 tablet (20 mg total) by mouth nightly.)    allopurinol 100 MG Oral Tab Take 1 tablet (100 mg total) by mouth daily.    Omega-3 Fatty Acids (FISH OIL) 1000 MG Oral Cap Take 1,000 mg by mouth daily.    Multiple  Vitamin (MULTI VITAMIN MENS OR) Take 1 tablet by mouth daily.    QUEtiapine 25 MG Oral Tab Take 0.5 tablets (12.5 mg total) by mouth nightly. (Patient not taking: Reported on 3/2/2024)    Microlet Lancets Does not apply Misc USE AS DIRECTED TWICE DAILY    Blood Glucose Monitoring Suppl (Gaming for Good CONTOUR NEXT MONITOR) w/Device Does not apply Kit 1 Units by Does not apply route 2 (two) times daily before meals.     Allergies  No Known Allergies    Physical Exam:   Blood pressure 130/84, pulse 73, temperature 97.3 °F (36.3 °C), temperature source Oral, resp. rate 18, height 5' 8\" (1.727 m), weight 218 lb 8 oz (99.1 kg), SpO2 98%.    General appearance:  alert, appears stated age and cooperative    HEENT: negative findings: lids and lashes normal and conjunctivae and sclerae normal.     Pulmonary: clear to auscultation bilaterally of anterior chest    Cardiovascular: regular rate and rhythm    Abdominal: soft, bowel sounds present; non-distended, non-tender    Extremities: No edema, cyanosis, or clubbing    Skin: warm and dry    Neurologic: Grossly normal    Psychiatric: calm      Results:     Laboratory Data:  Lab Results   Component Value Date    WBC 5.7 03/03/2024    HGB 11.2 (L) 03/03/2024    HCT 33.7 (L) 03/03/2024    .0 03/03/2024    CREATSERUM 1.08 03/03/2024    BUN 12 03/03/2024     03/03/2024    K 3.8 03/03/2024     03/03/2024    CO2 26.0 03/03/2024     (H) 03/03/2024    CA 9.2 03/03/2024    ALB 4.5 03/01/2024    ALKPHO 114 03/01/2024    TP 8.6 (H) 03/01/2024    AST 17 03/01/2024    ALT <7 (L) 03/01/2024    INR 1.18 03/03/2024    PTP 15.8 (H) 03/03/2024    TSH 3.044 05/18/2015    PSA 1.02 11/25/2015    CRP <0.40 02/05/2024    MG 2.0 02/15/2024         Imaging:  CT ABDOMEN+PELVIS(CONTRAST ONLY)(CPT=74177)    Result Date: 3/2/2024  CONCLUSION:   Diffuse proctitis, improved but not resolved since 2/14/2024.  No abscess.  If not already performed, after resolution of patient's symptoms,  direct visualization is recommended to exclude underlying rectal malignancy.  Aneurysmal dilation of the infrarenal abdominal aorta, unchanged since the prior exams. Nonemergent interventional radiology or vascular surgery consultation suggested. Yearly surveillance could be performed if intervention is deferred.        Multiple other incidental findings as described in the body of the report which are unchanged.   Dictated by (CST): Basil Varela MD on 3/02/2024 at 2:31 PM     Finalized by (CST): Basil Varela MD on 3/02/2024 at 2:38 PM          XR CHEST AP PORTABLE  (CPT=71045)    Result Date: 3/2/2024  CONCLUSION:   Right PICC with tip at the cavoatrial junction.  No acute cardiopulmonary abnormality.    Dictated by (CST): Basil Varela MD on 3/02/2024 at 9:20 AM     Finalized by (CST): Basil Varela MD on 3/02/2024 at 9:22 AM                Assessment/Plan:   Patient is a 84 year old male who was admitted to the hospital for Perirectal abscess:    Perirectal abscess   -Since initial imagining in 1/27/2028, improving in inflammation and number of abscess but as long as underlying inflammation does not resolve, always at risk.  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 Ddx also includes chronic infectious etiology - ID on board, no source found on biopsies.  Does not appear to be malignancy                -based on size, needs IR drainage with culturing of fluid/sensitivities                 -broad spec iv antibiotics - id following                -bowel regimen to limit constipation or large stool burden      Anemia - stable, normocytic                -iron studies consistent with iron def likely 2/2 to anemia of chronic disease   -replete iron    Thank you for allowing me to participate in the care of your patient.    Time spent in direct patient contact and decision making as well as counseling/coordination of care:  35 minutes    Note to  patient: The 21st Century Cures Act makes medical notes like these available to patients in the interest of transparency. However, this is a medical document intended as peer to peer communication. It is written in medical language and may contain abbreviations or verbiage that are unfamiliar. It may appear blunt or direct. Medical documents are intended to carry relevant information, facts as evident, and the clinical opinion of the practitioner.      MD ROSALBA Camargo,   3/3/2024  9:28 AM

## 2024-03-04 LAB
GLUCOSE BLDC GLUCOMTR-MCNC: 100 MG/DL (ref 70–99)
GLUCOSE BLDC GLUCOMTR-MCNC: 110 MG/DL (ref 70–99)
GLUCOSE BLDC GLUCOMTR-MCNC: 114 MG/DL (ref 70–99)
GLUCOSE BLDC GLUCOMTR-MCNC: 118 MG/DL (ref 70–99)

## 2024-03-04 PROCEDURE — 94799 UNLISTED PULMONARY SVC/PX: CPT

## 2024-03-04 PROCEDURE — 82962 GLUCOSE BLOOD TEST: CPT

## 2024-03-04 NOTE — PLAN OF CARE
Problem: Patient Centered Care  Goal: Patient preferences are identified and integrated in the patient's plan of care  Description: Interventions:  - What would you like us to know as we care for you?   - Provide timely, complete, and accurate information to patient/family  - Incorporate patient and family knowledge, values, beliefs, and cultural backgrounds into the planning and delivery of care  - Encourage patient/family to participate in care and decision-making at the level they choose  - Honor patient and family perspectives and choices  Outcome: Progressing     Problem: Diabetes/Glucose Control  Goal: Glucose maintained within prescribed range  Description: INTERVENTIONS:  - Monitor Blood Glucose as ordered  - Assess for signs and symptoms of hyperglycemia and hypoglycemia  - Administer ordered medications to maintain glucose within target range  - Assess barriers to adequate nutritional intake and initiate nutrition consult as needed  - Instruct patient on self management of diabetes  Outcome: Progressing     Problem: Patient/Family Goals  Goal: Patient/Family Long Term Goal  Description: Patient's Long Term Goal:   Interventions  -   - See additional Care Plan goals for specific interventions  Outcome: Progressing  Goal: Patient/Family Short Term Goal  Description: Patient's Short Term Goal:   Interventions:   -   - See additional Care Plan goals for specific interventions  Outcome: Progressing     Problem: PAIN - ADULT  Goal: Verbalizes/displays adequate comfort level or patient's stated pain goal  Description: INTERVENTIONS:  - Encourage pt to monitor pain and request assistance  - Assess pain using appropriate pain scale  - Administer analgesics based on type and severity of pain and evaluate response  - Implement non-pharmacological measures as appropriate and evaluate response  - Consider cultural and social influences on pain and pain management  - Manage/alleviate anxiety  - Utilize distraction  and/or relaxation techniques  - Monitor for opioid side effects  - Notify MD/LIP if interventions unsuccessful or patient reports new pain  - Anticipate increased pain with activity and pre-medicate as appropriate  Outcome: Progressing     Problem: RISK FOR INFECTION - ADULT  Goal: Absence of fever/infection during anticipated neutropenic period  Description: INTERVENTIONS  - Monitor WBC  - Administer growth factors as ordered  - Implement neutropenic guidelines  Outcome: Progressing     Problem: SAFETY ADULT - FALL  Goal: Free from fall injury  Description: INTERVENTIONS:  - Assess pt frequently for physical needs  - Identify cognitive and physical deficits and behaviors that affect risk of falls.  - [place] fall precautions as indicated by assessment.  - Educate pt/family on patient safety including physical limitations  - Instruct pt to call for assistance with activity based on assessment  - Modify environment to reduce risk of injury  - Provide assistive devices as appropriate  - Consider OT/PT consult to assist with strengthening/mobility  - Encourage toileting schedule  Outcome: Progressing     Problem: METABOLIC/FLUID AND ELECTROLYTES - ADULT  Goal: Glucose maintained within prescribed range  Description: INTERVENTIONS:  - Monitor Blood Glucose as ordered  - Assess for signs and symptoms of hyperglycemia and hypoglycemia  - Administer ordered medications to maintain glucose within target range  - Assess barriers to adequate nutritional intake and initiate nutrition consult as needed  - Instruct patient on self management of diabetes  Outcome: Progressing     Problem: SKIN/TISSUE INTEGRITY - ADULT  Goal: Skin integrity remains intact  Description: INTERVENTIONS  - Assess and document risk factors for pressure ulcer development  - Assess and document skin integrity  - Monitor for areas of redness and/or skin breakdown  - Initiate interventions, skin care algorithm/standards of care as needed  Outcome:  Progressing     Problem: Impaired Functional Mobility  Goal: Achieve highest/safest level of mobility/gait  Description: Interventions:  - Assess patient's functional ability and stability  - Promote increasing activity/tolerance for mobility and gait  - Educate and engage patient/family in tolerated activity level and precautions    Outcome: Progressing   Alert and oriented x4. BP elevated, MD aware, Hydralazine PRN with parameters ordered. IV fluids and IV zosyn continued. Denies pain. Patient wears CPAP at night. Blood sugar monitored. NPO for possible IR procedure. Patient aware with plan of care.

## 2024-03-04 NOTE — PROGRESS NOTES
[facility]  Gastroenterology Progress Note    Raul Freed Jr. Patient Status:  Inpatient    1940 MRN [id number]   Location [facility] 5SW/SE Attending Grecia Mc MD   Hosp Day # 2 PCP Rao Beyer MD     Subjective:  Raul Freed Jr. is a(n) 84 year old male.    Current complaints: npo for IR, having some loose stools.  No rectal bleeding.   No fevers.      Objective:  Blood pressure (!) 164/85, pulse 70, temperature 98.8 °F (37.1 °C), temperature source Oral, resp. rate 18, height 5' 8\" (1.727 m), weight 218 lb 8 oz (99.1 kg), SpO2 97%.  Respiratory: no labored breathing  CV: RRR  Abdomen: nondistended, soft, nontender  Extremities: no calf tenderness  Neurologic: Ox3    Labs:   Recent Labs   Lab 24  0006 24  0531   WBC 8.3 5.7  --    HGB 11.6* 11.2*  --    HCT 35.7* 33.7*  --    .0 226.0  --    CREATSERUM 1.28 1.08  --    BUN 17 12  --     142  --    K 3.9 3.8  --     108  --    CO2 29.0 26.0  --    * 114*  --    CA 9.8 9.2  --    ALB 4.5  --   --    ALKPHO 114  --   --    BILT 0.4  --   --    TP 8.6*  --   --    AST 17  --   --    ALT <7*  --   --    INR  --   --  1.18   PTP  --   --  15.8*       No results for input(s): \"ADELE\", \"LIP\", \"GGT\", \"PSA\", \"DDIMER\", \"ESRML\", \"ESRPF\", \"CRP\", \"BNP\", \"TROP\", \"CK\", \"CKMB\", \"ASHLEY\", \"RPR\", \"B12\", \"ETOH\", \"POCGLU\" in the last 168 hours.    Invalid input(s): \"RF\"     Imaging:  CT ABDOMEN+PELVIS(CONTRAST ONLY)(CPT=74177)    Result Date: 3/2/2024  CONCLUSION:   Diffuse proctitis, improved but not resolved since 2024.  No abscess.  If not already performed, after resolution of patient's symptoms, direct visualization is recommended to exclude underlying rectal malignancy.  Aneurysmal dilation of the infrarenal abdominal aorta, unchanged since the prior exams. Nonemergent interventional radiology or vascular surgery consultation suggested. Yearly surveillance could be performed if  intervention is deferred.        Multiple other incidental findings as described in the body of the report which are unchanged.   Dictated by (CST): Basil Varela MD on 3/02/2024 at 2:31 PM     Finalized by (CST): Basil Varela MD on 3/02/2024 at 2:38 PM          XR CHEST AP PORTABLE  (CPT=71045)    Result Date: 3/2/2024  CONCLUSION:   Right PICC with tip at the cavoatrial junction.  No acute cardiopulmonary abnormality.    Dictated by (CST): Basil Varela MD on 3/02/2024 at 9:20 AM     Finalized by (CST): Basil Varela MD on 3/02/2024 at 9:22 AM            Problem list:  Patient Active Problem List   Diagnosis    Essential hypertension, benign    Mixed hyperlipidemia due to type 2 diabetes mellitus (HCC)    Obstructive sleep apnea (adult) (pediatric)    Vitamin D deficiency    AAA (abdominal aortic aneurysm) (HCC)    Diabetes mellitus type 2 in obese (HCC)    Bilateral sensorineural hearing loss    History of smoking 30 or more pack years    Severe obesity (BMI 35.0-35.9 with comorbidity) (HCC)    Hiatal hernia with GERD without esophagitis    Benign non-nodular prostatic hyperplasia without lower urinary tract symptoms    Facet arthritis of cervical region    Centrilobular emphysema (HCC)    Interstitial lung disease (HCC)    Thoracic aortic atherosclerosis (HCC)    History of coronary artery stent placement    CAD in native artery    Spondylosis of cervical region without myelopathy or radiculopathy    Elevated ratio of cholesterol to high density lipoprotein (HDL)    Unspecified inflammatory spondylopathy, cervical region (HCC)    Acute kidney injury (HCC)    Proctocolitis    Perianal abscess    Abscess of anal and rectal regions    Perirectal abscess     Colonoscopy 2/2022 with fair prep, small polyp and otherwise normal.       Assessment    Perirectal abscess   -initial imagining in 1/27/2024,   -improving in inflammation and number of abscess but as long as underlying inflammation does not resolve, always at  risk.   -No granulomas supporting Crohn's but chronic colitis in setting of remote hx of anal fissure keeps IBD on differential. Ddx also includes chronic infectious etiology - ID on board, no source found on biopsies.  Does not appear to be malignancy                  Anemia - stable, normocytic     -iron studies consistent with iron def likely 2/2 to anemia of chronic disease          C dif colitis  -vanco   -follow up with ID       Plan   - IR drainage with culturing of fluid/sensitivities   - antibiotics - id following  - may warrant repeat Flex sig in 6-8 weeks as outpatient once this infectious event has resolved/improved.        Agree with note.  I have personally seen the patient and performed my own physical exam.  The note has been fully edited by me.   Gilberto Dumont

## 2024-03-04 NOTE — CM/SW NOTE
Social work was able to meet with the patient and his daughter at bedside per the daughters request.    The patient's daughter stated that she was not satisfied with the previous facility [facility] and she would like a new facility.    Social work advised the patient and his daughter that at this time PT/OT is recommending home and the patient is not requiring any assistance. Secondly, the patient's IV abx (Zosyn) has been discontinued at this time.    Social work advised the patient and his daughter that if long term IV abx are needed, a new SNF referral will be sent to other facility.    The patient and family understood.    Social work will follow up.    SW/CM to remain available for support and/or discharge planning.     Vanessa Rush MSW, LSW  Discharge Planner [id number]

## 2024-03-04 NOTE — PROGRESS NOTES
[facility]   Progress Note  -  Raul Freed Jr. Patient Status:  Inpatient    1940 MRN [id number]   Location [facility] 5SW/SE Attending Grecia Mc MD   Hosp Day # 2 PCP Rao Beyer MD     PCP: Rao Beyer MD      SEE ATTENDING NOTE AT BOTTOM OF PAGE    Is this a shared or split note between Advanced Practice Provider and Physician? Yes    Assessment and Plan:    Mr. Freed is a 83 year old male with PMH sig for CAD, DM2, HTN, HL, and recent admission for proctocolitis with perianal abscess discharged on IV abx then readmitted for diarrhea and C.diff who presents with concerns for worsening abscess based on CT  done at outside facility, pt sent in by GI, repeat CT done at [facility] shows resolution of abscess and no fluid to be drained by IR, GI rec repeat flex sig in 6-8 weeks, per ID they recommending stopping abx and observing pt. Pt will be retested for resolution of c-diff PTD. SW consulted for change in rehab center (was at [facility]), see below for details      Perirectal Abscess-resolved   Proctitis   - has been on abx since January with slow improvement  - seen by surgery, GI, thought to be 2/2 stercoral ulceration and a somewhat atypical location and on versus unusual location for ischemic injury, vs IBD  -  outpatient CT not at outside facility (not [facility]) with increased abscess size and sent to the ER for eval,    -3/2 Per Dr Mc repeat CT does not show abscess and she discussed with radiologist, similar findings to prior CT but improving  - Dr Mc spoke with interventional radiology about imaging and they state there is a tiny amount of fluid but nothing to be drained.  -Plans for repeat flex sig in 6-8 weeks as per gi   -abx- zosyn, discussed with ID who recommended stopping abx and observing based on CT findings  -awaiting surgery inputu     Baseline Cognitive Issues per wife  - issues with encephalopathy on last admission,  monitor for delirium  -no longer on seroquel     C dif colitis  -currently on vanco ppx, per Dr Rodriguez, plans to continue for a few days more and retest for  C-diff  -as per ID      Hx Constipation  -continue miralex and colace daily     Emphysema  Lung Nodule, RUL  -CTA brain- . Emphysema.  Noncalcified right upper lobe lung nodules, which measure up to 6 mm.   -outpt CT chest for completeness       CAD  HLD  Infrarenal abdominal aortic aneurysm measuring up to 5.0 cm.   HTN  -noted on CT A/P   -continue toprol, norvasc and Lipitor  -ASA has been on hold since last admission  -prn hydralazine   -rec Follow-up imaging outpatient with vascular surgery     DM2- Diet Controlled   -ssi prn     DEANGELO  -not compliant with CPAP      GOC  -full code per prior notes     MA/ACO Reach  -Re- Entry: NO  -Consults: gi and ID   -Discharge Needs: likely back to [facility]   -APPT: follow up with PCP Rao Beyer MD 1 week after discharge from rehab      AARON mejia in am  -diet-ADA    Prophy  -SCD  -heparin    Dispo  -pending clinical coarse  -3/4/2024 update given to family representative Israel Freed  PCP: Rao Beyer MD      Concerns regarding plan of care were discussed with patient. Patient agrees with plan as detailed above. Discussed plan of care with Dr. Mc     Note: This chart was prepared using voice recognition software and may contain unintended word substitution errors.      Christy Swartz RN, NP   [facility] Hospitalist Team  Contact via [employer] and [employer] (Check Availability)  3/4/2024    SUBJECTIVE:   No complaints. Daughter in law Cristina at bedside and states pt cognitive state is better here then at rehab. Apparently pt had no visitors for about 4 days as family all were sick and when pt was seen and taken to gi doctor they mentioned the cognitive state as well as CT done at outside facility who recommended admission. Family does not want pt to go back to [facility].        OBJECTIVE:   Blood pressure (!) 164/85, pulse 70, temperature 98.8 °F (37.1 °C), temperature source Oral, resp. rate 18, height 5' 8\" (1.727 m), weight 218 lb 8 oz (99.1 kg), SpO2 97%.    GENERAL: no apparent distress  NEURO: A/A   RESP: non labored, CTA  CARDIO: Regular, no murmur  ABD: soft, NT, ND, BS+  EXTREMITIES: no edema    DIAGNOSTIC DATA:   Labs:     Recent Labs   Lab 03/01/24 2009 03/03/24  0006 03/03/24  0531   WBC 8.3 5.7  --    HGB 11.6* 11.2*  --    MCV 95.2 94.4  --    .0 226.0  --    INR  --   --  1.18       Recent Labs   Lab 03/01/24 2009 03/03/24  0006    142   K 3.9 3.8    108   CO2 29.0 26.0   BUN 17 12   CREATSERUM 1.28 1.08   CA 9.8 9.2   * 114*       Recent Labs   Lab 03/01/24 2009   ALT <7*   AST 17   ALB 4.5       Recent Labs   Lab 03/03/24  1211 03/03/24  1706 03/03/24  2120 03/04/24  0615 03/04/24  1050   PGLU 116* 141* 117* 100* 114*       No results for input(s): \"TROP\" in the last 168 hours.        MEDICATIONS       insulin regular human  1-5 Units Subcutaneous 4 times per day    piperacillin-tazobactam  3.375 g Intravenous Q8H    vancomycin  125 mg Oral Daily    allopurinol  100 mg Oral Daily    amLODIPine  10 mg Oral Daily    atorvastatin  20 mg Oral Nightly    docusate sodium  100 mg Oral Daily    magnesium oxide  500 mg Oral Daily    metoprolol succinate ER  100 mg Oral Daily    pantoprazole  20 mg Oral QAM AC    melatonin  3 mg Oral Nightly    polyethylene glycol (PEG 3350)  17 g Oral Daily    heparin  5,000 Units Subcutaneous Q8H MATT      sodium chloride 75 mL/hr at 03/03/24 2124     hydrALAzine, acetaminophen, ondansetron, metoclopramide, glucose **OR** glucose **OR** glucose-vitamin C **OR** dextrose **OR** glucose **OR** glucose **OR** glucose-vitamin C    Christy Swartz, SERENE      IMAGING     CT ABDOMEN+PELVIS(CONTRAST ONLY)(CPT=74177)    Result Date: 3/2/2024  CONCLUSION:   Diffuse proctitis, improved but not resolved since 2/14/2024.  No  abscess.  If not already performed, after resolution of patient's symptoms, direct visualization is recommended to exclude underlying rectal malignancy.  Aneurysmal dilation of the infrarenal abdominal aorta, unchanged since the prior exams. Nonemergent interventional radiology or vascular surgery consultation suggested. Yearly surveillance could be performed if intervention is deferred.        Multiple other incidental findings as described in the body of the report which are unchanged.   Dictated by (CST): Basil Varela MD on 3/02/2024 at 2:31 PM     Finalized by (CST): Basil Varela MD on 3/02/2024 at 2:38 PM          XR CHEST AP PORTABLE  (CPT=71045)    Result Date: 3/2/2024  CONCLUSION:   Right PICC with tip at the cavoatrial junction.  No acute cardiopulmonary abnormality.    Dictated by (CST): Basil Varela MD on 3/02/2024 at 9:20 AM     Finalized by (CST): Basil Varela MD on 3/02/2024 at 9:22 AM             SEE ATTENDING NOTE BELOW:     Patient examined and assessed independently. Agree with above APN assessment.     S: No complaints, feels well, wants to go home.     Objective  /64 (BP Location: Left arm)   Pulse 69   Temp 99.6 °F (37.6 °C) (Oral)   Resp 18   Ht 5' 8\" (1.727 m)   Wt 218 lb 8 oz (99.1 kg)   SpO2 95%   BMI 33.22 kg/m²     Exam   GEN: elderly male in NAD, A&Ox 3  HEENT: EOMI  Pulm: CTAB, no crackles or wheezes  CV: RRR, no murmurs  ABD: Soft, non-tender, non-distended, +BS  SKIN: warm, dry  EXT: no edema    Assessment/Plan    Mr. Freed is a 83 year old male with PMH sig for CAD, DM2, HTN, HL, and recent admission for proctocolitis with perianal abscess discharged on IV abx then readmitted for diarrhea and C.diff who presents with concerns for worsening abscess based on CT 2/28 done at outside facility, pt sent in by GI, repeat CT done at [place] shows resolution of abscess and no fluid to be drained by IR, GI rec repeat flex sig in 6-8 weeks, per ID they recommending stopping abx  and observing pt. Pt will be retested for resolution of c-diff PTD. SW consulted for change in rehab center (was at [facility]), see below for details      Perirectal Abscess-resolved   Proctitis   Baseline Cognitive Issues per wife  C dif colitis  Hx Constipation  Emphysema  Lung Nodule, RUL  CAD  HLD  Infrarenal abdominal aortic aneurysm measuring up to 5.0 cm.   HTN  DM2- Diet Controlled   DEANGELO  GOC    Plan:  - d/w IR, no area to drain, imaging looks improved compared to prior  - d/w ID, will stop IV abx, continue vancomycin for a few days then retest for C.diff  - f/u GI, will need repeat flex sig in 4-6 weeks     Rest as above.    Grecia Mc MD  [facility] hospitalist

## 2024-03-04 NOTE — PROGRESS NOTES
[facility]  part of [facility] Infectious Disease  Progress Note    Raul Freed Jr. Patient Status:  Inpatient    1940 MRN [id number]   Location [facility] 5SW/SE Attending Grecia Mc MD   Hosp Day # 2 PCP Rao Beyer MD     Raul Freed Jr. is a 84 year old male.   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  Pertinent positives and negatives noted in the the HPI.       Allergies:  No Known Allergies     Current Meds:    Current Facility-Administered Medications:     insulin regular human (Novolin R, Humulin R) 100 UNIT/ML injection 1-5 Units, 1-5 Units, Subcutaneous, 4 times per day    hydrALAzine (Apresoline) 20 mg/mL injection 5 mg, 5 mg, Intravenous, Q6H PRN    sodium chloride 0.9% infusion, , Intravenous, Continuous    acetaminophen (Tylenol Extra Strength) tab 500 mg, 500 mg, Oral, Q4H PRN    ondansetron (Zofran) 4 MG/2ML injection 4 mg, 4 mg, Intravenous, Q6H PRN    metoclopramide (Reglan) 5 mg/mL injection 10 mg, 10 mg, Intravenous, Q8H PRN    piperacillin-tazobactam (Zosyn) 3.375 g in dextrose 5% 100 mL IVPB-ADDV, 3.375 g, Intravenous, Q8H    vancomycin (Vancocin) cap 125 mg, 125 mg, Oral, Daily    allopurinol (Zyloprim) tab 100 mg, 100 mg, Oral, Daily    amLODIPine (Norvasc) tab 10 mg, 10 mg, Oral, Daily    atorvastatin (Lipitor) tab 20 mg, 20 mg, Oral, Nightly    docusate sodium (Colace) cap 100 mg, 100 mg, Oral, Daily    magnesium oxide (Mag-Ox) tab 500 mg, 500 mg, Oral, Daily    metoprolol succinate ER (Toprol XL) 24 hr tab 100 mg, 100 mg, Oral, Daily    pantoprazole (Protonix) DR tab 20 mg, 20 mg, Oral, QAM AC    glucose (Dex4) 15 GM/59ML oral liquid 15 g, 15 g, Oral, Q15 Min PRN **OR** glucose (Glutose) 40% oral gel 15 g, 15 g, Oral, Q15 Min PRN **OR** glucose-vitamin C  (Dex-4) chewable tab 4 tablet, 4 tablet, Oral, Q15 Min PRN **OR** dextrose 50% injection 50 mL, 50 mL, Intravenous, Q15 Min PRN **OR** glucose (Dex4) 15 GM/59ML oral liquid 30 g, 30 g, Oral, Q15 Min PRN **OR** glucose (Glutose) 40% oral gel 30 g, 30 g, Oral, Q15 Min PRN **OR** glucose-vitamin C (Dex-4) chewable tab 8 tablet, 8 tablet, Oral, Q15 Min PRN    melatonin tab 3 mg, 3 mg, Oral, Nightly    polyethylene glycol (PEG 3350) (Miralax) 17 g oral packet 17 g, 17 g, Oral, Daily    heparin (Porcine) 5000 UNIT/ML injection 5,000 Units, 5,000 Units, Subcutaneous, Q8H MATT   No current outpatient medications on file.        HISTORY:  Past Medical History:   Diagnosis Date    Abdominal aortic aneurysm (AAA) 3.0 cm to 5.0 cm in diameter in female (HCC)     CAD involving native coronary artery of native heart without angina pectoris 03/20/2018    Cardiac LV ejection fraction 50-55% per 2018 angio  03/20/2018    Centrilobular emphysema (HCC) 03/19/2018    Coronary atherosclerosis     COVID 12/2020    Diabetes mellitus (HCC)     Gastric ulcer 2008    Healed 2009    GOUT     HYPERLIPIDEMIA     HYPERTENSION     OBESITY     OSTEOARTHRITIS     osteoarthritis knees    Presence of drug coated stent in prox & Mid LAD coronary artery 03/19/2018 03/20/2018    2.75 x 15 mm Xcience drug-eluting stent into the mid LAD 3.5 x 12 mm Xcience drug-eluting stent into the proximal LAD    Pulmonary emphysema (HCC)     SLEEP APNEA     [facility] fax [phone]      Past Surgical History:   Procedure Laterality Date    CATH BARE METAL STENT (BMS)      COLONOSCOPY  2009= Declines repeat    2009, complicated by anal fissure    COLONOSCOPY      COLONOSCOPY  03/2022    one small TA, int hem, can consider d/cing surveillance    COLONOSCOPY N/A 02/28/2022    Procedure: COLONOSCOPY,  with polypectomy;  Surgeon: Anthnoy Patel MD;  Location: [facility] [place], [facility]    HEMORRHOIDECTOMY  Dr Kamara 3/12/10 cdh     Rectal exam under  anesthesia/anoscopy. Incision and drainage of perirectal abscess. Placement of seton . Excision of anal tags. Destruction of internal hemorrhoids.  Surgery done at [facility] on 3/12/10.    OTHER SURGICAL HISTORY      left knee arthroscopy    PATIENT DOCUMENTED NOT TO HAVE EXPERIENCED ANY OF THE FOLLOWING EVENTS N/A 02/18/2015    Procedure: ESOPHAGOGASTRODUODENOSCOPY, POSSIBLE BIOPSY, POSSIBLE POLYPECTOMY 95117;  Surgeon: Musa Cook MD;  Location: [facility]    PATIENT WITHOUGH PREOPERATIVE ORDER FOR IV ANTIBIOTIC SURGICAL SITE INFECTION PROPHYLAXIS. N/A 02/18/2015    Procedure: ESOPHAGOGASTRODUODENOSCOPY, POSSIBLE BIOPSY, POSSIBLE POLYPECTOMY 30733;  Surgeon: Musa Cook MD;  Location: [facility]    UPPER GI ENDOSCOPY PERFORMED  2008, 2/27/2009    UPPER GI ENDOSCOPY,BIOPSY  2/18/15=Gastritis.  H pylori negative, normal SB Bx    UPPER GI ENDOSCOPY,BIOPSY N/A 02/18/2015    Procedure: ESOPHAGOGASTRODUODENOSCOPY, POSSIBLE BIOPSY, POSSIBLE POLYPECTOMY 36547;  Surgeon: Musa Cook MD;  Location: [facility]    UPPER GI ENDOSCOPY,EXAM          Social history and family history negative related to present illness except as above.    PHYSICAL EXAM:   BP (!) 164/85 (BP Location: Left arm)   Pulse 70   Temp 98.8 °F (37.1 °C) (Oral)   Resp 18   Ht 5' 8\" (1.727 m)   Wt 218 lb 8 oz (99.1 kg)   SpO2 97%   BMI 33.22 kg/m²   GENERAL:  Awake, alert, oriented x3. Non-tox, non-septic and in NAD.  HEENT:  Normocephalic, no scleral abnormalities.  Oropharynx clear, trachea ML.  NECK:  Supple, no masses, no lymphadenopathy.  LUNGS:  Clear to auscultation b/l, no rhonchi, rales, or wheezes.  CARDIO: RRR S1/S2, no rubs, clicks, heaves, or murmurs.  GI:  Soft NT/ND, BS present x4 quadrants, no HSM.  EXTREMITIES:  No edema, no clubbing, no cyanosis.  NEURO:  No focal neurologic deficits.  DERM:  Wounds not viewed    IMPRESSION/PLAN:     1.  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  AMS  -appears to be back to baseline  3. Hx of C diff  -on PO vancomycin  4.  Hx of AMS possibly from meropenem   5. Not much to add at this time; will follow with you            Recent Results (from the past 72 hour(s))   Comp Metabolic Panel (14)    Collection Time: 03/01/24  8:09 PM   Result Value Ref Range    Glucose 128 (H) 70 - 99 mg/dL    Sodium 140 136 - 145 mmol/L    Potassium 3.9 3.5 - 5.1 mmol/L    Chloride 106 98 - 112 mmol/L    CO2 29.0 21.0 - 32.0 mmol/L    Anion Gap 5 0 - 18 mmol/L    BUN 17 9 - 23 mg/dL    Creatinine 1.28 0.70 - 1.30 mg/dL    BUN/CREA Ratio 13.3 10.0 - 20.0    Calcium, Total 9.8 8.7 - 10.4 mg/dL    Calculated Osmolality 293 275 - 295 mOsm/kg    eGFR-Cr 55 (L) >=60 mL/min/1.73m2    ALT <7 (L) 10 - 49 U/L    AST 17 <=34 U/L    Alkaline Phosphatase 114 45 - 117 U/L    Bilirubin, Total 0.4 0.2 - 1.1 mg/dL    Total Protein 8.6 (H) 5.7 - 8.2 g/dL    Albumin 4.5 3.2 - 4.8 g/dL    Globulin  4.1 2.8 - 4.4 g/dL    A/G Ratio 1.1 1.0 - 2.0   RAINBOW DRAW LAVENDER    Collection Time: 03/01/24  8:09 PM   Result Value Ref Range    Hold Lavender Auto Resulted    RAINBOW DRAW LIGHT GREEN    Collection Time: 03/01/24  8:09 PM   Result Value Ref Range    Hold Lt Green Auto Resulted    CBC W/ DIFFERENTIAL    Collection Time: 03/01/24  8:09 PM   Result Value Ref Range    WBC 8.3 4.0 - 11.0 x10(3) uL    RBC 3.75 (L) 3.80 - 5.80 x10(6)uL    HGB 11.6 (L) 13.0 - 17.5 g/dL    HCT 35.7 (L) 39.0 - 53.0 %    MCV 95.2 80.0 - 100.0  fL    MCH 30.9 26.0 - 34.0 pg    MCHC 32.5 31.0 - 37.0 g/dL    RDW-SD 48.0 (H) 35.1 - 46.3 fL    RDW 13.7 11.0 - 15.0 %    .0 150.0 - 450.0 10(3)uL    Neutrophil Absolute Prelim 4.65 1.50 - 7.70 x10 (3) uL    Neutrophil Absolute 4.65 1.50 - 7.70 x10(3) uL    Lymphocyte Absolute 1.93 1.00 - 4.00 x10(3) uL    Monocyte Absolute 1.18 (H) 0.10 - 1.00 x10(3) uL    Eosinophil Absolute 0.47 0.00 - 0.70 x10(3) uL    Basophil Absolute 0.03 0.00 - 0.20 x10(3) uL    Immature Granulocyte Absolute 0.04 0.00 - 1.00 x10(3) uL    Neutrophil % 55.9 %    Lymphocyte % 23.3 %    Monocyte % 14.2 %    Eosinophil % 5.7 %    Basophil % 0.4 %    Immature Granulocyte % 0.5 %   Ferritin    Collection Time: 03/01/24  8:09 PM   Result Value Ref Range    Ferritin 348.5 30.0 - 530.0 ng/mL   Iron And Tibc    Collection Time: 03/01/24  8:09 PM   Result Value Ref Range    Iron 57 (L) 65 - 175 ug/dL    Transferrin 158 (L) 215 - 365 mg/dL    Total Iron Binding Capacity 235 (L) 250 - 425 ug/dL    % Saturation 24 20 - 50 %   Lactic Acid, Plasma    Collection Time: 03/01/24  9:29 PM   Result Value Ref Range    Lactic Acid 1.1 0.5 - 2.0 mmol/L   Blood Culture    Collection Time: 03/01/24  9:29 PM    Specimen: Blood,peripheral   Result Value Ref Range    Blood Culture Result No Growth 2 Days    Blood Culture    Collection Time: 03/01/24  9:29 PM    Specimen: Blood,peripheral   Result Value Ref Range    Blood Culture Result No Growth 2 Days    POCT Glucose    Collection Time: 03/02/24 12:50 AM   Result Value Ref Range    POC Glucose  106 (H) 70 - 99 mg/dL   POCT Glucose    Collection Time: 03/02/24  6:06 AM   Result Value Ref Range    POC Glucose  103 (H) 70 - 99 mg/dL   POCT Glucose    Collection Time: 03/02/24 12:09 PM   Result Value Ref Range    POC Glucose  117 (H) 70 - 99 mg/dL   POCT Glucose    Collection Time: 03/02/24  5:53 PM   Result Value Ref Range    POC Glucose  179 (H) 70 - 99 mg/dL   POCT Glucose    Collection Time: 03/02/24  9:08 PM    Result Value Ref Range    POC Glucose  129 (H) 70 - 99 mg/dL   Basic Metabolic Panel (8)    Collection Time: 03/03/24 12:06 AM   Result Value Ref Range    Glucose 114 (H) 70 - 99 mg/dL    Sodium 142 136 - 145 mmol/L    Potassium 3.8 3.5 - 5.1 mmol/L    Chloride 108 98 - 112 mmol/L    CO2 26.0 21.0 - 32.0 mmol/L    Anion Gap 8 0 - 18 mmol/L    BUN 12 9 - 23 mg/dL    Creatinine 1.08 0.70 - 1.30 mg/dL    BUN/CREA Ratio 11.1 10.0 - 20.0    Calcium, Total 9.2 8.7 - 10.4 mg/dL    Calculated Osmolality 295 275 - 295 mOsm/kg    eGFR-Cr 68 >=60 mL/min/1.73m2   CBC, Platelet; No Differential    Collection Time: 03/03/24 12:06 AM   Result Value Ref Range    WBC 5.7 4.0 - 11.0 x10(3) uL    RBC 3.57 (L) 3.80 - 5.80 x10(6)uL    HGB 11.2 (L) 13.0 - 17.5 g/dL    HCT 33.7 (L) 39.0 - 53.0 %    MCV 94.4 80.0 - 100.0 fL    MCH 31.4 26.0 - 34.0 pg    MCHC 33.2 31.0 - 37.0 g/dL    RDW 13.7 11.0 - 15.0 %    RDW-SD 47.4 (H) 35.1 - 46.3 fL    .0 150.0 - 450.0 10(3)uL   Prothrombin Time (PT)    Collection Time: 03/03/24  5:31 AM   Result Value Ref Range    PT 15.8 (H) 11.6 - 14.8 seconds    INR 1.18 0.80 - 1.20   RAINBOW DRAW LIGHT GREEN    Collection Time: 03/03/24  5:31 AM   Result Value Ref Range    Hold Lt Green Auto Resulted    RAINBOW DRAW LAVENDER    Collection Time: 03/03/24  5:31 AM   Result Value Ref Range    Hold Lavender Auto Resulted    POCT Glucose    Collection Time: 03/03/24  7:21 AM   Result Value Ref Range    POC Glucose  118 (H) 70 - 99 mg/dL   POCT Glucose    Collection Time: 03/03/24 12:11 PM   Result Value Ref Range    POC Glucose  116 (H) 70 - 99 mg/dL   POCT Glucose    Collection Time: 03/03/24  5:06 PM   Result Value Ref Range    POC Glucose  141 (H) 70 - 99 mg/dL   POCT Glucose    Collection Time: 03/03/24  9:20 PM   Result Value Ref Range    POC Glucose  117 (H) 70 - 99 mg/dL   POCT Glucose    Collection Time: 03/04/24  6:15 AM   Result Value Ref Range    POC Glucose  100 (H) 70 - 99 mg/dL   POCT Glucose     Collection Time: 03/04/24 10:50 AM   Result Value Ref Range    POC Glucose  114 (H) 70 - 99 mg/dL         Scotty Rodriguez MD     CALL [facility] AT [phone] IF QUESTIONS OR CONCERNS  THANKS

## 2024-03-04 NOTE — CONGREGATE LIVING REVIEW
[facility] Living Authorization    The [facility] Review Committee has reviewed this case and the patient IS APPROVED for discharge to a facility for Short Term Skilled once the following procedure is followed:     - The physician discharge instructions (contained within the WOJCIECH note for SNF) must inlcude the below appropriate and approved COVID instructions to the facility    For questions regarding CLRC approval process, please contact the CM assigned to the case.  For questions regarding RN discharge workflow, please contact the unit Clinical Leader.

## 2024-03-05 LAB
ANION GAP SERPL CALC-SCNC: 6 MMOL/L (ref 0–18)
BASOPHILS # BLD AUTO: 0.02 X10(3) UL (ref 0–0.2)
BASOPHILS NFR BLD AUTO: 0.4 %
BUN BLD-MCNC: 14 MG/DL (ref 9–23)
BUN/CREAT SERPL: 14.9 (ref 10–20)
CALCIUM BLD-MCNC: 9.8 MG/DL (ref 8.7–10.4)
CHLORIDE SERPL-SCNC: 108 MMOL/L (ref 98–112)
CO2 SERPL-SCNC: 26 MMOL/L (ref 21–32)
CREAT BLD-MCNC: 0.94 MG/DL
DEPRECATED RDW RBC AUTO: 46.7 FL (ref 35.1–46.3)
EGFRCR SERPLBLD CKD-EPI 2021: 80 ML/MIN/1.73M2 (ref 60–?)
EOSINOPHIL # BLD AUTO: 0.29 X10(3) UL (ref 0–0.7)
EOSINOPHIL NFR BLD AUTO: 5.2 %
ERYTHROCYTE [DISTWIDTH] IN BLOOD BY AUTOMATED COUNT: 13.7 % (ref 11–15)
GLUCOSE BLD-MCNC: 106 MG/DL (ref 70–99)
GLUCOSE BLDC GLUCOMTR-MCNC: 104 MG/DL (ref 70–99)
GLUCOSE BLDC GLUCOMTR-MCNC: 112 MG/DL (ref 70–99)
GLUCOSE BLDC GLUCOMTR-MCNC: 117 MG/DL (ref 70–99)
GLUCOSE BLDC GLUCOMTR-MCNC: 93 MG/DL (ref 70–99)
HCT VFR BLD AUTO: 34.3 %
HGB BLD-MCNC: 11.5 G/DL
IMM GRANULOCYTES # BLD AUTO: 0.03 X10(3) UL (ref 0–1)
IMM GRANULOCYTES NFR BLD: 0.5 %
LYMPHOCYTES # BLD AUTO: 1.57 X10(3) UL (ref 1–4)
LYMPHOCYTES NFR BLD AUTO: 28.2 %
MCH RBC QN AUTO: 31.5 PG (ref 26–34)
MCHC RBC AUTO-ENTMCNC: 33.5 G/DL (ref 31–37)
MCV RBC AUTO: 94 FL
MONOCYTES # BLD AUTO: 0.85 X10(3) UL (ref 0.1–1)
MONOCYTES NFR BLD AUTO: 15.3 %
NEUTROPHILS # BLD AUTO: 2.8 X10 (3) UL (ref 1.5–7.7)
NEUTROPHILS # BLD AUTO: 2.8 X10(3) UL (ref 1.5–7.7)
NEUTROPHILS NFR BLD AUTO: 50.4 %
OSMOLALITY SERPL CALC.SUM OF ELEC: 291 MOSM/KG (ref 275–295)
PLATELET # BLD AUTO: 215 10(3)UL (ref 150–450)
POTASSIUM SERPL-SCNC: 3.8 MMOL/L (ref 3.5–5.1)
RBC # BLD AUTO: 3.65 X10(6)UL
SODIUM SERPL-SCNC: 140 MMOL/L (ref 136–145)
WBC # BLD AUTO: 5.6 X10(3) UL (ref 4–11)

## 2024-03-05 PROCEDURE — 94799 UNLISTED PULMONARY SVC/PX: CPT

## 2024-03-05 PROCEDURE — 85025 COMPLETE CBC W/AUTO DIFF WBC: CPT | Performed by: NURSE PRACTITIONER

## 2024-03-05 PROCEDURE — 80048 BASIC METABOLIC PNL TOTAL CA: CPT | Performed by: NURSE PRACTITIONER

## 2024-03-05 PROCEDURE — 82962 GLUCOSE BLOOD TEST: CPT

## 2024-03-05 NOTE — PROGRESS NOTES
[facility]  Gastroenterology Progress Note    Raul Freed Jr. Patient Status:  Inpatient    1940 MRN [id number]   Location [facility] 5SW/SE Attending Kevin Da Silva MD   Hosp Day # 3 PCP Rao Beyer MD     Subjective:  Raul Freed Jr. is a(n) 84 year old male.    Current complaints: no current complaints, denies abdominal, rectal pain, BM x 1 per nursing    Objective:  Blood pressure 128/68, pulse 60, temperature 98.1 °F (36.7 °C), temperature source Oral, resp. rate 18, height 5' 8\" (1.727 m), weight 216 lb 7.9 oz (98.2 kg), SpO2 96%.  Respiratory: no labored breathing  CV: RRR  Abdomen: nondistended, soft, nontender  Extremities: no calf tenderness  Neurologic: Ox3    Labs:   Recent Labs   Lab 24  0006 24  0531 24  0531   WBC 8.3 5.7  --  5.6   HGB 11.6* 11.2*  --  11.5*   HCT 35.7* 33.7*  --  34.3*   .0 226.0  --  215.0   CREATSERUM 1.28 1.08  --  0.94   BUN 17 12  --  14    142  --  140   K 3.9 3.8  --  3.8    108  --  108   CO2 29.0 26.0  --  26.0   * 114*  --  106*   CA 9.8 9.2  --  9.8   ALB 4.5  --   --   --    ALKPHO 114  --   --   --    BILT 0.4  --   --   --    TP 8.6*  --   --   --    AST 17  --   --   --    ALT <7*  --   --   --    INR  --   --  1.18  --    PTP  --   --  15.8*  --        No results for input(s): \"ADELE\", \"LIP\", \"GGT\", \"PSA\", \"DDIMER\", \"ESRML\", \"ESRPF\", \"CRP\", \"BNP\", \"TROP\", \"CK\", \"CKMB\", \"ASHLEY\", \"RPR\", \"B12\", \"ETOH\", \"POCGLU\" in the last 168 hours.    Invalid input(s): \"RF\"     Imaging:  CT ABDOMEN+PELVIS(CONTRAST ONLY)(CPT=74177)    Result Date: 3/2/2024  CONCLUSION:   Diffuse proctitis, improved but not resolved since 2024.  No abscess.  If not already performed, after resolution of patient's symptoms, direct visualization is recommended to exclude underlying rectal malignancy.  Aneurysmal dilation of the infrarenal abdominal aorta, unchanged since the prior exams. Nonemergent interventional  radiology or vascular surgery consultation suggested. Yearly surveillance could be performed if intervention is deferred.        Multiple other incidental findings as described in the body of the report which are unchanged.   Dictated by (CST): Basil Varela MD on 3/02/2024 at 2:31 PM     Finalized by (CST): Basil Varela MD on 3/02/2024 at 2:38 PM          XR CHEST AP PORTABLE  (CPT=71045)    Result Date: 3/2/2024  CONCLUSION:   Right PICC with tip at the cavoatrial junction.  No acute cardiopulmonary abnormality.    Dictated by (CST): Basil Varela MD on 3/02/2024 at 9:20 AM     Finalized by (CST): Basil Varela MD on 3/02/2024 at 9:22 AM            Problem list:  Patient Active Problem List   Diagnosis    Essential hypertension, benign    Mixed hyperlipidemia due to type 2 diabetes mellitus (HCC)    Obstructive sleep apnea (adult) (pediatric)    Vitamin D deficiency    AAA (abdominal aortic aneurysm) (HCC)    Diabetes mellitus type 2 in obese (HCC)    Bilateral sensorineural hearing loss    History of smoking 30 or more pack years    Severe obesity (BMI 35.0-35.9 with comorbidity) (HCC)    Hiatal hernia with GERD without esophagitis    Benign non-nodular prostatic hyperplasia without lower urinary tract symptoms    Facet arthritis of cervical region    Centrilobular emphysema (HCC)    Interstitial lung disease (HCC)    Thoracic aortic atherosclerosis (HCC)    History of coronary artery stent placement    CAD in native artery    Spondylosis of cervical region without myelopathy or radiculopathy    Elevated ratio of cholesterol to high density lipoprotein (HDL)    Unspecified inflammatory spondylopathy, cervical region (HCC)    Acute kidney injury (HCC)    Proctocolitis    Perianal abscess    Abscess of anal and rectal regions    Perirectal abscess       Assessment    Proctocolitis   Perirectal abscess   -initial imaging  in 1/27/2024  - S/p sigmoidoscopy on 2/13.  - Pathology with ulcerated rectal mucosa with  extensive acute and chronic inflammation and reactive changes.  No granulomas, dysplasia, malignancy or changes associated with chronicity identified.              - chronic colitis in setting of remote hx of anal fissure keeps IBD on differential.               -as long as underlying inflammation does not resolve, always at risk for abscess  - CT as outpatient reported on 2/28 with intramural abscess.  - Readmitted with weakness, diarrhea and leukocytosis.  - Repeat CT, 3/2, with diffuse proctitis, improved.  No abscess visualized.  - General Surgery following.-  rec defer drainage -> will reassess    Anemia - stable, normocytic    -iron studies consistent with iron def likely 2/2 to anemia of chronic disease          C dif colitis  - acute on chronic diarrhea  -vanco   -follow up with ID       -Plan   - Follow-up recommendations per Surgery -consideration for MRI rectum patient protocol to be done in the next few days.  Defer to surgery service on order and timing.  - continue antibiotics  - id following  - will continue to follow peripherally  - warrant repeat Flex sig in 6-8 weeks as outpatient once this infectious event has resolved/improved.         Agree with note.  I have personally seen the patient and performed my own physical exam.  The note has been fully edited by me.   Gilberto Dumont MD

## 2024-03-05 NOTE — CONSULTS
[facility]    Report of Consultation    Raul Freed Jr. Patient Status:  Inpatient    1940 MRN [id number]   Location [facility] 4W/SW/SE Attending Anisha Guzman DO    Day # 2 PCP Rao Beyer MD     Date of Admission:  2024  Date of Consult:  2024  Reason for Consultation:   Colitis, rectal abscess    History of Present Illness:   Patient is a 84 year old male who was admitted to the hospital for Perirectal abscess:  He was hospitalized twice in the last 6 weeks for similar concerns and discharged on IV antibiotics which he has been receiving.  He now returns after planned surveillance CT as an outpatient on  showed persistence of abscess.  The CT was repeated here 3/02 and did not identify an abscess.    He denies abdominal or pelvic pain, fever, change in bowel habits, urinary complaints.    Prior history (24)  He is a poor historian, history obtained from patient wife and chart.  He reports having chronic diarrhea for years  His current symptoms started about 2 days ago, with progressive weakness.  He is said to have had a lot of diarrhea by family members, although he denies this as being different from his baseline.  He denies change in anal pain other than chronic intermittent pain related to passing stool and cleaning after.  He also denies N/V, loss of appetite, abdominal pain/distention, or blood in stool.  He has not noted urinary symptoms or F/C/S    He has a PMH significant for CAD, DM2, HTN.         Past Medical History  Past Medical History:   Diagnosis Date    Abdominal aortic aneurysm (AAA) 3.0 cm to 5.0 cm in diameter in female (HCC)     CAD involving native coronary artery of native heart without angina pectoris 2018    Cardiac LV ejection fraction 50-55% per 2018 angio  2018    Centrilobular emphysema (HCC) 2018    Coronary atherosclerosis     COVID 2020    Diabetes mellitus (HCC)     Gastric ulcer      Healed 2009    GOUT     HYPERLIPIDEMIA     HYPERTENSION     OBESITY     OSTEOARTHRITIS     osteoarthritis knees    Presence of drug coated stent in prox & Mid LAD coronary artery 03/19/2018 03/20/2018    2.75 x 15 mm Xcience drug-eluting stent into the mid LAD 3.5 x 12 mm Xcience drug-eluting stent into the proximal LAD    Pulmonary emphysema (HCC)     SLEEP APNEA     [facility] fax [phone]       Past Surgical History  Past Surgical History:   Procedure Laterality Date    CATH BARE METAL STENT (BMS)      COLONOSCOPY  2009= Declines repeat    2009, complicated by anal fissure    COLONOSCOPY      COLONOSCOPY  03/2022    one small TA, int hem, can consider d/cing surveillance    COLONOSCOPY N/A 02/28/2022    Procedure: COLONOSCOPY,  with polypectomy;  Surgeon: Anthony Patel MD;  Location: [facility]    HEMORRHOIDECTOMY  Dr Kamara 3/12/10 [facility]     Rectal exam under anesthesia/anoscopy. Incision and drainage of perirectal abscess. Placement of seton . Excision of anal tags. Destruction of internal hemorrhoids.  Surgery done at [facility] on 3/12/10.    OTHER SURGICAL HISTORY      left knee arthroscopy    PATIENT DOCUMENTED NOT TO HAVE EXPERIENCED ANY OF THE FOLLOWING EVENTS N/A 02/18/2015    Procedure: ESOPHAGOGASTRODUODENOSCOPY, POSSIBLE BIOPSY, POSSIBLE POLYPECTOMY 79339;  Surgeon: Musa Cook MD;  Location: [facility]    PATIENT WITHOUGH PREOPERATIVE ORDER FOR IV ANTIBIOTIC SURGICAL SITE INFECTION PROPHYLAXIS. N/A 02/18/2015    Procedure: ESOPHAGOGASTRODUODENOSCOPY, POSSIBLE BIOPSY, POSSIBLE POLYPECTOMY 01103;  Surgeon: Musa Cook MD;  Location: [facility]    UPPER GI ENDOSCOPY PERFORMED  2008, 2/27/2009    UPPER GI ENDOSCOPY,BIOPSY  2/18/15=Gastritis.  H pylori negative, normal SB Bx    UPPER GI ENDOSCOPY,BIOPSY N/A 02/18/2015    Procedure: ESOPHAGOGASTRODUODENOSCOPY, POSSIBLE BIOPSY, POSSIBLE POLYPECTOMY 65769;  Surgeon: Musa Cook MD;  Location: [facility]    UPPER GI ENDOSCOPY,EXAM         Family History  Family History   Problem Relation Age of Onset    Cancer Mother         lung    Obesity Son     Obesity Sister     Lipids Sister     Obesity Son        Social History  Social History     Socioeconomic History    Marital status:    Tobacco Use    Smoking status: Former     Packs/day: 1.00     Years: 40.00     Additional pack years: 0.00     Total pack years: 40.00     Types: Cigarettes     Quit date: 2009     Years since quittin.7    Smokeless tobacco: Never    Tobacco comments:     has been a 3 ppd   Vaping Use    Vaping Use: Never used   Substance and Sexual Activity    Alcohol use: Yes     Comment: occasional intake    Drug use: No     Social Determinants of Health     Food Insecurity: No Food Insecurity (3/2/2024)    Food Insecurity     Food Insecurity: Never true   Transportation Needs: No Transportation Needs (3/2/2024)    Transportation Needs     Lack of Transportation: No   Housing Stability: Low Risk  (3/2/2024)    Housing Stability     Housing Instability: No           Current Medications:      Medications Prior to Admission   Medication Sig    Emollient (EUCERIN PLUS) 5-5 % External Lotion Apply 1 Application topically daily. Upper and lower extremities    Zinc Oxide 10 % External Ointment Apply 1 Application topically as needed.    vancomycin 50 mg/mL Oral Recon Soln Take 2.5 mL (125 mg total) by mouth 4 (four) times daily for 6 days, THEN 2.5 mL (125 mg total) 2 (two) times a day for 14 days.    [] dextrose 5% SOLN 100 mL with piperacillin-tazobactam 4.5 (4-0.5) g SOLR 4.5 g Inject 4.5 g into the vein every 8 (eight) hours for 14 days. Weekly cbc w/diff, cmp, crp while on IV abx  PICC care per protocol    acetaminophen 500 MG Oral Tab Take 1 tablet (500 mg total) by mouth every 4 (four) hours as needed for Pain or Fever.    docusate sodium 100 MG Oral Cap Take 100 mg by mouth daily.    melatonin 3 MG Oral Tab  Take 1 tablet (3 mg total) by mouth nightly.    polyethylene glycol, PEG 3350, 17 g Oral Powd Pack Take 17 g by mouth daily as needed (If no bowel movement in last 24 hours).    amLODIPine 10 MG Oral Tab Take 1 tablet (10 mg total) by mouth daily.    saccharomyces boulardii 250 MG Oral Cap Take 1 capsule (250 mg total) by mouth 2 (two) times daily.    Magnesium 500 MG Oral Tab Take 1 tablet (500 mg total) by mouth daily.    omeprazole 20 MG Oral Capsule Delayed Release Take 1 capsule (20 mg total) by mouth daily.    METOPROLOL SUCCINATE 100 MG Oral Tablet 24 Hr TAKE 1 TABLET BY MOUTH EVERY DAY    atorvastatin 20 MG Oral Tab Take 1 tablet (20 mg total) by mouth daily. (Patient taking differently: Take 1 tablet (20 mg total) by mouth nightly.)    allopurinol 100 MG Oral Tab Take 1 tablet (100 mg total) by mouth daily.    Omega-3 Fatty Acids (FISH OIL) 1000 MG Oral Cap Take 1,000 mg by mouth daily.    Multiple Vitamin (MULTI VITAMIN MENS OR) Take 1 tablet by mouth daily.    QUEtiapine 25 MG Oral Tab Take 0.5 tablets (12.5 mg total) by mouth nightly. (Patient not taking: Reported on 3/2/2024)    Microlet Lancets Does not apply Misc USE AS DIRECTED TWICE DAILY    Blood Glucose Monitoring Suppl ([employer] CONTOUR NEXT MONITOR) w/Device Does not apply Kit 1 Units by Does not apply route 2 (two) times daily before meals.       Allergies  No Known Allergies    Review of Systems:   Pertinent items are noted in HPI.    Physical Exam:      BP (!) 164/78 (BP Location: Left arm)   Pulse 69   Temp 99.5 °F (37.5 °C) (Oral)   Resp 18   Ht 5' 8\" (1.727 m)   Wt 218 lb 8 oz (99.1 kg)   SpO2 97%   BMI 33.22 kg/m²    Body mass index is 33.22 kg/m².    I/O last 3 completed shifts:  In: 825 [P.O.:300; I.V.:525]  Out: -     CONSTITUTIONAL:  awake, alert, cooperative, no apparent distress, and appears stated age  EYES:  Lids and lashes normal, sclera clear, conjunctiva normal  ENT:  Normocephalic, without obvious abnormality,  atraumatic  NECK:  Supple, symmetrical, trachea midline, no adenopathy, thyroid symmetric, not enlarged and no tenderness  HEMATOLOGIC/LYMPHATICS:  No cervical lymphadenopathy, no supraclavicular lymphadenopathy, no inguinal lymphadenopathy  LUNGS:  No increased work of breathing, good air exchange, clear to auscultation bilaterally, no crackles or wheezing  CARDIOVASCULAR:  Normal apical impulse, regular rate and rhythm, and no murmur noted, no pedal edema  ABDOMEN:  Obese, normal bowel sounds, soft, non-distended, non-tender, no masses palpated, no hepatosplenomegaly        MUSCULOSKELETAL: Full range of motion noted.  Motor strength is 5 out of 5 all extremities bilaterally.   SKIN:  no rashes and no jaundice  PSYCHIATRIC:       Orientation:  normal     Appearance:  normal     Behavior:  normal     Attitude toward examiner:  normal     Affect: normal     Judgment:  Normal         Results:     Laboratory Data:  Lab Results   Component Value Date    WBC 5.7 03/03/2024    HGB 11.2 (L) 03/03/2024    HCT 33.7 (L) 03/03/2024    .0 03/03/2024    CREATSERUM 1.08 03/03/2024    BUN 12 03/03/2024     03/03/2024    K 3.8 03/03/2024     03/03/2024    CO2 26.0 03/03/2024     (H) 03/03/2024    CA 9.2 03/03/2024    ALB 4.5 03/01/2024    ALKPHO 114 03/01/2024    TP 8.6 (H) 03/01/2024    AST 17 03/01/2024    ALT <7 (L) 03/01/2024    INR 1.18 03/03/2024    PTP 15.8 (H) 03/03/2024    TSH 3.044 05/18/2015    PSA 1.02 11/25/2015    CRP <0.40 02/05/2024    MG 2.0 02/15/2024         Imaging:  No results found.       Impression:       Proctocolitis    Perianal abscess  He previously had an intramural abscess in the left posterior rectal wall, initially about the level of the inferior border of the prostate.  2 recent CTs, reviewed (02/28 as outpatient and 03/02 [facility] at [facility]) with conflicting findings (abscess on 02/28 not seen on 03/03)    Prior considerations re: drainage...  Defer drainage procedure at this  time,      -Continue IV antibiotics and anticipate MRI rectal fistula protocol in a few days to assess with better detail and determine if it has matured into a more drainable collection or if it has ruptured spontaneously into the rectal lumen.     -If drainage needed this would best be done via transanal route (in OR or via endoscopy) and not by IR as that could potentially create a supralevator fistula      Diarrhea  Patient reports chronic history of diarrhea and apparently increased  C.diff + last admit 02/08, has been on po Vanc      PAULA on CKD  Cr 1.08, had been Cr 1.9 on prior admit 6 weeks ago, prior labs from 2023 with Cr 1.0    ID and GI consults appreciated  No surgical plans   OK to allow diet       Thank you for allowing me to participate in the care of your patient.    Musa Shelby MD    03/04/2024

## 2024-03-05 NOTE — PROGRESS NOTES
[facility] AND CARE   Progress Note  -  Raul Freed Jr. Patient Status:  Inpatient    1940 MRN [id number]   Location [facility] 5SW/SE Attending    Hosp Day # 3 PCP Rao Beyer MD     PCP: Rao Beyer MD      Assessment and Plan:    Mr. Freed is a 83 year old male with PMH sig for CAD, DM2, HTN, HL, and recent admission for proctocolitis with perianal abscess discharged on IV abx then readmitted for diarrhea and C.diff who presents with concerns for worsening abscess based on CT  done at outside facility, pt sent in by GI, repeat CT done at [place] shows resolution of abscess and no fluid to be drained by IR, GI rec repeat flex sig in 6-8 weeks, per ID they recommending stopping abx and observing pt. Pt will be retested for resolution of c-diff PTD. SW consulted for change in rehab center (was at [facility]), see below for details      Perirectal Abscess-resolved   Proctitis   - has been on abx since January with slow improvement  - seen by surgery, GI, thought to be 2/2 stercoral ulceration and a somewhat atypical location and on versus unusual location for ischemic injury, vs IBD  -  outpatient CT not at outside facility (not [place]) with increased abscess size and sent to the ER for eval,    -3/2 Per Dr Mc repeat CT does not show abscess and she discussed with radiologist, similar findings to prior CT but improving  - Dr Mc spoke with interventional radiology about imaging and they state there is a tiny amount of fluid but nothing to be drained.  -Plans for repeat flex sig in 6-8 weeks as per gi   -abx- stopped, zosyn, discussed with ID who recommended stopping abx and observing based on CT findings  -surgery and GI planning MRI in 1-2 days    Baseline Cognitive impairment  per wife  - issues with encephalopathy on last admission, monitor for delirium  -no longer on seroquel     C dif colitis  -currently on vanco ppx, per Dr Rodriguez, plans to continue  for a few days more and retest for  C-diff  -as per ID      Hx Constipation  -continue miralex and colace daily     Emphysema  Lung Nodule, RUL  -CTA brain- . Emphysema.  Noncalcified right upper lobe lung nodules, which measure up to 6 mm.   -outpt CT chest for completeness       CAD  HLD  Infrarenal abdominal aortic aneurysm measuring up to 5.0 cm.   HTN  -noted on CT A/P   -continue toprol, norvasc and Lipitor  -ASA has been on hold since last admission  -prn hydralazine   -rec Follow-up imaging outpatient with vascular surgery     DM2- Diet Controlled   -ssi prn     DEANGELO  -not compliant with CPAP      GOC  -full code per prior notes     MA/ACO Reach  -Re- Entry: NO  -Consults: gi and ID   -Discharge Needs: likely back to [facility]   -APPT: follow up with PCP Rao Beyer MD 1 week after discharge from rehab      [facility] in am  -diet-ADA    Prophy  -SCD  -heparin    Dispo  -pending clinical coarse  -3/4/2024 update given to family representative Israel Freed  PCP: Rao Beyer MD      Concerns regarding plan of care were discussed with patient. Patient agrees with plan as detailed above.      Kevin Da Silva MD  [facility] Hospitalist Team      SUBJECTIVE:   No complaints. Feeling better, no abd pain, no fever or chills,       OBJECTIVE:   Blood pressure 128/68, pulse 60, temperature 98.1 °F (36.7 °C), temperature source Oral, resp. rate 18, height 5' 8\" (1.727 m), weight 216 lb 7.9 oz (98.2 kg), SpO2 96%.    GENERAL: no apparent distress  NEURO: A/A   RESP: non labored, CTA  CARDIO: Regular, no murmur  ABD: soft, NT, ND, BS+  EXTREMITIES: no edema    DIAGNOSTIC DATA:   Labs:     Recent Labs   Lab 03/01/24 2009 03/03/24  0006 03/03/24  0531 03/05/24  0531   WBC 8.3 5.7  --  5.6   HGB 11.6* 11.2*  --  11.5*   MCV 95.2 94.4  --  94.0   .0 226.0  --  215.0   INR  --   --  1.18  --        Recent Labs   Lab 03/01/24 2009 03/03/24  0006 03/05/24  0531    142 140   K 3.9 3.8  3.8    108 108   CO2 29.0 26.0 26.0   BUN 17 12 14   CREATSERUM 1.28 1.08 0.94   CA 9.8 9.2 9.8   * 114* 106*       Recent Labs   Lab 03/01/24  2009   ALT <7*   AST 17   ALB 4.5       Recent Labs   Lab 03/04/24  0615 03/04/24  1050 03/04/24  1725 03/04/24  2129 03/05/24  0730   PGLU 100* 114* 118* 110* 117*       No results for input(s): \"TROP\" in the last 168 hours.        MEDICATIONS       melatonin  5 mg Oral Nightly    insulin aspart  1-5 Units Subcutaneous TID CC    vancomycin  125 mg Oral Daily    allopurinol  100 mg Oral Daily    amLODIPine  10 mg Oral Daily    atorvastatin  20 mg Oral Nightly    docusate sodium  100 mg Oral Daily    magnesium oxide  500 mg Oral Daily    metoprolol succinate ER  100 mg Oral Daily    pantoprazole  20 mg Oral QAM AC    polyethylene glycol (PEG 3350)  17 g Oral Daily    heparin  5,000 Units Subcutaneous Q8H MATT         hydrALAzine, acetaminophen, ondansetron, metoclopramide, glucose **OR** glucose **OR** glucose-vitamin C **OR** dextrose **OR** glucose **OR** glucose **OR** glucose-vitamin C    Christy Swartz, SERENE      IMAGING     CT ABDOMEN+PELVIS(CONTRAST ONLY)(CPT=74177)    Result Date: 3/2/2024  CONCLUSION:   Diffuse proctitis, improved but not resolved since 2/14/2024.  No abscess.  If not already performed, after resolution of patient's symptoms, direct visualization is recommended to exclude underlying rectal malignancy.  Aneurysmal dilation of the infrarenal abdominal aorta, unchanged since the prior exams. Nonemergent interventional radiology or vascular surgery consultation suggested. Yearly surveillance could be performed if intervention is deferred.        Multiple other incidental findings as described in the body of the report which are unchanged.   Dictated by (CST): Basil Varela MD on 3/02/2024 at 2:31 PM     Finalized by (CST): Basil Varela MD on 3/02/2024 at 2:38 PM             SEE ATTENDING NOTE BELOW:     Patient examined and assessed  independently. Agree with above APN assessment.     S: No complaints, feels well, wants to go home.     Objective  /68 (BP Location: Right arm)   Pulse 60   Temp 98.1 °F (36.7 °C) (Oral)   Resp 18   Ht 5' 8\" (1.727 m)   Wt 216 lb 7.9 oz (98.2 kg)   SpO2 96%   BMI 32.92 kg/m²     Exam   GEN: elderly male in NAD, A&Ox 3  HEENT: EOMI  Pulm: CTAB, no crackles or wheezes  CV: RRR, no murmurs  ABD: Soft, non-tender, non-distended, +BS  SKIN: warm, dry  EXT: no edema    Assessment/Plan    Mr. Freed is a 83 year old male with PMH sig for CAD, DM2, HTN, HL, and recent admission for proctocolitis with perianal abscess discharged on IV abx then readmitted for diarrhea and C.diff who presents with concerns for worsening abscess based on CT 2/28 done at outside facility, pt sent in by GI, repeat CT done at [place] shows resolution of abscess and no fluid to be drained by IR, GI rec repeat flex sig in 6-8 weeks, per ID they recommending stopping abx and observing pt. Pt will be retested for resolution of c-diff PTD. SW consulted for change in rehab center (was at [facility]), see below for details      Perirectal Abscess-resolved   Proctitis   Baseline Cognitive Issues per wife  C dif colitis  Hx Constipation  Emphysema  Lung Nodule, RUL  CAD  HLD  Infrarenal abdominal aortic aneurysm measuring up to 5.0 cm.   HTN  DM2- Diet Controlled   DEANGELO  GOC    Plan:  - d/w IR, no area to drain, imaging looks improved compared to prior  - d/w ID, will stop IV abx, continue vancomycin for a few days then retest for C.diff  - f/u GI, will need repeat flex sig in 4-6 weeks     Rest as above.    Grecia Mc MD  [facility] hospitalist

## 2024-03-05 NOTE — PLAN OF CARE
Problem: Patient Centered Care  Goal: Patient preferences are identified and integrated in the patient's plan of care  Description: Interventions:  - What would you like us to know as we care for you?  - Provide timely, complete, and accurate information to patient/family  - Incorporate patient and family knowledge, values, beliefs, and cultural backgrounds into the planning and delivery of care  - Encourage patient/family to participate in care and decision-making at the level they choose  - Honor patient and family perspectives and choices  Outcome: Progressing     Problem: Diabetes/Glucose Control  Goal: Glucose maintained within prescribed range  Description: INTERVENTIONS:  - Monitor Blood Glucose as ordered  - Assess for signs and symptoms of hyperglycemia and hypoglycemia  - Administer ordered medications to maintain glucose within target range  - Assess barriers to adequate nutritional intake and initiate nutrition consult as needed  - Instruct patient on self management of diabetes  Outcome: Progressing     Problem: PAIN - ADULT  Goal: Verbalizes/displays adequate comfort level or patient's stated pain goal  Description: INTERVENTIONS:  - Encourage pt to monitor pain and request assistance  - Assess pain using appropriate pain scale  - Administer analgesics based on type and severity of pain and evaluate response  - Implement non-pharmacological measures as appropriate and evaluate response  - Consider cultural and social influences on pain and pain management  - Manage/alleviate anxiety  - Utilize distraction and/or relaxation techniques  - Monitor for opioid side effects  - Notify MD/LIP if interventions unsuccessful or patient reports new pain  - Anticipate increased pain with activity and pre-medicate as appropriate  Outcome: Progressing     Problem: RISK FOR INFECTION - ADULT  Goal: Absence of fever/infection during anticipated neutropenic period  Description: INTERVENTIONS  - Monitor WBC  -  Administer growth factors as ordered  - Implement neutropenic guidelines  Outcome: Progressing     Problem: SAFETY ADULT - FALL  Goal: Free from fall injury  Description: INTERVENTIONS:  - Assess pt frequently for physical needs  - Identify cognitive and physical deficits and behaviors that affect risk of falls.  - [place] fall precautions as indicated by assessment.  - Educate pt/family on patient safety including physical limitations  - Instruct pt to call for assistance with activity based on assessment  - Modify environment to reduce risk of injury  - Provide assistive devices as appropriate  - Consider OT/PT consult to assist with strengthening/mobility  - Encourage toileting schedule  Outcome: Progressing     Problem: METABOLIC/FLUID AND ELECTROLYTES - ADULT  Goal: Glucose maintained within prescribed range  Description: INTERVENTIONS:  - Monitor Blood Glucose as ordered  - Assess for signs and symptoms of hyperglycemia and hypoglycemia  - Administer ordered medications to maintain glucose within target range  - Assess barriers to adequate nutritional intake and initiate nutrition consult as needed  - Instruct patient on self management of diabetes  Outcome: Progressing     Problem: SKIN/TISSUE INTEGRITY - ADULT  Goal: Skin integrity remains intact  Description: INTERVENTIONS  - Assess and document risk factors for pressure ulcer development  - Assess and document skin integrity  - Monitor for areas of redness and/or skin breakdown  - Initiate interventions, skin care algorithm/standards of care as needed  Outcome: Progressing     Problem: Impaired Functional Mobility  Goal: Achieve highest/safest level of mobility/gait  Description: Interventions:  - Assess patient's functional ability and stability  - Promote increasing activity/tolerance for mobility and gait  - Educate and engage patient/family in tolerated activity level and precautions  Outcome: Progressing    Patient alert,oriented x3. Following  commands.  On RA. SR. Voiding. BMx1. Denies any pain.

## 2024-03-05 NOTE — PROGRESS NOTES
[facility]  part of [facility] Infectious Disease  Progress Note    Raul Freed Jr. Patient Status:  Inpatient    1940 MRN [id number]   Location [facility] 5SW/SE Attending Kevin Da Silva MD   Hosp Day # 3 PCP Rao Beyer MD     Subjective:  Patient seen and examined in bedside chair. Reports feeling good today. No acute overnight events. Otherwise no complaints.     Objective:  Blood pressure 128/68, pulse 60, temperature 98.1 °F (36.7 °C), temperature source Oral, resp. rate 18, height 5' 8\" (1.727 m), weight 216 lb 7.9 oz (98.2 kg), SpO2 96%.    Intake/Output:    Intake/Output Summary (Last 24 hours) at 3/5/2024 0900  Last data filed at 3/4/2024 2200  Gross per 24 hour   Intake 805 ml   Output --   Net 805 ml       Physical Exam:  General: Awake, alert, non-tox, NAD.  HEENT:  Oropharynx clear, trachea ML.  Heart: RRR S1S2 no murmurs.  Lungs: Essentially CTA b/l, no rhonchi, rales, wheezes.  Abdomen: Soft, NT/ND.  BS present.  No organomegaly.  Extremity: No edema.  Neurological: No focal deficits.  Derm:  Warm and dry.    Lab Data Review:  Lab Results   Component Value Date    WBC 5.6 2024    HGB 11.5 2024    HCT 34.3 2024    .0 2024    CREATSERUM 0.94 2024    BUN 14 2024     2024    K 3.8 2024     2024    CO2 26.0 2024     2024    CA 9.8 2024        Cultures:  Hospital Encounter on 24   1. Blood Culture     Status: None (Preliminary result)    Collection Time: 24  9:29 PM    Specimen: Blood,peripheral   Result Value Ref Range    Blood Culture Result No Growth 3 Days N/A       Radiology:  CT ABDOMEN+PELVIS(CONTRAST ONLY)(CPT=74177)    Result Date: 3/2/2024  CONCLUSION:   Diffuse proctitis, improved but not resolved since 2024.  No abscess.  If not already performed, after resolution of patient's symptoms, direct visualization is  recommended to exclude underlying rectal malignancy.  Aneurysmal dilation of the infrarenal abdominal aorta, unchanged since the prior exams. Nonemergent interventional radiology or vascular surgery consultation suggested. Yearly surveillance could be performed if intervention is deferred.        Multiple other incidental findings as described in the body of the report which are unchanged.   Dictated by (CST): Basil Varela MD on 3/02/2024 at 2:31 PM     Finalized by (CST): Basil Varela MD on 3/02/2024 at 2:38 PM             Assessment and Plan:  1.  Proctocolitis with abscess with concern for interval worsening  - Initial admission in January 2024 for sepsis in this gentleman who presented with fevers, leukocytosis and n/v/d at that time  - CT with evidence of severe proctocolitis with complex appearing L lateral fluid/gas at that time.  - Seen by Dr. Shelby with general surgery -- c/w a small intramural abscess in the L posterior rectal wall not amenable to drainage previously.  - MRI, 2/12, with fluid collections suggesting intramural abscesses with possible intersphincteric fistula formation.    - S/p sigmoidoscopy on 2/13.  - Pathology with ulcerated rectal mucosa with extensive acute and chronic inflammation and reactive changes.  No granulomas, dysplasia, malignancy or changes associated with chronicity identified.  - CT as outpatient reported on 2/28 with intramural abscess.  - Now readmitted with weakness, diarrhea and leukocytosis.  - Repeat CT, 3/2, with diffuse proctitis, improved.  No abscess visualized.  - General Surgery following.   - GI following.    - S/p course of IV Zosyn completed on 3/4/24.  - Now observing off abx therapy.     2.  Second admission on 2/28 with acute on chronic diarrhea and positive C. difficile status  - Patient with h/o chronic loose stools, unclear if presentation worse than baseline status.  - PO vancomycin, ongoing.    3.  Weakness and confusion  - Thought to be d/t IV  Invanz previously; however, continuing now while on IV Zosyn.  - Now back to baseline status.  - Supportive care ongoing.     4.  Recs  - Continue to observe off antibiotics at this time.  - Continue PO vancomycin.   - F/u WBC and fever curve.  - General Surgery input noted and appreciated -- no plans for surgical intervention at this time.  - GI input noted and appreciated -- may warrant repeat flex sig in 6-8 weeks as outpatient.  - Discussed plan of care with primary team attending physician, Dr. Kevin Da Silva.  - Supportive care as per the primary team.  - Discussed plan of care with nursing.   - Discussed plan of care with patient. All questions addressed and understanding verbalized.  - Further recommendations pending clinical course.    Please note that this report has been produced using speech recognition software and may contain errors related to that system including, but not limited to, errors in grammar, punctuation, and spelling, as well as words and phrases that possibly may have been recognized inappropriately.  If there are any questions or concerns, contact the dictating provider for clarification.     The 21st Century Cures Act makes medical notes like these available to patients in the interest of transparency. Please be advised this is a medical document. Medical documents are intended to carry relevant information, facts as evident, and the clinical opinion of the practitioner. The medical note is intended as peer to peer communication and may appear blunt or direct. It is written in medical language and may contain abbreviations or verbiage that are unfamiliar.     If you have any questions or concerns please call [facility] Infectious Disease at [phone].     Trace Olson, APRN    3/5/2024  9:00 AM

## 2024-03-06 VITALS
TEMPERATURE: 97 F | BODY MASS INDEX: 32.81 KG/M2 | SYSTOLIC BLOOD PRESSURE: 124 MMHG | RESPIRATION RATE: 16 BRPM | DIASTOLIC BLOOD PRESSURE: 82 MMHG | HEIGHT: 68 IN | WEIGHT: 216.5 LBS | OXYGEN SATURATION: 95 % | HEART RATE: 73 BPM

## 2024-03-06 LAB
ANION GAP SERPL CALC-SCNC: 6 MMOL/L (ref 0–18)
BASOPHILS # BLD AUTO: 0.03 X10(3) UL (ref 0–0.2)
BASOPHILS NFR BLD AUTO: 0.4 %
BUN BLD-MCNC: 17 MG/DL (ref 9–23)
BUN/CREAT SERPL: 17.2 (ref 10–20)
C DIFF TOX B STL QL: NEGATIVE
CALCIUM BLD-MCNC: 10.4 MG/DL (ref 8.7–10.4)
CHLORIDE SERPL-SCNC: 107 MMOL/L (ref 98–112)
CO2 SERPL-SCNC: 26 MMOL/L (ref 21–32)
CREAT BLD-MCNC: 0.99 MG/DL
DEPRECATED RDW RBC AUTO: 46.8 FL (ref 35.1–46.3)
EGFRCR SERPLBLD CKD-EPI 2021: 75 ML/MIN/1.73M2 (ref 60–?)
EOSINOPHIL # BLD AUTO: 0.34 X10(3) UL (ref 0–0.7)
EOSINOPHIL NFR BLD AUTO: 4.9 %
ERYTHROCYTE [DISTWIDTH] IN BLOOD BY AUTOMATED COUNT: 13.7 % (ref 11–15)
GLUCOSE BLD-MCNC: 106 MG/DL (ref 70–99)
GLUCOSE BLDC GLUCOMTR-MCNC: 95 MG/DL (ref 70–99)
HCT VFR BLD AUTO: 36 %
HGB BLD-MCNC: 12.2 G/DL
IMM GRANULOCYTES # BLD AUTO: 0.04 X10(3) UL (ref 0–1)
IMM GRANULOCYTES NFR BLD: 0.6 %
LYMPHOCYTES # BLD AUTO: 1.92 X10(3) UL (ref 1–4)
LYMPHOCYTES NFR BLD AUTO: 27.5 %
MCH RBC QN AUTO: 31.9 PG (ref 26–34)
MCHC RBC AUTO-ENTMCNC: 33.9 G/DL (ref 31–37)
MCV RBC AUTO: 94.2 FL
MONOCYTES # BLD AUTO: 1.01 X10(3) UL (ref 0.1–1)
MONOCYTES NFR BLD AUTO: 14.5 %
NEUTROPHILS # BLD AUTO: 3.64 X10 (3) UL (ref 1.5–7.7)
NEUTROPHILS # BLD AUTO: 3.64 X10(3) UL (ref 1.5–7.7)
NEUTROPHILS NFR BLD AUTO: 52.1 %
OSMOLALITY SERPL CALC.SUM OF ELEC: 290 MOSM/KG (ref 275–295)
PLATELET # BLD AUTO: 240 10(3)UL (ref 150–450)
POTASSIUM SERPL-SCNC: 4 MMOL/L (ref 3.5–5.1)
RBC # BLD AUTO: 3.82 X10(6)UL
SODIUM SERPL-SCNC: 139 MMOL/L (ref 136–145)
WBC # BLD AUTO: 7 X10(3) UL (ref 4–11)

## 2024-03-06 PROCEDURE — 87493 C DIFF AMPLIFIED PROBE: CPT | Performed by: HOSPITALIST

## 2024-03-06 PROCEDURE — 85025 COMPLETE CBC W/AUTO DIFF WBC: CPT | Performed by: NURSE PRACTITIONER

## 2024-03-06 PROCEDURE — 80048 BASIC METABOLIC PNL TOTAL CA: CPT | Performed by: NURSE PRACTITIONER

## 2024-03-06 PROCEDURE — 94799 UNLISTED PULMONARY SVC/PX: CPT

## 2024-03-06 PROCEDURE — 82962 GLUCOSE BLOOD TEST: CPT

## 2024-03-06 RX ORDER — ATORVASTATIN CALCIUM 20 MG/1
20 TABLET, FILM COATED ORAL NIGHTLY
Status: SHIPPED | COMMUNITY
Start: 2024-03-06

## 2024-03-06 RX ORDER — WHEAT DEXTRIN 3 G/3.8 G
POWDER (GRAM) ORAL
Status: SHIPPED | COMMUNITY
Start: 2024-03-06

## 2024-03-06 NOTE — DISCHARGE INSTRUCTIONS
Per GI TEAM  -Colonoscopy as outpatient in 4 weeks.   - MRI rectal protocol in 1-2 weeks recommended.    - avoid constipation.  Fiber supplementation such as benefiber or citrucel daily.  Office to call you with Dr Patel appointment     No further IV antibiotics needed for rectal abscess or vancomycin oral (c-diff negative)     An appointment for your primary Dr Beyer was made for 3/8 at 11:30 am for hospital follow up

## 2024-03-06 NOTE — PLAN OF CARE
Patient has safety precautions in place bed in the lowest position, bed alarm on, and call light within reach.     Problem: Patient Centered Care  Goal: Patient preferences are identified and integrated in the patient's plan of care  Description: Interventions:  - What would you like us to know as we care for you?   - Provide timely, complete, and accurate information to patient/family  - Incorporate patient and family knowledge, values, beliefs, and cultural backgrounds into the planning and delivery of care  - Encourage patient/family to participate in care and decision-making at the level they choose  - Honor patient and family perspectives and choices  Outcome: Progressing     Problem: Diabetes/Glucose Control  Goal: Glucose maintained within prescribed range  Description: INTERVENTIONS:  - Monitor Blood Glucose as ordered  - Assess for signs and symptoms of hyperglycemia and hypoglycemia  - Administer ordered medications to maintain glucose within target range  - Assess barriers to adequate nutritional intake and initiate nutrition consult as needed  - Instruct patient on self management of diabetes  Outcome: Progressing     Problem: Patient/Family Goals  Goal: Patient/Family Long Term Goal  Description: Patient's Long Term Goal:     Interventions:  -   - See additional Care Plan goals for specific interventions  Outcome: Progressing  Goal: Patient/Family Short Term Goal  Description: Patient's Short Term Goal:     Interventions:   -   - See additional Care Plan goals for specific interventions  Outcome: Progressing     Problem: PAIN - ADULT  Goal: Verbalizes/displays adequate comfort level or patient's stated pain goal  Description: INTERVENTIONS:  - Encourage pt to monitor pain and request assistance  - Assess pain using appropriate pain scale  - Administer analgesics based on type and severity of pain and evaluate response  - Implement non-pharmacological measures as appropriate and evaluate response  -  Consider cultural and social influences on pain and pain management  - Manage/alleviate anxiety  - Utilize distraction and/or relaxation techniques  - Monitor for opioid side effects  - Notify MD/LIP if interventions unsuccessful or patient reports new pain  - Anticipate increased pain with activity and pre-medicate as appropriate  Outcome: Progressing     Problem: RISK FOR INFECTION - ADULT  Goal: Absence of fever/infection during anticipated neutropenic period  Description: INTERVENTIONS  - Monitor WBC  - Administer growth factors as ordered  - Implement neutropenic guidelines  Outcome: Progressing     Problem: SAFETY ADULT - FALL  Goal: Free from fall injury  Description: INTERVENTIONS:  - Assess pt frequently for physical needs  - Identify cognitive and physical deficits and behaviors that affect risk of falls.  - Felicity fall precautions as indicated by assessment.  - Educate pt/family on patient safety including physical limitations  - Instruct pt to call for assistance with activity based on assessment  - Modify environment to reduce risk of injury  - Provide assistive devices as appropriate  - Consider OT/PT consult to assist with strengthening/mobility  - Encourage toileting schedule  Outcome: Progressing     Problem: METABOLIC/FLUID AND ELECTROLYTES - ADULT  Goal: Glucose maintained within prescribed range  Description: INTERVENTIONS:  - Monitor Blood Glucose as ordered  - Assess for signs and symptoms of hyperglycemia and hypoglycemia  - Administer ordered medications to maintain glucose within target range  - Assess barriers to adequate nutritional intake and initiate nutrition consult as needed  - Instruct patient on self management of diabetes  Outcome: Progressing     Problem: SKIN/TISSUE INTEGRITY - ADULT  Goal: Skin integrity remains intact  Description: INTERVENTIONS  - Assess and document risk factors for pressure ulcer development  - Assess and document skin integrity  - Monitor for areas of  redness and/or skin breakdown  - Initiate interventions, skin care algorithm/standards of care as needed  Outcome: Progressing     Problem: Impaired Functional Mobility  Goal: Achieve highest/safest level of mobility/gait  Description: Interventions:  - Assess patient's functional ability and stability  - Promote increasing activity/tolerance for mobility and gait  - Educate and engage patient/family in tolerated activity level and precautions    Outcome: Progressing

## 2024-03-06 NOTE — PROGRESS NOTES
[facility]  part of [facility] Infectious Disease  Progress Note    Raul Freed Jr. Patient Status:  Inpatient    1940 MRN [id number]   Location [facility] 5SW/SE Attending Kevin Da Silva MD   Hosp Day # 4 PCP Rao Beyer MD     Raul Freed Jr. is a 84 year old male.   Chief Complaint   Patient presents with    Abscess       HPI:      Off rx ;some loose stool               REVIEW OF SYSTEMS:   A comprehensive 10 point review of systems was completed.  Pertinent positives and negatives noted in the the HPI.       Allergies:  No Known Allergies     Current Meds:    Current Facility-Administered Medications:     melatonin cap/tab 5 mg, 5 mg, Oral, Nightly    insulin aspart (NovoLOG) 100 Units/mL FlexPen 1-5 Units, 1-5 Units, Subcutaneous, TID CC    hydrALAzine (Apresoline) 20 mg/mL injection 5 mg, 5 mg, Intravenous, Q6H PRN    acetaminophen (Tylenol Extra Strength) tab 500 mg, 500 mg, Oral, Q4H PRN    ondansetron (Zofran) 4 MG/2ML injection 4 mg, 4 mg, Intravenous, Q6H PRN    metoclopramide (Reglan) 5 mg/mL injection 10 mg, 10 mg, Intravenous, Q8H PRN    vancomycin (Vancocin) cap 125 mg, 125 mg, Oral, Daily    allopurinol (Zyloprim) tab 100 mg, 100 mg, Oral, Daily    amLODIPine (Norvasc) tab 10 mg, 10 mg, Oral, Daily    atorvastatin (Lipitor) tab 20 mg, 20 mg, Oral, Nightly    docusate sodium (Colace) cap 100 mg, 100 mg, Oral, Daily    magnesium oxide (Mag-Ox) tab 500 mg, 500 mg, Oral, Daily    metoprolol succinate ER (Toprol XL) 24 hr tab 100 mg, 100 mg, Oral, Daily    pantoprazole (Protonix) DR tab 20 mg, 20 mg, Oral, QAM AC    glucose (Dex4) 15 GM/59ML oral liquid 15 g, 15 g, Oral, Q15 Min PRN **OR** glucose (Glutose) 40% oral gel 15 g, 15 g, Oral, Q15 Min PRN **OR** glucose-vitamin C (Dex-4) chewable tab 4 tablet, 4 tablet, Oral, Q15 Min PRN **OR** dextrose 50% injection 50 mL, 50 mL, Intravenous, Q15 Min PRN **OR** glucose (Dex4) 15 GM/59ML  oral liquid 30 g, 30 g, Oral, Q15 Min PRN **OR** glucose (Glutose) 40% oral gel 30 g, 30 g, Oral, Q15 Min PRN **OR** glucose-vitamin C (Dex-4) chewable tab 8 tablet, 8 tablet, Oral, Q15 Min PRN    polyethylene glycol (PEG 3350) (Miralax) 17 g oral packet 17 g, 17 g, Oral, Daily    heparin (Porcine) 5000 UNIT/ML injection 5,000 Units, 5,000 Units, Subcutaneous, Q8H MATT   No current outpatient medications on file.        HISTORY:  Past Medical History:   Diagnosis Date    Abdominal aortic aneurysm (AAA) 3.0 cm to 5.0 cm in diameter in female (HCC)     CAD involving native coronary artery of native heart without angina pectoris 03/20/2018    Cardiac LV ejection fraction 50-55% per 2018 angio  03/20/2018    Centrilobular emphysema (HCC) 03/19/2018    Coronary atherosclerosis     COVID 12/2020    Diabetes mellitus (HCC)     Gastric ulcer 2008    Healed 2009    GOUT     HYPERLIPIDEMIA     HYPERTENSION     OBESITY     OSTEOARTHRITIS     osteoarthritis knees    Presence of drug coated stent in prox & Mid LAD coronary artery 03/19/2018 03/20/2018    2.75 x 15 mm Xcience drug-eluting stent into the mid LAD 3.5 x 12 mm Xcience drug-eluting stent into the proximal LAD    Pulmonary emphysema (HCC)     SLEEP APNEA     [facility] fax [phone]      Past Surgical History:   Procedure Laterality Date    CATH BARE METAL STENT (BMS)      COLONOSCOPY  2009= Declines repeat    2009, complicated by anal fissure    COLONOSCOPY      COLONOSCOPY  03/2022    one small TA, int hem, can consider d/cing surveillance    COLONOSCOPY N/A 02/28/2022    Procedure: COLONOSCOPY,  with polypectomy;  Surgeon: Anthony Patel MD;  Location: [facility], [facility]    HEMORRHOIDECTOMY  Dr Kamara 3/12/10 [facility]     Rectal exam under anesthesia/anoscopy. Incision and drainage of perirectal abscess. Placement of seton . Excision of anal tags. Destruction of internal hemorrhoids.  Surgery done at [facility] on 3/12/10.    OTHER SURGICAL HISTORY      left knee  arthroscopy    PATIENT DOCUMENTED NOT TO HAVE EXPERIENCED ANY OF THE FOLLOWING EVENTS N/A 02/18/2015    Procedure: ESOPHAGOGASTRODUODENOSCOPY, POSSIBLE BIOPSY, POSSIBLE POLYPECTOMY 14486;  Surgeon: Musa Cook MD;  Location: [facility]    PATIENT WITHOUGH PREOPERATIVE ORDER FOR IV ANTIBIOTIC SURGICAL SITE INFECTION PROPHYLAXIS. N/A 02/18/2015    Procedure: ESOPHAGOGASTRODUODENOSCOPY, POSSIBLE BIOPSY, POSSIBLE POLYPECTOMY 53229;  Surgeon: Musa Cook MD;  Location: [facility]    UPPER GI ENDOSCOPY PERFORMED  2008, 2/27/2009    UPPER GI ENDOSCOPY,BIOPSY  2/18/15=Gastritis.  H pylori negative, normal SB Bx    UPPER GI ENDOSCOPY,BIOPSY N/A 02/18/2015    Procedure: ESOPHAGOGASTRODUODENOSCOPY, POSSIBLE BIOPSY, POSSIBLE POLYPECTOMY 37111;  Surgeon: Musa Cook MD;  Location: [facility]    UPPER GI ENDOSCOPY,EXAM          Social history and family history negative related to present illness except as above.    PHYSICAL EXAM:   /82 (BP Location: Left arm)   Pulse 73   Temp 97.3 °F (36.3 °C) (Oral)   Resp 16   Ht 5' 8\" (1.727 m)   Wt 216 lb 7.9 oz (98.2 kg)   SpO2 95%   BMI 32.92 kg/m²   GENERAL:  Awake, alert, oriented x3. Non-tox, non-septic and in NAD.  HEENT:  Normocephalic, no scleral abnormalities.  Oropharynx clear, trachea ML.  NECK:  Supple, no masses, no lymphadenopathy.  LUNGS:  Clear to auscultation b/l, no rhonchi, rales, or wheezes.  CARDIO: RRR S1/S2, no rubs, clicks, heaves, or murmurs.  GI:  Soft NT/ND, BS present x4 quadrants, no HSM.  EXTREMITIES:  No edema, no clubbing, no cyanosis.  NEURO:  No focal neurologic deficits.  DERM:  Warm, dry, no rashes.    IMPRESSION/PLAN:   Stable fistula off rx  If c.diff positive rx taper [qid x 2 weeks then tid then bid then daily]this can be quicker if 'colonized only'  Spoke with pt dr fowler            Recent Results (from the past 72 hour(s))   POCT Glucose    Collection Time: 03/03/24  5:06 PM   Result  Value Ref Range    POC Glucose  141 (H) 70 - 99 mg/dL   POCT Glucose    Collection Time: 03/03/24  9:20 PM   Result Value Ref Range    POC Glucose  117 (H) 70 - 99 mg/dL   POCT Glucose    Collection Time: 03/04/24  6:15 AM   Result Value Ref Range    POC Glucose  100 (H) 70 - 99 mg/dL   POCT Glucose    Collection Time: 03/04/24 10:50 AM   Result Value Ref Range    POC Glucose  114 (H) 70 - 99 mg/dL   POCT Glucose    Collection Time: 03/04/24  5:25 PM   Result Value Ref Range    POC Glucose  118 (H) 70 - 99 mg/dL   POCT Glucose    Collection Time: 03/04/24  9:29 PM   Result Value Ref Range    POC Glucose  110 (H) 70 - 99 mg/dL   Basic Metabolic Panel (8)    Collection Time: 03/05/24  5:31 AM   Result Value Ref Range    Glucose 106 (H) 70 - 99 mg/dL    Sodium 140 136 - 145 mmol/L    Potassium 3.8 3.5 - 5.1 mmol/L    Chloride 108 98 - 112 mmol/L    CO2 26.0 21.0 - 32.0 mmol/L    Anion Gap 6 0 - 18 mmol/L    BUN 14 9 - 23 mg/dL    Creatinine 0.94 0.70 - 1.30 mg/dL    BUN/CREA Ratio 14.9 10.0 - 20.0    Calcium, Total 9.8 8.7 - 10.4 mg/dL    Calculated Osmolality 291 275 - 295 mOsm/kg    eGFR-Cr 80 >=60 mL/min/1.73m2   CBC W/ DIFFERENTIAL    Collection Time: 03/05/24  5:31 AM   Result Value Ref Range    WBC 5.6 4.0 - 11.0 x10(3) uL    RBC 3.65 (L) 3.80 - 5.80 x10(6)uL    HGB 11.5 (L) 13.0 - 17.5 g/dL    HCT 34.3 (L) 39.0 - 53.0 %    MCV 94.0 80.0 - 100.0 fL    MCH 31.5 26.0 - 34.0 pg    MCHC 33.5 31.0 - 37.0 g/dL    RDW-SD 46.7 (H) 35.1 - 46.3 fL    RDW 13.7 11.0 - 15.0 %    .0 150.0 - 450.0 10(3)uL    Neutrophil Absolute Prelim 2.80 1.50 - 7.70 x10 (3) uL    Neutrophil Absolute 2.80 1.50 - 7.70 x10(3) uL    Lymphocyte Absolute 1.57 1.00 - 4.00 x10(3) uL    Monocyte Absolute 0.85 0.10 - 1.00 x10(3) uL    Eosinophil Absolute 0.29 0.00 - 0.70 x10(3) uL    Basophil Absolute 0.02 0.00 - 0.20 x10(3) uL    Immature Granulocyte Absolute 0.03 0.00 - 1.00 x10(3) uL    Neutrophil % 50.4 %    Lymphocyte % 28.2 %    Monocyte %  15.3 %    Eosinophil % 5.2 %    Basophil % 0.4 %    Immature Granulocyte % 0.5 %   POCT Glucose    Collection Time: 03/05/24  7:30 AM   Result Value Ref Range    POC Glucose  117 (H) 70 - 99 mg/dL   POCT Glucose    Collection Time: 03/05/24 12:32 PM   Result Value Ref Range    POC Glucose  104 (H) 70 - 99 mg/dL   POCT Glucose    Collection Time: 03/05/24  5:12 PM   Result Value Ref Range    POC Glucose  93 70 - 99 mg/dL   POCT Glucose    Collection Time: 03/05/24  9:06 PM   Result Value Ref Range    POC Glucose  112 (H) 70 - 99 mg/dL   Basic Metabolic Panel (8)    Collection Time: 03/06/24  6:24 AM   Result Value Ref Range    Glucose 106 (H) 70 - 99 mg/dL    Sodium 139 136 - 145 mmol/L    Potassium 4.0 3.5 - 5.1 mmol/L    Chloride 107 98 - 112 mmol/L    CO2 26.0 21.0 - 32.0 mmol/L    Anion Gap 6 0 - 18 mmol/L    BUN 17 9 - 23 mg/dL    Creatinine 0.99 0.70 - 1.30 mg/dL    BUN/CREA Ratio 17.2 10.0 - 20.0    Calcium, Total 10.4 8.7 - 10.4 mg/dL    Calculated Osmolality 290 275 - 295 mOsm/kg    eGFR-Cr 75 >=60 mL/min/1.73m2   CBC W/ DIFFERENTIAL    Collection Time: 03/06/24  6:24 AM   Result Value Ref Range    WBC 7.0 4.0 - 11.0 x10(3) uL    RBC 3.82 3.80 - 5.80 x10(6)uL    HGB 12.2 (L) 13.0 - 17.5 g/dL    HCT 36.0 (L) 39.0 - 53.0 %    MCV 94.2 80.0 - 100.0 fL    MCH 31.9 26.0 - 34.0 pg    MCHC 33.9 31.0 - 37.0 g/dL    RDW-SD 46.8 (H) 35.1 - 46.3 fL    RDW 13.7 11.0 - 15.0 %    .0 150.0 - 450.0 10(3)uL    Neutrophil Absolute Prelim 3.64 1.50 - 7.70 x10 (3) uL    Neutrophil Absolute 3.64 1.50 - 7.70 x10(3) uL    Lymphocyte Absolute 1.92 1.00 - 4.00 x10(3) uL    Monocyte Absolute 1.01 (H) 0.10 - 1.00 x10(3) uL    Eosinophil Absolute 0.34 0.00 - 0.70 x10(3) uL    Basophil Absolute 0.03 0.00 - 0.20 x10(3) uL    Immature Granulocyte Absolute 0.04 0.00 - 1.00 x10(3) uL    Neutrophil % 52.1 %    Lymphocyte % 27.5 %    Monocyte % 14.5 %    Eosinophil % 4.9 %    Basophil % 0.4 %    Immature Granulocyte % 0.6 %   POCT  Glucose    Collection Time: 03/06/24 12:51 PM   Result Value Ref Range    POC Glucose  95 70 - 99 mg/dL         Scotty Rodriguez MD     CALL [facility] AT [phone] IF QUESTIONS OR CONCERNS  THANKS

## 2024-03-06 NOTE — DISCHARGE SUMMARY
[facility] Hospitalist Discharge Summary   Patient ID:  Raul Freed Jr.  [id number]  84 year old  2/19/1940    Admit date: 3/1/2024  Discharge date: 3/6/2024    Primary Care Physician: Rao Beyer MD   Attending Physician: Kevin Da Silva MD   Consults:   Consultants         Provider   Role Specialty     Musa Shelby MD  Consulting Physician SURGERY, GENERAL     Ene Graham MD  Consulting Physician GASTROENTEROLOGY     Nupur Bradford DO  Consulting Physician INFECTIOUS DISEASES            Hospital Discharge Diagnoses:   Perirectal abscess  ----See D/C Summary for further Dx    Risk of Readmission Lace+ Score: 79  59-90 High Risk  29-58 Medium Risk  0-28   Low Risk.    TCM Follow-Up Recommendation:  LACE > 58: High Risk of readmission after discharge from the hospital.    Please note that only [facility] DMG and EMG patients enrolled in the Medicare ACO, BCBS ACO and [facility] HMOs will be handled by the [facility] Care Management team.  For all other patients, please follow usual protocol for discharge care transition.    Reason for admission    Mr. Freed is a 84 year old male with PMH sig for CAD,DM2, HTN, HL, and recent admission for proctocolitis with perianal abscess currently on IV abx who presented with worsening abscess on outpatient CT. Patient was recently admitted 2/8-2/16 and 1/27- 1/30. He completed IV zosyn and had repeat CT 2/28 that showed continued intramural abscess and was sent to the ER for eval.      In the ED, vitals stable, denies pain. States he has been having diarrhea and has had some pain due to his rectum being raw. Had issues with delirium on prior admission and at SNF. Knows he is in the hospital. When asked if he was having any confusion at SNF he said he had 2 episodes that were really bad. No fevers or chills.    Hospital Course:    Mr. Freed is a 84 year old male with PMH sig for CAD, DM2, HTN, HL, and recent admission for proctocolitis with perianal abscess discharged on IV abx  then readmitted for diarrhea and C.diff who presents with concerns for persistent abscess based on CT 2/28 done at outside facility, pt sent in by GI, repeat CT done at [facility] 3/1 shows resolution of abscess and no fluid to be drained per IR and General surgery. IV abx stopped and per GI rec MRI rectal protocol in 1-2 weeks and colonoscopy in 4 weeks. Repeat c-diff negative. Pt to follow up with pcp on 3/8 at 11:30 am, GI office to call family with gi appointment,  see below for details      Perirectal Abscess-resolved   Proctitis   - has been on abx since January with slow improvement  - seen by surgery, GI, thought to be 2/2 stercoral ulceration and a somewhat atypical location and on versus unusual location for ischemic injury, vs IBD  - 2/28 outpatient CT not at outside facility (not [facility]) with increased abscess size and sent to the ER for eval,    -3/2 Repeat CT without abscess, evaluated by IR and general surgery nothing to drain.   -monitored off abx with clinical improvement   -recommended to have: MRI rectal protocol 1-2 weeks   -outpt colonoscopy in 4 weeks   -follow up with Dr Patel in 1-2 weeks   -follow up with Dr Shelby as needed based on follow up imaging      C dif colitis  Hx Constipation   -completed long vanco taper after c-diff + on 2/8  -3/6 repeat C-diff negative   -continue colace, gi recommended Fiber supplementation such as benefiber or citrucel daily to avoid constipation     Baseline memory issues   -as per conversation with wife  - issues with encephalopathy on last admission, non this admission       Emphysema  Lung Nodule, RUL  -CTA brain- . Emphysema.  Noncalcified right upper lobe lung nodules, which measure up to 6 mm.   -outpt CT chest for completeness       CAD  HLD  Infrarenal abdominal aortic aneurysm measuring up to 5.0 cm.   HTN  -noted on CT A/P   -continue toprol, norvasc and Lipitor  -rec Follow-up imaging outpatient with vascular surgery     DM2- Diet Controlled    -A1C 6.5     DEANGELO  -not compliant with CPAP      GOC  -full code per prior notes     MA/ACO Reach  -Re- Entry: NO  -Consults: gi surgery and ID   -Discharge: [x] Rao Beyer MD 3/8 at 11:30 pm        EXAM:   GENERAL: no apparent distress, overweight  NEURO: A/A Ox3  RESP: non labored, CTA  CARDIO: Regular, no murmur  ABD: soft, NT, ND, BS+  EXTREMITIES: no edema, no calf tenderness, SCD in place    Radiology:   No results found.    Discharge Instructions     Medication List        CONTINUE taking these medications      acetaminophen 500 MG Tabs  Commonly known as: Tylenol Extra Strength     allopurinol 100 MG Tabs  Commonly known as: Zyloprim     amLODIPine 10 MG Tabs  Commonly known as: Norvasc     atorvastatin 20 MG Tabs  Commonly known as: Lipitor     BuildZoom Contour Next Monitor w/Device Kit  1 Units by Does not apply route 2 (two) times daily before meals.     Benefiber Powd     docusate sodium 100 MG Caps  Commonly known as: COLACE     Magnesium 500 MG Tabs     melatonin 3 MG Tabs     metoprolol succinate  MG Tb24  Commonly known as: Toprol XL  TAKE 1 TABLET BY MOUTH EVERY DAY     Microlet Lancets Misc  USE AS DIRECTED TWICE DAILY     MULTI VITAMIN MENS OR     omega-3 fatty acids 1000 MG Caps  Commonly known as: Fish Oil     omeprazole 20 MG Cpdr  Commonly known as: PriLOSEC     saccharomyces boulardii 250 MG Caps  Commonly known as: Florastor     Zinc Oxide 10 % Oint            STOP taking these medications      dextrose 5% SOLN 100 mL with piperacillin-tazobactam 4.5 (4-0.5) g SOLR 4.5 g     Eucerin Plus 5-5 % Lotn     Polyethylene Glycol 3350 17 g Pack  Commonly known as: MIRALAX     QUEtiapine 25 MG Tabs  Commonly known as: SEROquel     vancomycin 50 mg/mL Solr  Commonly known as: Firvanq            Code Status: Full Code    Important follow up:   Follow-up Information       Rao Beyer MD Follow up on 3/8/2024.    Specialty: Internal Medicine  Why: 3/8 at 11:30 pm  Contact  information:  [address]  [address]  [facility] [address]  [phone]               Anthony Patel MD Follow up.    Specialty: GASTROENTEROLOGY  Why: gi office to call you with appointment  Contact information:  [address]  [facility] [id number] [phone]               Musa Shelby MD Follow up.    Specialty: SURGERY, GENERAL  Why: As needed, hospital follow up  Contact information:  [address]  [address]  [facility] [id number] [phone]                             Disposition: home  Discharged Condition: stable      Additional patient instructions:  Per GI TEAM  -Colonoscopy as outpatient in 4 weeks.   - MRI rectal protocol in 1-2 weeks recommended.    - avoid constipation.  Fiber supplementation such as benefiber or citrucel daily.  Office to call you with Dr Patel appointment     No further IV antibiotics needed for rectal abscess and c-diff negative so no vancomycin needed     An appointment for your primary Dr Beyer was made for 3/8 at 11:30 am for hospital follow up   =========================================================================================================================    I Reconciled current and discharge medications on day of discharge  Patient had opportunity to ask questions and state understand and agree with therapeutic plan as outlined    Total Time Coordinating Care: greater  than 30 minutes  Is this a shared or split note between Advanced Practice Provider and Physician? Yes    Note: This chart was prepared using voice recognition software and may contain unintended word substitution errors.     Christy Swartz RN, NP   [facility] Hospitalist Team   3/6/2024      SEE ATTENDING NOTE BELOW      Patient seen and examined independently.  Discussed with APN and agree with note above    Patient improved.  Stable for discharge to home.     GEN: NAD  RESP: CTAB  CV: RRR    Time of discharge >30 minutes    Kevin Da Silva MD

## 2024-03-06 NOTE — PLAN OF CARE
Problem: Patient Centered Care  Goal: Patient preferences are identified and integrated in the patient's plan of care  Description: Interventions:  - What would you like us to know as we care for you? ***  - Provide timely, complete, and accurate information to patient/family  - Incorporate patient and family knowledge, values, beliefs, and cultural backgrounds into the planning and delivery of care  - Encourage patient/family to participate in care and decision-making at the level they choose  - Honor patient and family perspectives and choices  Outcome: Progressing     Problem: Diabetes/Glucose Control  Goal: Glucose maintained within prescribed range  Description: INTERVENTIONS:  - Monitor Blood Glucose as ordered  - Assess for signs and symptoms of hyperglycemia and hypoglycemia  - Administer ordered medications to maintain glucose within target range  - Assess barriers to adequate nutritional intake and initiate nutrition consult as needed  - Instruct patient on self management of diabetes  Outcome: Progressing     Problem: Patient/Family Goals  Goal: Patient/Family Long Term Goal  Description: Patient's Long Term Goal: ***    Interventions:  - ***  - See additional Care Plan goals for specific interventions  Outcome: Progressing  Goal: Patient/Family Short Term Goal  Description: Patient's Short Term Goal: ***    Interventions:   - ***  - See additional Care Plan goals for specific interventions  Outcome: Progressing     Problem: PAIN - ADULT  Goal: Verbalizes/displays adequate comfort level or patient's stated pain goal  Description: INTERVENTIONS:  - Encourage pt to monitor pain and request assistance  - Assess pain using appropriate pain scale  - Administer analgesics based on type and severity of pain and evaluate response  - Implement non-pharmacological measures as appropriate and evaluate response  - Consider cultural and social influences on pain and pain management  - Manage/alleviate anxiety  -  Utilize distraction and/or relaxation techniques  - Monitor for opioid side effects  - Notify MD/LIP if interventions unsuccessful or patient reports new pain  - Anticipate increased pain with activity and pre-medicate as appropriate  Outcome: Progressing     Problem: RISK FOR INFECTION - ADULT  Goal: Absence of fever/infection during anticipated neutropenic period  Description: INTERVENTIONS  - Monitor WBC  - Administer growth factors as ordered  - Implement neutropenic guidelines  Outcome: Progressing     Problem: SAFETY ADULT - FALL  Goal: Free from fall injury  Description: INTERVENTIONS:  - Assess pt frequently for physical needs  - Identify cognitive and physical deficits and behaviors that affect risk of falls.  - [place] fall precautions as indicated by assessment.  - Educate pt/family on patient safety including physical limitations  - Instruct pt to call for assistance with activity based on assessment  - Modify environment to reduce risk of injury  - Provide assistive devices as appropriate  - Consider OT/PT consult to assist with strengthening/mobility  - Encourage toileting schedule  Outcome: Progressing     Problem: METABOLIC/FLUID AND ELECTROLYTES - ADULT  Goal: Glucose maintained within prescribed range  Description: INTERVENTIONS:  - Monitor Blood Glucose as ordered  - Assess for signs and symptoms of hyperglycemia and hypoglycemia  - Administer ordered medications to maintain glucose within target range  - Assess barriers to adequate nutritional intake and initiate nutrition consult as needed  - Instruct patient on self management of diabetes  Outcome: Progressing     Problem: SKIN/TISSUE INTEGRITY - ADULT  Goal: Skin integrity remains intact  Description: INTERVENTIONS  - Assess and document risk factors for pressure ulcer development  - Assess and document skin integrity  - Monitor for areas of redness and/or skin breakdown  - Initiate interventions, skin care algorithm/standards of care as  needed  Outcome: Progressing     Problem: Impaired Functional Mobility  Goal: Achieve highest/safest level of mobility/gait  Description: Interventions:  - Assess patient's functional ability and stability  - Promote increasing activity/tolerance for mobility and gait  - Educate and engage patient/family in tolerated activity level and precautions  {Additional Mobility Interventions:[id number]:::0}  Outcome: Progressing

## 2024-03-06 NOTE — PLAN OF CARE
AVS reviewed with patient and daughter in law over phone. PICC line removed, PIV removed. All questions answered  Problem: Patient Centered Care  Goal: Patient preferences are identified and integrated in the patient's plan of care  Description: Interventions:  - What would you like us to know as we care for you? From home  - Provide timely, complete, and accurate information to patient/family  - Incorporate patient and family knowledge, values, beliefs, and cultural backgrounds into the planning and delivery of care  - Encourage patient/family to participate in care and decision-making at the level they choose  - Honor patient and family perspectives and choices  3/6/2024 1526 by Agata Allison RN  Outcome: Adequate for Discharge  3/6/2024 0827 by Agata Allison RN  Outcome: Progressing     Problem: Diabetes/Glucose Control  Goal: Glucose maintained within prescribed range  Description: INTERVENTIONS:  - Monitor Blood Glucose as ordered  - Assess for signs and symptoms of hyperglycemia and hypoglycemia  - Administer ordered medications to maintain glucose within target range  - Assess barriers to adequate nutritional intake and initiate nutrition consult as needed  - Instruct patient on self management of diabetes  3/6/2024 1526 by Agata Allison RN  Outcome: Adequate for Discharge  3/6/2024 0827 by Agata Allison RN  Outcome: Progressing     Problem: Patient/Family Goals  Goal: Patient/Family Long Term Goal  Description: Patient's Long Term Goal: to go home    Interventions:  -   - See additional Care Plan goals for specific interventions  3/6/2024 1526 by Agata Allison RN  Outcome: Adequate for Discharge  3/6/2024 0827 by Agata Allison RN  Outcome: Progressing  Goal: Patient/Family Short Term Goal  Description: Patient's Short Term Goal: feel better    Interventions:   -   - See additional Care Plan goals for specific interventions  3/6/2024 1526 by Agata Allison RN  Outcome: Adequate for  Discharge  3/6/2024 0827 by Agata Allison RN  Outcome: Progressing     Problem: PAIN - ADULT  Goal: Verbalizes/displays adequate comfort level or patient's stated pain goal  Description: INTERVENTIONS:  - Encourage pt to monitor pain and request assistance  - Assess pain using appropriate pain scale  - Administer analgesics based on type and severity of pain and evaluate response  - Implement non-pharmacological measures as appropriate and evaluate response  - Consider cultural and social influences on pain and pain management  - Manage/alleviate anxiety  - Utilize distraction and/or relaxation techniques  - Monitor for opioid side effects  - Notify MD/LIP if interventions unsuccessful or patient reports new pain  - Anticipate increased pain with activity and pre-medicate as appropriate  3/6/2024 1526 by Agata Allison RN  Outcome: Adequate for Discharge  3/6/2024 0827 by Agata Allison RN  Outcome: Progressing     Problem: RISK FOR INFECTION - ADULT  Goal: Absence of fever/infection during anticipated neutropenic period  Description: INTERVENTIONS  - Monitor WBC  - Administer growth factors as ordered  - Implement neutropenic guidelines  3/6/2024 1526 by Agata Allison RN  Outcome: Adequate for Discharge  3/6/2024 0827 by Agata Allison RN  Outcome: Progressing     Problem: SAFETY ADULT - FALL  Goal: Free from fall injury  Description: INTERVENTIONS:  - Assess pt frequently for physical needs  - Identify cognitive and physical deficits and behaviors that affect risk of falls.  - [place] fall precautions as indicated by assessment.  - Educate pt/family on patient safety including physical limitations  - Instruct pt to call for assistance with activity based on assessment  - Modify environment to reduce risk of injury  - Provide assistive devices as appropriate  - Consider OT/PT consult to assist with strengthening/mobility  - Encourage toileting schedule  3/6/2024 1526 by Agata Allison, BILL  Outcome:  Adequate for Discharge  3/6/2024 0827 by Agata Allison RN  Outcome: Progressing     Problem: SKIN/TISSUE INTEGRITY - ADULT  Goal: Skin integrity remains intact  Description: INTERVENTIONS  - Assess and document risk factors for pressure ulcer development  - Assess and document skin integrity  - Monitor for areas of redness and/or skin breakdown  - Initiate interventions, skin care algorithm/standards of care as needed  3/6/2024 1526 by Agata Allison RN  Outcome: Adequate for Discharge  3/6/2024 0827 by Agata Allison RN  Outcome: Progressing     Problem: Impaired Functional Mobility  Goal: Achieve highest/safest level of mobility/gait  Description: Interventions:  - Assess patient's functional ability and stability  - Promote increasing activity/tolerance for mobility and gait  - Educate and engage patient/family in tolerated activity level and precautions    3/6/2024 1526 by Agata Allison RN  Outcome: Adequate for Discharge  3/6/2024 0827 by Agata Allison RN  Outcome: Progressing

## 2024-03-06 NOTE — PROGRESS NOTES
[facility]  Gastroenterology Progress Note    Raul Freed Jr. Patient Status:  Inpatient    1940 MRN [id number]   Location [facility] 5SW/SE Attending Kevin Da Silva MD   Hosp Day # 4 PCP Rao Beyer MD     Subjective:  Raul Freed Jr. is a(n) 84 year old male.    Current complaints: Feels the same no current complaints awaiting stool sample for C. difficile    Objective:  Blood pressure 124/82, pulse 73, temperature 97.3 °F (36.3 °C), temperature source Oral, resp. rate 16, height 5' 8\" (1.727 m), weight 216 lb 7.9 oz (98.2 kg), SpO2 95%.  Respiratory: no labored breathing  CV: RRR  Abdomen: nondistended, soft, nontender  Extremities: no calf tenderness  Neurologic: Ox3    Labs:   Recent Labs   Lab 24  0006 24  0531 24  0531 24  0624   WBC 8.3 5.7  --  5.6 7.0   HGB 11.6* 11.2*  --  11.5* 12.2*   HCT 35.7* 33.7*  --  34.3* 36.0*   .0 226.0  --  215.0 240.0   CREATSERUM 1.28 1.08  --  0.94 0.99   BUN 17 12  --  14 17    142  --  140 139   K 3.9 3.8  --  3.8 4.0    108  --  108 107   CO2 29.0 26.0  --  26.0 26.0   * 114*  --  106* 106*   CA 9.8 9.2  --  9.8 10.4   ALB 4.5  --   --   --   --    ALKPHO 114  --   --   --   --    BILT 0.4  --   --   --   --    TP 8.6*  --   --   --   --    AST 17  --   --   --   --    ALT <7*  --   --   --   --    INR  --   --  1.18  --   --    PTP  --   --  15.8*  --   --        No results for input(s): \"ADELE\", \"LIP\", \"GGT\", \"PSA\", \"DDIMER\", \"ESRML\", \"ESRPF\", \"CRP\", \"BNP\", \"TROP\", \"CK\", \"CKMB\", \"ASHLEY\", \"RPR\", \"B12\", \"ETOH\", \"POCGLU\" in the last 168 hours.    Invalid input(s): \"RF\"     Imaging:  No results found.     Problem list:  Patient Active Problem List   Diagnosis    Essential hypertension, benign    Mixed hyperlipidemia due to type 2 diabetes mellitus (HCC)    Obstructive sleep apnea (adult) (pediatric)    Vitamin D deficiency    AAA (abdominal aortic aneurysm) (HCC)    Diabetes  mellitus type 2 in obese    Bilateral sensorineural hearing loss    History of smoking 30 or more pack years    Severe obesity (BMI 35.0-35.9 with comorbidity) (HCC)    Hiatal hernia with GERD without esophagitis    Benign non-nodular prostatic hyperplasia without lower urinary tract symptoms    Facet arthritis of cervical region    Centrilobular emphysema (HCC)    Interstitial lung disease (HCC)    Thoracic aortic atherosclerosis (HCC)    History of coronary artery stent placement    CAD in native artery    Spondylosis of cervical region without myelopathy or radiculopathy    Elevated ratio of cholesterol to high density lipoprotein (HDL)    Unspecified inflammatory spondylopathy, cervical region (HCC)    Acute kidney injury (HCC)    Proctocolitis    Perianal abscess    Abscess of anal and rectal regions    Perirectal abscess       Assessment/Plan:  Proctocolitis   Perirectal abscess   -initial imaging  in 1/27/2024  - S/p sigmoidoscopy on 2/13.  - Pathology with ulcerated rectal mucosa with extensive acute and chronic inflammation and reactive changes.  No granulomas, dysplasia, malignancy or changes associated with chronicity identified.              - chronic colitis in setting of remote hx of anal fissure keeps IBD on differential but this is less likely.               - possibly related to stercoral ulcer.  Cannot rule out perianal fistula as suggested on MRI in 2/12/24.                 -as long as underlying inflammation does not resolve, always at risk for abscess  - CT as outpatient reported on 2/28 with intramural abscess.  - Readmitted with weakness, diarrhea and leukocytosis.  - Repeat CT, 3/2, with diffuse proctitis, improved.  No abscess visualized.  - General Surgery following.-  rec defer drainage -> will reassess     Anemia - stable, normocytic    -iron studies consistent with iron def likely 2/2 to anemia of chronic disease          C dif colitis  - acute on chronic diarrhea  -vanco   -follow up  with ID       -Plan   - Follow-up recommendations per Surgery   - antibiotics - id following  - Colonoscopy as outpatient in 4 weeks.   - MRI rectal protocol in 1-2 weeks recommended.    - avoid constipation.  Fiber supplementation such as benefiber or citrucel daily.      Gilberto Dumont MD

## 2024-03-26 ENCOUNTER — OFFICE VISIT (OUTPATIENT)
Dept: OTOLARYNGOLOGY | Facility: CLINIC | Age: 84
End: 2024-03-26

## 2024-03-26 DIAGNOSIS — H61.23 BILATERAL IMPACTED CERUMEN: Primary | ICD-10-CM

## 2024-03-26 PROCEDURE — 69210 REMOVE IMPACTED EAR WAX UNI: CPT | Performed by: OTOLARYNGOLOGY

## 2024-03-26 NOTE — PROGRESS NOTES
Raul Freed Jr. is a 84 year old male.    Chief Complaint   Patient presents with    Ear Wax     Ear cleaning        HISTORY OF PRESENT ILLNESS    Patient presents for cerumen removal. No other complaints or concerns at this time    Social History     Socioeconomic History    Marital status:    Tobacco Use    Smoking status: Former     Packs/day: 1.00     Years: 40.00     Additional pack years: 0.00     Total pack years: 40.00     Types: Cigarettes     Quit date: 2009     Years since quittin.8    Smokeless tobacco: Never    Tobacco comments:     has been a 3 ppd   Vaping Use    Vaping Use: Never used   Substance and Sexual Activity    Alcohol use: Yes     Comment: occasional intake    Drug use: No       Family History   Problem Relation Age of Onset    Cancer Mother         lung    Obesity Son     Obesity Sister     Lipids Sister     Obesity Son        Past Medical History:   Diagnosis Date    Abdominal aortic aneurysm (AAA) 3.0 cm to 5.0 cm in diameter in female (HCC)     CAD involving native coronary artery of native heart without angina pectoris 2018    Cardiac LV ejection fraction 50-55% per 2018 angio  2018    Centrilobular emphysema (HCC) 2018    Coronary atherosclerosis     COVID 2020    Diabetes mellitus (HCC)     Gastric ulcer 2008    Healed     GOUT     HYPERLIPIDEMIA     HYPERTENSION     OBESITY     OSTEOARTHRITIS     osteoarthritis knees    Presence of drug coated stent in prox & Mid LAD coronary artery 2018    2.75 x 15 mm Xcience drug-eluting stent into the mid LAD 3.5 x 12 mm Xcience drug-eluting stent into the proximal LAD    Pulmonary emphysema (HCC)     SLEEP APNEA     [facility] fax [phone]       Past Surgical History:   Procedure Laterality Date    CATH BARE METAL STENT (BMS)      COLONOSCOPY  2009= Declines repeat    , complicated by anal fissure    COLONOSCOPY      COLONOSCOPY  2022    one small TA, int hem, can consider  d/cing surveillance    COLONOSCOPY N/A 02/28/2022    Procedure: COLONOSCOPY,  with polypectomy;  Surgeon: Anthony Patel MD;  Location: [facility]    HEMORRHOIDECTOMY  Dr Kamara 3/12/10 [facility]     Rectal exam under anesthesia/anoscopy. Incision and drainage of perirectal abscess. Placement of seton . Excision of anal tags. Destruction of internal hemorrhoids.  Surgery done at [facility] on 3/12/10.    OTHER SURGICAL HISTORY      left knee arthroscopy    PATIENT DOCUMENTED NOT TO HAVE EXPERIENCED ANY OF THE FOLLOWING EVENTS N/A 02/18/2015    Procedure: ESOPHAGOGASTRODUODENOSCOPY, POSSIBLE BIOPSY, POSSIBLE POLYPECTOMY 21005;  Surgeon: Musa Cook MD;  Location: [facility]    PATIENT WITHOUGH PREOPERATIVE ORDER FOR IV ANTIBIOTIC SURGICAL SITE INFECTION PROPHYLAXIS. N/A 02/18/2015    Procedure: ESOPHAGOGASTRODUODENOSCOPY, POSSIBLE BIOPSY, POSSIBLE POLYPECTOMY 82479;  Surgeon: Musa Cook MD;  Location: [facility]    UPPER GI ENDOSCOPY PERFORMED  2008, 2/27/2009    UPPER GI ENDOSCOPY,BIOPSY  2/18/15=Gastritis.  H pylori negative, normal SB Bx    UPPER GI ENDOSCOPY,BIOPSY N/A 02/18/2015    Procedure: ESOPHAGOGASTRODUODENOSCOPY, POSSIBLE BIOPSY, POSSIBLE POLYPECTOMY 29661;  Surgeon: Musa Cook MD;  Location: [facility]    UPPER GI ENDOSCOPY,EXAM         REVIEW OF SYSTEMS    System Neg/Pos Details   Constitutional Negative Fatigue, fever and weight loss.   ENMT Negative Drooling.   Eyes Negative Blurred vision and vision changes.   Respiratory Negative Dyspnea and wheezing.   Cardio Negative Chest pain, irregular heartbeat/palpitations and syncope.   GI Negative Abdominal pain and diarrhea.   Endocrine Negative Cold intolerance and heat intolerance.   Neuro Negative Tremors.   Psych Negative Anxiety and depression.   Integumentary Negative Frequent skin infections, pigment change and rash.   Hema/Lymph Negative Easy bleeding and easy bruising.            PHYSICAL EXAM    There were no vitals taken for this visit.       Constitutional Normal Overall appearance - Normal.        Neck Exam Normal Inspection - Normal. Palpation - Normal. Parotid gland - Normal. Thyroid gland - Normal.             Head/Face Normal Facial features - Normal. Eyebrows - Normal. Skull - Normal.             Ears Normal Inspection - Right: Normal, Left: Normal. Canal - Right: Normal, Left: Normal. TM - Right: Normal, Left: Normal.   Skin Normal Inspection - Normal.                              Canals:  Right: Canal reveals cerumen impaction,   Left: Canal reveals cerumen impaction,     Tympanic Membranes:  Right: Normal tympanic membrane.   Left: Normal tympanic membrane.     TM Visualized Method:   Right TM examined via otomicroscopy.    Left TM examined via otomicroscopy.      PROCEDURE:    Removal of cerumen impaction   The cerumen impaction was completely removed using microscopy.   Removal was completed by using acurette and/or suction.   Comments: Return to clinic as needed.  Avoid q-tips, water precautions and use over the counter wax remedies as needed.      Current Outpatient Medications:     atorvastatin 20 MG Oral Tab, Take 1 tablet (20 mg total) by mouth nightly., Disp: , Rfl:     Wheat Dextrin (BENEFIBER) Oral Powder, Take daily to prevent constipation. Follow directions on the bottle. Buy over the counter, Disp: , Rfl:     Zinc Oxide 10 % External Ointment, Apply 1 Application topically as needed., Disp: , Rfl:     acetaminophen 500 MG Oral Tab, Take 1 tablet (500 mg total) by mouth every 4 (four) hours as needed for Pain or Fever., Disp: , Rfl:     docusate sodium 100 MG Oral Cap, Take 100 mg by mouth daily., Disp: , Rfl:     melatonin 3 MG Oral Tab, Take 1 tablet (3 mg total) by mouth nightly., Disp: , Rfl:     amLODIPine 10 MG Oral Tab, Take 1 tablet (10 mg total) by mouth daily., Disp: , Rfl:     saccharomyces boulardii 250 MG Oral Cap, Take 1 capsule (250 mg total)  by mouth 2 (two) times daily., Disp: , Rfl:     Magnesium 500 MG Oral Tab, Take 1 tablet (500 mg total) by mouth daily., Disp: , Rfl:     omeprazole 20 MG Oral Capsule Delayed Release, Take 1 capsule (20 mg total) by mouth daily., Disp: , Rfl:     METOPROLOL SUCCINATE 100 MG Oral Tablet 24 Hr, TAKE 1 TABLET BY MOUTH EVERY DAY, Disp: 90 tablet, Rfl: 1    allopurinol 100 MG Oral Tab, Take 1 tablet (100 mg total) by mouth daily., Disp: , Rfl:     Microlet Lancets Does not apply Misc, USE AS DIRECTED TWICE DAILY, Disp: 200 each, Rfl: 3    Blood Glucose Monitoring Suppl ([employer] CONTOUR NEXT MONITOR) w/Device Does not apply Kit, 1 Units by Does not apply route 2 (two) times daily before meals., Disp: 1 kit, Rfl: 0    Omega-3 Fatty Acids (FISH OIL) 1000 MG Oral Cap, Take 1,000 mg by mouth daily., Disp: , Rfl:     Multiple Vitamin (MULTI VITAMIN MENS OR), Take 1 tablet by mouth daily., Disp: , Rfl:   ASSESSMENT AND PLAN    1. Bilateral impacted cerumen    - REMOVAL IMPACTED CERUMEN REQUIRING INSTRUMENTATION, UNILATERAL      All cerumen was removed using microscopy. I have asked the patient to return to see me as needed for repeat cerumen removal in the future.      Kehinde Bernabe MD    3/26/2024    8:41 AM

## 2024-09-24 ENCOUNTER — OFFICE VISIT (OUTPATIENT)
Dept: OTOLARYNGOLOGY | Facility: CLINIC | Age: 84
End: 2024-09-24

## 2024-09-24 DIAGNOSIS — H61.23 BILATERAL IMPACTED CERUMEN: Primary | ICD-10-CM

## 2024-09-24 PROCEDURE — 69210 REMOVE IMPACTED EAR WAX UNI: CPT | Performed by: OTOLARYNGOLOGY

## 2024-09-24 NOTE — PROGRESS NOTES
Raul Freed Jr. is a 84 year old male.    Chief Complaint   Patient presents with    Ear Wax     Ear cleaning        HISTORY OF PRESENT ILLNESS    Patient presents for cerumen removal. No other complaints or concerns at this time    Social History     Socioeconomic History    Marital status:    Tobacco Use    Smoking status: Former     Current packs/day: 0.00     Average packs/day: 1 pack/day for 40.0 years (40.0 ttl pk-yrs)     Types: Cigarettes     Start date: 6/1/1969     Quit date: 6/1/2009     Years since quitting: 15.3    Smokeless tobacco: Never    Tobacco comments:     has been a 3 ppd   Vaping Use    Vaping status: Never Used   Substance and Sexual Activity    Alcohol use: Yes     Comment: occasional intake    Drug use: No       Family History   Problem Relation Age of Onset    Cancer Mother         lung    Obesity Son     Obesity Sister     Lipids Sister     Obesity Son        Past Medical History:    Abdominal aortic aneurysm (AAA) 3.0 cm to 5.0 cm in diameter in female (HCC)    CAD involving native coronary artery of native heart without angina pectoris    Cardiac LV ejection fraction 50-55% per 2018 angio     Centrilobular emphysema (HCC)    Coronary atherosclerosis    COVID    Diabetes mellitus (HCC)    Gastric ulcer    Healed 2009    GOUT    HYPERLIPIDEMIA    HYPERTENSION    OBESITY    OSTEOARTHRITIS    osteoarthritis knees    Presence of drug coated stent in prox & Mid LAD coronary artery 03/19/2018    2.75 x 15 mm [employer]ience drug-eluting stent into the mid LAD 3.5 x 12 mm Xcience drug-eluting stent into the proximal LAD    Pulmonary emphysema (HCC)    SLEEP APNEA    [facility] fax [phone]       Past Surgical History:   Procedure Laterality Date    Cath bare metal stent (bms)      Colonoscopy  2009= Declines repeat    2009, complicated by anal fissure    Colonoscopy      Colonoscopy  03/2022    one small TA, int hem, can consider d/cing surveillance    Colonoscopy N/A 02/28/2022     Procedure: COLONOSCOPY,  with polypectomy;  Surgeon: Anthony Patel MD;  Location: [facility]    Hemorrhoidectomy  Dr Kamara 3/12/10 [facility]     Rectal exam under anesthesia/anoscopy. Incision and drainage of perirectal abscess. Placement of seton . Excision of anal tags. Destruction of internal hemorrhoids.  Surgery done at [facility] on 3/12/10.    Other surgical history      left knee arthroscopy    Patient documented not to have experienced any of the following events N/A 02/18/2015    Procedure: ESOPHAGOGASTRODUODENOSCOPY, POSSIBLE BIOPSY, POSSIBLE POLYPECTOMY 02817;  Surgeon: Musa Cook MD;  Location: [facility]    Patient withough preoperative order for iv antibiotic surgical site infection prophylaxis. N/A 02/18/2015    Procedure: ESOPHAGOGASTRODUODENOSCOPY, POSSIBLE BIOPSY, POSSIBLE POLYPECTOMY 64908;  Surgeon: Musa Cook MD;  Location: [facility]    Upper gi endoscopy performed  2008, 2/27/2009    Upper gi endoscopy,biopsy  2/18/15=Gastritis.  H pylori negative, normal SB Bx    Upper gi endoscopy,biopsy N/A 02/18/2015    Procedure: ESOPHAGOGASTRODUODENOSCOPY, POSSIBLE BIOPSY, POSSIBLE POLYPECTOMY 20166;  Surgeon: Musa Cook MD;  Location: [facility]    Upper gi endoscopy,exam         REVIEW OF SYSTEMS    System Neg/Pos Details   Constitutional Negative Fatigue, fever and weight loss.   ENMT Negative Drooling.   Eyes Negative Blurred vision and vision changes.   Respiratory Negative Dyspnea and wheezing.   Cardio Negative Chest pain, irregular heartbeat/palpitations and syncope.   GI Negative Abdominal pain and diarrhea.   Endocrine Negative Cold intolerance and heat intolerance.   Neuro Negative Tremors.   Psych Negative Anxiety and depression.   Integumentary Negative Frequent skin infections, pigment change and rash.   Hema/Lymph Negative Easy bleeding and easy bruising.           PHYSICAL EXAM    There were no vitals taken for this  visit.       Constitutional Normal Overall appearance - Normal.        Neck Exam Normal Inspection - Normal. Palpation - Normal. Parotid gland - Normal. Thyroid gland - Normal.             Head/Face Normal Facial features - Normal. Eyebrows - Normal. Skull - Normal.             Ears Normal Inspection - Right: Normal, Left: Normal. Canal - Right: Normal, Left: Normal. TM - Right: Normal, Left: Normal.   Skin Normal Inspection - Normal.                              Canals:  Right: Canal reveals cerumen impaction,   Left: Canal reveals cerumen impaction,     Tympanic Membranes:  Right: Normal tympanic membrane.   Left: Normal tympanic membrane.     TM Visualized Method:   Right TM examined via otomicroscopy.    Left TM examined via otomicroscopy.      PROCEDURE:    Removal of cerumen impaction   The cerumen impaction was completely removed using microscopy.   Removal was completed by using acurette and/or suction.   Comments: Return to clinic as needed.  Avoid q-tips, water precautions and use over the counter wax remedies as needed.      Current Outpatient Medications:     atorvastatin 20 MG Oral Tab, Take 1 tablet (20 mg total) by mouth nightly., Disp: , Rfl:     Wheat Dextrin (BENEFIBER) Oral Powder, Take daily to prevent constipation. Follow directions on the bottle. Buy over the counter, Disp: , Rfl:     Zinc Oxide 10 % External Ointment, Apply 1 Application topically as needed., Disp: , Rfl:     acetaminophen 500 MG Oral Tab, Take 1 tablet (500 mg total) by mouth every 4 (four) hours as needed for Pain or Fever., Disp: , Rfl:     docusate sodium 100 MG Oral Cap, Take 100 mg by mouth daily., Disp: , Rfl:     melatonin 3 MG Oral Tab, Take 1 tablet (3 mg total) by mouth nightly., Disp: , Rfl:     amLODIPine 10 MG Oral Tab, Take 1 tablet (10 mg total) by mouth daily., Disp: , Rfl:     saccharomyces boulardii 250 MG Oral Cap, Take 1 capsule (250 mg total) by mouth 2 (two) times daily., Disp: , Rfl:     Magnesium 500  MG Oral Tab, Take 1 tablet (500 mg total) by mouth daily., Disp: , Rfl:     omeprazole 20 MG Oral Capsule Delayed Release, Take 1 capsule (20 mg total) by mouth daily., Disp: , Rfl:     METOPROLOL SUCCINATE 100 MG Oral Tablet 24 Hr, TAKE 1 TABLET BY MOUTH EVERY DAY, Disp: 90 tablet, Rfl: 1    allopurinol 100 MG Oral Tab, Take 1 tablet (100 mg total) by mouth daily., Disp: , Rfl:     Microlet Lancets Does not apply Misc, USE AS DIRECTED TWICE DAILY, Disp: 200 each, Rfl: 3    Blood Glucose Monitoring Suppl ([email or web address] CONTOUR NEXT MONITOR) w/Device Does not apply Kit, 1 Units by Does not apply route 2 (two) times daily before meals., Disp: 1 kit, Rfl: 0    Omega-3 Fatty Acids (FISH OIL) 1000 MG Oral Cap, Take 1,000 mg by mouth daily., Disp: , Rfl:     Multiple Vitamin (MULTI VITAMIN MENS OR), Take 1 tablet by mouth daily., Disp: , Rfl:   ASSESSMENT AND PLAN    1. Bilateral impacted cerumen    - REMOVAL IMPACTED CERUMEN REQUIRING INSTRUMENTATION, UNILATERAL      All cerumen was removed using microscopy. I have asked the patient to return to see me as needed for repeat cerumen removal in the future.      Kehinde Bernabe MD    9/24/2024    9:40 AM

## 2025-02-07 ENCOUNTER — OFFICE VISIT (OUTPATIENT)
Dept: OTOLARYNGOLOGY | Facility: CLINIC | Age: 85
End: 2025-02-07

## 2025-02-07 DIAGNOSIS — H61.23 BILATERAL IMPACTED CERUMEN: Primary | ICD-10-CM

## 2025-02-07 PROCEDURE — 69210 REMOVE IMPACTED EAR WAX UNI: CPT | Performed by: OTOLARYNGOLOGY

## 2025-02-07 NOTE — PROGRESS NOTES
Raul Freed Jr. is a 84 year old male.    Chief Complaint   Patient presents with    Ear Wax     Ear cleaning       HISTORY OF PRESENT ILLNESS    Patient presents for cerumen removal. No other complaints or concerns at this time    Social History     Socioeconomic History    Marital status:    Tobacco Use    Smoking status: Former     Current packs/day: 0.00     Average packs/day: 1 pack/day for 40.0 years (40.0 ttl pk-yrs)     Types: Cigarettes     Start date: 6/1/1969     Quit date: 6/1/2009     Years since quitting: 15.6    Smokeless tobacco: Never    Tobacco comments:     has been a 3 ppd   Vaping Use    Vaping status: Never Used   Substance and Sexual Activity    Alcohol use: Yes     Comment: occasional intake    Drug use: No       Family History   Problem Relation Age of Onset    Cancer Mother         lung    Obesity Son     Obesity Sister     Lipids Sister     Obesity Son        Past Medical History:    Abdominal aortic aneurysm (AAA) 3.0 cm to 5.0 cm in diameter in female    CAD involving native coronary artery of native heart without angina pectoris    Cardiac LV ejection fraction 50-55% per 2018 angio     Centrilobular emphysema (HCC)    Coronary atherosclerosis    COVID    Diabetes mellitus (HCC)    Gastric ulcer    Healed 2009    GOUT    HYPERLIPIDEMIA    HYPERTENSION    OBESITY    OSTEOARTHRITIS    osteoarthritis knees    Presence of drug coated stent in prox & Mid LAD coronary artery 03/19/2018    2.75 x 15 mm [employer]ience drug-eluting stent into the mid LAD 3.5 x 12 mm Xcience drug-eluting stent into the proximal LAD    Pulmonary emphysema (HCC)    SLEEP APNEA    [facility] fax [phone]       Past Surgical History:   Procedure Laterality Date    Cath bare metal stent (bms)      Colonoscopy  2009= Declines repeat    2009, complicated by anal fissure    Colonoscopy      Colonoscopy  03/2022    one small TA, int hem, can consider d/cing surveillance    Colonoscopy N/A 02/28/2022    Procedure:  COLONOSCOPY,  with polypectomy;  Surgeon: Anthony Patel MD;  Location: [facility]    Hemorrhoidectomy  Dr Kamara 3/12/10 [facility]     Rectal exam under anesthesia/anoscopy. Incision and drainage of perirectal abscess. Placement of seton . Excision of anal tags. Destruction of internal hemorrhoids.  Surgery done at [facility] on 3/12/10.    Other surgical history      left knee arthroscopy    Patient documented not to have experienced any of the following events N/A 02/18/2015    Procedure: ESOPHAGOGASTRODUODENOSCOPY, POSSIBLE BIOPSY, POSSIBLE POLYPECTOMY 33433;  Surgeon: Musa Cook MD;  Location: [facility]    Patient withough preoperative order for iv antibiotic surgical site infection prophylaxis. N/A 02/18/2015    Procedure: ESOPHAGOGASTRODUODENOSCOPY, POSSIBLE BIOPSY, POSSIBLE POLYPECTOMY 03361;  Surgeon: Musa Cook MD;  Location: [facility]    Upper gi endoscopy performed  2008, 2/27/2009    Upper gi endoscopy,biopsy  2/18/15=Gastritis.  H pylori negative, normal SB Bx    Upper gi endoscopy,biopsy N/A 02/18/2015    Procedure: ESOPHAGOGASTRODUODENOSCOPY, POSSIBLE BIOPSY, POSSIBLE POLYPECTOMY 09722;  Surgeon: Musa Cook MD;  Location: [facility]    Upper gi endoscopy,exam         REVIEW OF SYSTEMS    System Neg/Pos Details   Constitutional Negative Fatigue, fever and weight loss.   ENMT Negative Drooling.   Eyes Negative Blurred vision and vision changes.   Respiratory Negative Dyspnea and wheezing.   Cardio Negative Chest pain, irregular heartbeat/palpitations and syncope.   GI Negative Abdominal pain and diarrhea.   Endocrine Negative Cold intolerance and heat intolerance.   Neuro Negative Tremors.   Psych Negative Anxiety and depression.   Integumentary Negative Frequent skin infections, pigment change and rash.   Hema/Lymph Negative Easy bleeding and easy bruising.           PHYSICAL EXAM    There were no vitals taken for this visit.        Constitutional Normal Overall appearance - Normal.        Neck Exam Normal Inspection - Normal. Palpation - Normal. Parotid gland - Normal. Thyroid gland - Normal.             Head/Face Normal Facial features - Normal. Eyebrows - Normal. Skull - Normal.             Ears Normal Inspection - Right: Normal, Left: Normal. Canal - Right: Normal, Left: Normal. TM - Right: Normal, Left: Normal.   Skin Normal Inspection - Normal.                              Canals:  Right: Canal reveals cerumen impaction,   Left: Canal reveals cerumen impaction,     Tympanic Membranes:  Right: Normal tympanic membrane.   Left: Normal tympanic membrane.     TM Visualized Method:   Right TM examined via otomicroscopy.    Left TM examined via otomicroscopy.      PROCEDURE:    Removal of cerumen impaction   The cerumen impaction was completely removed using microscopy.   Removal was completed by using acurette and/or suction.   Comments: Return to clinic as needed.  Avoid q-tips, water precautions and use over the counter wax remedies as needed.      Current Outpatient Medications:     atorvastatin 20 MG Oral Tab, Take 1 tablet (20 mg total) by mouth nightly., Disp: , Rfl:     Wheat Dextrin (BENEFIBER) Oral Powder, Take daily to prevent constipation. Follow directions on the bottle. Buy over the counter, Disp: , Rfl:     Zinc Oxide 10 % External Ointment, Apply 1 Application topically as needed., Disp: , Rfl:     acetaminophen 500 MG Oral Tab, Take 1 tablet (500 mg total) by mouth every 4 (four) hours as needed for Pain or Fever., Disp: , Rfl:     docusate sodium 100 MG Oral Cap, Take 100 mg by mouth daily., Disp: , Rfl:     melatonin 3 MG Oral Tab, Take 1 tablet (3 mg total) by mouth nightly., Disp: , Rfl:     amLODIPine 10 MG Oral Tab, Take 1 tablet (10 mg total) by mouth daily., Disp: , Rfl:     saccharomyces boulardii 250 MG Oral Cap, Take 1 capsule (250 mg total) by mouth 2 (two) times daily., Disp: , Rfl:     Magnesium 500 MG Oral Tab,  Take 1 tablet (500 mg total) by mouth daily., Disp: , Rfl:     omeprazole 20 MG Oral Capsule Delayed Release, Take 1 capsule (20 mg total) by mouth daily., Disp: , Rfl:     METOPROLOL SUCCINATE 100 MG Oral Tablet 24 Hr, TAKE 1 TABLET BY MOUTH EVERY DAY, Disp: 90 tablet, Rfl: 1    allopurinol 100 MG Oral Tab, Take 1 tablet (100 mg total) by mouth daily., Disp: , Rfl:     Microlet Lancets Does not apply Misc, USE AS DIRECTED TWICE DAILY, Disp: 200 each, Rfl: 3    Blood Glucose Monitoring Suppl ([employer] CONTOUR NEXT MONITOR) w/Device Does not apply Kit, 1 Units by Does not apply route 2 (two) times daily before meals., Disp: 1 kit, Rfl: 0    Omega-3 Fatty Acids (FISH OIL) 1000 MG Oral Cap, Take 1,000 mg by mouth daily., Disp: , Rfl:     Multiple Vitamin (MULTI VITAMIN MENS OR), Take 1 tablet by mouth daily., Disp: , Rfl:   ASSESSMENT AND PLAN    1. Bilateral impacted cerumen        All cerumen was removed using microscopy. I have asked the patient to return to see me as needed for repeat cerumen removal in the future.      Kehinde Bernabe MD    2/7/2025    9:07 AM

## 2025-03-08 ENCOUNTER — APPOINTMENT (OUTPATIENT)
Dept: CT IMAGING | Facility: HOSPITAL | Age: 85
End: 2025-03-08
Attending: EMERGENCY MEDICINE
Payer: MEDICARE

## 2025-03-08 ENCOUNTER — HOSPITAL ENCOUNTER (EMERGENCY)
Facility: HOSPITAL | Age: 85
Discharge: HOME | End: 2025-03-08
Attending: EMERGENCY MEDICINE
Payer: MEDICARE

## 2025-03-08 VITALS
OXYGEN SATURATION: 96 % | HEIGHT: 70 IN | HEART RATE: 63 BPM | TEMPERATURE: 97 F | WEIGHT: 210 LBS | SYSTOLIC BLOOD PRESSURE: 151 MMHG | BODY MASS INDEX: 30.06 KG/M2 | DIASTOLIC BLOOD PRESSURE: 68 MMHG | RESPIRATION RATE: 19 BRPM

## 2025-03-08 DIAGNOSIS — T14.8XXA ABRASION: ICD-10-CM

## 2025-03-08 DIAGNOSIS — S20.212A CONTUSION OF RIB ON LEFT SIDE, INITIAL ENCOUNTER: Primary | ICD-10-CM

## 2025-03-08 PROCEDURE — 99284 EMERGENCY DEPT VISIT MOD MDM: CPT

## 2025-03-08 PROCEDURE — 71250 CT THORAX DX C-: CPT | Performed by: EMERGENCY MEDICINE

## 2025-03-08 PROCEDURE — 93005 ELECTROCARDIOGRAM TRACING: CPT

## 2025-03-08 PROCEDURE — 93010 ELECTROCARDIOGRAM REPORT: CPT

## 2025-03-08 PROCEDURE — 90471 IMMUNIZATION ADMIN: CPT

## 2025-03-08 RX ORDER — ACETAMINOPHEN AND CODEINE PHOSPHATE 300; 30 MG/1; MG/1
1-2 TABLET ORAL EVERY 6 HOURS PRN
Qty: 14 TABLET | Refills: 0 | Status: SHIPPED | OUTPATIENT
Start: 2025-03-08 | End: 2025-03-13

## 2025-03-08 RX ORDER — IBUPROFEN 400 MG/1
400 TABLET, FILM COATED ORAL ONCE
Status: COMPLETED | OUTPATIENT
Start: 2025-03-08 | End: 2025-03-08

## 2025-03-08 NOTE — ED INITIAL ASSESSMENT (HPI)
Patient to ED this morning for fall on the concrete yesterday,. Patient reporting pain to the left side of the chest, left side and his back.

## 2025-03-08 NOTE — ED PROVIDER NOTES
Patient Seen in: [facility] Emergency Department      History     Chief Complaint   Patient presents with    Fall     Stated Complaint: Fall    Subjective:   85-year-old male who denies being on anticoagulation here with left rib injury that occurred 36 hours ago.  He had a mechanical fall and landed on his chest and arm.  He has been having mostly left lower anterior rib pain with movement or breath.  Sometimes he feels it in his lateral ribs as well.  Also has a small skin tear to his left tricep region.  No head or neck injury.  No weakness or numbness.  No nausea vomiting or diarrhea.  No cough or congestion.  No leg swelling.  No focal weakness or numbness.  He has been using Tylenol.  Wife is in the room.              Objective:     No pertinent past medical history.            No pertinent past surgical history.              No pertinent social history.                Physical Exam     ED Triage Vitals [03/08/25 0349]   /79   Pulse 72   Resp 20   Temp 97 °F (36.1 °C)   Temp src Temporal   SpO2 98 %   O2 Device None (Room air)       Current Vitals:   Vital Signs  BP: 151/68  Pulse: 63  Resp: 19  Temp: 97 °F (36.1 °C)  Temp src: Temporal  MAP (mmHg): 91    Oxygen Therapy  SpO2: 96 %  O2 Device: None (Room air)        Physical Exam  HENT:      Head: Normocephalic.      Right Ear: External ear normal.      Left Ear: External ear normal.      Nose: Nose normal.      Mouth/Throat:      Mouth: Mucous membranes are moist.   Eyes:      Extraocular Movements: Extraocular movements intact.      Pupils: Pupils are equal, round, and reactive to light.   Cardiovascular:      Rate and Rhythm: Normal rate and regular rhythm.   Pulmonary:      Effort: Pulmonary effort is normal.      Breath sounds: Normal breath sounds.      Comments: Significant tenderness left anterior lower ribs but no crepitus or bruising noted.  Abdominal:      Palpations: Abdomen is soft.      Tenderness: There is no abdominal tenderness.       Comments: Tenderness over the splenic area   Musculoskeletal:         General: No tenderness. Normal range of motion.      Cervical back: Normal range of motion.      Comments: Control skin tear about 2 cm left tricep region   Skin:     General: Skin is warm.      Capillary Refill: Capillary refill takes less than 2 seconds.   Neurological:      Mental Status: He is alert.      Sensory: No sensory deficit.      Motor: No weakness.           ED Course   Labs Reviewed - No data to display  EKG    Rate, intervals and axes as noted on EKG Report.  Rate: 67  Rhythm: Sinus Rhythm  Reading: EKG normal sinus rhythm and rate.  Normal axis and intervals.  Normal ST segments.  This EKG was interpreted by me.  Normal                       MDM      CT chest independently interpreted by me shows no acute process        Medical Decision Making  Patient here with mechanical fall with    Amount and/or Complexity of Data Reviewed  Radiology: ordered and independent interpretation performed.     Details: There are pneumothorax on CT  Discussion of management or test interpretation with external provider(s): Patient did well.  Incentive spirometry given.    Risk  Prescription drug management.        Disposition and Plan     Clinical Impression:  1. Contusion of rib on left side, initial encounter    2. Abrasion         Disposition:  Discharge  3/8/2025  5:37 am    Follow-up:  Rao Beyer MD  [address] [place] [address]  [address]  [facility] [address]  [phone]    Follow up      We recommend that you schedule follow up care with a primary care provider within the next three months to obtain basic health screening including reassessment of your blood pressure.      Medications Prescribed:  Discharge Medication List as of 3/8/2025  5:44 AM        START taking these medications    Details   acetaminophen-codeine 300-30 MG Oral Tab Take 1-2 tablets by mouth every 6 (six) hours as needed for Pain., Normal, Disp-14 tablet, R-0                  Supplementary Documentation:

## 2025-03-08 NOTE — DISCHARGE INSTRUCTIONS
Your primary doctor for reevaluation Monday.  Return for changes or worsening.  Read all instructions.  Tylenol 3 for pain but no driving while taking this medication.  Use the incentive spirometer to expand your lungs 4-5 times a day.

## 2025-03-10 LAB
ATRIAL RATE: 67 BPM
P AXIS: 52 DEGREES
P-R INTERVAL: 182 MS
Q-T INTERVAL: 396 MS
QRS DURATION: 102 MS
QTC CALCULATION (BEZET): 418 MS
R AXIS: 27 DEGREES
T AXIS: 61 DEGREES
VENTRICULAR RATE: 67 BPM

## 2025-03-26 ENCOUNTER — HOSPITAL ENCOUNTER (OUTPATIENT)
Facility: HOSPITAL | Age: 85
Setting detail: OBSERVATION
LOS: 1 days | Discharge: HOME | End: 2025-03-27
Attending: EMERGENCY MEDICINE | Admitting: HOSPITALIST
Payer: MEDICARE

## 2025-03-26 ENCOUNTER — APPOINTMENT (OUTPATIENT)
Dept: CT IMAGING | Facility: HOSPITAL | Age: 85
End: 2025-03-26
Attending: EMERGENCY MEDICINE
Payer: MEDICARE

## 2025-03-26 ENCOUNTER — APPOINTMENT (OUTPATIENT)
Dept: GENERAL RADIOLOGY | Facility: HOSPITAL | Age: 85
End: 2025-03-26
Attending: EMERGENCY MEDICINE
Payer: MEDICARE

## 2025-03-26 DIAGNOSIS — A41.9 SEPSIS DUE TO UNDETERMINED ORGANISM (HCC): Primary | ICD-10-CM

## 2025-03-26 DIAGNOSIS — K52.9 ENTEROCOLITIS: ICD-10-CM

## 2025-03-26 LAB
ALBUMIN SERPL-MCNC: 4.5 G/DL (ref 3.2–4.8)
ALBUMIN/GLOB SERPL: 1.4 {RATIO} (ref 1–2)
ALP LIVER SERPL-CCNC: 114 U/L
ALT SERPL-CCNC: 9 U/L
ANION GAP SERPL CALC-SCNC: 14 MMOL/L (ref 0–18)
APTT PPP: 46.5 SECONDS (ref 23–36)
AST SERPL-CCNC: 21 U/L (ref ?–34)
BASOPHILS # BLD: 0 X10(3) UL (ref 0–0.2)
BASOPHILS NFR BLD: 0 %
BILIRUB SERPL-MCNC: 0.8 MG/DL (ref 0.2–1.1)
BILIRUB UR QL: NEGATIVE
BUN BLD-MCNC: 33 MG/DL (ref 9–23)
BUN/CREAT SERPL: 26.6 (ref 10–20)
CALCIUM BLD-MCNC: 8.9 MG/DL (ref 8.7–10.4)
CHLORIDE SERPL-SCNC: 107 MMOL/L (ref 98–112)
CLARITY UR: CLEAR
CO2 SERPL-SCNC: 19 MMOL/L (ref 21–32)
COLOR UR: YELLOW
CREAT BLD-MCNC: 1.24 MG/DL
DEPRECATED RDW RBC AUTO: 46.3 FL (ref 35.1–46.3)
EGFRCR SERPLBLD CKD-EPI 2021: 57 ML/MIN/1.73M2 (ref 60–?)
EOSINOPHIL # BLD: 0.19 X10(3) UL (ref 0–0.7)
EOSINOPHIL NFR BLD: 1 %
ERYTHROCYTE [DISTWIDTH] IN BLOOD BY AUTOMATED COUNT: 13.5 % (ref 11–15)
EST. AVERAGE GLUCOSE BLD GHB EST-MCNC: 134 MG/DL (ref 68–126)
GLOBULIN PLAS-MCNC: 3.3 G/DL (ref 2–3.5)
GLUCOSE BLD-MCNC: 184 MG/DL (ref 70–99)
GLUCOSE BLDC GLUCOMTR-MCNC: 124 MG/DL (ref 70–99)
GLUCOSE BLDC GLUCOMTR-MCNC: 125 MG/DL (ref 70–99)
GLUCOSE BLDC GLUCOMTR-MCNC: 151 MG/DL (ref 70–99)
GLUCOSE UR-MCNC: NORMAL MG/DL
HBA1C MFR BLD: 6.3 % (ref ?–5.7)
HCT VFR BLD AUTO: 38.1 %
HGB BLD-MCNC: 13.2 G/DL
HYALINE CASTS #/AREA URNS AUTO: PRESENT /LPF
INR BLD: 1.16 (ref 0.8–1.2)
KETONES UR-MCNC: NEGATIVE MG/DL
LACTATE SERPL-SCNC: 1.9 MMOL/L (ref 0.5–2)
LACTATE SERPL-SCNC: 2.9 MMOL/L (ref 0.5–2)
LEUKOCYTE ESTERASE UR QL STRIP.AUTO: NEGATIVE
LYMPHOCYTES NFR BLD: 12 %
LYMPHOCYTES NFR BLD: 2.29 X10(3) UL (ref 1–4)
MCH RBC QN AUTO: 32.6 PG (ref 26–34)
MCHC RBC AUTO-ENTMCNC: 34.6 G/DL (ref 31–37)
MCV RBC AUTO: 94.1 FL
MONOCYTES # BLD: 1.53 X10(3) UL (ref 0.1–1)
MONOCYTES NFR BLD: 8 %
MORPHOLOGY: NORMAL
NEUTROPHILS # BLD AUTO: 14.44 X10 (3) UL (ref 1.5–7.7)
NEUTROPHILS NFR BLD: 78 %
NEUTS BAND NFR BLD: 1 %
NEUTS HYPERSEG # BLD: 15.09 X10(3) UL (ref 1.5–7.7)
NITRITE UR QL STRIP.AUTO: NEGATIVE
OSMOLALITY SERPL CALC.SUM OF ELEC: 302 MOSM/KG (ref 275–295)
PH UR: 5.5 [PH] (ref 5–8)
PLATELET # BLD AUTO: 196 10(3)UL (ref 150–450)
PLATELET MORPHOLOGY: NORMAL
POTASSIUM SERPL-SCNC: 4.3 MMOL/L (ref 3.5–5.1)
PROT SERPL-MCNC: 7.8 G/DL (ref 5.7–8.2)
PROT UR-MCNC: 100 MG/DL
PROTHROMBIN TIME: 15.5 SECONDS (ref 11.6–14.8)
RBC # BLD AUTO: 4.05 X10(6)UL
SODIUM SERPL-SCNC: 140 MMOL/L (ref 136–145)
SP GR UR STRIP: 1.03 (ref 1–1.03)
TOTAL CELLS COUNTED BLD: 100
UROBILINOGEN UR STRIP-ACNC: NORMAL
WBC # BLD AUTO: 19.1 X10(3) UL (ref 4–11)

## 2025-03-26 PROCEDURE — 85610 PROTHROMBIN TIME: CPT | Performed by: EMERGENCY MEDICINE

## 2025-03-26 PROCEDURE — 94660 CPAP INITIATION&MGMT: CPT

## 2025-03-26 PROCEDURE — 83605 ASSAY OF LACTIC ACID: CPT | Performed by: EMERGENCY MEDICINE

## 2025-03-26 PROCEDURE — 87507 IADNA-DNA/RNA PROBE TQ 12-25: CPT | Performed by: HOSPITALIST

## 2025-03-26 PROCEDURE — 85025 COMPLETE CBC W/AUTO DIFF WBC: CPT | Performed by: EMERGENCY MEDICINE

## 2025-03-26 PROCEDURE — 82962 GLUCOSE BLOOD TEST: CPT

## 2025-03-26 PROCEDURE — 94760 N-INVAS EAR/PLS OXIMETRY 1: CPT

## 2025-03-26 PROCEDURE — 96365 THER/PROPH/DIAG IV INF INIT: CPT

## 2025-03-26 PROCEDURE — 80053 COMPREHEN METABOLIC PANEL: CPT | Performed by: EMERGENCY MEDICINE

## 2025-03-26 PROCEDURE — 99285 EMERGENCY DEPT VISIT HI MDM: CPT

## 2025-03-26 PROCEDURE — 96372 THER/PROPH/DIAG INJ SC/IM: CPT

## 2025-03-26 PROCEDURE — 85007 BL SMEAR W/DIFF WBC COUNT: CPT | Performed by: EMERGENCY MEDICINE

## 2025-03-26 PROCEDURE — 87493 C DIFF AMPLIFIED PROBE: CPT | Performed by: HOSPITALIST

## 2025-03-26 PROCEDURE — 71045 X-RAY EXAM CHEST 1 VIEW: CPT | Performed by: EMERGENCY MEDICINE

## 2025-03-26 PROCEDURE — 74177 CT ABD & PELVIS W/CONTRAST: CPT | Performed by: EMERGENCY MEDICINE

## 2025-03-26 PROCEDURE — 87040 BLOOD CULTURE FOR BACTERIA: CPT | Performed by: EMERGENCY MEDICINE

## 2025-03-26 PROCEDURE — 99291 CRITICAL CARE FIRST HOUR: CPT

## 2025-03-26 PROCEDURE — 81001 URINALYSIS AUTO W/SCOPE: CPT | Performed by: EMERGENCY MEDICINE

## 2025-03-26 PROCEDURE — 85027 COMPLETE CBC AUTOMATED: CPT | Performed by: EMERGENCY MEDICINE

## 2025-03-26 PROCEDURE — 85730 THROMBOPLASTIN TIME PARTIAL: CPT | Performed by: EMERGENCY MEDICINE

## 2025-03-26 PROCEDURE — 96367 TX/PROPH/DG ADDL SEQ IV INF: CPT

## 2025-03-26 PROCEDURE — 83036 HEMOGLOBIN GLYCOSYLATED A1C: CPT | Performed by: HOSPITALIST

## 2025-03-26 PROCEDURE — 36415 COLL VENOUS BLD VENIPUNCTURE: CPT

## 2025-03-26 RX ORDER — NICOTINE POLACRILEX 4 MG
30 LOZENGE BUCCAL
Status: DISCONTINUED | OUTPATIENT
Start: 2025-03-26 | End: 2025-03-27

## 2025-03-26 RX ORDER — ACETAMINOPHEN 500 MG
500 TABLET ORAL EVERY 4 HOURS PRN
Status: DISCONTINUED | OUTPATIENT
Start: 2025-03-26 | End: 2025-03-27

## 2025-03-26 RX ORDER — MECOBALAMIN 5000 MCG
5000 TABLET,DISINTEGRATING ORAL EVERY MORNING
COMMUNITY

## 2025-03-26 RX ORDER — DEXTROSE MONOHYDRATE 25 G/50ML
50 INJECTION, SOLUTION INTRAVENOUS
Status: DISCONTINUED | OUTPATIENT
Start: 2025-03-26 | End: 2025-03-27

## 2025-03-26 RX ORDER — POLYETHYLENE GLYCOL 3350 17 G/17G
17 POWDER, FOR SOLUTION ORAL DAILY
COMMUNITY

## 2025-03-26 RX ORDER — METOPROLOL SUCCINATE 100 MG/1
100 TABLET, EXTENDED RELEASE ORAL DAILY
Status: DISCONTINUED | OUTPATIENT
Start: 2025-03-26 | End: 2025-03-27

## 2025-03-26 RX ORDER — VANCOMYCIN HYDROCHLORIDE 125 MG/1
125 CAPSULE ORAL 4 TIMES DAILY
Status: DISCONTINUED | OUTPATIENT
Start: 2025-03-26 | End: 2025-03-27

## 2025-03-26 RX ORDER — ASPIRIN 81 MG/1
81 TABLET ORAL NIGHTLY
COMMUNITY

## 2025-03-26 RX ORDER — ACETAMINOPHEN 650 MG/1
SUPPOSITORY RECTAL
Status: COMPLETED
Start: 2025-03-26 | End: 2025-03-26

## 2025-03-26 RX ORDER — NICOTINE POLACRILEX 4 MG
15 LOZENGE BUCCAL
Status: DISCONTINUED | OUTPATIENT
Start: 2025-03-26 | End: 2025-03-27

## 2025-03-26 RX ORDER — METRONIDAZOLE 500 MG/100ML
500 INJECTION, SOLUTION INTRAVENOUS ONCE
Status: COMPLETED | OUTPATIENT
Start: 2025-03-26 | End: 2025-03-26

## 2025-03-26 RX ORDER — ONDANSETRON 2 MG/ML
4 INJECTION INTRAMUSCULAR; INTRAVENOUS EVERY 6 HOURS PRN
Status: DISCONTINUED | OUTPATIENT
Start: 2025-03-26 | End: 2025-03-27

## 2025-03-26 RX ORDER — PANTOPRAZOLE SODIUM 20 MG/1
20 TABLET, DELAYED RELEASE ORAL
Status: DISCONTINUED | OUTPATIENT
Start: 2025-03-27 | End: 2025-03-27

## 2025-03-26 RX ORDER — HEPARIN SODIUM 5000 [USP'U]/ML
5000 INJECTION, SOLUTION INTRAVENOUS; SUBCUTANEOUS EVERY 8 HOURS SCHEDULED
Status: DISCONTINUED | OUTPATIENT
Start: 2025-03-26 | End: 2025-03-27

## 2025-03-26 RX ORDER — ASPIRIN 81 MG/1
81 TABLET ORAL NIGHTLY
Status: DISCONTINUED | OUTPATIENT
Start: 2025-03-26 | End: 2025-03-27

## 2025-03-26 RX ORDER — ATORVASTATIN CALCIUM 20 MG/1
20 TABLET, FILM COATED ORAL NIGHTLY
Status: DISCONTINUED | OUTPATIENT
Start: 2025-03-26 | End: 2025-03-27

## 2025-03-26 RX ORDER — AMLODIPINE BESYLATE 10 MG/1
10 TABLET ORAL DAILY
Status: DISCONTINUED | OUTPATIENT
Start: 2025-03-26 | End: 2025-03-27

## 2025-03-26 RX ORDER — ACETAMINOPHEN 650 MG/1
650 SUPPOSITORY RECTAL ONCE
Status: COMPLETED | OUTPATIENT
Start: 2025-03-26 | End: 2025-03-26

## 2025-03-26 RX ORDER — SODIUM CHLORIDE 9 MG/ML
INJECTION, SOLUTION INTRAVENOUS CONTINUOUS
Status: DISCONTINUED | OUTPATIENT
Start: 2025-03-26 | End: 2025-03-27

## 2025-03-26 RX ORDER — METOCLOPRAMIDE HYDROCHLORIDE 5 MG/ML
5 INJECTION INTRAMUSCULAR; INTRAVENOUS EVERY 8 HOURS PRN
Status: DISCONTINUED | OUTPATIENT
Start: 2025-03-26 | End: 2025-03-27

## 2025-03-26 RX ORDER — VIT C/B6/B5/MAGNESIUM/HERB 173 50-5-6-5MG
1 CAPSULE ORAL DAILY
COMMUNITY

## 2025-03-26 RX ORDER — ACETAMINOPHEN 325 MG/1
650 TABLET ORAL ONCE
Status: DISCONTINUED | OUTPATIENT
Start: 2025-03-26 | End: 2025-03-26

## 2025-03-26 RX ORDER — METRONIDAZOLE 500 MG/100ML
500 INJECTION, SOLUTION INTRAVENOUS EVERY 12 HOURS
Status: DISCONTINUED | OUTPATIENT
Start: 2025-03-27 | End: 2025-03-27

## 2025-03-26 NOTE — ED PROVIDER NOTES
Patient Seen in: [facility] Emergency Department      History     Chief Complaint   Patient presents with    Fever     Stated Complaint: sepsis, ams    Subjective:   HPI          Objective:     Past Medical History:    Abdominal aortic aneurysm (AAA) 3.0 cm to 5.0 cm in diameter in female    CAD involving native coronary artery of native heart without angina pectoris    Cardiac LV ejection fraction 50-55% per 2018 angio     Centrilobular emphysema (HCC)    Coronary atherosclerosis    COVID    Diabetes mellitus (HCC)    Gastric ulcer    Healed 2009    GOUT    HYPERLIPIDEMIA    HYPERTENSION    OBESITY    OSTEOARTHRITIS    osteoarthritis knees    Presence of drug coated stent in prox & Mid LAD coronary artery 03/19/2018    2.75 x 15 mm Xcience drug-eluting stent into the mid LAD 3.5 x 12 mm Xcience drug-eluting stent into the proximal LAD    Pulmonary emphysema (HCC)    SLEEP APNEA    [facility] fax [phone]              Past Surgical History:   Procedure Laterality Date    Cath bare metal stent (bms)      Colonoscopy  2009= Declines repeat    2009, complicated by anal fissure    Colonoscopy      Colonoscopy  03/2022    one small TA, int hem, can consider d/cing surveillance    Colonoscopy N/A 02/28/2022    Procedure: COLONOSCOPY,  with polypectomy;  Surgeon: Anthony Patel MD;  Location: [facility]    Hemorrhoidectomy  Dr Kamara 3/12/10 [facility]     Rectal exam under anesthesia/anoscopy. Incision and drainage of perirectal abscess. Placement of seton . Excision of anal tags. Destruction of internal hemorrhoids.  Surgery done at [facility] on 3/12/10.    Other surgical history      left knee arthroscopy    Patient documented not to have experienced any of the following events N/A 02/18/2015    Procedure: ESOPHAGOGASTRODUODENOSCOPY, POSSIBLE BIOPSY, POSSIBLE POLYPECTOMY 35174;  Surgeon: Musa Cook MD;  Location: [facility]    Patient withough preoperative order for iv antibiotic  surgical site infection prophylaxis. N/A 02/18/2015    Procedure: ESOPHAGOGASTRODUODENOSCOPY, POSSIBLE BIOPSY, POSSIBLE POLYPECTOMY 03164;  Surgeon: Musa Cook MD;  Location: [facility]    Upper gi endoscopy performed  2008, 2/27/2009    Upper gi endoscopy,biopsy  2/18/15=Gastritis.  H pylori negative, normal SB Bx    Upper gi endoscopy,biopsy N/A 02/18/2015    Procedure: ESOPHAGOGASTRODUODENOSCOPY, POSSIBLE BIOPSY, POSSIBLE POLYPECTOMY 55188;  Surgeon: Musa Cook MD;  Location: [facility]    Upper gi endoscopy,exam                  Social History     Socioeconomic History    Marital status:    Tobacco Use    Smoking status: Former     Current packs/day: 0.00     Average packs/day: 1 pack/day for 40.0 years (40.0 ttl pk-yrs)     Types: Cigarettes     Start date: 6/1/1969     Quit date: 6/1/2009     Years since quitting: 15.8    Smokeless tobacco: Never    Tobacco comments:     has been a 3 ppd   Vaping Use    Vaping status: Never Used   Substance and Sexual Activity    Alcohol use: Yes     Comment: occasional intake    Drug use: No     Social Drivers of Health     Food Insecurity: No Food Insecurity (3/2/2024)    Food Insecurity     Food Insecurity: Never true   Transportation Needs: No Transportation Needs (3/2/2024)    Transportation Needs     Lack of Transportation: No   Housing Stability: Low Risk  (3/2/2024)    Housing Stability     Housing Instability: No                  Physical Exam     ED Triage Vitals   BP 03/26/25 1110 113/60   Pulse 03/26/25 1100 106   Resp 03/26/25 1110 (!) 27   Temp 03/26/25 1100 (!) 103.3 °F (39.6 °C)   Temp src 03/26/25 1100 Rectal   SpO2 03/26/25 1100 95 %   O2 Device 03/26/25 1115 Nasal cannula       Current Vitals:   Vital Signs  BP: 118/30  Pulse: 76  Resp: 20  Temp: 99.9 °F (37.7 °C)  Temp src: Oral  MAP (mmHg): (!) 53    Oxygen Therapy  SpO2: 94 %  O2 Device: None (Room air)  O2 Flow Rate (L/min): 2 L/min        Physical Exam         ED Course     Labs Reviewed   COMP METABOLIC PANEL (14) - Abnormal; Notable for the following components:       Result Value    Glucose 184 (*)     CO2 19.0 (*)     BUN 33 (*)     BUN/CREA Ratio 26.6 (*)     Calculated Osmolality 302 (*)     eGFR-Cr 57 (*)     ALT 9 (*)     All other components within normal limits   CBC WITH DIFFERENTIAL WITH PLATELET - Abnormal; Notable for the following components:    WBC 19.1 (*)     HCT 38.1 (*)     Neutrophil Absolute Prelim 14.44 (*)     All other components within normal limits   URINALYSIS WITH CULTURE REFLEX - Abnormal; Notable for the following components:    Blood Urine Trace (*)     Protein Urine 100 (*)     Squamous Epi. Cells Few (*)     Hyaline Casts Present (*)     All other components within normal limits   PROTHROMBIN TIME (PT) - Abnormal; Notable for the following components:    PT 15.5 (*)     All other components within normal limits   PTT, ACTIVATED - Abnormal; Notable for the following components:    PTT 46.5 (*)     All other components within normal limits   LACTIC ACID, PLASMA - Abnormal; Notable for the following components:    Lactic Acid 2.9 (*)     All other components within normal limits   MANUAL DIFFERENTIAL - Abnormal; Notable for the following components:    Neutrophil Absolute Manual 15.09 (*)     Monocyte Absolute Manual 1.53 (*)     All other components within normal limits   LACTIC ACID REFLEX POST POSTIVE   BLOOD CULTURE   BLOOD CULTURE                   MDM      85-year-old male history of CAD, emphysema, diabetes, hypertension, hyperlipidemia, and who was admitted last year for treatment of proctocolitis complicated by perirectal abscess not requiring surgery presents today with fever and altered mental status.  Per his grandson, who provides a history, the patient started acting confused yesterday.  Also noted to have nonbloody diarrhea and some abdominal bloating.  No fever was noticed at the time.  They deny headache, neck pain,  cough, shortness of breath, dysuria, or leg swelling.    On exam, febrile at 39.6, slightly tachycardic, somewhat confused but answering questions and following commands.  No respiratory distress but requiring some supplemental oxygen, clear lungs, abdomen relatively soft with mild generalized tenderness, no significant extremity edema    Differential: Sepsis, concern for intra-abdominal source given his history    Patient evaluated with labs including empiric cultures, lactate, IV fluids, and broad-spectrum antibiotics    Labs show lactic acidosis with leukocytosis.  Urinalysis negative.  I independently reviewed the patient's chest x-ray. No clear evidence of consolidation or pneumothorax.    CT abdomen pelvis showed enterocolitis and abdominal aortic aneurysm    Patient had improved symptoms on reexamination and appeared well  Will admit for further management    I spent a total of 35 minutes of critical care time in obtaining history, performing a physical exam, bedside monitoring of interventions, collecting and interpreting tests and discussion with consultants but not including time spent performing procedures.    Admission disposition: 3/26/2025  1:37 PM           Medical Decision Making      Disposition and Plan     Clinical Impression:  1. Sepsis due to undetermined organism (HCC)    2. Enterocolitis         Disposition:  Admit  3/26/2025  1:37 pm    Follow-up:  No follow-up provider specified.  We recommend that you schedule follow up care with a primary care provider within the next three months to obtain basic health screening including reassessment of your blood pressure.      Medications Prescribed:  Current Discharge Medication List              Supplementary Documentation:     [facility]  [facility] of [facility]      Sepsis Reassessment Note    /54   Pulse 102   Temp 99.9 °F (37.7 °C) (Oral)   Resp 20   Ht 180.3 cm (5' 11\")   Wt 113.4 kg   SpO2 95%   BMI 34.87 kg/m²       I completed the sepsis reassessment at 1335      Gabriel Dow MD  3/26/2025  12:52 PM             Hospital Problems       Present on Admission  Date Reviewed: 2/7/2025            ICD-10-CM Noted POA    Sepsis (HCC) A41.9 3/26/2025 Unknown                             Sepsis dx documented at 3/26/2025  1:37 PM

## 2025-03-26 NOTE — PLAN OF CARE
Problem: Patient Centered Care  Goal: Patient preferences are identified and integrated in the patient's plan of care  Description: Interventions:- What would you like us to know as we care for you? From home with spouse  - Provide timely, complete, and accurate information to patient/family- Incorporate patient and family knowledge, values, beliefs, and cultural backgrounds into the planning and delivery of care- Encourage patient/family to participate in care and decision-making at the level they choose- Honor patient and family perspectives and choices  Outcome: Progressing     Problem: GASTROINTESTINAL - ADULT  Goal: Maintains or returns to baseline bowel function  Description: INTERVENTIONS:- Assess bowel function- Maintain adequate hydration with IV or PO as ordered and tolerated- Evaluate effectiveness of GI medications- Encourage mobilization and activity- Obtain nutritional consult as needed- Establish a toileting routine/schedule- Consider collaborating with pharmacy to review patient's medication profile  Outcome: Progressing     Problem: METABOLIC/FLUID AND ELECTROLYTES - ADULT  Goal: Glucose maintained within prescribed range  Description: INTERVENTIONS:- Monitor Blood Glucose as ordered- Assess for signs and symptoms of hyperglycemia and hypoglycemia- Administer ordered medications to maintain glucose within target range- Assess barriers to adequate nutritional intake and initiate nutrition consult as needed- Instruct patient on self management of diabetes  Outcome: Progressing  Goal: Electrolytes maintained within normal limits  Description: INTERVENTIONS:- Monitor labs and rhythm and assess patient for signs and symptoms of electrolyte imbalances- Administer electrolyte replacement as ordered- Monitor response to electrolyte replacements, including rhythm and repeat lab results as appropriate- Fluid restriction as ordered- Instruct patient on fluid and nutrition restrictions as  appropriate  Outcome: Progressing  Goal: Hemodynamic stability and optimal renal function maintained  Description: INTERVENTIONS:- Monitor labs and assess for signs and symptoms of volume excess or deficit- Monitor intake, output and patient weight- Monitor urine specific gravity, serum osmolarity and serum sodium as indicated or ordered- Monitor response to interventions for patient's volume status, including labs, urine output, blood pressure (other measures as available)- Encourage oral intake as appropriate- Instruct patient on fluid and nutrition restrictions as appropriate  Outcome: Progressing

## 2025-03-26 NOTE — ED QUICK NOTES
Orders for admission, patient is aware of plan and ready to go upstairs. Any questions, please call ED RN Jurate at extension [phone].     Patient Covid vaccination status: Fully vaccinated     COVID Test Ordered in ED: None    COVID Suspicion at Admission: N/A    Running Infusions:      Mental Status/LOC at time of transport: axox4    Other pertinent information:   CIWA score: N/A   NIH score:  N/A

## 2025-03-26 NOTE — ED QUICK NOTES
Patient stable for transfer to floor at this time. A&Ox4, skin p/w/d. Denies cp/pradeep. Pain managed. Iv site patent and intact. All belongings accompanying patient to floor.

## 2025-03-26 NOTE — CONSULTS
REFERRING PHYSICIAN: Dr. Candelario ref. provider found    HPI:         Thank you very much for requesting me to see the patient. Hx per chart and pt.     As you know, Raul Freed Jr. is a 85 year old male with history of multiple large perirectal abscesses requiring IR drainage 3/2024, h/o C diff colitis, CAD, emphysema, DEANGELO, DM, HTN -- who presented to ER today with complaints of fever (103.3 in ER) and altered mental status x 1 day, non-bloody diarrhea. Pt denies abdominal pain now. Relates was in his usual state of health until present illness. No recent abx exposure. Ros neg otherwise.             PMH: AAA; COPD; CAD/LAD ETELVINA 2018; COVID -19 infection; DM; gastric ulcer 2009; gout; dyslipidemia; HTN; obesity; OA; sleep apnea;     PMH-GI:      2/28/2022 Colonoscopy -- for hematochezia work up showed: 1.) The quality of the prep was fair. 2.) A 4 mm sessile polyp was found in the transverse and was removed -- - 3.) The colonic mucosa was otherwise normal appearing throughout the colon and rectum. 4.)  - - small internal hemorrhoids - -.\" Path: tubular adenoma    ___________________________________     1/27/2024 --CT-abdomen-pelvis:  1. Imaging findings suggesting severe proctocolitis with complex-appearing left lateral fluid and gas collection [2.1 x 3.1 x 4.7 cm.] concerning for associated abscess formation. 2. Fluid-filled loops of small and large bowel, which could reflect additional underlying enterocolitis or malabsorption in the appropriate clinical setting. 3. Reactive ileus or partial large bowel obstruction. 4. Infrarenal abdominal aortic aneurysm measuring up to 5.0 cm maximally. 5. Mild prostatomegaly. 6. Large gas and debris-filled duodenal diverticulum.      2/8/2024 -- CT-abdomen-pelvis: 1. Severe proctocolitis with substantial interval worsening, with interval development of a large intramural rectal abscess [3.1 x 14.1 x 9.3 cm.].   2. Additional left posterolateral [1.9 x 4.2 x 2.2 cm ] and posterior  [1.7 x 2.5 x 2.6 cm ] peripherally enhancing fluid collections are seen, suggesting potential communicating or independent abscesses. 3. A heavy stool burden is seen throughout the more proximal colon, and there may be some degree of partial large bowel obstruction.       2/13/2024 - sigmoidoscopy to 40 cm -- \"The visualized colonic mucosa was normal with the exception of a couple of diverticular openings in the sigmoid colon and 2 serpiginous white-based 1 cm ulcers just above the dentate line in the distal rectum.  The rest of the mucosa appeared normal.  There is no colitis is seen elsewhere albeit with poor visualization overall.  We saw no evidence of swelling or fistula.  The ulcers were biopsied.  There was no evidence of pseudomembranes seen.\" Path: Ulcerated rectal mucosa with extensive acute and chronic inflammation and reactive changes. No granulomas, dysplasia, malignancy or changes associated with chronicity identified.     The above findings on this very limited examination are less suggestive of stercoral ulceration.  Could be due to Crohn's disease.  We saw no fistulous openings nor any difficulty distending the rectum nor any suggestion of intramural fluid collections.  Will complete his treatment for C. Difficile. Await pathology and stain for CMV. Will need short interval repeat imaging of the rectal region.  When stable for a more complete colonoscopy this will be important.\"     2/12/2024 -- MRI pelvis: Severely artifactually degraded examination. Within these parameters: 1. Extensive rectal edema with fluid collections suggesting intramural abscesses, possibly with intersphincteric fistula formation. These are difficult to characterize given the substantial motion artifact.     2/14/2024 --CT-abdomen-pelvis: 1. Persistent findings of proctitis with decrease in conspicuity of peripherally enhancing fluid collections previously noted along the left lateral and posterior aspects of the anorectal  junction suggesting resolving abscesses.  Note that the larger crescentic intramural fluid collection questioned on the previous CT is no longer seen with certainty.  2. Uncomplicated periampullary and distal colonic diverticulosis. 3. Stable 5.0 cm fusiform infrarenal abdominal aortic aneurysm.     3/2/2024 --CT-abdomen-pelvis: Diffuse proctitis, improved but not resolved since 2/14/2024.  No abscess.          3/2/2024 -- consultation -- Dr. Ene Graham -- \"Reason for consultation: perirectal abscess. HPI: Patient is a 84 year old male with hx of CAD with stents, DM2, HTN, HLP, PUD, Gout, pulmonary emphysema, OA, DEANGELO who was admitted to the hospital for Perirectal abscess and confusion after recent admission for proctocolitis with perianal abscess discharged on invanz.  Patient had CT, MRI and flex to determine exactly what was going on, based on these results it is felt that the patient had a intrarectal fluid collection likely to have originated from deep ulcerations in the distal rectum near the dentate line there was no evidence of colitis on imaging or endoscopy.  Etiologies included Crohn's disease, stercoral ulceration and a somewhat atypical location and on versus unusual location for ischemic injury.  Patient was switched from Invanz to Zosyn and will complete a 2-week course and will need repeat CT scan of the rectum as well as outpatient colonoscopy.  During his hospital course patient was also found to be C. difficile positive and on vanc and had issues with metabolic encephalopathy attributed to infection currently on Seroquel of note patient does have underlying cognitive issues.      CT with possibly several communicating abscesses and initial intramural abscess worsening.  MRI with extensive rectal edema with fluid collection suggesting intramural abscesses, possibly with intersphincteric fistula formation, s/p sigmoidoscopy on 2/13/24, plans for eventual colonoscopy.     Recent imaging on 2/28  showing intramural abscess 5x2x2 cm with perirectal adenopathy (review of imaging over the month) - - -    IMPRESSION: Perirectal abscess - -Since initial imagining in 1/27/2028, improving in inflammation and number of abscess but as long as underlying inflammation does not resolve, always at risk.  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 Ddx also includes chronic infectious etiology - ID on board, no source found on biopsies.  Does not appear to be malignancy.               -based on size, needs IR drainage with culturing of fluid/sensitivities .  -broad spec iv antibiotics.     -bowel regimen to limit constipation or large stool burden.    Anemia - stable, normocytic.               -will get iron studies to assess for iron def likely 2/2 to anemia of chronic disease.\"      3/1/2024 - 3/6/2024 discharge summary: \" - - recent admission for proctocolitis with perianal abscess currently on IV abx who presented with worsening abscess on outpatient CT. Patient was recently admitted 2/8-2/16 and 1/27- 1/30. He completed IV zosyn and had repeat CT 2/28 that showed continued intramural abscess and was sent to the ER for eval.  - - - Hospital Course:  Mr. Freed is a 84 year old male with PMH sig for CAD, DM2, HTN, HL, and recent admission [2/8-2/16/2024 and 1/27- 1/30/2024] for proctocolitis with perianal abscess discharged on IV abx then readmitted for diarrhea and C.diff who presents with concerns for persistent abscess based on CT 2/28 done at outside facility, pt sent in by GI, repeat CT done at [facility] 3/1 shows resolution of abscess and no fluid to be drained per IR and General surgery. IV abx stopped and per GI rec MRI rectal protocol in 1-2 weeks and colonoscopy in 4 weeks. Repeat c-diff negative. Pt to follow up with pcp on 3/8 at 11:30 am, GI office to call family with gi appointment -- \"        07/15/2024 -- Colonoscopy --   Anthony Patel --\"Indication: Proctitis, Rectal abscess. - - Findings: Possible small fistula opening with no drainage and no fluctuance in the perianal area observed during perianal exam Diverticula in the sigmoid colon Two sessile polyps measuring smaller than 5 mm in the transverse colon and sigmoid colon;  --  Internal hemorrhoids. The terminal ileum appeared normal.The entire colon appeared normal.\"  path: random R colon: NORMAL; random L colon = NORMAL. Transverse colon polyp: Sessile serrated polyp. Sigmoid colon polyp = hyperplastic. Rectum, biopsy: Colonic mucosa with focal surface erosion:  \"Histologic sections show colonic mucosa with surface erosion in the absence of active inflammation, lamina propria lymphoplasmacytosis, granulomatous inflammation, or architectural distortion. These findings are nonspecific. Diagnostic considerations include diverticulitis associated changes, transient infection, and bowel preparation. Clinical correlation is required.\"   ___________________________________     3/26/2025 -- CT-abdomen-pelvis: Fluid-filled nondilated small bowel and colon which can suggest an enterocolitis.  Mild circumferential wall thickening of the rectum also seen which also may reflect proctitis.  Advise short-term follow-up colonoscopy if not already recently performed. 4.7 cm infrarenal abdominal aortic aneurysm mildly increased in size.     SOC: quit tob 2009 (40 pack yr hx).  (Danitza Freed); grandson=  Raul Freed.     Fhx:         Past Surgical History:   Procedure Laterality Date    Cath bare metal stent (bms)      Colonoscopy  2009= Declines repeat    2009, complicated by anal fissure    Colonoscopy      Colonoscopy  03/2022    one small TA, int hem, can consider d/cing surveillance    Colonoscopy N/A 02/28/2022    Procedure: COLONOSCOPY,  with polypectomy;  Surgeon: Anthony Patel MD;  Location: [facility], [facility]    Hemorrhoidectomy  Dr Kamara 3/12/10 [facility]     Rectal  exam under anesthesia/anoscopy. Incision and drainage of perirectal abscess. Placement of seton . Excision of anal tags. Destruction of internal hemorrhoids.  Surgery done at [facility] on 3/12/10.    Other surgical history      left knee arthroscopy    Patient documented not to have experienced any of the following events N/A 02/18/2015    Procedure: ESOPHAGOGASTRODUODENOSCOPY, POSSIBLE BIOPSY, POSSIBLE POLYPECTOMY 86384;  Surgeon: Musa Cook MD;  Location: [facility]    Patient withough preoperative order for iv antibiotic surgical site infection prophylaxis. N/A 02/18/2015    Procedure: ESOPHAGOGASTRODUODENOSCOPY, POSSIBLE BIOPSY, POSSIBLE POLYPECTOMY 27397;  Surgeon: Musa Cook MD;  Location: [facility]    Upper gi endoscopy performed  2008, 2/27/2009    Upper gi endoscopy,biopsy  2/18/15=Gastritis.  H pylori negative, normal SB Bx    Upper gi endoscopy,biopsy N/A 02/18/2015    Procedure: ESOPHAGOGASTRODUODENOSCOPY, POSSIBLE BIOPSY, POSSIBLE POLYPECTOMY 64118;  Surgeon: Musa Cook MD;  Location: [facility]    Upper gi endoscopy,exam       Social History     Socioeconomic History    Marital status:    Tobacco Use    Smoking status: Former     Current packs/day: 0.00     Average packs/day: 1 pack/day for 40.0 years (40.0 ttl pk-yrs)     Types: Cigarettes     Start date: 6/1/1969     Quit date: 6/1/2009     Years since quitting: 15.8    Smokeless tobacco: Never    Tobacco comments:     has been a 3 ppd   Vaping Use    Vaping status: Never Used   Substance and Sexual Activity    Alcohol use: Yes     Comment: occasional intake    Drug use: No     Social Drivers of Health     Food Insecurity: No Food Insecurity (3/2/2024)    Food Insecurity     Food Insecurity: Never true   Transportation Needs: No Transportation Needs (3/2/2024)    Transportation Needs     Lack of Transportation: No   Housing Stability: Low Risk  (3/2/2024)    Housing Stability     Housing  Instability: No         No current facility-administered medications for this encounter.       Allergies[1]    A comprehensive 10 point review of systems was completed.  Pertinent positives and negatives noted in the the HPI.    EXAM:  /30   Pulse 76   Temp 99.9 °F (37.7 °C) (Oral)   Resp 20   Ht 5' 11\" (1.803 m)   Wt 250 lb (113.4 kg)   SpO2 94%   BMI 34.87 kg/m²  -Body mass index is 34.87 kg/m².  GENERAL: well developed, well nourished, in no apparent distress  SKIN: no rashes, no suspicious lesions  HEENT: anicteric; no JVD.  NECK: supple, no adenopathy, no bruits  LUNGS: clear to auscultation  CARDIO: RRR, nl s1 and s2. No murmers appreciated.  GI: Soft, NT, ND, normal BS. NO HSM, masses, hernias or bruits.  EXTREMITIES: no cyanosis, clubbing or edema    ___________________________________________________________    ASSESSMENT: In summary this is a 85 year old male with history of multiple large perirectal abscesses requiring IR drainage 3/2024, h/o C diff colitis, CAD, emphysema, DEANGELO, DM, HTN -- who presented to ER today with complaints of fever (103.3 in ER) and altered mental status x 1 day, non-bloody diarrhea.  CT-abdomen-pelvis suggestive of enterocolitis.     ACTIVE ISSUES INCLUDE:     Non-bloody diarrheal illness / fever x 1 day --h/o C diff colitis / inga-rectal abscesses. Presentation and CT-abdomen-pelvis most consistent with infectious enteritis. Presentation seems unrelated to prior episode of inga-rectal abscess.      -- awaiting GI stool panel / C diff.    -- empiric abx / Vanc    -- supportive care.     2. Altered mental status -- suspect related to #1 / volume deletion.     3. H/o  CAD, emphysema / DEANGELO, DM, HTN - per primary service.            The patient indicates understanding of these issues and agrees to the plan. Thank you!       Orlando Diaz M.D.       [facility]  ___________________________________________________________            [1] No Known  Allergies

## 2025-03-26 NOTE — H&P
[facility] Hospitalist H&P       CC:   Chief Complaint   Patient presents with    Fever        PCP: Rao Beyer MD    Date of Admission: 3/26/2025 10:56 AM    ASSESSMENT / PLAN:     Mr. Freed is an 85 year old male with PMH sig for CAD, DM2, HTN, HL, and prior admission for proctocolitis with perianal abscess, C. Diff who presented with AMS and fever.    Sepsis  Fever  Diarrhea  - CT with enterocolitis and proctitis  - started on ceftriaxone and flagyl, will continue  - LA 2.9, IVF bolus, trend LA  - Bcx pending  - check C.diff and GI stool panel   - start empiric PO vanc given hx of C. Diff in the past   - GI consult    Hx perirectal abscess, ?perianal fistula  Proctitis  - treated in March 2024, does have inflammation on current CT  - recommended to have MRI rectal protocol and colonoscopy, did have precancerous polyp but no evidence of Crohn's on colonoscopy 7/2024  - due for repeat MRI rectal protocol this month  - GI consult    Hx. C. Diff  - required long PO vanc taper last year  - check C. Diff  - started empiric PO vanc    AMS  Baseline memory issues   - likely 2/2 infection/fever  - issues with encephalopathy on previous admission       Emphysema  Lung Nodule, RUL  - recent CT chest with 1.6 cm solid nodule posterior RUL  - recommend repeat CT in 3 months       CAD  HLD  Infrarenal abdominal aortic aneurysm measuring up to 5.0 cm.   HTN  - stable on repeat CT  -continue toprol, norvasc and Lipitor  -rec Follow-up imaging outpatient with vascular surgery     DM2- Diet Controlled   -A1C 6.5      DEANGELO  -not compliant with CPAP      GOC  -full code - confirmed with patient  - POA- wife and daughter  - lives with wife     MA/ACO Reach  -Re- Entry: NO  -Consults:   -Discharge     FN:  - IVF: NS  - Diet: diabetic    DVT Prophy: SCD  Lines: PIV    Dispo: pending clinical course    Outpatient records or previous hospital records reviewed.     Further recommendations pending patient's clinical course.  [facility]  hospitalist to continue to follow patient while in house    Patient and/or patient's family given opportunity to ask questions and note understanding and agreeing with therapeutic plan as outlined    Grecia Mc MD  [facility] Hospitalist  Answering Service number: [phone]    HPI       History of Present Illness:    Mr. Freed is an 85 year old male with PMH sig for CAD, DM2, HTN, HL, and prior admission for proctocolitis with perianal abscess, C. Diff who presented with AMS and fever. Patient states he was in his normal state of health until yesterday evening. States he started to have loose watery stools, about 5-6 episodes. Similar to prior admission for C.diff. denied fevers or chills at home but did have a fever in the ER. No cough, abdominal pain, dysuria or other infectious symptoms. No sick contacts. Grandson at bedside.      In the ER, temp 103.3, other vitals stable. CT without abscess, did show enterocolitis.       PMH  Past Medical History:    Abdominal aortic aneurysm (AAA) 3.0 cm to 5.0 cm in diameter in female    CAD involving native coronary artery of native heart without angina pectoris    Cardiac LV ejection fraction 50-55% per 2018 angio     Centrilobular emphysema (HCC)    Coronary atherosclerosis    COVID    Diabetes mellitus (HCC)    Gastric ulcer    Healed 2009    GOUT    HYPERLIPIDEMIA    HYPERTENSION    OBESITY    OSTEOARTHRITIS    osteoarthritis knees    Presence of drug coated stent in prox & Mid LAD coronary artery 03/19/2018    2.75 x 15 mm Xcience drug-eluting stent into the mid LAD 3.5 x 12 mm Xcience drug-eluting stent into the proximal LAD    Pulmonary emphysema (HCC)    SLEEP APNEA    [facility] fax [phone]        PSH  Past Surgical History:   Procedure Laterality Date    Cath bare metal stent (bms)      Colonoscopy  2009= Declines repeat    2009, complicated by anal fissure    Colonoscopy      Colonoscopy  03/2022    one small TA, int hem, can consider d/cing surveillance     Colonoscopy N/A 02/28/2022    Procedure: COLONOSCOPY,  with polypectomy;  Surgeon: Anthony Patel MD;  Location: [facility]    Hemorrhoidectomy  Dr Kamara 3/12/10 [facility]     Rectal exam under anesthesia/anoscopy. Incision and drainage of perirectal abscess. Placement of seton . Excision of anal tags. Destruction of internal hemorrhoids.  Surgery done at [facility] on 3/12/10.    Other surgical history      left knee arthroscopy    Patient documented not to have experienced any of the following events N/A 02/18/2015    Procedure: ESOPHAGOGASTRODUODENOSCOPY, POSSIBLE BIOPSY, POSSIBLE POLYPECTOMY 88137;  Surgeon: Musa Cook MD;  Location: [facility]    Patient withough preoperative order for iv antibiotic surgical site infection prophylaxis. N/A 02/18/2015    Procedure: ESOPHAGOGASTRODUODENOSCOPY, POSSIBLE BIOPSY, POSSIBLE POLYPECTOMY 42390;  Surgeon: Musa Cook MD;  Location: [facility]    Upper gi endoscopy performed  2008, 2/27/2009    Upper gi endoscopy,biopsy  2/18/15=Gastritis.  H pylori negative, normal SB Bx    Upper gi endoscopy,biopsy N/A 02/18/2015    Procedure: ESOPHAGOGASTRODUODENOSCOPY, POSSIBLE BIOPSY, POSSIBLE POLYPECTOMY 50579;  Surgeon: Musa Cook MD;  Location: [facility]    Upper gi endoscopy,exam          ALL:  Allergies[1]     Home Medications:  Medications Taking[2]      Soc Hx  Social History     Tobacco Use    Smoking status: Former     Current packs/day: 0.00     Average packs/day: 1 pack/day for 40.0 years (40.0 ttl pk-yrs)     Types: Cigarettes     Start date: 6/1/1969     Quit date: 6/1/2009     Years since quitting: 15.8    Smokeless tobacco: Never    Tobacco comments:     has been a 3 ppd   Substance Use Topics    Alcohol use: Yes     Comment: occasional intake        Fam Hx  Family History   Problem Relation Age of Onset    Cancer Mother         lung    Obesity Son     Obesity Sister     Lipids Sister     Obesity Son         Review of Systems  Comprehensive ROS reviewed and negative except for what's stated above.     OBJECTIVE:  /66   Pulse 89   Temp 99.9 °F (37.7 °C) (Oral)   Resp 19   Ht 5' 11\" (1.803 m)   Wt 250 lb (113.4 kg)   SpO2 92%   BMI 34.87 kg/m²     GEN: elderly male in NAD  HEENT: EOMI  Pulm: CTAB, no crackles or wheezes  CV: RRR, no murmurs  ABD: Soft, non-tender, +distended, +BS  SKIN: warm, dry  EXT: no edema    Diagnostic Data:    CBC/Chem    Recent Labs   Lab 03/26/25  1119   RBC 4.05   HGB 13.2   HCT 38.1*   MCV 94.1   MCH 32.6   MCHC 34.6   RDW 13.5   NEPRELIM 14.44*   WBC 19.1*   .0         Recent Labs   Lab 03/26/25  1119   *   BUN 33*   CREATSERUM 1.24   EGFRCR 57*   CA 8.9      K 4.3      CO2 19.0*     Lab Results   Component Value Date    WBC 19.1 03/26/2025    HGB 13.2 03/26/2025    HCT 38.1 03/26/2025    .0 03/26/2025    CREATSERUM 1.24 03/26/2025    BUN 33 03/26/2025     03/26/2025    K 4.3 03/26/2025     03/26/2025    CO2 19.0 03/26/2025     03/26/2025    CA 8.9 03/26/2025    ALB 4.5 03/26/2025    ALKPHO 114 03/26/2025    BILT 0.8 03/26/2025    TP 7.8 03/26/2025    AST 21 03/26/2025    ALT 9 03/26/2025    PTT 46.5 03/26/2025    INR 1.16 03/26/2025       No results for input(s): \"TROP\" in the last 168 hours.    Additional Diagnostics:    CXR: image personally reviewed possible fluid overload    Radiology: XR CHEST AP PORTABLE  (CPT=71045)    Result Date: 3/26/2025  CONCLUSION: Slight increased interstitial markings within both lungs which could represent mild/early interstitial edema, an interstitial pneumonitis, or a combination.    Dictated by (CST): Apolinar Aburto MD on 3/26/2025 at 11:43 AM     Finalized by (CST): Apolinar Aburto MD on 3/26/2025 at 11:44 AM                  [1] No Known Allergies  [2]   No outpatient medications have been marked as taking for the 3/26/25 encounter (Hospital Encounter).

## 2025-03-26 NOTE — ED QUICK NOTES
Patient is here with altered mental status with diarrhea since 10pm last night. Patient has history of sepsis with similar symptoms to that.   Patient is tachycardic, ST on EKG, . /82. Patient was saturating 89% on RA. 6L applied, patient is saturating 96%. Patient has fever of 103F. Stroke scale is inconclusive per EMS. Patient is AXOX1 which is abnormal for patient.

## 2025-03-27 VITALS
WEIGHT: 230.31 LBS | RESPIRATION RATE: 18 BRPM | OXYGEN SATURATION: 94 % | BODY MASS INDEX: 32.24 KG/M2 | DIASTOLIC BLOOD PRESSURE: 57 MMHG | HEART RATE: 72 BPM | SYSTOLIC BLOOD PRESSURE: 127 MMHG | TEMPERATURE: 98 F | HEIGHT: 71 IN

## 2025-03-27 LAB
ADENOVIRUS F 40/41 PCR: NEGATIVE
ANION GAP SERPL CALC-SCNC: 9 MMOL/L (ref 0–18)
ASTROVIRUS PCR: NEGATIVE
BASOPHILS # BLD AUTO: 0.01 X10(3) UL (ref 0–0.2)
BASOPHILS NFR BLD AUTO: 0.1 %
BUN BLD-MCNC: 20 MG/DL (ref 9–23)
BUN/CREAT SERPL: 21.7 (ref 10–20)
C CAYETANENSIS DNA SPEC QL NAA+PROBE: NEGATIVE
C DIFF TOX B STL QL: NEGATIVE
CALCIUM BLD-MCNC: 7.7 MG/DL (ref 8.7–10.4)
CAMPY SP DNA.DIARRHEA STL QL NAA+PROBE: NEGATIVE
CHLORIDE SERPL-SCNC: 112 MMOL/L (ref 98–112)
CO2 SERPL-SCNC: 18 MMOL/L (ref 21–32)
CREAT BLD-MCNC: 0.92 MG/DL
CRYPTOSP DNA SPEC QL NAA+PROBE: NEGATIVE
DEPRECATED RDW RBC AUTO: 50.2 FL (ref 35.1–46.3)
EAEC PAA PLAS AGGR+AATA ST NAA+NON-PRB: NEGATIVE
EC STX1+STX2 + H7 FLIC SPEC NAA+PROBE: NEGATIVE
EGFRCR SERPLBLD CKD-EPI 2021: 82 ML/MIN/1.73M2 (ref 60–?)
ENTAMOEBA HISTOLYTICA PCR: NEGATIVE
EOSINOPHIL # BLD AUTO: 0.05 X10(3) UL (ref 0–0.7)
EOSINOPHIL NFR BLD AUTO: 0.4 %
EPEC EAE GENE STL QL NAA+NON-PROBE: NEGATIVE
ERYTHROCYTE [DISTWIDTH] IN BLOOD BY AUTOMATED COUNT: 13.8 % (ref 11–15)
ETEC LTA+ST1A+ST1B TOX ST NAA+NON-PROBE: NEGATIVE
GIARDIA LAMBLIA PCR: NEGATIVE
GLUCOSE BLD-MCNC: 110 MG/DL (ref 70–99)
GLUCOSE BLDC GLUCOMTR-MCNC: 124 MG/DL (ref 70–99)
GLUCOSE BLDC GLUCOMTR-MCNC: 146 MG/DL (ref 70–99)
HCT VFR BLD AUTO: 32.3 %
HGB BLD-MCNC: 10.6 G/DL
IMM GRANULOCYTES # BLD AUTO: 0.32 X10(3) UL (ref 0–1)
IMM GRANULOCYTES NFR BLD: 2.5 %
LYMPHOCYTES # BLD AUTO: 1.12 X10(3) UL (ref 1–4)
LYMPHOCYTES NFR BLD AUTO: 8.8 %
MCH RBC QN AUTO: 32.6 PG (ref 26–34)
MCHC RBC AUTO-ENTMCNC: 32.8 G/DL (ref 31–37)
MCV RBC AUTO: 99.4 FL
MONOCYTES # BLD AUTO: 2.8 X10(3) UL (ref 0.1–1)
MONOCYTES NFR BLD AUTO: 22 %
NEUTROPHILS # BLD AUTO: 8.4 X10 (3) UL (ref 1.5–7.7)
NEUTROPHILS # BLD AUTO: 8.4 X10(3) UL (ref 1.5–7.7)
NEUTROPHILS NFR BLD AUTO: 66.2 %
NOROVIRUS GI/GII PCR: POSITIVE
OSMOLALITY SERPL CALC.SUM OF ELEC: 291 MOSM/KG (ref 275–295)
P SHIGELLOIDES DNA STL QL NAA+PROBE: NEGATIVE
PLATELET # BLD AUTO: 163 10(3)UL (ref 150–450)
POTASSIUM SERPL-SCNC: 3.4 MMOL/L (ref 3.5–5.1)
RBC # BLD AUTO: 3.25 X10(6)UL
ROTAVIRUS A PCR: NEGATIVE
SALMONELLA DNA SPEC QL NAA+PROBE: NEGATIVE
SAPOVIRUS PCR: NEGATIVE
SHIGELLA SP+EIEC IPAH ST NAA+NON-PROBE: NEGATIVE
SODIUM SERPL-SCNC: 139 MMOL/L (ref 136–145)
V CHOLERAE DNA SPEC QL NAA+PROBE: NEGATIVE
VIBRIO DNA SPEC NAA+PROBE: NEGATIVE
WBC # BLD AUTO: 12.7 X10(3) UL (ref 4–11)
YERSINIA DNA SPEC NAA+PROBE: NEGATIVE

## 2025-03-27 PROCEDURE — 96361 HYDRATE IV INFUSION ADD-ON: CPT

## 2025-03-27 PROCEDURE — 96366 THER/PROPH/DIAG IV INF ADDON: CPT

## 2025-03-27 PROCEDURE — 96372 THER/PROPH/DIAG INJ SC/IM: CPT

## 2025-03-27 PROCEDURE — 80048 BASIC METABOLIC PNL TOTAL CA: CPT | Performed by: HOSPITALIST

## 2025-03-27 PROCEDURE — 85060 BLOOD SMEAR INTERPRETATION: CPT | Performed by: HOSPITALIST

## 2025-03-27 PROCEDURE — 82962 GLUCOSE BLOOD TEST: CPT

## 2025-03-27 PROCEDURE — 96376 TX/PRO/DX INJ SAME DRUG ADON: CPT

## 2025-03-27 PROCEDURE — 94760 N-INVAS EAR/PLS OXIMETRY 1: CPT

## 2025-03-27 PROCEDURE — 85025 COMPLETE CBC W/AUTO DIFF WBC: CPT | Performed by: HOSPITALIST

## 2025-03-27 RX ORDER — POTASSIUM CHLORIDE 1500 MG/1
40 TABLET, EXTENDED RELEASE ORAL ONCE
Status: COMPLETED | OUTPATIENT
Start: 2025-03-27 | End: 2025-03-27

## 2025-03-27 NOTE — PROGRESS NOTES
GI  PROGRESS NOTE    SUBJECTIVE: tolerating diet.  No abd pain; 3 loose BM's /day.     OBJECTIVE:  Temp:  [97.9 °F (36.6 °C)-98.8 °F (37.1 °C)] 97.9 °F (36.6 °C)  Pulse:  [72-88] 72  Resp:  [18-27] 18  BP: (108-132)/(30-75) 127/57  SpO2:  [90 %-97 %] 94 %  Exam  Gen: No acute distress, alert and oriented x3  Pulm: Lungs clear, normal respiratory effort  CV: Heart with regular rate and rhythm, no peripheral edema  Abd: Abdomen soft, NT; ND; (+) BS; no organomegaly, bowel sounds present    Labs:     Recent Labs   Lab 03/26/25  1119 03/27/25  0509   WBC 19.1* 12.7*   HGB 13.2 10.6*   MCV 94.1 99.4   .0 163.0   BAND 1  --    INR 1.16  --        Recent Labs   Lab 03/26/25  1119 03/27/25  0509    139   K 4.3 3.4*    112   CO2 19.0* 18.0*   BUN 33* 20   CREATSERUM 1.24 0.92   CA 8.9 7.7*   * 110*       Recent Labs   Lab 03/26/25  1119   ALT 9*   AST 21   ALB 4.5         Meds:      heparin  5,000 Units Subcutaneous Q8H MATT    insulin aspart  1-5 Units Subcutaneous TID CC    vancomycin  125 mg Oral QID    [Held by provider] amLODIPine  10 mg Oral Daily    aspirin  81 mg Oral Nightly    atorvastatin  20 mg Oral Nightly    [Held by provider] metoprolol succinate ER  100 mg Oral Daily    pantoprazole  20 mg Oral QAM AC      sodium chloride 75 mL/hr at 03/27/25 0922       glucose **OR** [DISCONTINUED] glucose **OR** [DISCONTINUED] glucose-vitamin C **OR** [DISCONTINUED] dextrose **OR** [DISCONTINUED] glucose **OR** [DISCONTINUED] glucose **OR** [DISCONTINUED] glucose-vitamin C    acetaminophen    _______________________________________________________________    IMPRESSION: Pt is a 85 year old male with history of multiple large perirectal abscesses requiring IR drainage 3/2024, h/o C diff colitis, CAD, emphysema, DEANGELO, DM, HTN -- who presented to ER today with complaints of fever (103.3 in ER) and altered mental status x 1 day, non-bloody diarrhea.  CT-abdomen-pelvis suggestive of enterocolitis.       ACTIVE ISSUES INCLUDE:      Non-bloody diarrheal illness / fever x 1 day --h/o C diff colitis / inga-rectal abscesses. Presentation and CT-abdomen-pelvis most consistent with infectious enteritis. Presentation seems unrelated to prior episode of inga-rectal abscess.                    -- GI stool panel POSITIVE for Norovirus. C diff negative.                 -- d / c/ empiric abx                 -- supportive care    -- OK to discharge from GI standpoint if tolerating diet.      2. Altered mental status -- suspect related to #1 / volume deletion.      3. H/o  CAD, emphysema / DEANGELO, DM, HTN - per primary service    All questions were answered for pt/ pt's spouse, and dtr-in-law at bedside.     Orlando Diaz M.D.  [facility]   _______________________________________________________________

## 2025-03-27 NOTE — CM/SW NOTE
Patient failed inpatient criteria. Second level of review completed and supports observation.  UR committee in agreement. Discussed with Dr. Simon  who approves observation status.  Observation status and MOON  notice explained to the patient . Verbal acknowledge taken at bedside and given copy . Patient in isolation .Copy placed in chart  Order for observation in place    Jennifer MACDONALD, Utilization Review   Ext [phone]    .

## 2025-03-27 NOTE — DISCHARGE SUMMARY
DM Internal Medicine Discharge Summary   Patient ID:  Raul Freed Jr.  [id number]  85 year old  2/19/1940    Admit date: 3/26/2025    Discharge date and time: 3/27/2025     Attending Physician: Yadiel Simon MD     Primary Care Physician: Rao Beyer MD     Admit Dx: Enterocolitis [K52.9]  Sepsis due to undetermined organism (HCC) [A41.9]      Discharge Diagnosis   Norovirus, hypokalemia     Discharged Condition: stable    Disposition: home    Important follow up: NICOLÁS coordinator to contact daughter in law for PCP f/u and repeat BMP   Rao Beyer MD  [address]  [address]  [facility] [id number] [phone]    Follow up on 3/28/2025  3/28 at 11:30        Please refer to prior H&P on admission date.    Hospital Course:   85 year old male with PMH sig for CAD, DM2, HTN, HL, and prior admission for proctocolitis with perianal abscess, C. Diff who presented with AMS and fever.     Sepsis from norovirus diarrhea   - CT with enterocolitis and proctitis  - started on ceftriaxone and flagyl, will continue  - LA 2.9, IVF bolus, trend LA  - Bcx pending  - check C.diff and GI stool panel + norovirus  - start empiric PO vanc given hx of C. Diff in the past  dc all abx  - GI consult d/w DR Emily ahuja for dc home    hypoK  - GI losses from above- ok for home recheck K w PCP in fu next week      Hx perirectal abscess, ?perianal fistula  Proctitis  - treated in March 2024, does have inflammation on current CT  - recommended to have MRI rectal protocol and colonoscopy, did have precancerous polyp but no evidence of Crohn's on colonoscopy 7/2024  - due for repeat MRI rectal protocol this month  - GI consult     Hx. C. Diff  - required long PO vanc taper last year  - check C. Diff negative   - started empiric PO vanc dc      AMS  Baseline memory issues   - likely 2/2 infection/fever  - issues with encephalopathy on previous admission       Emphysema  Lung Nodule, RUL  - recent CT chest with 1.6 cm solid nodule  posterior RUL  - recommend repeat CT in 3 months       CAD  HLD  Infrarenal abdominal aortic aneurysm measuring up to 5.0 cm.   HTN  - stable on repeat CT  -continue toprol, norvasc and Lipitor  -rec Follow-up imaging outpatient with vascular surgery     DM2- Diet Controlled   -A1C 6.5      DEANGELO  -not compliant with CPAP    Day of discharge Exam   Vitals:    03/27/25 0920   BP: 127/57   Pulse: 72   Resp: 18   Temp: 97.9 °F (36.6 °C)       Exam on day of discharge:  Gen: No acute distress  CV: RRR  Lungs: CTAB, good respiratory effort  Abdomen: s/nt/nd  Neuro: no focal deficits      Discharge meds     Medication List        CONTINUE taking these medications      acetaminophen 500 MG Tabs  Commonly known as: Tylenol Extra Strength     amLODIPine 10 MG Tabs  Commonly known as: Norvasc     aspirin 81 MG Tbec     atorvastatin 20 MG Tabs  Commonly known as: Lipitor     B-12 5000 MCG Tbdp     Magnesium Oxide 250 MG Tabs     melatonin 3 MG Tabs     metoprolol succinate  MG Tb24  Commonly known as: Toprol XL  TAKE 1 TABLET BY MOUTH EVERY DAY     MULTI VITAMIN MENS OR     omega-3 fatty acids 1000 MG Caps  Commonly known as: Fish Oil     omeprazole 20 MG Cpdr  Commonly known as: PriLOSEC     polyethylene glycol (PEG 3350) 17 g Pack  Commonly known as: Miralax     QC Tumeric Complex 500 MG Caps  Generic drug: Turmeric              Consults: IP CONSULT TO GASTROENTEROLOGY    Radiology: CT ABDOMEN+PELVIS(CONTRAST ONLY)(CPT=74177)    Result Date: 3/26/2025  PROCEDURE: CT ABDOMEN + PELVIS (CONTRAST ONLY) (CPT=74177)  COMPARISON: [facility], CT ABDOMEN + PELVIS (CONTRAST ONLY) (CPT=74177), 3/02/2024, 12:57 PM.  [facility], CT ABDOMEN + PELVIS (CONTRAST ONLY) (CPT=74177), 2/14/2024, 9:03 AM.  [facility], CT ABDOMEN + PELVIS (CONTRAST ONLY) (CPT=74177), 2/08/2024, 11:41 AM.  INDICATIONS: sepsis, bloating, abd pain  TECHNIQUE: CT images of the abdomen and pelvis were obtained with  non-ionic intravenous contrast material.  Automated exposure control for dose reduction was used. Adjustment of the mA and/or kV was done based on the patient's size. Use of iterative reconstruction technique for dose reduction was used.  Dose information is transmitted to the ACR (American College of Radiology) NRDR (National Radiology Data Registry) which includes the Dose Index Registry.  FINDINGS:  LUNG BASES: No focal consolidation. LIVER: No enlargement, atrophy, abnormal density, or significant focal lesion.  SPLEEN: No enlargement or focal lesion.  GALLBLADDER: No cholelithiasis. PANCREAS: No lesion, fluid collection, ductal dilatation, or atrophy.  ADRENALS: No defined mass or abnormal enlargement.  KIDNEYS: Enhance symmetrically without hydronephrosis.  Probable bilateral renal cysts noted. AORTA/VASCULAR:   4.7 cm infrarenal abdominal aortic aneurysm with mild peripheral eccentric mural thrombus mildly increased in size. ABDOMINAL NODES: No mass or adenopathy.  BOWEL/MESENTERY:  No dilated bowel.  Nondilated fluid-filled small bowel.  Fluid-filled colon also noted.  Mild circumferential rectal wall thickening.  Scattered colonic diverticulosis.  No pneumatosis or pneumoperitoneum.  Duodenal large diverticulum again noted. ABDOMINAL WALL: Unremarkable. URINARY BLADDER: No focal wall thickening or calculus.  PELVIC NODES: No adenopathy.   PELVIC ORGANS: Prominent prostate. BONES:   Spondylosis lower thoracic and lumbar spine. OTHER: Negative.          CONCLUSION:   Fluid-filled nondilated small bowel and colon which can suggest an enterocolitis.  Mild circumferential wall thickening of the rectum also seen which also may reflect proctitis.  Advise short-term follow-up colonoscopy if not already recently performed.  4.7 cm infrarenal abdominal aortic aneurysm mildly increased in size.  Lesser incidental findings as above.      Dictated by (CST): Nando Gaines MD on 3/26/2025 at 1:26 PM     Finalized by  (CST): Nando Gaines MD on 3/26/2025 at 1:35 PM          XR CHEST AP PORTABLE  (CPT=71045)    Result Date: 3/26/2025  PROCEDURE: XR CHEST AP PORTABLE  (CPT=71045) TIME: 11:28 a.m..   COMPARISON: None.  INDICATIONS: Altered mental status with diarrhea since 10pm last night. History of sepsis with similar symptoms.  TECHNIQUE:   Single view.   FINDINGS:  The heart and mediastinal structures are minimally enlarged.  Lung volumes are slightly diminished.  There are increased interstitial markings within both lungs.  The findings are nonspecific but could represent very slight/early interstitial edema, a mild pneumonitis, or a combination.         CONCLUSION: Slight increased interstitial markings within both lungs which could represent mild/early interstitial edema, an interstitial pneumonitis, or a combination.    Dictated by (CST): Apolinar Aburto MD on 3/26/2025 at 11:43 AM     Finalized by (CST): Apolinar Aburto MD on 3/26/2025 at 11:44 AM          CT CHEST (FMC=99129)    Result Date: 3/8/2025  PROCEDURE: CT CHEST (CPT=71250)  COMPARISON: None.  INDICATIONS: Fall left rib pain ,  TECHNIQUE: CT images of the chest were obtained without the administration of contrast material.  Automated exposure control for dose reduction was used. Adjustment of the mA and/or kV was done based on the patient's size. Use of iterative reconstruction  technique for dose reduction was used. Dose information is transmitted to the ACR (American College of Radiology) NRDR (National Radiology Data Registry) which includes the Dose Index Registry.  FINDINGS:  LINES AND TUBES: None.  MEDIASTINUM/VASCULATURE: There are multiple mildly prominent mediastinal lymph nodes which are nonspecific and measure less than 1 cm in short axis. There is atherosclerotic calcification of the aortic arch and thoracic aorta. The thoracic aorta is otherwise unremarkable and not dilated. Mediastinal fat planes are preserved. Cardiac chambers are  unremarkable. Pericardium is normal. There are dense coronary artery calcifications. The main pulmonary artery has a normal diameter and is otherwise unremarkable. There are mitral annular calcifications.  LUNGS AND PLEURA: Moderate centrilobular emphysema seen within the bilateral upper and lower lobes.  1.6 cm semi solid nodule seen within the posterior aspect of the right upper lobe (series 3, image 46).  There is bilateral lower lobe atelectasis.  No pneumothorax or pleural effusion.  CHEST WALL/BONES: There is degenerative disease of the thoracic spine. The chest wall and osseous structures are otherwise unremarkable.  LIMITED ABDOMEN: Small hiatal hernia with wall thickening at the gastroesophageal junction.  Bilateral renal cysts are partially seen.         CONCLUSION:   1.6 cm semi solid nodule within the posterior right upper lobe may be secondary to scar or atelectasis. Followup chest CT recommended in 3 months to demonstrate resolution.   Moderate centrilobular emphysema  No rib fracture.    Dictated by (CST): Basil Varela MD on 3/08/2025 at 8:32 AM     Finalized by (CST): Basil Varela MD on 3/08/2025 at 8:35 AM             Operative Procedures:        Total Time Coordinating Care: > than 30 minutes    Patient had opportunity to ask questions and state understand and agree with therapeutic plan as outlined      Yadiel Simon MD  [facility] Hospitalist  [phone]  3/27/2025  1:51 PM

## 2025-03-27 NOTE — PLAN OF CARE
Problem: Patient Centered Care  Goal: Patient preferences are identified and integrated in the patient's plan of care  Description: Interventions:- What would you like us to know as we care for you? From home with spouse  - Provide timely, complete, and accurate information to patient/family- Incorporate patient and family knowledge, values, beliefs, and cultural backgrounds into the planning and delivery of care- Encourage patient/family to participate in care and decision-making at the level they choose- Honor patient and family perspectives and choices  Outcome: Progressing     Problem: Patient/Family Goals  Goal: Patient/Family Long Term Goal  Description: Patient's Long Term Goal: discharge from hospital   Interventions:  - Monitor vitals, labs, imaging   - Follow MD's orders   - Administer medications per order    - See additional Care Plan goals for specific interventions  Outcome: Progressing  Goal: Patient/Family Short Term Goal  Description: Patient's Short Term Goal: feel better   Interventions:   - Monitor vitals, labs, imaging   - Follow MD's orders   - Administer medications per order    - See additional Care Plan goals for specific interventions  Outcome: Progressing     Problem: GASTROINTESTINAL - ADULT  Goal: Maintains or returns to baseline bowel function  Description: INTERVENTIONS:- Assess bowel function- Maintain adequate hydration with IV or PO as ordered and tolerated- Evaluate effectiveness of GI medications- Encourage mobilization and activity- Obtain nutritional consult as needed- Establish a toileting routine/schedule- Consider collaborating with pharmacy to review patient's medication profile  Outcome: Progressing     Problem: METABOLIC/FLUID AND ELECTROLYTES - ADULT  Goal: Glucose maintained within prescribed range  Description: INTERVENTIONS:- Monitor Blood Glucose as ordered- Assess for signs and symptoms of hyperglycemia and hypoglycemia- Administer ordered medications to maintain  glucose within target range- Assess barriers to adequate nutritional intake and initiate nutrition consult as needed- Instruct patient on self management of diabetes  INTERVENTIONS:- Monitor Blood Glucose as ordered- Assess for signs and symptoms of hyperglycemia and hypoglycemia- Administer ordered medications to maintain glucose within target range- Assess barriers to adequate nutritional intake and initiate nutrition consult as needed- Instruct patient on self management of diabetes  Outcome: Progressing  Goal: Electrolytes maintained within normal limits  Description: INTERVENTIONS:- Monitor labs and rhythm and assess patient for signs and symptoms of electrolyte imbalances- Administer electrolyte replacement as ordered- Monitor response to electrolyte replacements, including rhythm and repeat lab results as appropriate- Fluid restriction as ordered- Instruct patient on fluid and nutrition restrictions as appropriate  Outcome: Progressing  Goal: Hemodynamic stability and optimal renal function maintained  Description: INTERVENTIONS:- Monitor labs and assess for signs and symptoms of volume excess or deficit- Monitor intake, output and patient weight- Monitor urine specific gravity, serum osmolarity and serum sodium as indicated or ordered- Monitor response to interventions for patient's volume status, including labs, urine output, blood pressure (other measures as available)- Encourage oral intake as appropriate- Instruct patient on fluid and nutrition restrictions as appropriate  Outcome: Progressing     Problem: Diabetes/Glucose Control  Goal: Glucose maintained within prescribed range  Description: INTERVENTIONS:- Monitor Blood Glucose as ordered- Assess for signs and symptoms of hyperglycemia and hypoglycemia- Administer ordered medications to maintain glucose within target range- Assess barriers to adequate nutritional intake and initiate nutrition consult as needed- Instruct patient on self management of  diabetes  INTERVENTIONS:- Monitor Blood Glucose as ordered- Assess for signs and symptoms of hyperglycemia and hypoglycemia- Administer ordered medications to maintain glucose within target range- Assess barriers to adequate nutritional intake and initiate nutrition consult as needed- Instruct patient on self management of diabetes  Outcome: Progressing     Problem: SAFETY ADULT - FALL  Goal: Free from fall injury  Description: INTERVENTIONS:- Assess pt frequently for physical needs- Identify cognitive and physical deficits and behaviors that affect risk of falls.- [place] fall precautions as indicated by assessment.- Educate pt/family on patient safety including physical limitations- Instruct pt to call for assistance with activity based on assessment- Modify environment to reduce risk of injury- Provide assistive devices as appropriate- Consider OT/PT consult to assist with strengthening/mobility- Encourage toileting schedule  Outcome: Progressing     Problem: DISCHARGE PLANNING  Goal: Discharge to home or other facility with appropriate resources  Description: INTERVENTIONS:- Identify barriers to discharge w/pt and caregiver- Include patient/family/discharge partner in discharge planning- Arrange for needed discharge resources and transportation as appropriate- Identify discharge learning needs (meds, wound care, etc)- Arrange for interpreters to assist at discharge as needed- Consider post-discharge preferences of patient/family/discharge partner- Complete POLST form as appropriate- Assess patient's ability to be responsible for managing their own health- Refer to Case Management Department for coordinating discharge planning if the patient needs post-hospital services based on physician/LIP order or complex needs related to functional status, cognitive ability or social support system  Outcome: Progressing

## 2025-05-09 ENCOUNTER — OFFICE VISIT (OUTPATIENT)
Dept: OTOLARYNGOLOGY | Facility: CLINIC | Age: 85
End: 2025-05-09

## 2025-05-09 VITALS — HEIGHT: 71 IN | BODY MASS INDEX: 32.2 KG/M2 | WEIGHT: 230 LBS

## 2025-05-09 DIAGNOSIS — H61.23 BILATERAL IMPACTED CERUMEN: Primary | ICD-10-CM

## 2025-05-09 PROCEDURE — 69210 REMOVE IMPACTED EAR WAX UNI: CPT | Performed by: OTOLARYNGOLOGY

## 2025-05-09 RX ORDER — SILDENAFIL 100 MG/1
100 TABLET, FILM COATED ORAL DAILY PRN
COMMUNITY
Start: 2025-04-23

## 2025-05-09 NOTE — PROGRESS NOTES
Raul Freed Jr. is a 85 year old male.    Chief Complaint   Patient presents with    Ear Wax     Ear cleaning        HISTORY OF PRESENT ILLNESS    Patient presents for cerumen removal. No other complaints or concerns at this time    Social Hx on file[1]    Family History[2]    Past Medical History[3]    Past Surgical History[4]    REVIEW OF SYSTEMS    System Neg/Pos Details   Constitutional Negative Fatigue, fever and weight loss.   ENMT Negative Drooling.   Eyes Negative Blurred vision and vision changes.   Respiratory Negative Dyspnea and wheezing.   Cardio Negative Chest pain, irregular heartbeat/palpitations and syncope.   GI Negative Abdominal pain and diarrhea.   Endocrine Negative Cold intolerance and heat intolerance.   Neuro Negative Tremors.   Psych Negative Anxiety and depression.   Integumentary Negative Frequent skin infections, pigment change and rash.   Hema/Lymph Negative Easy bleeding and easy bruising.           PHYSICAL EXAM    Ht 5' 11\" (1.803 m)   Wt 230 lb (104.3 kg)   BMI 32.08 kg/m²        Constitutional Normal Overall appearance - Normal.        Neck Exam Normal Inspection - Normal. Palpation - Normal. Parotid gland - Normal. Thyroid gland - Normal.             Head/Face Normal Facial features - Normal. Eyebrows - Normal. Skull - Normal.             Ears Normal Inspection - Right: Normal, Left: Normal. Canal - Right: Normal, Left: Normal. TM - Right: Normal, Left: Normal.   Skin Normal Inspection - Normal.                              Canals:  Right: Canal reveals cerumen impaction,   Left: Canal reveals cerumen impaction,     Tympanic Membranes:  Right: Normal tympanic membrane.   Left: Normal tympanic membrane.     TM Visualized Method:   Right TM examined via otomicroscopy.    Left TM examined via otomicroscopy.      PROCEDURE:    Removal of cerumen impaction   The cerumen impaction was completely removed using microscopy.   Removal was completed by using acurette and/or suction.    Comments: Return to clinic as needed.  Avoid q-tips, water precautions and use over the counter wax remedies as needed.    Medications - Current[5]  ASSESSMENT AND PLAN    1. Bilateral impacted cerumen    - REMOVAL IMPACTED CERUMEN REQUIRING INSTRUMENTATION, UNILATERAL      All cerumen was removed using microscopy. I have asked the patient to return to see me as needed for repeat cerumen removal in the future.      Kehinde Bernabe MD    5/9/2025    8:49 AM           [1]   Social History  Socioeconomic History    Marital status:    Tobacco Use    Smoking status: Former     Current packs/day: 0.00     Average packs/day: 1 pack/day for 40.0 years (40.0 ttl pk-yrs)     Types: Cigarettes     Start date: 6/1/1969     Quit date: 6/1/2009     Years since quitting: 15.9    Smokeless tobacco: Never    Tobacco comments:     has been a 3 ppd   Vaping Use    Vaping status: Never Used   Substance and Sexual Activity    Alcohol use: Yes     Comment: occasional intake    Drug use: No   [2]   Family History  Problem Relation Age of Onset    Cancer Mother         lung    Obesity Son     Obesity Sister     Lipids Sister     Obesity Son    [3]   Past Medical History:   Abdominal aortic aneurysm (AAA) 3.0 cm to 5.0 cm in diameter in female    CAD involving native coronary artery of native heart without angina pectoris    Cardiac LV ejection fraction 50-55% per 2018 angio     Centrilobular emphysema (HCC)    Coronary atherosclerosis    COVID    Diabetes mellitus (HCC)    Gastric ulcer    Healed 2009    GOUT    HYPERLIPIDEMIA    HYPERTENSION    OBESITY    OSTEOARTHRITIS    osteoarthritis knees    Presence of drug coated stent in prox & Mid LAD coronary artery 03/19/2018    2.75 x 15 mm Xcience drug-eluting stent into the mid LAD 3.5 x 12 mm Xcience drug-eluting stent into the proximal LAD    Pulmonary emphysema (HCC)    SLEEP APNEA    [facility] fax [phone]   [4]   Past Surgical History:  Procedure Laterality Date    Cath  bare metal stent (bms)      Colonoscopy  2009= Declines repeat    2009, complicated by anal fissure    Colonoscopy      Colonoscopy  03/2022    one small TA, int hem, can consider d/cing surveillance    Colonoscopy N/A 02/28/2022    Procedure: COLONOSCOPY,  with polypectomy;  Surgeon: Anthony Patel MD;  Location: [facility]    Hemorrhoidectomy  Dr Kamara 3/12/10 [facility]     Rectal exam under anesthesia/anoscopy. Incision and drainage of perirectal abscess. Placement of seton . Excision of anal tags. Destruction of internal hemorrhoids.  Surgery done at [facility] on 3/12/10.    Other surgical history      left knee arthroscopy    Patient documented not to have experienced any of the following events N/A 02/18/2015    Procedure: ESOPHAGOGASTRODUODENOSCOPY, POSSIBLE BIOPSY, POSSIBLE POLYPECTOMY 89936;  Surgeon: Musa Cook MD;  Location: [facility]    Patient withough preoperative order for iv antibiotic surgical site infection prophylaxis. N/A 02/18/2015    Procedure: ESOPHAGOGASTRODUODENOSCOPY, POSSIBLE BIOPSY, POSSIBLE POLYPECTOMY 03673;  Surgeon: Musa Cook MD;  Location: [facility]    Upper gi endoscopy performed  2008, 2/27/2009    Upper gi endoscopy,biopsy  2/18/15=Gastritis.  H pylori negative, normal SB Bx    Upper gi endoscopy,biopsy N/A 02/18/2015    Procedure: ESOPHAGOGASTRODUODENOSCOPY, POSSIBLE BIOPSY, POSSIBLE POLYPECTOMY 92515;  Surgeon: Musa Cook MD;  Location: [facility]    Upper gi endoscopy,exam     [5]   Current Outpatient Medications:     Sildenafil Citrate 100 MG Oral Tab, Take 1 tablet (100 mg total) by mouth daily as needed for Erectile Dysfunction., Disp: , Rfl:     aspirin 81 MG Oral Tab EC, Take 1 tablet (81 mg total) by mouth at bedtime., Disp: , Rfl:     polyethylene glycol, PEG 3350, 17 g Oral Powd Pack, Take 17 g by mouth daily., Disp: , Rfl:     Methylcobalamin (B-12) 5000 MCG Oral Tablet Dispersible, Take 5,000 mcg by  mouth every morning., Disp: , Rfl:     Turmeric (QC TUMERIC COMPLEX) 500 MG Oral Cap, Take 1 tablet by mouth daily., Disp: , Rfl:     Magnesium Oxide 250 MG Oral Tab, Take 1 tablet (250 mg total) by mouth at bedtime., Disp: , Rfl:     atorvastatin 20 MG Oral Tab, Take 1 tablet (20 mg total) by mouth nightly., Disp: , Rfl:     acetaminophen 500 MG Oral Tab, Take 1 tablet (500 mg total) by mouth every 4 (four) hours as needed for Pain or Fever., Disp: , Rfl:     melatonin 3 MG Oral Tab, Take 1 tablet (3 mg total) by mouth nightly., Disp: , Rfl:     amLODIPine 10 MG Oral Tab, Take 1 tablet (10 mg total) by mouth daily., Disp: , Rfl:     omeprazole 20 MG Oral Capsule Delayed Release, Take 1 capsule (20 mg total) by mouth daily., Disp: , Rfl:     METOPROLOL SUCCINATE 100 MG Oral Tablet 24 Hr, TAKE 1 TABLET BY MOUTH EVERY DAY, Disp: 90 tablet, Rfl: 1    Omega-3 Fatty Acids (FISH OIL) 1000 MG Oral Cap, Take 1,000 mg by mouth daily., Disp: , Rfl:     Multiple Vitamin (MULTI VITAMIN MENS OR), Take 1 tablet by mouth daily., Disp: , Rfl:

## 2025-08-06 ENCOUNTER — APPOINTMENT (OUTPATIENT)
Dept: CT IMAGING | Facility: HOSPITAL | Age: 85
End: 2025-08-06
Attending: EMERGENCY MEDICINE

## 2025-08-06 ENCOUNTER — APPOINTMENT (OUTPATIENT)
Dept: GENERAL RADIOLOGY | Facility: HOSPITAL | Age: 85
End: 2025-08-06
Attending: EMERGENCY MEDICINE

## 2025-08-06 ENCOUNTER — HOSPITAL ENCOUNTER (OUTPATIENT)
Facility: HOSPITAL | Age: 85
Setting detail: OBSERVATION
Discharge: HOME | End: 2025-08-07
Attending: EMERGENCY MEDICINE | Admitting: INTERNAL MEDICINE

## 2025-08-06 DIAGNOSIS — R55 SYNCOPE AND COLLAPSE: Primary | ICD-10-CM

## 2025-08-06 DIAGNOSIS — R55 RECURRENT SYNCOPE: ICD-10-CM

## 2025-08-06 LAB
ALBUMIN SERPL-MCNC: 4.6 G/DL (ref 3.2–4.8)
ALBUMIN/GLOB SERPL: 1.4 (ref 1–2)
ALP LIVER SERPL-CCNC: 99 U/L (ref 45–117)
ALT SERPL-CCNC: 7 U/L (ref 10–49)
ANION GAP SERPL CALC-SCNC: 8 MMOL/L (ref 0–18)
AST SERPL-CCNC: 19 U/L (ref ?–34)
ATRIAL RATE: 60 BPM
BASOPHILS # BLD: 0 X10(3) UL (ref 0–0.2)
BASOPHILS NFR BLD: 0 %
BILIRUB SERPL-MCNC: 0.5 MG/DL (ref 0.2–1.1)
BILIRUB UR QL: NEGATIVE
BUN BLD-MCNC: 24 MG/DL (ref 9–23)
BUN/CREAT SERPL: 19 (ref 10–20)
CALCIUM BLD-MCNC: 9.2 MG/DL (ref 8.7–10.4)
CHLORIDE SERPL-SCNC: 107 MMOL/L (ref 98–112)
CLARITY UR: CLEAR
CO2 SERPL-SCNC: 25 MMOL/L (ref 21–32)
COLOR UR: YELLOW
CREAT BLD-MCNC: 1.26 MG/DL (ref 0.7–1.3)
DEPRECATED RDW RBC AUTO: 45.4 FL (ref 35.1–46.3)
EGFRCR SERPLBLD CKD-EPI 2021: 56 ML/MIN/1.73M2 (ref 60–?)
EOSINOPHIL # BLD: 0.14 X10(3) UL (ref 0–0.7)
EOSINOPHIL NFR BLD: 2 %
ERYTHROCYTE [DISTWIDTH] IN BLOOD BY AUTOMATED COUNT: 12.9 % (ref 11–15)
EST. AVERAGE GLUCOSE BLD GHB EST-MCNC: 137 MG/DL (ref 68–126)
GLOBULIN PLAS-MCNC: 3.2 G/DL (ref 2–3.5)
GLUCOSE BLD-MCNC: 134 MG/DL (ref 70–99)
GLUCOSE BLDC GLUCOMTR-MCNC: 124 MG/DL (ref 70–99)
GLUCOSE BLDC GLUCOMTR-MCNC: 180 MG/DL (ref 70–99)
GLUCOSE UR-MCNC: NORMAL MG/DL
HBA1C MFR BLD: 6.4 % (ref ?–5.7)
HCT VFR BLD AUTO: 36.1 % (ref 39–53)
HGB BLD-MCNC: 12.7 G/DL (ref 13–17.5)
HGB UR QL STRIP.AUTO: NEGATIVE
KETONES UR-MCNC: NEGATIVE MG/DL
LEUKOCYTE ESTERASE UR QL STRIP.AUTO: NEGATIVE
LYMPHOCYTES NFR BLD: 1.96 X10(3) UL (ref 1–4)
LYMPHOCYTES NFR BLD: 25 %
MCH RBC QN AUTO: 33.5 PG (ref 26–34)
MCHC RBC AUTO-ENTMCNC: 35.2 G/DL (ref 31–37)
MCV RBC AUTO: 95.3 FL (ref 80–100)
MONOCYTES # BLD: 0.21 X10(3) UL (ref 0.1–1)
MONOCYTES NFR BLD: 3 %
MORPHOLOGY: NORMAL
NEUTROPHILS # BLD AUTO: 3.27 X10 (3) UL (ref 1.5–7.7)
NEUTROPHILS NFR BLD: 65 %
NEUTS BAND NFR BLD: 2 %
NEUTS HYPERSEG # BLD: 4.69 X10(3) UL (ref 1.5–7.7)
NITRITE UR QL STRIP.AUTO: NEGATIVE
OSMOLALITY SERPL CALC.SUM OF ELEC: 296 MOSM/KG (ref 275–295)
P AXIS: 48 DEGREES
P-R INTERVAL: 190 MS
PH UR: 5.5 (ref 5–8)
PLATELET # BLD AUTO: 208 10(3)UL (ref 150–450)
PLATELET MORPHOLOGY: NORMAL
POTASSIUM SERPL-SCNC: 4 MMOL/L (ref 3.5–5.1)
PROT SERPL-MCNC: 7.8 G/DL (ref 5.7–8.2)
PROT UR-MCNC: NEGATIVE MG/DL
Q-T INTERVAL: 418 MS
QRS DURATION: 104 MS
QTC CALCULATION (BEZET): 418 MS
R AXIS: 6 DEGREES
RBC # BLD AUTO: 3.79 X10(6)UL (ref 3.8–5.8)
SODIUM SERPL-SCNC: 140 MMOL/L (ref 136–145)
SP GR UR STRIP: 1.02 (ref 1–1.03)
T AXIS: 15 DEGREES
TOTAL CELLS COUNTED BLD: 100
TROPONIN I SERPL HS-MCNC: 5 NG/L (ref ?–53)
UROBILINOGEN UR STRIP-ACNC: NORMAL
VARIANT LYMPHS NFR BLD MANUAL: 3 % (ref ?–4)
VENTRICULAR RATE: 60 BPM
WBC # BLD AUTO: 7 X10(3) UL (ref 4–11)

## 2025-08-06 PROCEDURE — 83036 HEMOGLOBIN GLYCOSYLATED A1C: CPT | Performed by: INTERNAL MEDICINE

## 2025-08-06 PROCEDURE — 99285 EMERGENCY DEPT VISIT HI MDM: CPT

## 2025-08-06 PROCEDURE — 71045 X-RAY EXAM CHEST 1 VIEW: CPT | Performed by: EMERGENCY MEDICINE

## 2025-08-06 PROCEDURE — 80053 COMPREHEN METABOLIC PANEL: CPT | Performed by: EMERGENCY MEDICINE

## 2025-08-06 PROCEDURE — 94640 AIRWAY INHALATION TREATMENT: CPT

## 2025-08-06 PROCEDURE — 81003 URINALYSIS AUTO W/O SCOPE: CPT | Performed by: EMERGENCY MEDICINE

## 2025-08-06 PROCEDURE — 85007 BL SMEAR W/DIFF WBC COUNT: CPT | Performed by: EMERGENCY MEDICINE

## 2025-08-06 PROCEDURE — 84484 ASSAY OF TROPONIN QUANT: CPT | Performed by: EMERGENCY MEDICINE

## 2025-08-06 PROCEDURE — 93010 ELECTROCARDIOGRAM REPORT: CPT

## 2025-08-06 PROCEDURE — 96372 THER/PROPH/DIAG INJ SC/IM: CPT

## 2025-08-06 PROCEDURE — 74177 CT ABD & PELVIS W/CONTRAST: CPT | Performed by: EMERGENCY MEDICINE

## 2025-08-06 PROCEDURE — 94664 DEMO&/EVAL PT USE INHALER: CPT

## 2025-08-06 PROCEDURE — 36415 COLL VENOUS BLD VENIPUNCTURE: CPT

## 2025-08-06 PROCEDURE — 85027 COMPLETE CBC AUTOMATED: CPT | Performed by: EMERGENCY MEDICINE

## 2025-08-06 PROCEDURE — 94660 CPAP INITIATION&MGMT: CPT

## 2025-08-06 PROCEDURE — 93005 ELECTROCARDIOGRAM TRACING: CPT

## 2025-08-06 PROCEDURE — 85025 COMPLETE CBC W/AUTO DIFF WBC: CPT | Performed by: EMERGENCY MEDICINE

## 2025-08-06 PROCEDURE — 70450 CT HEAD/BRAIN W/O DYE: CPT | Performed by: EMERGENCY MEDICINE

## 2025-08-06 PROCEDURE — 82962 GLUCOSE BLOOD TEST: CPT

## 2025-08-06 RX ORDER — PANTOPRAZOLE SODIUM 40 MG/1
40 TABLET, DELAYED RELEASE ORAL
Status: DISCONTINUED | OUTPATIENT
Start: 2025-08-07 | End: 2025-08-07

## 2025-08-06 RX ORDER — POLYETHYLENE GLYCOL 3350 17 G/17G
17 POWDER, FOR SOLUTION ORAL DAILY
Status: DISCONTINUED | OUTPATIENT
Start: 2025-08-06 | End: 2025-08-07

## 2025-08-06 RX ORDER — FLUTICASONE PROPIONATE AND SALMETEROL 500; 50 UG/1; UG/1
1 POWDER RESPIRATORY (INHALATION) 2 TIMES DAILY
Status: DISCONTINUED | OUTPATIENT
Start: 2025-08-06 | End: 2025-08-07

## 2025-08-06 RX ORDER — ACETAMINOPHEN 500 MG
500 TABLET ORAL EVERY 4 HOURS PRN
Status: DISCONTINUED | OUTPATIENT
Start: 2025-08-06 | End: 2025-08-07

## 2025-08-06 RX ORDER — BISACODYL 10 MG
10 SUPPOSITORY, RECTAL RECTAL
Status: DISCONTINUED | OUTPATIENT
Start: 2025-08-06 | End: 2025-08-07

## 2025-08-06 RX ORDER — AMLODIPINE BESYLATE 10 MG/1
10 TABLET ORAL DAILY
Status: DISCONTINUED | OUTPATIENT
Start: 2025-08-07 | End: 2025-08-07

## 2025-08-06 RX ORDER — DIMENHYDRINATE 50 MG
1 TABLET ORAL DAILY
Status: DISCONTINUED | OUTPATIENT
Start: 2025-08-06 | End: 2025-08-06

## 2025-08-06 RX ORDER — FLUTICASONE FUROATE, UMECLIDINIUM BROMIDE AND VILANTEROL TRIFENATATE 100; 62.5; 25 UG/1; UG/1; UG/1
1 POWDER RESPIRATORY (INHALATION) DAILY
COMMUNITY

## 2025-08-06 RX ORDER — METOPROLOL SUCCINATE 100 MG/1
100 TABLET, EXTENDED RELEASE ORAL DAILY
COMMUNITY

## 2025-08-06 RX ORDER — POLYETHYLENE GLYCOL 3350 17 G/17G
17 POWDER, FOR SOLUTION ORAL DAILY PRN
Status: DISCONTINUED | OUTPATIENT
Start: 2025-08-06 | End: 2025-08-07

## 2025-08-06 RX ORDER — ATORVASTATIN CALCIUM 20 MG/1
20 TABLET, FILM COATED ORAL NIGHTLY
Status: DISCONTINUED | OUTPATIENT
Start: 2025-08-06 | End: 2025-08-07

## 2025-08-06 RX ORDER — BENZONATATE 200 MG/1
200 CAPSULE ORAL 3 TIMES DAILY PRN
Status: DISCONTINUED | OUTPATIENT
Start: 2025-08-06 | End: 2025-08-07

## 2025-08-06 RX ORDER — DIMENHYDRINATE 50 MG
1 TABLET ORAL DAILY
COMMUNITY

## 2025-08-06 RX ORDER — SODIUM PHOSPHATE, DIBASIC AND SODIUM PHOSPHATE, MONOBASIC 7; 19 G/230ML; G/230ML
1 ENEMA RECTAL ONCE AS NEEDED
Status: DISCONTINUED | OUTPATIENT
Start: 2025-08-06 | End: 2025-08-07

## 2025-08-06 RX ORDER — HEPARIN SODIUM 5000 [USP'U]/ML
5000 INJECTION, SOLUTION INTRAVENOUS; SUBCUTANEOUS EVERY 8 HOURS SCHEDULED
Status: DISCONTINUED | OUTPATIENT
Start: 2025-08-06 | End: 2025-08-07

## 2025-08-06 RX ORDER — ASPIRIN 81 MG/1
81 TABLET ORAL DAILY
Status: DISCONTINUED | OUTPATIENT
Start: 2025-08-07 | End: 2025-08-07

## 2025-08-06 RX ORDER — ONDANSETRON 2 MG/ML
4 INJECTION INTRAMUSCULAR; INTRAVENOUS EVERY 6 HOURS PRN
Status: DISCONTINUED | OUTPATIENT
Start: 2025-08-06 | End: 2025-08-07

## 2025-08-06 RX ORDER — NICOTINE POLACRILEX 4 MG
30 LOZENGE BUCCAL
Status: DISCONTINUED | OUTPATIENT
Start: 2025-08-06 | End: 2025-08-07

## 2025-08-06 RX ORDER — DEXTROSE MONOHYDRATE 25 G/50ML
50 INJECTION, SOLUTION INTRAVENOUS
Status: DISCONTINUED | OUTPATIENT
Start: 2025-08-06 | End: 2025-08-07

## 2025-08-06 RX ORDER — DOXEPIN HYDROCHLORIDE 50 MG/1
1 CAPSULE ORAL DAILY
Status: DISCONTINUED | OUTPATIENT
Start: 2025-08-07 | End: 2025-08-07

## 2025-08-06 RX ORDER — SENNOSIDES 8.6 MG
17.2 TABLET ORAL NIGHTLY PRN
Status: DISCONTINUED | OUTPATIENT
Start: 2025-08-06 | End: 2025-08-07

## 2025-08-06 RX ORDER — METOPROLOL SUCCINATE 100 MG/1
100 TABLET, EXTENDED RELEASE ORAL DAILY
Status: DISCONTINUED | OUTPATIENT
Start: 2025-08-07 | End: 2025-08-07

## 2025-08-06 RX ORDER — NICOTINE POLACRILEX 4 MG
15 LOZENGE BUCCAL
Status: DISCONTINUED | OUTPATIENT
Start: 2025-08-06 | End: 2025-08-07

## 2025-08-06 RX ORDER — METOCLOPRAMIDE HYDROCHLORIDE 5 MG/ML
5 INJECTION INTRAMUSCULAR; INTRAVENOUS EVERY 8 HOURS PRN
Status: DISCONTINUED | OUTPATIENT
Start: 2025-08-06 | End: 2025-08-07

## 2025-08-07 ENCOUNTER — APPOINTMENT (OUTPATIENT)
Dept: CV DIAGNOSTICS | Facility: HOSPITAL | Age: 85
End: 2025-08-07
Attending: INTERNAL MEDICINE

## 2025-08-07 VITALS
HEIGHT: 68 IN | BODY MASS INDEX: 34.22 KG/M2 | WEIGHT: 225.81 LBS | HEART RATE: 63 BPM | DIASTOLIC BLOOD PRESSURE: 59 MMHG | OXYGEN SATURATION: 94 % | TEMPERATURE: 98 F | SYSTOLIC BLOOD PRESSURE: 159 MMHG | RESPIRATION RATE: 20 BRPM

## 2025-08-07 LAB
ANION GAP SERPL CALC-SCNC: 10 MMOL/L (ref 0–18)
BASOPHILS # BLD: 0 X10(3) UL (ref 0–0.2)
BASOPHILS NFR BLD: 0 %
BUN BLD-MCNC: 20 MG/DL (ref 9–23)
BUN/CREAT SERPL: 16.4 (ref 10–20)
CALCIUM BLD-MCNC: 9.3 MG/DL (ref 8.7–10.4)
CHLORIDE SERPL-SCNC: 106 MMOL/L (ref 98–112)
CO2 SERPL-SCNC: 23 MMOL/L (ref 21–32)
CREAT BLD-MCNC: 1.22 MG/DL (ref 0.7–1.3)
DEPRECATED RDW RBC AUTO: 45.3 FL (ref 35.1–46.3)
EGFRCR SERPLBLD CKD-EPI 2021: 58 ML/MIN/1.73M2 (ref 60–?)
EOSINOPHIL # BLD: 0.07 X10(3) UL (ref 0–0.7)
EOSINOPHIL NFR BLD: 1 %
ERYTHROCYTE [DISTWIDTH] IN BLOOD BY AUTOMATED COUNT: 12.8 % (ref 11–15)
GLUCOSE BLD-MCNC: 123 MG/DL (ref 70–99)
GLUCOSE BLDC GLUCOMTR-MCNC: 112 MG/DL (ref 70–99)
GLUCOSE BLDC GLUCOMTR-MCNC: 136 MG/DL (ref 70–99)
HCT VFR BLD AUTO: 36.8 % (ref 39–53)
HGB BLD-MCNC: 12.6 G/DL (ref 13–17.5)
LYMPHOCYTES NFR BLD: 1.51 X10(3) UL (ref 1–4)
LYMPHOCYTES NFR BLD: 21 %
MAGNESIUM SERPL-MCNC: 2.2 MG/DL (ref 1.6–2.6)
MCH RBC QN AUTO: 33.1 PG (ref 26–34)
MCHC RBC AUTO-ENTMCNC: 34.2 G/DL (ref 31–37)
MCV RBC AUTO: 96.6 FL (ref 80–100)
MONOCYTES # BLD: 1.01 X10(3) UL (ref 0.1–1)
MONOCYTES NFR BLD: 14 %
MORPHOLOGY: NORMAL
NEUTROPHILS # BLD AUTO: 3.49 X10 (3) UL (ref 1.5–7.7)
NEUTROPHILS NFR BLD: 64 %
NEUTS HYPERSEG # BLD: 4.61 X10(3) UL (ref 1.5–7.7)
OSMOLALITY SERPL CALC.SUM OF ELEC: 292 MOSM/KG (ref 275–295)
PLATELET # BLD AUTO: 214 10(3)UL (ref 150–450)
PLATELET MORPHOLOGY: NORMAL
POTASSIUM SERPL-SCNC: 4.2 MMOL/L (ref 3.5–5.1)
RBC # BLD AUTO: 3.81 X10(6)UL (ref 3.8–5.8)
SODIUM SERPL-SCNC: 139 MMOL/L (ref 136–145)
TOTAL CELLS COUNTED BLD: 100
WBC # BLD AUTO: 7.2 X10(3) UL (ref 4–11)

## 2025-08-07 PROCEDURE — 80048 BASIC METABOLIC PNL TOTAL CA: CPT | Performed by: INTERNAL MEDICINE

## 2025-08-07 PROCEDURE — 97116 GAIT TRAINING THERAPY: CPT

## 2025-08-07 PROCEDURE — 82962 GLUCOSE BLOOD TEST: CPT

## 2025-08-07 PROCEDURE — 97530 THERAPEUTIC ACTIVITIES: CPT

## 2025-08-07 PROCEDURE — 93306 TTE W/DOPPLER COMPLETE: CPT | Performed by: INTERNAL MEDICINE

## 2025-08-07 PROCEDURE — 83735 ASSAY OF MAGNESIUM: CPT | Performed by: INTERNAL MEDICINE

## 2025-08-07 PROCEDURE — 94799 UNLISTED PULMONARY SVC/PX: CPT

## 2025-08-07 PROCEDURE — 85007 BL SMEAR W/DIFF WBC COUNT: CPT | Performed by: INTERNAL MEDICINE

## 2025-08-07 PROCEDURE — 97161 PT EVAL LOW COMPLEX 20 MIN: CPT

## 2025-08-07 PROCEDURE — 85025 COMPLETE CBC W/AUTO DIFF WBC: CPT | Performed by: INTERNAL MEDICINE

## 2025-08-07 PROCEDURE — 96372 THER/PROPH/DIAG INJ SC/IM: CPT

## 2025-08-07 PROCEDURE — 97165 OT EVAL LOW COMPLEX 30 MIN: CPT

## 2025-08-07 PROCEDURE — 85027 COMPLETE CBC AUTOMATED: CPT | Performed by: INTERNAL MEDICINE

## 2025-08-18 ENCOUNTER — HOSPITAL ENCOUNTER (OUTPATIENT)
Dept: CV DIAGNOSTICS | Facility: HOSPITAL | Age: 85
Discharge: HOME | End: 2025-08-18
Attending: INTERNAL MEDICINE

## 2025-08-18 DIAGNOSIS — R55 SYNCOPE AND COLLAPSE: ICD-10-CM

## 2025-08-18 PROCEDURE — 93271 ECG/MONITORING AND ANALYSIS: CPT | Performed by: INTERNAL MEDICINE

## 2025-08-18 PROCEDURE — 93270 REMOTE 30 DAY ECG REV/REPORT: CPT | Performed by: INTERNAL MEDICINE

## 2025-08-19 ENCOUNTER — OFFICE VISIT (OUTPATIENT)
Dept: OTOLARYNGOLOGY | Facility: CLINIC | Age: 85
End: 2025-08-19

## 2025-08-19 DIAGNOSIS — H61.23 BILATERAL IMPACTED CERUMEN: Primary | ICD-10-CM

## 2025-08-19 PROCEDURE — 69210 REMOVE IMPACTED EAR WAX UNI: CPT | Performed by: OTOLARYNGOLOGY

## (undated) DEVICE — Device

## (undated) DEVICE — SYRINGE REGULAR TIP 60ML

## (undated) DEVICE — TUBING SCT CLR 6FT .25IN MDVC

## (undated) DEVICE — CANNULA NASAL ADULT PIGTAIL L7

## (undated) DEVICE — KIT CLEAN ENDOKIT 1.1OZ GOWNX2

## (undated) DEVICE — KIT ENDO ORCAPOD 160/180/190

## (undated) NOTE — IP AVS SNAPSHOT
Patient Demographics     Address  [address] [place] [address] Phone  [phone] (Home)  [phone] (Mobile) *Preferred* E-mail Address  [email or web address]      Patient Contacts     Name Relation Home Work Mobile    Chel Freed Spouse [phone]    Raul Freed Son   [phone]      Allergies as of 2/16/2024  Review status set to In Progress on 2/14/2024   No Known Allergies      Noted Reaction Type Reactions    DELETED: Aspirin 07/10/2008        Large doses      Code Status Information     Code Status    Full Code        Patient Instructions       Infection  Will need repeat CT rectum in 2 weeks. Will need to follow up with Dr Patel from GI about colonoscopy  See med sheet for antibiotics   Weekly labs and dressing change as per ID     Emphysema  Lung Nodule, RUL  -CTA brain-  Emphysema.  Noncalcified right upper lobe lung nodules, which measure up to 6 mm.      Follow-up Information     Tiffani Hensley APRN. Call in 1 week(s).    Specialty: Nurse Practitioner  Why: Call to follow up with ID within one week of discharge.    If patient is discharged to [facility] luis carlosgenna, BABITA Carmona to follow there. Please consult him.  Contact information:  [address]  [address]  [place] [id number] [phone]             Anthony Patel MD Follow up.    Specialty: GASTROENTEROLOGY  Why: to see in 2 weeks. will need ct of rectum and eventual colonoscopy  Contact information:  [address] [facility] [address]  [facility] [id number] [phone]             Rao Beyer MD Follow up.    Specialty: Internal Medicine  Why: within 1 week of discharge  Contact information:  [address] [facility]  [address]  [facility] [id number] [phone]                        Your Home Meds List      TAKE these medications       Instructions Authorizing Provider Morning Afternoon Evening As Needed   acetaminophen 500 MG Tabs  Commonly known as: Tylenol Extra Strength  Next dose due: Anytime as  needed       Take 1 tablet (500 mg total) by mouth every 4 (four) hours as needed for Pain or Fever.   Christy A Bertoni         allopurinol 100 MG Tabs  Commonly known as: Zyloprim  Next dose due: Take tomorrow 9 am       Take 1 tablet (100 mg total) by mouth daily.          amLODIPine 10 MG Tabs  Commonly known as: Norvasc  Next dose due: Take tomorrow 9 am       Take 1 tablet (10 mg total) by mouth daily.          atorvastatin 20 MG Tabs  Commonly known as: Lipitor  Next dose due: Take tomorrow 9 am       Take 1 tablet (20 mg total) by mouth daily.   Ron Poe         CoContest Contour Next Monitor w/Device Kit  Next dose due: Take this evening         1 Units by Does not apply route 2 (two) times daily before meals.   Alexander Jara         dextrose 5% SOLN 100 mL with piperacillin-tazobactam 4.5 (4-0.5) g SOLR 4.5 g  Next dose due: Tonight 9 pm         Inject 4.5 g into the vein every 8 (eight) hours for 14 days. Weekly cbc w/diff, cmp, crp while on IV abx  PICC care per protocol  Stop taking on: March 1, 2024   Tiffani Schwzenia         docusate sodium 100 MG Caps  Commonly known as: COLACE  Start taking on: February 17, 2024  Next dose due: Take tomorrow 9 am       Take 100 mg by mouth daily.   Christy A Bertoni         Magnesium 500 MG Tabs  Next dose due: Take tomorrow 9 am       Take 1 tablet (500 mg total) by mouth daily.          melatonin 3 MG Tabs  Next dose due: Tonight 9 pm          Christy A Bertoni         metoprolol succinate  MG Tb24  Commonly known as: Toprol XL  Next dose due: Take tomorrow 9 am       TAKE 1 TABLET BY MOUTH EVERY DAY   Ron Poe         Microlet Lancets Misc      USE AS DIRECTED TWICE DAILY   Ron Poe         MULTI VITAMIN MENS OR  Next dose due: Take tomorrow 9 am       ONE DAILY          omega-3 fatty acids 1000 MG Caps  Commonly known as: Fish Oil  Next dose due: Take tomorrow 9 am       ONE DAILY          omeprazole 20 MG Cpdr  Commonly known as:  PriLOSEC  Next dose due: Take tomorrow 9 am       Take 1 capsule (20 mg total) by mouth daily.          Polyethylene Glycol 3350 17 g Pack  Commonly known as: MIRALAX  Next dose due: Anytime as needed       Take 17 g by mouth daily as needed (If no bowel movement in last 24 hours).   Christy Swartz         QUEtiapine 25 MG Tabs  Commonly known as: SEROquel  Next dose due: Tonight 9 pm         Take 0.5 tablets (12.5 mg total) by mouth nightly.   Christy A Bertoni         saccharomyces boulardii 250 MG Caps  Commonly known as: Florastor  Next dose due: Tonight 9 pm         Take 1 capsule (250 mg total) by mouth 2 (two) times daily.          vancomycin 50 mg/mL Solr  Commonly known as: Firvanq  Start taking on: February 16, 2024  Next dose due: This evening at 5 pm         Take 2.5 mL (125 mg total) by mouth 4 (four) times daily for 6 days, THEN 2.5 mL (125 mg total) 2 (two) times a day for 14 days.  Stop taking on: March 7, 2024   Tiffani Hensley               Where to Get Your Medications      Please  your prescriptions at the location directed by your doctor or nurse    Bring a paper prescription for each of these medications  dextrose 5% SOLN 100 mL with piperacillin-tazobactam 4.5 (4-0.5) g SOLR 4.5 g  vancomycin 50 mg/mL Solr           [phone] - MAR ACTION REPORT  (last 48 hrs)    ** SITE UNKNOWN **     Order ID Medication Name Action Time Action Reason Comments    [id number] QUEtiapine (SEROquel) tab 12.5 mg 02/15/24 2057 Given      [id number] allopurinol (Zyloprim) tab 100 mg 02/15/24 0918 Given      [id number] allopurinol (Zyloprim) tab 100 mg 02/16/24 0757 Given      [id number] amLODIPine (Norvasc) tab 10 mg 02/15/24 0918 Given      [id number] amLODIPine (Norvasc) tab 10 mg 02/16/24 0757 Given      [id number] atorvastatin (Lipitor) tab 20 mg 02/15/24 0918 Given      [id number] atorvastatin (Lipitor) tab 20 mg 02/16/24 0757 Given      [id number] docusate sodium (Colace) cap 100 mg 02/15/24 [facility] Given       [id number] docusate sodium (Colace) cap 100 mg 02/16/24 0757 Given      [id number] lidocaine PF (Xylocaine-MPF) 1% injection 02/16/24 1200 Given      [id number] metoprolol succinate ER (Toprol XL) 24 hr tab 100 mg 02/15/24 0918 Given      [id number] metoprolol succinate ER (Toprol XL) 24 hr tab 100 mg 02/16/24 0757 Given      [id number] pantoprazole (Protonix) DR tab 20 mg 02/15/24 0506 Given      [id number] pantoprazole (Protonix) DR tab 20 mg 02/16/24 0543 Given      [id number] piperacillin-tazobactam (Zosyn) 4.5 g in dextrose 5% 100 mL IVPB-ADDV 02/14/24 2218 New Bag      [id number] piperacillin-tazobactam (Zosyn) 4.5 g in dextrose 5% 100 mL IVPB-ADDV 02/15/24 0505 New Bag      [id number] piperacillin-tazobactam (Zosyn) 4.5 g in dextrose 5% 100 mL IVPB-ADDV 02/15/24 1308 New Bag      [id number] piperacillin-tazobactam (Zosyn) 4.5 g in dextrose 5% 100 mL IVPB-ADDV 02/15/24 2058 New Bag      [id number] piperacillin-tazobactam (Zosyn) 4.5 g in dextrose 5% 100 mL IVPB-ADDV 02/16/24 0450 New Bag      [id number] piperacillin-tazobactam (Zosyn) 4.5 g in dextrose 5% 100 mL IVPB-ADDV 02/16/24 1313 New Bag      [id number] vancomycin (Firvanq) 50 mg/mL oral solution 125 mg 02/14/24 1619 Given      [id number] vancomycin (Firvanq) 50 mg/mL oral solution 125 mg 02/14/24 2218 Given      [id number] vancomycin (Firvanq) 50 mg/mL oral solution 125 mg 02/15/24 0924 Given      [id number] vancomycin (Firvanq) 50 mg/mL oral solution 125 mg 02/15/24 1309 Given      [id number] vancomycin (Firvanq) 50 mg/mL oral solution 125 mg 02/15/24 1640 Given      [id number] vancomycin (Firvanq) 50 mg/mL oral solution 125 mg 02/15/24 2058 Given      [id number] vancomycin (Firvanq) 50 mg/mL oral solution 125 mg 02/16/24 0759 Given      [id number] vancomycin (Firvanq) 50 mg/mL oral solution 125 mg 02/16/24 1313 Given            LEFT LOWER ABDOMEN     Order ID Medication Name Action Time Action Reason Comments    [id number] heparin (Porcine) 5000 UNIT/ML injection 5,000  Units 02/15/24 2116 Given      [id number] heparin (Porcine) 5000 UNIT/ML injection 5,000 Units 02/16/24 1313 Given            RIGHT LOWER ABDOMEN     Order ID Medication Name Action Time Action Reason Comments    [id number] heparin (Porcine) 5000 UNIT/ML injection 5,000 Units 02/15/24 1309 Given      [id number] heparin (Porcine) 5000 UNIT/ML injection 5,000 Units 02/16/24 0500 Given by Other            RIGHT UPPER ARM     Order ID Medication Name Action Time Action Reason Comments    [id number] heparin (Porcine) 5000 UNIT/ML injection 5,000 Units 02/14/24 2111 Given      [id number] heparin (Porcine) 5000 UNIT/ML injection 5,000 Units 02/15/24 0506 Given              Recent Vital Signs    Flowsheet Row Most Recent Value   /61 Filed at 02/16/2024 0755   Pulse 78 Filed at 02/16/2024 0755   Resp 18 Filed at 02/16/2024 0755   Temp 97.8 °F (36.6 °C) Filed at 02/16/2024 0755   SpO2 94 % Filed at 02/16/2024 0755      Patient's Most Recent Weight    Flowsheet Row Most Recent Value   Patient Weight 102.5 kg (226 lb)      CPAP Settings (Inpatient)    Flowsheet Row Most Recent Value   Mode AutoPAP   Interface Full face mask   Mask size Large   O2% 21 %   Flow rate --  [RA]         Lab Results Last 24 Hours      Basic Metabolic Panel (8) [[id number]] (Abnormal)  Resulted: 02/16/24 0614, Result status: Final result   Ordering provider: Christy Swartz NP  02/15/24 2300 Resulting lab: [facility] ([facility])    Specimen Information    Type Source Collected On   Blood — 02/16/24 0517          Components    Component Value Reference Range Flag Lab   Glucose 107 70 - 99 mg/dL H [facility])   Sodium 144 136 - 145 mmol/L — [facility])   Potassium 3.5 3.5 - 5.1 mmol/L — [facility])   Chloride 108 98 - 112 mmol/L — [facility])   CO2 29.0 21.0 - 32.0 mmol/L — [facility])   Anion Gap 7 0 - 18 mmol/L — [facility] ([facility])   BUN 9 9 - 23 mg/dL — [facility] ([facility])   Creatinine 1.03  0.70 - 1.30 mg/dL — [facility] ([facility])   BUN/CREA Ratio 8.7 10.0 - 20.0 L [facility] ([facility])   Calcium, Total 9.4 8.7 - 10.4 mg/dL — [facility] ([facility])   Calculated Osmolality 297 275 - 295 mOsm/kg H [facility] ([facility])   eGFR-Cr 72 >=60 mL/min/1.73m2 — [facility] ([facility])            CBC With Differential With Platelet [[id number]] (Abnormal)  Resulted: 02/16/24 0606, Result status: Final result   Ordering provider: Christy Swartz NP  02/15/24 2300 Resulting lab: [facility] ([facility])   Narrative:  The following orders were created for panel order CBC With Differential With Platelet.  Procedure                               Abnormality         Status                     ---------                               -----------         ------                     CBC W/ DIFFERENTIAL[[id number]]          Abnormal            Final result                 Please view results for these tests on the individual orders.    Specimen Information    Type Source Collected On   Blood — 02/16/24 0526            Testing Performed By     Lab - Abbreviation Name Director Address Valid Date Range    162 - [facility] ([facility]) [facility] ([facility]) [place] Lee [facility] [address] 03/19/20 1442 - Present            Microbiology Results (All)     Procedure Component Value Units Date/Time    Blood Culture [[id number]] Collected: 02/08/24 1314    Order Status: Completed Lab Status: Final result Updated: 02/13/24 1400    Specimen: Blood,peripheral      Blood Culture Result No Growth 5 Days    Blood Culture [[id number]] Collected: 02/08/24 1314    Order Status: Completed Lab Status: Final result Updated: 02/13/24 1400    Specimen: Blood,peripheral      Blood Culture Result No Growth 5 Days    Clostridium difficile by EIA [[id number]]  (Abnormal) Collected: 02/08/24 1804    Order Status: Completed Lab Status: Final result Updated: 02/08/24 2014    Specimen: Stool       Expression of C. difficile toxin A/B Genes Detected    Clostridium difficile(toxigenic)PCR [[id number]]  (Abnormal) Collected: 02/08/24 1804    Order Status: Completed Lab Status: Final result Updated: 02/08/24 1932    Specimen: Stool      C. Difficile Toxin B Gene Positive    Narrative:      Correlate with EIA results to assess patient's likelihood of C. difficile infection versus colonization.     SARS-CoV-2/Flu A and B/RSV by PCR (GeneXpert) [[id number]]  (Normal) Collected: 02/08/24 0854    Order Status: Completed Lab Status: Final result Updated: 02/08/24 0942    Specimen: Other from Nares      SARS-CoV-2 (COVID-19) - (GeneXpert) Not Detected     Influenza A by PCR Negative     Influenza B by PCR Negative     RSV by PCR Negative    Narrative:      This test is intended for the qualitative detection and differentiation of SARS-CoV-2, influenza A, influenza B, and respiratory syncytial virus (RSV) viral RNA in nasopharyngeal or nares swabs from individuals suspected of respiratory viral infection consistent with COVID-19 by their healthcare provider. Signs and symptoms of respiratory viral infection due to SARS-CoV-2, influenza, and RSV can be similar.    Test performed using the Xpert Xpress SARS-CoV-2/FLU/RSV (real time RT-PCR)  assay on the GeneXpert instrument, [employer], [place] [address].   This test is being used under the Food and Drug Administration's Emergency Use Authorization.    The authorized Fact Sheet for Healthcare Providers for this assay is available upon request from the laboratory.         H&P - H&P Note      H&P signed by Kerrie Guzman DO at 2/8/2024  2:17 PM  Version 1 of 1    Author: Kerrie Guzman DO Service: — Author Type: Physician    Filed: 2/8/2024  2:17 PM Date of Service: 2/8/2024  2:06 PM Status: Signed    : Kerrie Guzman DO (Physician)         [facility]       CC:   Chief Complaint   Patient presents with    Fatigue        PCP:  Rao Beyer MD    History of Present Illness: Patient is a 83 year old male with PMH sig for CAD, DM2, HTN, HL, and recent admission for proctocolitis with perianal abscess discharged on invanz presenting with diarrhea and worsening weakness. Upon presentation he was afebrile. Labwork showed a worsening leukocytosis of 19.3    CT A/P showed:  1. Severe proctocolitis with substantial interval worsening, with interval development of a large intramural rectal abscess.      2. Additional left posterolateral and posterior peripherally enhancing fluid collections are seen, suggesting potential communicating or independent abscesses.      3. A heavy stool burden is seen throughout the more proximal colon, and there may be some degree of partial large bowel obstruction.       Patient was started on merrem and admitted for further medical management.       PMH  Past Medical History:   Diagnosis Date    Abdominal aortic aneurysm (AAA) 3.0 cm to 5.0 cm in diameter in female (HCC)     CAD involving native coronary artery of native heart without angina pectoris 03/20/2018    Cardiac LV ejection fraction 50-55% per 2018 angio  03/20/2018    Centrilobular emphysema (HCC) 03/19/2018    Coronary atherosclerosis     COVID 12/2020    Diabetes mellitus (HCC)     Gastric ulcer 2008    Healed 2009    GOUT     High blood pressure     High cholesterol     Hypercholesterolemia     HYPERLIPIDEMIA     HYPERTENSION     Hypertension     OBESITY     OSTEOARTHRITIS     osteoarthritis knees    Prediabetes     Presence of drug coated stent in prox & Mid LAD coronary artery 03/19/2018 03/20/2018    2.75 x 15 mm Xcience drug-eluting stent into the mid LAD 3.5 x 12 mm Xcience drug-eluting stent into the proximal LAD    Pulmonary emphysema (HCC)     SLEEP APNEA     [facility] fax [phone]    Sleep apnea         PSH  Past Surgical History:   Procedure Laterality Date    CATH BARE METAL STENT (BMS)      COLONOSCOPY  2009= Declines repeat     , complicated by anal fissure    COLONOSCOPY      COLONOSCOPY  2022    one small TA, int hem, can consider d/cing surveillance    COLONOSCOPY N/A 2022    Procedure: COLONOSCOPY,  with polypectomy;  Surgeon: Anthony Patel MD;  Location: [facility]    HEMORRHOIDECTOMY  Dr Kamara 3/12/10 [facility]     Rectal exam under anesthesia/anoscopy. Incision and drainage of perirectal abscess. Placement of seton . Excision of anal tags. Destruction of internal hemorrhoids.  Surgery done at [facility] on 3/12/10.    OTHER SURGICAL HISTORY      left knee arthroscopy    PATIENT DOCUMENTED NOT TO HAVE EXPERIENCED ANY OF THE FOLLOWING EVENTS N/A 2015    Procedure: ESOPHAGOGASTRODUODENOSCOPY, POSSIBLE BIOPSY, POSSIBLE POLYPECTOMY 59898;  Surgeon: Musa Cook MD;  Location: [facility]    PATIENT WITHOUGH PREOPERATIVE ORDER FOR IV ANTIBIOTIC SURGICAL SITE INFECTION PROPHYLAXIS. N/A 2015    Procedure: ESOPHAGOGASTRODUODENOSCOPY, POSSIBLE BIOPSY, POSSIBLE POLYPECTOMY 00987;  Surgeon: Musa Cook MD;  Location: [facility]    UPPER GI ENDOSCOPY PERFORMED  , 2009    UPPER GI ENDOSCOPY,BIOPSY  2/18/15=Gastritis.  H pylori negative, normal SB Bx    UPPER GI ENDOSCOPY,BIOPSY N/A 2015    Procedure: ESOPHAGOGASTRODUODENOSCOPY, POSSIBLE BIOPSY, POSSIBLE POLYPECTOMY 35465;  Surgeon: Musa Cook MD;  Location: [facility]    UPPER GI ENDOSCOPY,EXAM          ALL:  No Known Allergies     Home Medications:  No outpatient medications have been marked as taking for the 24 encounter (Hospital Encounter).         Soc Hx  Social History     Tobacco Use    Smoking status: Former     Packs/day: 1.00     Years: 40.00     Additional pack years: 0.00     Total pack years: 40.00     Types: Cigarettes     Quit date: 2009     Years since quittin.6    Smokeless tobacco: Never    Tobacco comments:     has been a 3 ppd   Substance Use Topics    Alcohol  use: Yes     Comment: occasional intake        Fam Hx  Family History   Problem Relation Age of Onset    Cancer Mother         lung    Obesity Son     Obesity Sister     Lipids Sister     Obesity Son        Review of Systems  Comprehensive ROS reviewed and negative except for what's stated above.     OBJECTIVE:  BP (!) 164/75   Pulse 79   Temp 98.5 °F (36.9 °C)   Resp 23   Ht 5' 10\" (1.778 m)   Wt 226 lb (102.5 kg)   SpO2 95%   BMI 32.43 kg/m²   General:  Alert, no distress   Head:  Normocephalic, without obvious abnormality               Neck: Supple   Lungs:   Clear to auscultation bilaterally. Normal effort       Heart:  Regular rate and rhythm, S1, S2 normal,    Abdomen:   Soft, non-tender. Bowel sounds normal.   Extremities: Extremities cherelle             Diagnostic Data:    CBC/Chem  Recent Labs   Lab 02/05/24  1550 02/08/24  0854   WBC 8.9 19.3*   HGB 10.9* 11.2*   MCV 96.3 96.2   .0 258.0       Recent Labs   Lab 02/05/24  1550 02/08/24  0854    138   K 4.2 4.2    105   CO2 28.0 27.0   BUN 17 17   CREATSERUM 1.07 1.09   * 117*   CA 9.1 8.9       Recent Labs   Lab 02/05/24  1550 02/08/24  0854   ALT 48 27   AST 35* 20   ALB 4.0 3.8       No results for input(s): \"TROP\" in the last 168 hours.    Radiology: CT ABDOMEN+PELVIS(CONTRAST ONLY)(CPT=74177)    Result Date: 2/8/2024  CONCLUSION:  1. Severe proctocolitis with substantial interval worsening, with interval development of a large intramural rectal abscess.  2. Additional left posterolateral and posterior peripherally enhancing fluid collections are seen, suggesting potential communicating or independent abscesses.  3. A heavy stool burden is seen throughout the more proximal colon, and there may be some degree of partial large bowel obstruction.   4. Infrarenal abdominal aortic aneurysm measuring up to 5.0 cm.  5. A large complex debris-filled duodenal diverticulum is seen without CT evidence acute complication.  6.  Prostatomegaly.  7. Pulmonary interstitial fibrosis is suggested.  8. Possible hepatic steatosis.  9. Lesser incidental findings as above.    elm-remote.   Dictated by (CST): Mike Baum MD on 2/08/2024 at 12:08 PM     Finalized by (CST): Mike Baum MD on 2/08/2024 at 12:20 PM          CTA BRAIN + CTA CAROTIDS (CPT=70496/11497)    Result Date: 2/8/2024  CONCLUSION:  1. No proximal intracranial flow limiting stenosis/large vessel occlusion.  No intracranial aneurysm identified.  2. No hemodynamically significant stenosis or dissection involving the cervical carotid or vertebral arteries.  Non flow-limiting moderate left and mild right carotid bifurcation atherosclerosis.  There is also mild to moderate right vertebral ostial stenosis related to atherosclerosis. 3. Noncontrast head CT demonstrates no acute intracranial abnormality. 4. Nonspecific white matter changes involving both cerebral hemispheres that most likely reflect sequelae of chronic microangiopathy. 5. Emphysema.  Noncalcified right upper lobe lung nodules, which measure up to 6 mm.  Nonemergent follow-up chest CT is suggested for complete evaluation (unless outside institution comparison imaging is available and demonstrates stability).  elm-remote  Dictated by (CST): Toño Chavarria MD on 2/08/2024 at 11:55 AM     Finalized by (CST): Toño Chavarria MD on 2/08/2024 at 12:03 PM          XR CHEST AP/PA (1 VIEW) (CPT=71045)    Result Date: 2/8/2024  CONCLUSION: No acute cardiopulmonary abnormality.  Interval placement of a right upper extremity PICC with tip in the cavoatrial junction.   Dictated by (CST): Sean Singer MD on 2/08/2024 at 10:41 AM     Finalized by (CST): Sean Singer MD on 2/08/2024 at 10:43 AM          MRI BRAIN WO ACUTE (3) SEQUENCE (CPT=70551)    Result Date: 2/8/2024  CONCLUSION: No acute intracranial process.  No evidence of acute or subacute infarct. There are moderate microvascular white matter ischemic changes, likely  related to long-standing hypertension and/or diabetes.   Dictated by (CST): Sean Singer MD on 2/08/2024 at 10:00 AM     Finalized by (CST): Sean Singer MD on 2/08/2024 at 10:01 AM             ASSESSMENT / PLAN:   Patient is a 83 year old male with PMH sig for CAD, DM2, HTN, HL, and recent admission for proctocolitis with perianal abscess discharged on invanz presenting with diarrhea and worsening weakness.    Severe proctocolitis  Large intramural rectal abscess  - recent admission for same dc on invanz  - CT reviewed, shows interval worsening  [facility]  - [facility] colleagues consulted, appreciate recommendations  - reached out to IR to see if anything can be drained    Overflow diarrhea  - CT reviewed, large stool burden  - stop all anti diarrheal medications  - needs as good bowel regimen  - currently NPO until seen by general surgery, then will start on scheduled medications    CKD  - chronic, stable    CAD  HLD  - statin, hold ASA for now\    DM2  - home regimen: not on home meds  - accuchecks QID, hypoglycemic protocol, SSI     HTN  - resume home metoprolol    FN:  - IVF: NS  - Diet: NPO    DVT Prophy: SCD  Lines: PIV    Dispo: pending clinical course    Outpatient records or previous hospital records reviewed.     Further recommendations pending patient's clinical course.  [facility] hospitalist to continue to follow patient while in house    Patient and/or patient's family given opportunity to ask questions and note understanding and agreeing with therapeutic plan as outlined    Thank You,  Kerrie Guzman DO    Hospitalist with [facility] Service number: [phone]      Electronically signed by Kerrie Guzman DO on 2/8/2024  2:17 PM              Consults - MD Consult Notes      Consults signed by Aniket Grimes MD at 2/10/2024  2:52 PM     Author: Aniket Grimes MD Service: Gastroenterology Author Type: Physician    Filed: 2/10/2024  2:52 PM Date  of Service: 2024  2:26 PM Status: Signed    : Aniekt Grimes MD (Physician)       [facility]    Report of Consultation    Raul Freed Jr. Patient Status:  Inpatient    1940 MRN [id number]   Location [facility] 5SW/SE Attending Jose D Carrillo DO   Hosp Day # 2 PCP Rao Beyer MD     Date of Admission:  2024  Date of Consult:  24  Reason for Consultation:   Rectal Abscess    History of Present Illness:   Patient is a 83 year old male with a history of HTN, HLD, DM2, CAD, Gout, DEANGELO and PUD who presents for rectal discomfort.    Patient presents back with general weakness dizziness, diarrhea and feeling off balance after recent admission for new proctocolitis and perianal abscess.  Previously managed conservatively with IV antibiotics and PICC line for 3 weeks.  Having some looser stools however no rectal bleeding.  Afebrile on admission.  Imaging with worsening of rectal intramural abscess.  Evaluated by infectious disease as well as surgery.  Repeat CT A/P with severe proctocilitis with interval worsening with interval development of large rectal intramural abscess.  Also with heavy stool burden in proximal colon, infrarenal AAA.  Plan for MRI Pelvis tomorrow.  WBC up from last admission discharge 9 to 19.3.  On Zosyn.  Stool cultures pending.  Colonoscopy 2022 with fair prep, small polyp and otherwise normal.       Past Medical History  Past Medical History:   Diagnosis Date    Abdominal aortic aneurysm (AAA) 3.0 cm to 5.0 cm in diameter in female (HCC)     CAD involving native coronary artery of native heart without angina pectoris 2018    Cardiac LV ejection fraction 50-55% per 2018 angio  2018    Centrilobular emphysema (HCC) 2018    Coronary atherosclerosis     COVID 2020    Diabetes mellitus (HCC)     Gastric ulcer 2008    Healed     GOUT     High blood pressure     High cholesterol     Hypercholesterolemia     HYPERLIPIDEMIA      HYPERTENSION     Hypertension     OBESITY     OSTEOARTHRITIS     osteoarthritis knees    Prediabetes     Presence of drug coated stent in prox & Mid LAD coronary artery 03/19/2018 03/20/2018    2.75 x 15 mm Xcience drug-eluting stent into the mid LAD 3.5 x 12 mm Xcience drug-eluting stent into the proximal LAD    Pulmonary emphysema (HCC)     SLEEP APNEA     [facility] fax [phone]    Sleep apnea        Past Surgical History  Past Surgical History:   Procedure Laterality Date    CATH BARE METAL STENT (BMS)      COLONOSCOPY  2009= Declines repeat    2009, complicated by anal fissure    COLONOSCOPY      COLONOSCOPY  03/2022    one small TA, int hem, can consider d/cing surveillance    COLONOSCOPY N/A 02/28/2022    Procedure: COLONOSCOPY,  with polypectomy;  Surgeon: Anthony Patel MD;  Location: [facility]    HEMORRHOIDECTOMY  Dr Kamara 3/12/10 [facility]     Rectal exam under anesthesia/anoscopy. Incision and drainage of perirectal abscess. Placement of seton . Excision of anal tags. Destruction of internal hemorrhoids.  Surgery done at [facility] on 3/12/10.    OTHER SURGICAL HISTORY      left knee arthroscopy    PATIENT DOCUMENTED NOT TO HAVE EXPERIENCED ANY OF THE FOLLOWING EVENTS N/A 02/18/2015    Procedure: ESOPHAGOGASTRODUODENOSCOPY, POSSIBLE BIOPSY, POSSIBLE POLYPECTOMY 06157;  Surgeon: Musa Cook MD;  Location: [facility]    PATIENT WITHOUGH PREOPERATIVE ORDER FOR IV ANTIBIOTIC SURGICAL SITE INFECTION PROPHYLAXIS. N/A 02/18/2015    Procedure: ESOPHAGOGASTRODUODENOSCOPY, POSSIBLE BIOPSY, POSSIBLE POLYPECTOMY 71870;  Surgeon: Musa Cook MD;  Location: [facility]    UPPER GI ENDOSCOPY PERFORMED  2008, 2/27/2009    UPPER GI ENDOSCOPY,BIOPSY  2/18/15=Gastritis.  H pylori negative, normal SB Bx    UPPER GI ENDOSCOPY,BIOPSY N/A 02/18/2015    Procedure: ESOPHAGOGASTRODUODENOSCOPY, POSSIBLE BIOPSY, POSSIBLE POLYPECTOMY 94479;  Surgeon: Musa Cook MD;  Location:  [facility]    UPPER GI ENDOSCOPY,EXAM         Family History  Family History   Problem Relation Age of Onset    Cancer Mother         lung    Obesity Son     Obesity Sister     Lipids Sister     Obesity Son        Social History  Social History     Socioeconomic History    Marital status:    Tobacco Use    Smoking status: Former     Packs/day: 1.00     Years: 40.00     Additional pack years: 0.00     Total pack years: 40.00     Types: Cigarettes     Quit date: 2009     Years since quittin.7    Smokeless tobacco: Never    Tobacco comments:     has been a 3 ppd   Vaping Use    Vaping Use: Never used   Substance and Sexual Activity    Alcohol use: Yes     Comment: occasional intake    Drug use: No     Social Determinants of Health     Food Insecurity: No Food Insecurity (2024)    Food Insecurity     Food Insecurity: Never true   Transportation Needs: No Transportation Needs (2024)    Transportation Needs     Lack of Transportation: No   Housing Stability: Low Risk  (2024)    Housing Stability     Housing Instability: No          Current Medications:  Current Facility-Administered Medications   Medication Dose Route Frequency    simethicone (Mylicon) chewable tab 80 mg  80 mg Oral QID PRN    hydrALAzine (Apresoline) 20 mg/mL injection 10 mg  10 mg Intravenous Q6H PRN    piperacillin-tazobactam (Zosyn) 4.5 g in dextrose 5% 100 mL IVPB-ADDV  4.5 g Intravenous Q8H    sodium chloride 0.9% infusion   Intravenous Continuous    acetaminophen (Tylenol Extra Strength) tab 500 mg  500 mg Oral Q4H PRN    ondansetron (Zofran) 4 MG/2ML injection 4 mg  4 mg Intravenous Q6H PRN    metoclopramide (Reglan) 5 mg/mL injection 10 mg  10 mg Intravenous Q8H PRN    polyethylene glycol (PEG 3350) (Miralax) 17 g oral packet 17 g  17 g Oral Daily PRN    sennosides (Senokot) tab 17.2 mg  17.2 mg Oral Nightly PRN    bisacodyl (Dulcolax) 10 MG rectal suppository 10 mg  10 mg Rectal Daily PRN    fleet enema  (Fleet) 7-19 GM/118ML rectal enema 133 mL  1 enema Rectal Once PRN    allopurinol (Zyloprim) tab 100 mg  100 mg Oral Daily    atorvastatin (Lipitor) tab 20 mg  20 mg Oral Daily    metoprolol succinate ER (Toprol XL) 24 hr tab 100 mg  100 mg Oral Daily    pantoprazole (Protonix) DR tab 20 mg  20 mg Oral QAM AC    glucose (Dex4) 15 GM/59ML oral liquid 15 g  15 g Oral Q15 Min PRN    Or    glucose (Glutose) 40% oral gel 15 g  15 g Oral Q15 Min PRN    Or    glucose-vitamin C (Dex-4) chewable tab 4 tablet  4 tablet Oral Q15 Min PRN    Or    dextrose 50% injection 50 mL  50 mL Intravenous Q15 Min PRN    Or    glucose (Dex4) 15 GM/59ML oral liquid 30 g  30 g Oral Q15 Min PRN    Or    glucose (Glutose) 40% oral gel 30 g  30 g Oral Q15 Min PRN    Or    glucose-vitamin C (Dex-4) chewable tab 8 tablet  8 tablet Oral Q15 Min PRN    insulin aspart (NovoLOG) 100 Units/mL FlexPen 1-5 Units  1-5 Units Subcutaneous TID CC    vancomycin (Firvanq) 50 mg/mL oral solution 125 mg  125 mg Oral QID     Medications Prior to Admission   Medication Sig    Magnesium 500 MG Oral Tab Take 1 tablet (500 mg total) by mouth daily.    Turmeric (QC TUMERIC COMPLEX) 500 MG Oral Cap Take 500 mg by mouth daily.    omeprazole 20 MG Oral Capsule Delayed Release Take 1 capsule (20 mg total) by mouth daily.    METOPROLOL SUCCINATE 100 MG Oral Tablet 24 Hr TAKE 1 TABLET BY MOUTH EVERY DAY    atorvastatin 20 MG Oral Tab Take 1 tablet (20 mg total) by mouth daily.    allopurinol 100 MG Oral Tab Take 1 tablet (100 mg total) by mouth daily.    cholecalciferol (VITAMIN D3) 5000 units Oral Cap Take 1 capsule (5,000 Units total) by mouth daily.    aspirin 81 MG Oral Chew Tab Chew 1 tablet (81 mg total) by mouth daily.    Omega-3 Fatty Acids (FISH OIL) 1000 MG Oral Cap ONE DAILY    Multiple Vitamin (MULTI VITAMIN MENS OR) ONE DAILY    Sildenafil Citrate 50 MG Oral Tab Take 1 tablet (50 mg total) by mouth daily as needed. (Patient not taking: Reported on 2/8/2024)     benzonatate 200 MG Oral Cap Take 1 capsule (200 mg total) by mouth 3 (three) times daily as needed.    Glucose Blood (CONTOUR NEXT TEST) In Vitro Strip TEST TWICE DAILY BEFORE MEALS AS DIRECTED    Microlet Lancets Does not apply Misc USE AS DIRECTED TWICE DAILY    Blood Glucose Monitoring Suppl ([employer] CONTOUR NEXT MONITOR) w/Device Does not apply Kit 1 Units by Does not apply route 2 (two) times daily before meals.       Allergies  No Known Allergies    Review of Systems:    Pertinent items are noted in HPI.    Physical Exam:   Blood pressure 119/57, pulse 83, temperature 97.5 °F (36.4 °C), temperature source Oral, resp. rate 18, height 5' 10\" (1.778 m), weight 226 lb (102.5 kg), SpO2 92%.    GENERAL: stable  SKIN: no rashes, no suspicious lesions  HEENT: atraumatic, normocephalic  CHEST/CARDIO: no chest tenderness, RRR without murmur  LUNGS: clear to auscultation  GI: good BS's and no masses, HSM or tenderness  MUSCULOSKELETAL: back is not tender  EXTREMITIES: no edema      Results:     Laboratory Data:  Lab Results   Component Value Date    WBC 17.9 (H) 02/10/2024    HGB 10.4 (L) 02/10/2024    HCT 30.9 (L) 02/10/2024    .0 02/10/2024    CREATSERUM 0.91 02/10/2024    BUN 9 02/10/2024     02/10/2024    K 3.2 (L) 02/10/2024     02/10/2024    CO2 26.0 02/10/2024     (H) 02/10/2024    CA 8.1 (L) 02/10/2024    ALB 3.8 02/08/2024    ALKPHO 85 02/08/2024    TP 7.6 02/08/2024    AST 20 02/08/2024    ALT 27 02/08/2024    TSH 3.044 05/18/2015    PSA 1.02 11/25/2015    CRP <0.40 02/05/2024    MG 2.2 01/29/2024    TROPHS 6 02/08/2024         Imaging:  No results found.       Impression:   83 year old male with a history of HTN, HLD, DM2, CAD, Gout, DEANGELO and PUD who presents for rectal discomfort.    Rectal Intramural Abscess:  Proctocolitis:  Leukocytosis:  - Patient presents back with fatigue and weakness after recent admission for new proctocolitis and perianal abscess.  Previously managed  conservatively with IV antibiotics and PICC line for 3 weeks.   - Imaging with severe proctocilitis with interval worsening with interval development of large rectal intramural abscess.  Also with heavy stool burden in proximal colon, infrarenal AAA. Colonoscopy 2/2022 with fair prep, small polyp and otherwise normal.   - Discussed with Dr. Patterson and imaging reviewed.  Unable to drain endoscopically with rectal EUS given appearance and location of the abscess.  Unable to drain per IR.   - Dr. Shelby following, plan for MRI Pelvis tomorrow  - ID following, on Zosyn    Aniket Grimes MD  2/10/2024      Electronically signed by Aniket Grimes MD on 2/10/2024  2:52 PM           D/C Summary    No notes of this type exist for this encounter.     Physical Therapy Notes (last 72 hours)  Notes from 2/13/2024  2:43 PM through 2/16/2024  2:43 PM   No notes of this type exist for this encounter.        Occupational Therapy Notes (last 72 hours)      Occupational Therapy Note signed by Isela Watson OT at 2/14/2024 12:57 PM  Version 1 of 1    Author: Isela Watson OT Service: — Author Type: Occupational Therapist    Filed: 2/14/2024 12:57 PM Date of Service: 2/14/2024  9:30 AM Status: Signed    : Isela Watson OT (Occupational Therapist)       Attempted to see patient this AM for OT follow up treat. Patient sleeping soundly in bed. Patient awoken, but refusing any OOB activity this session. Education provided on importance of OOB activity. Will continue to follow and follow up as scheduling allows and patient is willing to participate.    Isela Watson OTR/L  [facility]  [id number]               Video Swallow Study Notes    No notes of this type exist for this encounter.     SLP Notes    No notes of this type exist for this encounter.     Immunizations     Name Date      Covid-19 Moderna 05/18/21     Covid-19 Moderna 04/14/21     Depo-Medrol 40mg Inj 01/21/20     Depo-Medrol 40mg Inj 02/14/17      INFLUENZA 10/16/20     INFLUENZA 10/08/19     INFLUENZA 09/17/18     INFLUENZA 03/19/18     INFLUENZA defer-11/13/17     Deferral: +Patient Refuses Z28.21     INFLUENZA defer-11/13/17     Deferral: Patient Refused     Pneumococcal (Prevnar 13) 11/30/15     Pneumovax 23 08/19/13     TDAP 08/27/14     Zoster Vaccine Live (Zostavax) 07/15/13       Future Appointments        Provider Department Center    3/26/2024 8:00 AM Kehinde Bernabe MD [facility]      Multidisciplinary Problems     Active Goals     Not on file          Resolved Goals        Problem: Patient/Family Goals    Goal Priority Disciplines Outcome Interventions   Patient/Family Long Term Goal   (Resolved)     Interdisciplinary Adequate for Discharge    Description: Patient's Long Term Goal: to return home    Interventions:  - Further testing  - Monitor labs and vital signs  - Medication compliance  - Follow provider recommendations  - See additional Care Plan goals for specific interventions   Patient/Family Short Term Goal   (Resolved)     Interdisciplinary Adequate for Discharge    Description: Patient's Short Term Goal: no more diarrhea    Interventions:   - Monitor intake and output  - Medication compliance  - Follow provider recommendations  - See additional Care Plan goals for specific interventions

## (undated) NOTE — LETTER
[place] [address]  Authorization for Invasive Procedures  Date: 2/13/2024           Time: 1030    I hereby authorize Dr. Alvarado , my physician and his/her assistants (if applicable), which may include medical students, residents, and/or fellows, to perform the following surgical operation/ procedure and administer such anesthesia as may be determined necessary by my physician: *** on Raul Freed Jr.  2.   I recognize that during the surgical operation/procedure, unforeseen conditions may necessitate additional or different procedures than those listed above.  I, therefore, further authorize and request that the above-named surgeon, assistants, or designees perform such procedures as are, in their judgment, necessary and desirable.    3.   My surgeon/physician has discussed prior to my surgery the potential benefits, risks and side effects of this procedure; the likelihood of achieving goals; and potential problems that might occur during recuperation.  They also discussed reasonable alternatives to the procedure, including risks, benefits, and side effects related to the alternatives and risks related to not receiving this procedure.  I have had all my questions answered and I acknowledge that no guarantee has been made as to the result that may be obtained.    4.   Should the need arise during my operation/procedure, which includes change of level of care prior to discharge, I also consent to the administration of blood and/or blood products.  Further, I understand that despite careful testing and screening of blood or blood products by collecting agencies, I may still be subject to ill effects as a result of receiving a blood transfusion and/or blood products.  The following are some, but not all, of the potential risks that can occur: fever and allergic reactions, hemolytic reactions, transmission of diseases such as Hepatitis, AIDS and Cytomegalovirus (CMV) and fluid overload.  In  the event that I wish to have an autologous transfusion of my own blood, or a directed donor transfusion, I will discuss this with my physician.   Check only if Refusing Blood or Blood Products  I understand refusal of blood or blood products as deemed necessary by my physician may have serious consequences to my condition to include possible death. I hereby assume responsibility for my refusal and release the hospital, its personnel, and my physicians from any responsibility for the consequences of my refusal.         o  Refuse         5.   I authorize the use of any specimen, organs, tissues, body parts or foreign objects that may be removed from my body during the operation/procedure for diagnosis, research or teaching purposes and their subsequent disposal by hospital authorities.  I also authorize the release of specimen test results and/or written reports to my treating physician on the hospital medical staff or other referring or consulting physicians involved in my care, at the discretion of the Pathologist or my treating physician.    6.   I consent to the photographing or videotaping of the operations or procedures to be performed, including appropriate portions of my body for medical, scientific, or educational purposes, provided my identity is not revealed by the pictures or by descriptive texts accompanying them.  If the procedure has been photographed/videotaped, the surgeon will obtain the original picture, image, videotape or CD.  The hospital will not be responsible for storage, release or maintenance of the picture, image, tape or CD.    7.   I consent to the presence of a  or observers in the operating room as deemed necessary by my physician or their designees.    8.   I recognize that in the event my procedure results in extended X-Ray/fluoroscopy time, I may develop a skin reaction.    9. If I have a Do Not Attempt Resuscitation (DNAR) order in place, that status will be  suspended while in the operating room, procedural suite, and during the recovery period unless otherwise explicitly stated by me (or a person authorized to consent on my behalf). The surgeon or my attending physician will determine when the applicable recovery period ends for purposes of reinstating the DNAR order.  10. Patients having a sterilization procedure: I understand that if the procedure is successful the results will be permanent and it will therefore be impossible for me to inseminate, conceive, or bear children.  I also understand that the procedure is intended to result in sterility, although the result has not been guaranteed.   11. I acknowledge that my physician has explained sedation/analgesia administration to me including the risk and benefits I consent to the administration of sedation/analgesia as may be necessary or desirable in the judgment of my physician.    I CERTIFY THAT I HAVE READ AND FULLY UNDERSTAND THE ABOVE CONSENT TO OPERATION and/or OTHER PROCEDURE.        ____________________________________       _________________________________      ______________________________  Signature of Patient         Signature of Responsible Person        Printed Name of Responsible Person        ____________________________________      _________________________________      ______________________________       Signature of Witness          Relationship to Patient                       Date                                       Time    Patient Name: Raul Freed      : 1940                 Printed: 2024      Medical Record #: [id number]                      Page 1 of 2          STATEMENT OF PHYSICIAN My signature below affirms that prior to the time of the procedure; I have explained to the patient and/or his/her legal representative, the risks and benefits involved in the proposed treatment and any reasonable alternative to the proposed treatment. I have also explained the  risks and benefits involved in refusal of the proposed treatment and alternatives to the proposed treatment and have answered the patient's questions. If I have a significant financial interest in a co-management agreement or a significant financial interest in any product or implant, or other significant relationship used in this procedure/surgery, I have disclosed this and had a discussion with my patient.     _______________________________________________________________ _____________________________  (Signature of Physician)                                                                                         (Date)                                   (Time)    Patient Name: Raul Freed Jr.     : 1940                 Printed: 2024      Medical Record #: [id number]                      Page 2 of 2

## (undated) NOTE — [place] [address]  Authorization for Invasive Procedures  Date: 2/13/2024           Time: 1030    I hereby authorize Dr. Alvarado, my physician and his/her assistants (if applicable), which may include medical students, residents, and/or fellows, to perform the following surgical operation/ procedure and administer such anesthesia as may be determined necessary by my physician: Sigmoidoscopy on Raul GALVAN Abdiaziz Haddad  2.   I recognize that during the surgical operation/procedure, unforeseen conditions may necessitate additional or different procedures than those listed above.  I, therefore, further authorize and request that the above-named surgeon, assistants, or designees perform such procedures as are, in their judgment, necessary and desirable.    3.   My surgeon/physician has discussed prior to my surgery the potential benefits, risks and side effects of this procedure; the likelihood of achieving goals; and potential problems that might occur during recuperation.  They also discussed reasonable alternatives to the procedure, including risks, benefits, and side effects related to the alternatives and risks related to not receiving this procedure.  I have had all my questions answered and I acknowledge that no guarantee has been made as to the result that may be obtained.    4.   Should the need arise during my operation/procedure, which includes change of level of care prior to discharge, I also consent to the administration of blood and/or blood products.  Further, I understand that despite careful testing and screening of blood or blood products by collecting agencies, I may still be subject to ill effects as a result of receiving a blood transfusion and/or blood products.  The following are some, but not all, of the potential risks that can occur: fever and allergic reactions, hemolytic reactions, transmission of diseases such as Hepatitis, AIDS and Cytomegalovirus (CMV) and fluid  overload.  In the event that I wish to have an autologous transfusion of my own blood, or a directed donor transfusion, I will discuss this with my physician.   Check only if Refusing Blood or Blood Products  I understand refusal of blood or blood products as deemed necessary by my physician may have serious consequences to my condition to include possible death. I hereby assume responsibility for my refusal and release the hospital, its personnel, and my physicians from any responsibility for the consequences of my refusal.         o  Refuse         5.   I authorize the use of any specimen, organs, tissues, body parts or foreign objects that may be removed from my body during the operation/procedure for diagnosis, research or teaching purposes and their subsequent disposal by hospital authorities.  I also authorize the release of specimen test results and/or written reports to my treating physician on the hospital medical staff or other referring or consulting physicians involved in my care, at the discretion of the Pathologist or my treating physician.    6.   I consent to the photographing or videotaping of the operations or procedures to be performed, including appropriate portions of my body for medical, scientific, or educational purposes, provided my identity is not revealed by the pictures or by descriptive texts accompanying them.  If the procedure has been photographed/videotaped, the surgeon will obtain the original picture, image, videotape or CD.  The hospital will not be responsible for storage, release or maintenance of the picture, image, tape or CD.    7.   I consent to the presence of a  or observers in the operating room as deemed necessary by my physician or their designees.    8.   I recognize that in the event my procedure results in extended X-Ray/fluoroscopy time, I may develop a skin reaction.    9. If I have a Do Not Attempt Resuscitation (DNAR) order in place, that status  will be suspended while in the operating room, procedural suite, and during the recovery period unless otherwise explicitly stated by me (or a person authorized to consent on my behalf). The surgeon or my attending physician will determine when the applicable recovery period ends for purposes of reinstating the DNAR order.  10. Patients having a sterilization procedure: I understand that if the procedure is successful the results will be permanent and it will therefore be impossible for me to inseminate, conceive, or bear children.  I also understand that the procedure is intended to result in sterility, although the result has not been guaranteed.   11. I acknowledge that my physician has explained sedation/analgesia administration to me including the risk and benefits I consent to the administration of sedation/analgesia as may be necessary or desirable in the judgment of my physician.    I CERTIFY THAT I HAVE READ AND FULLY UNDERSTAND THE ABOVE CONSENT TO OPERATION and/or OTHER PROCEDURE.        ____________________________________       _________________________________      ______________________________  Signature of Patient         Signature of Responsible Person        Printed Name of Responsible Person        ____________________________________      _________________________________      ______________________________       Signature of Witness          Relationship to Patient                       Date                                       Time    Patient Name: Raul Freed      : 1940                 Printed: 2024      Medical Record #: [id number]                      Page 1 of 2          STATEMENT OF PHYSICIAN My signature below affirms that prior to the time of the procedure; I have explained to the patient and/or his/her legal representative, the risks and benefits involved in the proposed treatment and any reasonable alternative to the proposed treatment. I have also  explained the risks and benefits involved in refusal of the proposed treatment and alternatives to the proposed treatment and have answered the patient's questions. If I have a significant financial interest in a co-management agreement or a significant financial interest in any product or implant, or other significant relationship used in this procedure/surgery, I have disclosed this and had a discussion with my patient.     _______________________________________________________________ _____________________________  (Signature of Physician)                                                                                         (Date)                                   (Time)    Patient Name: Raul Freed Jr.     : 1940                 Printed: 2024      Medical Record #: [id number]                      Page 2 of 2

## (undated) NOTE — LETTER
[facility], [place]     PICC INSERTION CONSENT     I agree to have a Peripherally Inserted Central Catheter (PICC) placed in my arm.   1. The PICC insertion procedure, care, maintenance, risks, benefits, and complications have been explained to me by my physician, ________________________, and I understand them.   2. I understand that this may not be the only way I can receive my medication. I understand that my physician has determined that the PICC would be the safest and most effective means of administering my medication at this time. If there are other options of giving medication into my veins those options have been explained to me by my physician and I have chosen this one.   3. I realize a nurse who has been specially trained and certified by the hospital and ’s representative to insert a PICC will perform this procedure. My catheter will be inserted by _____________________________.   4. I have been informed by my doctor of the nature and purpose of this procedure and the risks involved and the possibility of complications. I realize that this is an invasive procedure and has certain risks such as air embolism (air entering the catheter or my vein), arterial puncture (a tearing of one of my arteries), infection, irregular heartbeat and venous thrombosis (a blood clot in a vein) nerve injury and fracture of the catheter with or without migration.   5. In order to numb the area where the line will be placed, a small amount of anesthetic medication will be injected as ordered by my physician.   6. I understand that while the catheter will be placed in my upper arm the end of the catheter will come to rest in an area near my heart.     7. The person performing this procedure has discussed the potential benefits, risks, and side effects of the PICC; the likelihood of achieving goals; and potential problems that might occur during recuperation. They also discussed reasonable alternatives to  the PICC, including risks, benefits, and side effects related to the alternatives and risks related to not receiving this procedure.    8.  I have expressed any questions about this procedure to my physician or the PICC Proceduralist and he/she has answered them.  I certify that I have read and understand this consent to the insertion of a PICC.      _________________________________________________________   Date     Time     Patient/Guardian Signature       ____________________________________   Printed name of Patient/Guardian          ________________________________________________________________    Date        Time                   Witnessing RN Signature      Patient Name: Raul GALVAN Abdiaziz Haddad     : 1940                 Printed: 2024     Medical Record #: [id number]

## (undated) NOTE — LETTER
[facility]  Administration of Anesthesia  I, Raul Freed . agree to be cared for by a physician anesthesiologist alone and/or with a nurse anesthetist, who is specially trained to monitor me and give me medicine to put me to sleep or keep me comfortable during my procedure    I understand that my anesthesiologist and/or anesthetist is not an employee or agent of [facility] or [employer]. He or she works for [facility]    As the patient asking for anesthesia services, I agree to:  Allow the anesthesiologist (anesthesia doctor) to give me medicine and do additional procedures as necessary. Some examples are: Starting or using an “IV” to give me medicine, fluids or blood during my procedure, and having a breathing tube placed to help me breathe when I’m asleep (intubation). In the event that my heart stops working properly, I understand that my anesthesiologist will make every effort to sustain my life, unless otherwise directed by [facility] Do Not Resuscitate documents.  Tell my anesthesia doctor before my procedure:  If I am pregnant.  The last time that I ate or drank.  iii. All of the medicines I take (including prescriptions, herbal supplements, and pills I can buy without a prescription (including street drugs/illegal medications). Failure to inform my anesthesiologist about these medicines may increase my risk of anesthetic complications.  iv.If I am allergic to anything or have had a reaction to anesthesia before.  I understand how the anesthesia medicine will help me (benefits).  I understand that with any type of anesthesia medicine there are risks:  The most common risks are: nausea, vomiting, sore throat, muscle soreness, damage to my eyes, mouth, or teeth (from breathing tube placement).  Rare risks include: remembering what happened during my procedure, allergic reactions to medications, injury to my airway, heart, lungs, vision, nerves, or  muscles and in extremely rare instances death.  My doctor has explained to me other choices available to me for my care (alternatives).  Pregnant Patients (“epidural”):  I understand that the risks of having an epidural (medicine given into my back to help control pain during labor), include itching, low blood pressure, difficulty urinating, headache or slowing of the baby’s heart. Very rare risks include infection, bleeding, seizure, irregular heart rhythms and nerve injury.  Regional Anesthesia (“spinal”, “epidural”, & “nerve blocks”):  I understand that rare but potential complications include headache, bleeding, infection, seizure, irregular heart rhythms, and nerve injury.    _____________________________________________________________________________  Patient (or Representative) Signature/Relationship to Patient  Date   Time    _____________________________________________________________________________   Name (if used)    Language/Organization   Time    _____________________________________________________________________________  Nurse Anesthetist Signature     Date   Time  _____________________________________________________________________________  Anesthesiologist Signature     Date   Time  I have discussed the procedure and information above with the patient (or patient’s representative) and answered their questions. The patient or their representative has agreed to have anesthesia services.    _____________________________________________________________________________  Witness        Date   Time  I have verified that the signature is that of the patient or patient’s representative, and that it was signed before the procedure  Patient Name: Raul Freed      : 1940                 Printed: 2024 at 8:18 AM    Medical Record #: [id number]                                            Page 1 of 1  ----------ANESTHESIA CONSENT----------